# Patient Record
Sex: MALE | Race: WHITE | Employment: OTHER | URBAN - METROPOLITAN AREA
[De-identification: names, ages, dates, MRNs, and addresses within clinical notes are randomized per-mention and may not be internally consistent; named-entity substitution may affect disease eponyms.]

---

## 2017-01-04 ENCOUNTER — TRANSFERRED RECORDS (OUTPATIENT)
Dept: HEALTH INFORMATION MANAGEMENT | Facility: CLINIC | Age: 82
End: 2017-01-04

## 2017-01-19 ENCOUNTER — TELEPHONE (OUTPATIENT)
Dept: INTERNAL MEDICINE | Facility: CLINIC | Age: 82
End: 2017-01-19

## 2017-01-19 NOTE — TELEPHONE ENCOUNTER
[facility] called for verbal orders, pt private pays for SNV  Med recon, vitals, dietary d/t parkinson    Verbal orders given per protocol  Sheila Mancia RN

## 2017-03-02 ENCOUNTER — OFFICE VISIT (OUTPATIENT)
Dept: INTERNAL MEDICINE | Facility: CLINIC | Age: 82
End: 2017-03-02
Payer: MEDICARE

## 2017-03-02 VITALS
BODY MASS INDEX: 25.43 KG/M2 | HEART RATE: 71 BPM | SYSTOLIC BLOOD PRESSURE: 126 MMHG | HEIGHT: 66 IN | OXYGEN SATURATION: 98 % | WEIGHT: 158.2 LBS | DIASTOLIC BLOOD PRESSURE: 76 MMHG | TEMPERATURE: 98.1 F

## 2017-03-02 DIAGNOSIS — L30.9 DERMATITIS: Primary | ICD-10-CM

## 2017-03-02 DIAGNOSIS — I10 ESSENTIAL HYPERTENSION WITH GOAL BLOOD PRESSURE LESS THAN 140/90: ICD-10-CM

## 2017-03-02 PROCEDURE — 99213 OFFICE O/P EST LOW 20 MIN: CPT | Performed by: INTERNAL MEDICINE

## 2017-03-02 RX ORDER — METOPROLOL SUCCINATE 25 MG/1
25 TABLET, EXTENDED RELEASE ORAL EVERY MORNING
Qty: 90 TABLET | Refills: 3 | Status: SHIPPED | OUTPATIENT
Start: 2017-03-02 | End: 2018-01-26

## 2017-03-02 RX ORDER — CLOBETASOL PROPIONATE 0.5 MG/G
OINTMENT TOPICAL
Qty: 60 G | Refills: 3 | Status: SHIPPED | OUTPATIENT
Start: 2017-03-02 | End: 2018-01-26

## 2017-03-02 NOTE — PROGRESS NOTES
"  SUBJECTIVE:                                                    Chilango Echevarria is a 82 year old male who presents to clinic today for the following health issues:    Rash      Duration: x1 month    Description (location/character/radiation): on back    Intensity:  moderate    Accompanying signs and symptoms: None    History (similar episodes/previous evaluation): None    Precipitating or alleviating factors: None    Therapies tried and outcome: Eucerin cream     Rash pruritic- mostly at night  Pt has long standing hx of dermatitis - atopic and at times contact     Problem list and histories reviewed & adjusted, as indicated.  Additional history: as documented    Labs reviewed in EPIC    Reviewed and updated as needed this visit by clinical staff  Tobacco  Allergies  Meds  Problems       Reviewed and updated as needed this visit by Provider  Allergies  Meds  Problems         ROS:  Constitutional, HEENT, cardiovascular, pulmonary, gi and gu systems are negative, except as otherwise noted.    OBJECTIVE:                                                    /76 (BP Location: Right arm, Patient Position: Chair, Cuff Size: Adult Regular)  Pulse 71  Temp 98.1  F (36.7  C) (Oral)  Ht 5' 5.75\" (1.67 m)  Wt 158 lb 3.2 oz (71.8 kg)  SpO2 98%  BMI 25.73 kg/m2  Body mass index is 25.73 kg/(m^2).  GENERAL APPEARANCE: alert and no distress  SKIN: papular rash diffusely spread over upper back, some small vesicular lesions interdispersed along the lesions, some excoriations as well.     Diagnostic test results:  none      ASSESSMENT/PLAN:                                                    1. Dermatitis  Try topical - may be limited by ability to apply to his back  - DERMATOLOGY REFERRAL  - clobetasol (TEMOVATE) 0.05 % ointment; Apply sparingly to affected area twice daily.  Do not apply to face.  Dispense: 60 g; Refill: 3    2. Essential hypertension with goal blood pressure less than 140/90  - metoprolol (TOPROL-XL) " 25 MG 24 hr tablet; Take 1 tablet (25 mg) by mouth every morning  Dispense: 90 tablet; Refill: 3      Follow up with Provider - prn      Enrique Ring MD  [facility]

## 2017-03-02 NOTE — NURSING NOTE
"Chief Complaint   Patient presents with     Derm Problem     rash       Initial /76 (BP Location: Right arm, Patient Position: Chair, Cuff Size: Adult Regular)  Pulse 71  Temp 98.1  F (36.7  C) (Oral)  Ht 5' 5.75\" (1.67 m)  Wt 158 lb 3.2 oz (71.8 kg)  SpO2 98%  BMI 25.73 kg/m2 Estimated body mass index is 25.73 kg/(m^2) as calculated from the following:    Height as of this encounter: 5' 5.75\" (1.67 m).    Weight as of this encounter: 158 lb 3.2 oz (71.8 kg).  Medication Reconciliation: incomplete   Shannon Morris CMA ([facility])      "

## 2017-03-15 ENCOUNTER — TELEPHONE (OUTPATIENT)
Dept: INTERNAL MEDICINE | Facility: CLINIC | Age: 82
End: 2017-03-15

## 2017-03-15 NOTE — TELEPHONE ENCOUNTER
[facility] Nurse is calling requesting Re-cert of home care. Cont with private pay nurse visits as requested by pt for nextt 60 days.  Verbal OK given, per standing orders.  Form will be faxed to PCP to review, sign, and complete.

## 2017-04-04 NOTE — MR AVS SNAPSHOT
After Visit Summary   3/2/2017    Chilango Echevarria    MRN: [id number]           Patient Information     Date Of Birth          10/24/1934        Visit Information        Provider Department      3/2/2017 1:20 PM Enrique Ring MD [facility]        Today's Diagnoses     Dermatitis    -  1    Essential hypertension with goal blood pressure less than 140/90           Follow-ups after your visit        Additional Services     DERMATOLOGY REFERRAL       Your provider has referred you to: [facility]: [facility] [phone]   [email or web address]    Please be aware that coverage of these services is subject to the terms and limitations of your health insurance plan.  Call member services at your health plan with any benefit or coverage questions.      Please bring the following with you to your appointment:    (1) Any X-Rays, CTs or MRIs which have been performed.  Contact the facility where they were done to arrange for  prior to your scheduled appointment.    (2) List of current medications  (3) This referral request   (4) Any documents/labs given to you for this referral                  Who to contact     If you have questions or need follow up information about today's clinic visit or your schedule please contact [facility] directly at [phone].  Normal or non-critical lab and imaging results will be communicated to you by MyChart, letter or phone within 4 business days after the clinic has received the results. If you do not hear from us within 7 days, please contact the clinic through MyChart or phone. If you have a critical or abnormal lab result, we will notify you by phone as soon as possible.  Submit refill requests through [employer] or call your pharmacy and they will forward the refill request to us. Please allow 3 business days for your refill to be completed.           "Additional Information About Your Visit        MyChart Information     "InvierteMe,SL" lets you send messages to your doctor, view your test results, renew your prescriptions, schedule appointments and more. To sign up, go to [email or web address] . Click on \"Log in\" on the left side of the screen, which will take you to the Welcome page. Then click on \"Sign up Now\" on the right side of the page.     You will be asked to enter the access code listed below, as well as some personal information. Please follow the directions to create your username and password.     Your access code is: [email or web address]  Expires: 2017  2:02 PM     Your access code will  in 90 days. If you need help or a new code, please call your [place] clinic or [phone].        Care EveryWhere ID     This is your Care EveryWhere ID. This could be used by other organizations to access your [place] medical records  [id number]        Your Vitals Were     Pulse Temperature Height Pulse Oximetry BMI (Body Mass Index)       71 98.1  F (36.7  C) (Oral) 5' 5.75\" (1.67 m) 98% 25.73 kg/m2        Blood Pressure from Last 3 Encounters:   17 126/76   16 124/82   16 115/80    Weight from Last 3 Encounters:   17 158 lb 3.2 oz (71.8 kg)   16 150 lb 6.4 oz (68.2 kg)   16 150 lb (68 kg)              We Performed the Following     DERMATOLOGY REFERRAL          Today's Medication Changes          These changes are accurate as of: 3/2/17  2:02 PM.  If you have any questions, ask your nurse or doctor.               These medicines have changed or have updated prescriptions.        Dose/Directions    * clobetasol 0.05 % cream   Commonly known as:  TEMOVATE   This may have changed:  Another medication with the same name was added. Make sure you understand how and when to take each.   Used for:  Essential hypertension with goal blood pressure less than 140/90   Changed by:  Enrique Ring MD        CHARBEL TOPICALLY BID PRN "   Refills:  5       * clobetasol 0.05 % cream   Commonly known as:  TEMOVATE   This may have changed:  Another medication with the same name was added. Make sure you understand how and when to take each.   Used for:  Contact dermatitis, unspecified contact dermatitis type, unspecified trigger   Changed by:  Enrique Ring MD        apply two times daily if needed.   Quantity:  30 g   Refills:  5       * clobetasol 0.05 % ointment   Commonly known as:  TEMOVATE   This may have changed:  You were already taking a medication with the same name, and this prescription was added. Make sure you understand how and when to take each.   Used for:  Dermatitis   Changed by:  Enrique Ring MD        Apply sparingly to affected area twice daily.  Do not apply to face.   Quantity:  60 g   Refills:  3       * Notice:  This list has 3 medication(s) that are the same as other medications prescribed for you. Read the directions carefully, and ask your doctor or other care provider to review them with you.      Stop taking these medicines if you haven't already. Please contact your care team if you have questions.     fluticasone 50 MCG/ACT spray   Commonly known as:  FLONASE   Stopped by:  Enrique Ring MD           ketotifen 0.025 % Soln ophthalmic solution   Commonly known as:  ZADITOR   Stopped by:  Enrique Ring MD           loratadine 10 MG tablet   Commonly known as:  CLARITIN   Stopped by:  Enrique Ring MD           psyllium 58.6 % Powd   Commonly known as:  METAMUCIL SMOOTH TEXTURE   Stopped by:  Enrique Ring MD           ranitidine 150 MG tablet   Commonly known as:  ZANTAC   Stopped by:  Enrique Ring MD                Where to get your medicines      These medications were sent to [id number] [address] AT [facility] [address]  [address] [facility] [address], [facility] [address]     Phone:  [phone]     clobetasol 0.05 % ointment    metoprolol  25 MG 24 hr tablet                Primary Care Provider Office Phone # Fax #    Enrique Ring [phone]       [facility] [address] [facility] [address]        Thank you!     Thank you for choosing [facility]  for your care. Our goal is always to provide you with excellent care. Hearing back from our patients is one way we can continue to improve our services. Please take a few minutes to complete the written survey that you may receive in the mail after your visit with us. Thank you!             Your Updated Medication List - Protect others around you: Learn how to safely use, store and throw away your medicines at www.disposemymeds.org.          This list is accurate as of: 3/2/17  2:02 PM.  Always use your most recent med list.                   Brand Name Dispense Instructions for use    allopurinol 100 MG tablet    ZYLOPRIM    90 tablet    Take 1 tablet (100 mg) by mouth daily       aspirin 81 MG tablet      1 tab po QD (Once per day)       bisacodyl 5 MG EC tablet    DULCOLAX     Take 1 tablet (5 mg) by mouth daily as needed for constipation       * clobetasol 0.05 % cream    TEMOVATE     CHARBEL TOPICALLY BID PRN       * clobetasol 0.05 % cream    TEMOVATE    30 g    apply two times daily if needed.       * clobetasol 0.05 % ointment    TEMOVATE    60 g    Apply sparingly to affected area twice daily.  Do not apply to face.       ENSURE PLUS Liqd      Take 240 mLs by mouth daily       metoprolol 25 MG 24 hr tablet    TOPROL-XL    90 tablet    Take 1 tablet (25 mg) by mouth every morning       omeprazole 20 MG CR capsule    priLOSEC    90 capsule    Take  by mouth. TAKE 30'-60' BEFORE 1ST MEAL DAILY       polyethylene glycol powder    MIRALAX    510 g    Take 17 g by mouth daily as needed for constipation (1 capful)       * Notice:  This list has 3 medication(s) that are the same as other medications prescribed for you. Read the directions  carefully, and ask your doctor or other care provider to review them with you.       no

## 2017-05-22 DIAGNOSIS — Z53.9 DIAGNOSIS NOT YET DEFINED: Primary | ICD-10-CM

## 2017-05-22 PROCEDURE — G0180 MD CERTIFICATION HHA PATIENT: HCPCS | Performed by: INTERNAL MEDICINE

## 2017-06-09 DIAGNOSIS — M1A.9XX0 CHRONIC GOUT WITHOUT TOPHUS, UNSPECIFIED CAUSE, UNSPECIFIED SITE: Primary | ICD-10-CM

## 2017-06-09 DIAGNOSIS — I10 ESSENTIAL HYPERTENSION: ICD-10-CM

## 2017-06-09 RX ORDER — ALLOPURINOL 100 MG/1
TABLET ORAL
Qty: 90 TABLET | Refills: 1 | Status: SHIPPED | OUTPATIENT
Start: 2017-06-09 | End: 2017-12-18

## 2017-06-09 NOTE — TELEPHONE ENCOUNTER
Routing refill request to provider for review/approval because:  Labs not current:  Uric acid

## 2017-06-09 NOTE — TELEPHONE ENCOUNTER
allopurinol       Last Written Prescription Date: 11/16/16  Last Fill Quantity: 90, # refills: 1  Last Office Visit with [facility], P or [facility] prescribing provider:  03/02/17        Uric Acid   Date Value Ref Range Status   06/16/2010 6.3 3.5 - 8.5 mg/dL Final   ]  Creatinine   Date Value Ref Range Status   05/26/2015 0.73 mg/dL Final   ]  Lab Results   Component Value Date    WBC 7.4 05/17/2015     Lab Results   Component Value Date    RBC 3.52 05/17/2015     Lab Results   Component Value Date    HGB 10.8 05/26/2015    HGB 10.8 05/26/2015     Lab Results   Component Value Date    HCT 33.6 05/17/2015     No components found for: MCT  Lab Results   Component Value Date    MCV 96 05/17/2015     Lab Results   Component Value Date    MCH 32.1 05/17/2015     Lab Results   Component Value Date    MCHC 33.6 05/17/2015     Lab Results   Component Value Date    RDW 14.1 05/17/2015     Lab Results   Component Value Date     05/20/2015     Lab Results   Component Value Date    AST 20 05/18/2015     Lab Results   Component Value Date    ALT 24 05/18/2015

## 2017-07-13 ENCOUNTER — TELEPHONE (OUTPATIENT)
Dept: NURSING | Facility: CLINIC | Age: 82
End: 2017-07-13

## 2017-07-19 DIAGNOSIS — Z53.9 DIAGNOSIS NOT YET DEFINED: Primary | ICD-10-CM

## 2017-07-19 PROCEDURE — G0179 MD RECERTIFICATION HHA PT: HCPCS | Performed by: INTERNAL MEDICINE

## 2017-10-04 DIAGNOSIS — M10.9 GOUT: ICD-10-CM

## 2017-10-04 DIAGNOSIS — K21.9 GASTROESOPHAGEAL REFLUX DISEASE WITHOUT ESOPHAGITIS: ICD-10-CM

## 2017-10-04 NOTE — TELEPHONE ENCOUNTER
Omeprazole      Last Written Prescription Date: 11/16/16  Last Fill Quantity: 90,  # refills: 2   Last Office Visit with [facility], P or [facility] prescribing provider: 03/02/17; last OV discussing GERD: >1 year ago    Routing refill request to provider for review/approval because:  Last OV discussing GERD >1 year ago    Please advise, thanks.

## 2017-10-17 PROCEDURE — G0179 MD RECERTIFICATION HHA PT: HCPCS | Performed by: INTERNAL MEDICINE

## 2017-12-18 DIAGNOSIS — M1A.9XX0 CHRONIC GOUT WITHOUT TOPHUS, UNSPECIFIED CAUSE, UNSPECIFIED SITE: ICD-10-CM

## 2017-12-18 NOTE — LETTER
[facility]  [address] [id number] [phone]            Chilango Echevarria  [address] [place] [address]  [id number]  [facility] [address]        December 19, 2017    Dear Chilango,    While refilling your prescription today, we noticed that you are due to have labs drawn.  We will refill your prescription for 30 days, but a follow-up appointment must be made before any additional refills can be approved.     Taking care of your health is important to us and we look forward to seeing you in the near future.  Please call us at [phone] or [phone] (or use Quixhop) to schedule an appointment.     Please disregard this notice if you have already made an appointment.    Sincerely,        [facility]

## 2017-12-19 RX ORDER — ALLOPURINOL 100 MG/1
TABLET ORAL
Qty: 30 TABLET | Refills: 0 | Status: SHIPPED | OUTPATIENT
Start: 2017-12-19 | End: 2018-01-26

## 2018-01-10 ENCOUNTER — TELEPHONE (OUTPATIENT)
Dept: NURSING | Facility: CLINIC | Age: 83
End: 2018-01-10

## 2018-01-10 NOTE — TELEPHONE ENCOUNTER
Parveen, nurse from [facility], calling with request for orders.  Patient is requesting to have one private pay nurse visit within the next 60 days.  Verbal consent given per standing nurse orders.  Hawa Srivastava RN

## 2018-01-18 DIAGNOSIS — Z53.9 DIAGNOSIS NOT YET DEFINED: Primary | ICD-10-CM

## 2018-01-18 PROCEDURE — G0179 MD RECERTIFICATION HHA PT: HCPCS | Performed by: INTERNAL MEDICINE

## 2018-01-26 ENCOUNTER — OFFICE VISIT (OUTPATIENT)
Dept: INTERNAL MEDICINE | Facility: CLINIC | Age: 83
End: 2018-01-26
Payer: MEDICARE

## 2018-01-26 VITALS
SYSTOLIC BLOOD PRESSURE: 120 MMHG | RESPIRATION RATE: 16 BRPM | BODY MASS INDEX: 24.46 KG/M2 | TEMPERATURE: 97.7 F | DIASTOLIC BLOOD PRESSURE: 60 MMHG | HEART RATE: 80 BPM | HEIGHT: 66 IN | WEIGHT: 152.2 LBS

## 2018-01-26 DIAGNOSIS — K21.9 GASTROESOPHAGEAL REFLUX DISEASE WITHOUT ESOPHAGITIS: ICD-10-CM

## 2018-01-26 DIAGNOSIS — I10 ESSENTIAL HYPERTENSION: ICD-10-CM

## 2018-01-26 DIAGNOSIS — L30.9 DERMATITIS: ICD-10-CM

## 2018-01-26 DIAGNOSIS — M1A.9XX0 CHRONIC GOUT WITHOUT TOPHUS, UNSPECIFIED CAUSE, UNSPECIFIED SITE: ICD-10-CM

## 2018-01-26 DIAGNOSIS — I10 ESSENTIAL HYPERTENSION WITH GOAL BLOOD PRESSURE LESS THAN 140/90: ICD-10-CM

## 2018-01-26 DIAGNOSIS — G20.A1 PARKINSON'S DISEASE (H): Primary | ICD-10-CM

## 2018-01-26 LAB
ANION GAP SERPL CALCULATED.3IONS-SCNC: 6 MMOL/L (ref 3–14)
BUN SERPL-MCNC: 17 MG/DL (ref 7–30)
CALCIUM SERPL-MCNC: 8.2 MG/DL (ref 8.5–10.1)
CHLORIDE SERPL-SCNC: 106 MMOL/L (ref 94–109)
CO2 SERPL-SCNC: 27 MMOL/L (ref 20–32)
CREAT SERPL-MCNC: 0.85 MG/DL (ref 0.66–1.25)
GFR SERPL CREATININE-BSD FRML MDRD: 86 ML/MIN/1.7M2
GLUCOSE SERPL-MCNC: 86 MG/DL (ref 70–99)
POTASSIUM SERPL-SCNC: 3.9 MMOL/L (ref 3.4–5.3)
SODIUM SERPL-SCNC: 139 MMOL/L (ref 133–144)
URATE SERPL-MCNC: 3.6 MG/DL (ref 3.5–7.2)

## 2018-01-26 PROCEDURE — 99214 OFFICE O/P EST MOD 30 MIN: CPT | Performed by: INTERNAL MEDICINE

## 2018-01-26 PROCEDURE — 36415 COLL VENOUS BLD VENIPUNCTURE: CPT | Performed by: INTERNAL MEDICINE

## 2018-01-26 PROCEDURE — 84550 ASSAY OF BLOOD/URIC ACID: CPT | Performed by: INTERNAL MEDICINE

## 2018-01-26 PROCEDURE — 80048 BASIC METABOLIC PNL TOTAL CA: CPT | Performed by: INTERNAL MEDICINE

## 2018-01-26 RX ORDER — CLOBETASOL PROPIONATE 0.5 MG/G
OINTMENT TOPICAL
Qty: 60 G | Refills: 11 | Status: SHIPPED | OUTPATIENT
Start: 2018-01-26 | End: 2018-02-16

## 2018-01-26 RX ORDER — ALLOPURINOL 100 MG/1
TABLET ORAL
Qty: 90 TABLET | Refills: 3 | Status: SHIPPED | OUTPATIENT
Start: 2018-01-26 | End: 2019-01-30

## 2018-01-26 RX ORDER — METOPROLOL SUCCINATE 25 MG/1
25 TABLET, EXTENDED RELEASE ORAL EVERY MORNING
Qty: 90 TABLET | Refills: 3 | Status: SHIPPED | OUTPATIENT
Start: 2018-01-26 | End: 2018-10-26

## 2018-01-26 NOTE — LETTER
February 1, 2018      Chilango Echevarria  [address] [place] [address]  [id number]  [facility] [address]        Dear Wale,    We are writing to inform you of your test results.    Your test results fall within the expected range(s) or remain unchanged from previous results.  Please continue with current treatment plan.    Resulted Orders   Basic metabolic panel   Result Value Ref Range    Sodium 139 133 - 144 mmol/L    Potassium 3.9 3.4 - 5.3 mmol/L    Chloride 106 94 - 109 mmol/L    Carbon Dioxide 27 20 - 32 mmol/L    Anion Gap 6 3 - 14 mmol/L    Glucose 86 70 - 99 mg/dL    Urea Nitrogen 17 7 - 30 mg/dL    Creatinine 0.85 0.66 - 1.25 mg/dL    GFR Estimate 86 >60 mL/min/1.7m2      Comment:      Non  GFR Calc    GFR Estimate If Black >90 >60 mL/min/1.7m2      Comment:       GFR Calc    Calcium 8.2 (L) 8.5 - 10.1 mg/dL   Uric acid   Result Value Ref Range    Uric Acid 3.6 3.5 - 7.2 mg/dL       If you have any questions or concerns, please call the clinic at the number listed above.       Sincerely,        Enrique Ring MD

## 2018-01-26 NOTE — NURSING NOTE
"Chief Complaint   Patient presents with     Medication Follow-up       Initial /60  Pulse 80  Temp 97.7  F (36.5  C)  Resp 16  Ht 5' 5.75\" (1.67 m)  Wt 152 lb 3.2 oz (69 kg)  BMI 24.75 kg/m2 Estimated body mass index is 24.75 kg/(m^2) as calculated from the following:    Height as of this encounter: 5' 5.75\" (1.67 m).    Weight as of this encounter: 152 lb 3.2 oz (69 kg).  Medication Reconciliation: complete     Dana Holman, CATHI      "

## 2018-01-26 NOTE — MR AVS SNAPSHOT
"              After Visit Summary   1/26/2018    Chilango Echevarria    MRN: [id number]           Patient Information     Date Of Birth          10/24/1934        Visit Information        Provider Department      1/26/2018 1:00 PM Enrique Ring MD [facility]        Today's Diagnoses     Parkinson's disease (H)    -  1    Chronic gout without tophus, unspecified cause, unspecified site        Essential hypertension with goal blood pressure less than 140/90        Gastroesophageal reflux disease without esophagitis        Dermatitis        Essential hypertension           Follow-ups after your visit        Who to contact     If you have questions or need follow up information about today's clinic visit or your schedule please contact [facility] directly at [phone].  Normal or non-critical lab and imaging results will be communicated to you by MyChart, letter or phone within 4 business days after the clinic has received the results. If you do not hear from us within 7 days, please contact the clinic through Any+Timeshart or phone. If you have a critical or abnormal lab result, we will notify you by phone as soon as possible.  Submit refill requests through [employer] or call your pharmacy and they will forward the refill request to us. Please allow 3 business days for your refill to be completed.          Additional Information About Your Visit        Any+Timeshart Information     [employer] lets you send messages to your doctor, view your test results, renew your prescriptions, schedule appointments and more. To sign up, go to [email or web address] . Click on \"Log in\" on the left side of the screen, which will take you to the Welcome page. Then click on \"Sign up Now\" on the right side of the page.     You will be asked to enter the access code listed below, as well as some personal information. Please follow the directions to create your username and password.     Your " "access code is: [email or web address]  Expires: 2018  1:35 PM     Your access code will  in 90 days. If you need help or a new code, please call your [place] clinic or [phone].        Care EveryWhere ID     This is your Care EveryWhere ID. This could be used by other organizations to access your [place] medical records  [id number]        Your Vitals Were     Pulse Temperature Respirations Height BMI (Body Mass Index)       80 97.7  F (36.5  C) 16 5' 5.75\" (1.67 m) 24.75 kg/m2        Blood Pressure from Last 3 Encounters:   18 120/60   17 126/76   16 124/82    Weight from Last 3 Encounters:   18 152 lb 3.2 oz (69 kg)   17 158 lb 3.2 oz (71.8 kg)   16 150 lb 6.4 oz (68.2 kg)              We Performed the Following     Basic metabolic panel     Uric acid          Today's Medication Changes          These changes are accurate as of 18  1:35 PM.  If you have any questions, ask your nurse or doctor.               These medicines have changed or have updated prescriptions.        Dose/Directions    allopurinol 100 MG tablet   Commonly known as:  ZYLOPRIM   This may have changed:  See the new instructions.   Used for:  Chronic gout without tophus, unspecified cause, unspecified site        TAKE 1 TABLET(100 MG) BY MOUTH DAILY   Quantity:  90 tablet   Refills:  3       clobetasol 0.05 % ointment   Commonly known as:  TEMOVATE   This may have changed:  Another medication with the same name was removed. Continue taking this medication, and follow the directions you see here.   Used for:  Dermatitis        Apply sparingly to affected area twice daily.  Do not apply to face.   Quantity:  60 g   Refills:  11       omeprazole 20 MG CR capsule   Commonly known as:  priLOSEC   This may have changed:  See the new instructions.   Used for:  Gastroesophageal reflux disease without esophagitis        TAKE 1 CAPSULE BY MOUTH 30 TO 60 MINUTES BEFORE FIRST MEAL OF THE DAY   Quantity:  90 " capsule   Refills:  3            Where to get your medicines      These medications were sent to [id number] - [place], [place] [facility] [address] [facility] [address] [facility] [address]  [address], [facility] [address]     Phone:  [phone]     allopurinol 100 MG tablet    clobetasol 0.05 % ointment    metoprolol succinate 25 MG 24 hr tablet    omeprazole 20 MG CR capsule                Primary Care Provider Office Phone # Fax #    Enrique Ring [phone]       [address] [facility] [address]        Equal Access to Services     PRIYANKA GLASS : Hadii aad ku hadasho Sobenitezali, waaxda luqadaha, qaybta kaalmada adeegyada, drew lares. So [facility] [phone].    ATENCIÓN: Si habla español, tiene a burch disposición servicios gratuitos de asistencia lingüística. [facility] [phone].    We comply with applicable federal civil rights laws and [place] laws. We do not discriminate on the basis of race, color, national origin, age, disability, sex, sexual orientation, or gender identity.            Thank you!     Thank you for choosing [facility]  for your care. Our goal is always to provide you with excellent care. Hearing back from our patients is one way we can continue to improve our services. Please take a few minutes to complete the written survey that you may receive in the mail after your visit with us. Thank you!             Your Updated Medication List - Protect others around you: Learn how to safely use, store and throw away your medicines at www.disposemymeds.org.          This list is accurate as of 1/26/18  1:35 PM.  Always use your most recent med list.                   Brand Name Dispense Instructions for use Diagnosis    allopurinol 100 MG tablet    ZYLOPRIM    90 tablet    TAKE 1 TABLET(100 MG) BY MOUTH DAILY    Chronic gout without tophus, unspecified cause, unspecified site       aspirin 81 MG tablet      1  tab po QD (Once per day)        bisacodyl 5 MG EC tablet    DULCOLAX     Take 1 tablet (5 mg) by mouth daily as needed for constipation    Slow transit constipation       clobetasol 0.05 % ointment    TEMOVATE    60 g    Apply sparingly to affected area twice daily.  Do not apply to face.    Dermatitis       ENSURE PLUS Liqd      Take 240 mLs by mouth daily        metoprolol succinate 25 MG 24 hr tablet    TOPROL-XL    90 tablet    Take 1 tablet (25 mg) by mouth every morning    Essential hypertension with goal blood pressure less than 140/90       omeprazole 20 MG CR capsule    priLOSEC    90 capsule    TAKE 1 CAPSULE BY MOUTH 30 TO 60 MINUTES BEFORE FIRST MEAL OF THE DAY    Gastroesophageal reflux disease without esophagitis       polyethylene glycol powder    MIRALAX    510 g    Take 17 g by mouth daily as needed for constipation (1 capful)    Constipation

## 2018-01-26 NOTE — PROGRESS NOTES
"  SUBJECTIVE:   Chilango Echevarria is a 83 year old male who presents to clinic today for the following health issues:    Hypertension  BP Readings from Last 3 Encounters:   01/26/18 120/60   03/02/17 126/76   07/21/16 124/82        Gout- reports no episodes in last year    Parkinson's- has deferred rx and denies any falls or significant problems with iADLs    Problem list and histories reviewed & adjusted, as indicated.  Additional history: as documented    Labs reviewed in EPIC    Reviewed and updated as needed this visit by clinical staff  Allergies       Reviewed and updated as needed this visit by Provider         ROS:  Constitutional, HEENT, cardiovascular, pulmonary, gi and gu systems are negative, except as otherwise noted.    OBJECTIVE:                                                    /60  Pulse 80  Temp 97.7  F (36.5  C)  Resp 16  Ht 5' 5.75\" (1.67 m)  Wt 152 lb 3.2 oz (69 kg)  BMI 24.75 kg/m2  Body mass index is 24.75 kg/(m^2).  GENERAL APPEARANCE: alert, no distress  HENT: ear canals and TM's normal and nose and mouth without ulcers or lesions  NECK: no adenopathy, no asymmetry, masses, or scars and thyroid normal to palpation  RESP: lungs clear to auscultation - no rales, rhonchi or wheezes  CV: regular rates and rhythm, normal S1 S2, no S3 or S4 and no murmur, click or rub  MS: extremities normal- no gross deformities noted  SKIN: no suspicious lesions or rashes  Neuro: normal get up and go test. Resting tremor. No significant cogwheel rigidity  Diagnostic test results:  Results for orders placed or performed in visit on 01/26/18 (from the past 24 hour(s))   Basic metabolic panel   Result Value Ref Range    Sodium 139 133 - 144 mmol/L    Potassium 3.9 3.4 - 5.3 mmol/L    Chloride 106 94 - 109 mmol/L    Carbon Dioxide 27 20 - 32 mmol/L    Anion Gap 6 3 - 14 mmol/L    Glucose 86 70 - 99 mg/dL    Urea Nitrogen 17 7 - 30 mg/dL    Creatinine 0.85 0.66 - 1.25 mg/dL    GFR Estimate 86 >60 " mL/min/1.7m2    GFR Estimate If Black >90 >60 mL/min/1.7m2    Calcium 8.2 (L) 8.5 - 10.1 mg/dL   Uric acid   Result Value Ref Range    Uric Acid 3.6 3.5 - 7.2 mg/dL        ASSESSMENT/PLAN:                                                    1. Parkinson's disease (H)  Cont monitor.     2. Chronic gout without tophus, unspecified cause, unspecified site  Well controlled  - allopurinol (ZYLOPRIM) 100 MG tablet; TAKE 1 TABLET(100 MG) BY MOUTH DAILY  Dispense: 90 tablet; Refill: 3  - Basic metabolic panel  - Uric acid    3. Essential hypertension with goal blood pressure less than 140/90  Well controlled  - metoprolol succinate (TOPROL-XL) 25 MG 24 hr tablet; Take 1 tablet (25 mg) by mouth every morning  Dispense: 90 tablet; Refill: 3    4. Gastroesophageal reflux disease without esophagitis  - omeprazole (PRILOSEC) 20 MG CR capsule; TAKE 1 CAPSULE BY MOUTH 30 TO 60 MINUTES BEFORE FIRST MEAL OF THE DAY  Dispense: 90 capsule; Refill: 3    5. Dermatitis  - clobetasol (TEMOVATE) 0.05 % ointment; Apply sparingly to affected area twice daily.  Do not apply to face.  Dispense: 60 g; Refill: 11        Follow up with Provider - 6 mo      Enrique Ring MD  [facility]

## 2018-02-15 ENCOUNTER — TELEPHONE (OUTPATIENT)
Dept: INTERNAL MEDICINE | Facility: CLINIC | Age: 83
End: 2018-02-15

## 2018-02-15 DIAGNOSIS — L30.9 DERMATITIS: ICD-10-CM

## 2018-02-15 NOTE — TELEPHONE ENCOUNTER
Patient called and stated he needed a replacement for his clobetasol (TEMOVATE) 0.05 % ointment. Insurance will cover Triamcinolone. Patient is wanting a hard copy of the rx mailed to his home address on file. Please give pt a call once rx has been approved and mailed.

## 2018-02-16 RX ORDER — TRIAMCINOLONE ACETONIDE 1 MG/G
OINTMENT TOPICAL 2 TIMES DAILY
Qty: 30 G | Refills: 11 | Status: SHIPPED | OUTPATIENT
Start: 2018-02-16 | End: 2019-03-25

## 2018-02-20 DIAGNOSIS — Z53.9 DIAGNOSIS NOT YET DEFINED: Primary | ICD-10-CM

## 2018-03-14 ENCOUNTER — DOCUMENTATION ONLY (OUTPATIENT)
Dept: CARE COORDINATION | Facility: CLINIC | Age: 83
End: 2018-03-14

## 2018-03-14 NOTE — PROGRESS NOTES
[facility] now requests orders and shares plan of care/discharge summaries for some patients through [employer].  Please REPLY TO THIS MESSAGE in order to give authorization for orders when needed.  This is considered a verbal order, you will still receive a faxed copy of orders for signature.  Thank you for your assistance in improving collaboration for our patients.    ORDER//    Hourly RN/LPN visits 0 to 24 hrs/day as requested by pt. May increase or decrease as needed for 60 day recertification of home care services.     Thanks!

## 2018-04-19 DIAGNOSIS — Z53.9 DIAGNOSIS NOT YET DEFINED: Primary | ICD-10-CM

## 2018-04-19 PROCEDURE — G0179 MD RECERTIFICATION HHA PT: HCPCS | Performed by: INTERNAL MEDICINE

## 2018-05-09 ENCOUNTER — DOCUMENTATION ONLY (OUTPATIENT)
Dept: CARE COORDINATION | Facility: CLINIC | Age: 83
End: 2018-05-09

## 2018-05-09 NOTE — PROGRESS NOTES
[facility] now requests orders and shares plan of care/discharge summaries for some patients through [employer].  Please REPLY TO THIS MESSAGE in order to give authorization for orders when needed.  This is considered a verbal order, you will still receive a faxed copy of orders for signature.  Thank you for your assistance in improving collaboration for our patients.    ORDER    60 day recert of private pay home care services.     Pt requesting to continue with hourly RN/LPN 0 to 24 hrs/day as requested, anticipating every other month wellness checks/ nail care.   No changes in plan of care.     Thanks!

## 2018-07-19 ENCOUNTER — DOCUMENTATION ONLY (OUTPATIENT)
Dept: CARE COORDINATION | Facility: CLINIC | Age: 83
End: 2018-07-19

## 2018-07-19 NOTE — PROGRESS NOTES
[facility] now requests orders and shares plan of care/discharge summaries for some patients through [employer].  Please REPLY TO THIS MESSAGE OR ROUTE BACK TO THE AUTHOR in order to give authorization for orders when needed.  This is considered a verbal order, you will still receive a faxed copy of orders for signature.  Thank you for your assistance in improving collaboration for our patients.    ORDER    Hourly nursing 0 to 24 hrs/day as requested, may increase or decrease as needed. Private pay.    Summary to MD//  Vitals//Afebrile, P 66, RR 18, /60, Sp02 98 pct on room air.   Situation// 60 day recertification for continued home care services through [facility].  Background// 83 year old male with diagnosis of htn, parkinsons and aortic stenosis. Pt lives alone, friends in apartment building that are willing to assist him. Has several family members in another state that he communicates with and 1 brother.  Pt is not homebound, drives still. Patient alert and oriented x4, pleasant. No change in appetite. Drinks ensure 2-3x weekly and approx 2 to 3 beers daily, reinforced need to increase water intake. Denies falls. Denies SOB/cough. LSCTA. Skin CDI. Ambulates and transfers independently. Independent with medications. Continent of bowel and bladder.  Denies any new issues with bowel and bladder, miralax and fiber tab used regularly. No significant changes in past cert period. Requesting ongoing nurse visits for nail care to feet every other month.   Analysis// Would benefit from supportive services as requested to promote safety and independence in home setting.   Recommendation// Plan is to continue with private pay hourly nurse visits 0 to 24 hrs/day as requested by patient to assess weight, nail care, nutrition and for general assessment. Estimated need for homecare is ongoing. Thank you.

## 2018-08-06 PROCEDURE — G0179 MD RECERTIFICATION HHA PT: HCPCS | Performed by: INTERNAL MEDICINE

## 2018-09-06 ENCOUNTER — TELEPHONE (OUTPATIENT)
Dept: INTERNAL MEDICINE | Facility: CLINIC | Age: 83
End: 2018-09-06

## 2018-09-13 ENCOUNTER — TRANSFERRED RECORDS (OUTPATIENT)
Dept: HEALTH INFORMATION MANAGEMENT | Facility: CLINIC | Age: 83
End: 2018-09-13

## 2018-10-26 ENCOUNTER — OFFICE VISIT (OUTPATIENT)
Dept: INTERNAL MEDICINE | Facility: CLINIC | Age: 83
End: 2018-10-26
Payer: MEDICARE

## 2018-10-26 VITALS
DIASTOLIC BLOOD PRESSURE: 76 MMHG | SYSTOLIC BLOOD PRESSURE: 138 MMHG | BODY MASS INDEX: 23.58 KG/M2 | TEMPERATURE: 97.7 F | RESPIRATION RATE: 14 BRPM | HEART RATE: 62 BPM | OXYGEN SATURATION: 97 % | WEIGHT: 145 LBS

## 2018-10-26 DIAGNOSIS — K59.01 SLOW TRANSIT CONSTIPATION: ICD-10-CM

## 2018-10-26 DIAGNOSIS — I10 ESSENTIAL HYPERTENSION: ICD-10-CM

## 2018-10-26 DIAGNOSIS — I10 ESSENTIAL HYPERTENSION WITH GOAL BLOOD PRESSURE LESS THAN 140/90: ICD-10-CM

## 2018-10-26 DIAGNOSIS — R42 DIZZINESS: Primary | ICD-10-CM

## 2018-10-26 DIAGNOSIS — G20.A1 PARKINSON'S DISEASE (H): ICD-10-CM

## 2018-10-26 DIAGNOSIS — R29.6 FALLS FREQUENTLY: ICD-10-CM

## 2018-10-26 DIAGNOSIS — M1A.9XX0 CHRONIC GOUT WITHOUT TOPHUS, UNSPECIFIED CAUSE, UNSPECIFIED SITE: ICD-10-CM

## 2018-10-26 LAB
ALBUMIN SERPL-MCNC: 3.5 G/DL (ref 3.4–5)
ALP SERPL-CCNC: 60 U/L (ref 40–150)
ALT SERPL W P-5'-P-CCNC: 10 U/L (ref 0–70)
ANION GAP SERPL CALCULATED.3IONS-SCNC: 7 MMOL/L (ref 3–14)
AST SERPL W P-5'-P-CCNC: 64 U/L (ref 0–45)
BILIRUB SERPL-MCNC: 0.7 MG/DL (ref 0.2–1.3)
BUN SERPL-MCNC: 14 MG/DL (ref 7–30)
CALCIUM SERPL-MCNC: 8.7 MG/DL (ref 8.5–10.1)
CHLORIDE SERPL-SCNC: 102 MMOL/L (ref 94–109)
CO2 SERPL-SCNC: 27 MMOL/L (ref 20–32)
CREAT SERPL-MCNC: 1.08 MG/DL (ref 0.66–1.25)
ERYTHROCYTE [DISTWIDTH] IN BLOOD BY AUTOMATED COUNT: 13.6 % (ref 10–15)
GFR SERPL CREATININE-BSD FRML MDRD: 65 ML/MIN/1.7M2
GLUCOSE SERPL-MCNC: 82 MG/DL (ref 70–99)
HCT VFR BLD AUTO: 40 % (ref 40–53)
HGB BLD-MCNC: 12.9 G/DL (ref 13.3–17.7)
MCH RBC QN AUTO: 31.9 PG (ref 26.5–33)
MCHC RBC AUTO-ENTMCNC: 32.3 G/DL (ref 31.5–36.5)
MCV RBC AUTO: 99 FL (ref 78–100)
PLATELET # BLD AUTO: 179 10E9/L (ref 150–450)
POTASSIUM SERPL-SCNC: 4.3 MMOL/L (ref 3.4–5.3)
PROT SERPL-MCNC: 7.1 G/DL (ref 6.8–8.8)
RBC # BLD AUTO: 4.04 10E12/L (ref 4.4–5.9)
SODIUM SERPL-SCNC: 136 MMOL/L (ref 133–144)
TSH SERPL DL<=0.005 MIU/L-ACNC: 1.14 MU/L (ref 0.4–4)
URATE SERPL-MCNC: 4.3 MG/DL (ref 3.5–7.2)
VIT B12 SERPL-MCNC: 311 PG/ML (ref 193–986)
WBC # BLD AUTO: 4.7 10E9/L (ref 4–11)

## 2018-10-26 PROCEDURE — 85027 COMPLETE CBC AUTOMATED: CPT | Performed by: INTERNAL MEDICINE

## 2018-10-26 PROCEDURE — 84443 ASSAY THYROID STIM HORMONE: CPT | Performed by: INTERNAL MEDICINE

## 2018-10-26 PROCEDURE — 84550 ASSAY OF BLOOD/URIC ACID: CPT | Performed by: INTERNAL MEDICINE

## 2018-10-26 PROCEDURE — 99214 OFFICE O/P EST MOD 30 MIN: CPT | Performed by: INTERNAL MEDICINE

## 2018-10-26 PROCEDURE — 82607 VITAMIN B-12: CPT | Performed by: INTERNAL MEDICINE

## 2018-10-26 PROCEDURE — 80053 COMPREHEN METABOLIC PANEL: CPT | Performed by: INTERNAL MEDICINE

## 2018-10-26 PROCEDURE — 36415 COLL VENOUS BLD VENIPUNCTURE: CPT | Performed by: INTERNAL MEDICINE

## 2018-10-26 RX ORDER — CARBIDOPA AND LEVODOPA 25; 100 MG/1; MG/1
2 TABLET ORAL 3 TIMES DAILY
COMMUNITY
Start: 2018-10-26 | End: 2021-01-01

## 2018-10-26 RX ORDER — METOPROLOL SUCCINATE 25 MG/1
12.5 TABLET, EXTENDED RELEASE ORAL EVERY MORNING
Qty: 90 TABLET | Refills: 0
Start: 2018-10-26 | End: 2020-01-17

## 2018-10-26 RX ORDER — CARBIDOPA AND LEVODOPA 25; 100 MG/1; MG/1
TABLET ORAL
Refills: 3 | COMMUNITY
Start: 2018-09-13 | End: 2018-10-26

## 2018-10-26 RX ORDER — CARBIDOPA AND LEVODOPA 25; 100 MG/1; MG/1
1 TABLET ORAL 3 TIMES DAILY
COMMUNITY
Start: 2018-10-26 | End: 2018-10-26

## 2018-10-26 NOTE — PATIENT INSTRUCTIONS
*  Dizziness upon standing up can be due to more variable blood pressure at times.  This can be made worse by not drinking enough water throughout the day.  This can be made worse with alcohol.      *  Reduce the Metoprolol to 12.5 mg once per day.  Take 1/2 of the 25 mg tablet.     *  Continue all other medications at the same doses.  Contact your usual pharmacy if you need refills.     *  Make sure that you try to eat regular meals, aim for three meals per day to get enough calories.     *  Stop the aspirin due to excessive and easier bruising.     *  Take Miralax more often to get the bowels moving better.    Take one capful three times per day until the BMs become loose, then reduce to once or twice per day depending on your bowelk patterns.     *  Continue Senokot tablet, 2 per day to help provide    *  Consider fiber supplement to help bulk the stool.      *  Home care referral to see about home care nurse    *  Return to see Dr. Ring in approximately 6-8 weeks, sooner if needed.  Please get nonfasting labs done in the [facility] lab 1-2 days before this appointment.  Call [phone] to schedule both appointments.

## 2018-10-26 NOTE — PROGRESS NOTES
SUBJECTIVE:   Chilango Echevarria is a 84 year old male who presents to clinic today for the following health issues:    Pt has fallen 2 times in past month. Last fall was 2 days ago on birthday.   Having lightheadedness when standing up. Goes away after a little bit.    Has not went to ER for fall.     Has known parkinsons disease, follwoed by neurology          Problem list and histories reviewed & adjusted, as indicated.  Additional history: as documented        Reviewed and updated as needed this visit by clinical staff  Tobacco  Allergies  Meds  Med Hx  Surg Hx  Fam Hx  Soc Hx      Reviewed and updated as needed this visit by Provider           Past Medical History:  ---------------------------  Past Medical History:   Diagnosis Date     Aortic stenosis     very mild     Ying esophagus      Contact dermatitis and other eczema, due to unspecified cause      Esophageal reflux      Gout, unspecified      Incarcerated hiatal hernia 5/21/2015     Inguinal hernia without mention of obstruction or gangrene, unilateral or unspecified, (not specified as recurrent)      Polymyalgia rheumatica (H)      Umbilical hernia without mention of obstruction or gangrene      Unspecified essential hypertension      Vasomotor rhinitis        Past Surgical History:  ---------------------------  Past Surgical History:   Procedure Laterality Date     C NONSPECIFIC PROCEDURE      T&A     C NONSPECIFIC PROCEDURE      umbilical herniorraphy     ESOPHAGOSCOPY, GASTROSCOPY, DUODENOSCOPY (EGD), COMBINED N/A 5/11/2015    Procedure: COMBINED ESOPHAGOSCOPY, GASTROSCOPY, DUODENOSCOPY (EGD);  Surgeon: Thad Ferro MD;  Location: [facility] OR     LAPAROSCOPIC ASSISTED INSERTION TUBE JEJUNOSTOMY N/A 5/14/2015    Procedure: LAPAROSCOPIC ASSISTED INSERTION TUBE JEJUNOSTOMY;  Surgeon: Thad Ferro MD;  Location: [facility] OR     LAPAROSCOPIC HERNIORRHAPHY HIATAL N/A 5/14/2015    Procedure: LAPAROSCOPIC HERNIORRHAPHY HIATAL;   Surgeon: Thad Ferro MD;  Location: [facility] OR     LAPAROSCOPIC HERNIORRHAPHY INGUINAL Right 5/14/2015    Procedure: LAPAROSCOPIC HERNIORRHAPHY INGUINAL;  Surgeon: Thad Ferro MD;  Location: [facility] OR     PICC INSERTION Right 5/11/2015    5fr DL Power PICC, 39cm (1cm external) in the R medial brachial vein w/ tip in the low SVC.       Current Medications:  ---------------------------  Current Outpatient Prescriptions   Medication Sig Dispense Refill     allopurinol (ZYLOPRIM) 100 MG tablet TAKE 1 TABLET(100 MG) BY MOUTH DAILY 90 tablet 3     ASPIRIN 81 MG OR TABS 1 tab po QD (Once per day)       bisacodyl (DULCOLAX) 5 MG EC tablet Take 1 tablet (5 mg) by mouth daily as needed for constipation       carbidopa-levodopa (SINEMET)  MG per tablet Unsure dose  3     metoprolol succinate (TOPROL-XL) 25 MG 24 hr tablet Take 1 tablet (25 mg) by mouth every morning 90 tablet 3     Nutritional Supplements (ENSURE PLUS) LIQD Take 240 mLs by mouth daily       omeprazole (PRILOSEC) 20 MG CR capsule TAKE 1 CAPSULE BY MOUTH 30 TO 60 MINUTES BEFORE FIRST MEAL OF THE DAY 90 capsule 3     polyethylene glycol (MIRALAX) powder Take 17 g by mouth daily as needed for constipation (1 capful) 510 g 1     triamcinolone (KENALOG) 0.1 % ointment Apply topically 2 times daily 30 g 11       Allergies:  -------------  Allergies   Allergen Reactions     Lisinopril      rash, exacerbation of eczema       Social History:  -------------------  Social History     Social History     Marital status: Single     Spouse name: N/A     Number of children: 1     Years of education: N/A     Occupational History     retired      Social History Main Topics     Smoking status: Never Smoker     Smokeless tobacco: Never Used     Alcohol use Yes      Comment: beer only, 3 bottles per day     Drug use: No     Sexual activity: Not Currently     Partners: Female     Other Topics Concern     Not on file     Social History Narrative       Family  Medical History:  ------------------------------  Family History   Problem Relation Age of Onset     Breast Cancer Mother      Diabetes Paternal Grandfather      Cancer Son 37     lung         ROS:  REVIEW OF SYSTEMS:    RESP: negative for cough, dyspnea, wheezing, hemoptysis  CV: negative for chest pain, palpitations, PND, [place], orthopnea  GI: negative for dysphagia, N/V, pain, melena, diarrhea and constipation  NEURO: negative for numbness/tingling, paralysis; POS for incoordination due to parkinsons     OBJECTIVE:                                                    /76  Pulse 62  Temp 97.7  F (36.5  C) (Oral)  Resp 14  Wt 145 lb (65.8 kg)  SpO2 97%  BMI 23.58 kg/m2     GENERAL alert and no distress  EYES:  Normal sclera,conjunctiva, EOMI  HENT: oral and posterior pharynx without lesions or erythema, facies symmetric  Mask like fascies  NECK: Neck supple. No LAD, without thyroidmegaly or JVD., Carotids without bruits.  RESP: Clear to ausculation bilaterally without wheezes or crackles. Normal BS in all fields.  CV: RRR normal S1S2 without murmurs, rubs or gallops. PMI normal  LYMPH: no cervical lymph adenopathy appreciated  EXT:  no edema  PSYCH: Alert and oriented times 3; speech- coherent  SKIN:  No obvious significant skin lesions on visible portions of face   NEURO:  Slower shuffling type gait initially, mild sog wheel rigidity       ASSESSMENT/PLAN:                                                      (R42) Dizziness  (primary encounter diagnosis)  Comment: could be mild orthostasis, reduce meds slightly.   OK to liberalize sodium intake  Plan: TSH with free T4 reflex, Vitamin B12, HOME CARE        NURSING REFERRAL, order for DME            (G20) Parkinson's disease (H)  Comment: has been followed by neurology.  Discussed the progressive natuer of this issue  Plan: CBC with platelets, Comprehensive metabolic         panel, Vitamin B12, carbidopa-levodopa         (SINEMET)  MG per tablet, CARE          COORDINATION REFERRAL, HOME CARE NURSING         REFERRAL, order for DME            (K59.01) Slow transit constipation  Comment: mirlax powder, drink more fluids.   Plan: TSH with free T4 reflex            (I10) Essential hypertension  Comment:   Plan:     (M1A.9XX0) Chronic gout without tophus, unspecified cause, unspecified site  Comment:   Plan: CBC with platelets, Comprehensive metabolic         panel, Uric acid            (I10) Essential hypertension with goal blood pressure less than 140/90  Comment:   Plan: metoprolol succinate (TOPROL-XL) 25 MG 24 hr         tablet            (R29.6) Falls frequently  Comment: concerned about safeety at home.  Not wearing his panic button.   Would like OT and PT home safety assessment.   Plan: CARE COORDINATION REFERRAL, HOME CARE NURSING         REFERRAL               See Patient Instructions    *  Dizziness upon standing up can be due to more variable blood pressure at times.  This can be made worse by not drinking enough water throughout the day.  This can be made worse with alcohol.      *  Reduce the Metoprolol to 12.5 mg once per day.  Take 1/2 of the 25 mg tablet.     *  Continue all other medications at the same doses.  Contact your usual pharmacy if you need refills.     *  Make sure that you try to eat regular meals, aim for three meals per day to get enough calories.     *  Stop the aspirin due to excessive and easier bruising.     *  Take Miralax more often to get the bowels moving better.    Take one capful three times per day until the BMs become loose, then reduce to once or twice per day depending on your bowelk patterns.     *  Continue Senokot tablet, 2 per day to help provide    *  Consider fiber supplement to help bulk the stool.      *  Home care referral to see about home care nurse    *  Return to see Dr. Ring in approximately 6-8 weeks, sooner if needed.  Please get nonfasting labs done in the [facility] lab 1-2 days before this appointment.   Call [phone] to schedule both appointments.       TATIANA CLEMENT M.D., MD  [facility]

## 2018-10-26 NOTE — MR AVS SNAPSHOT
After Visit Summary   10/26/2018    Chilango Echevarria    MRN: [id number]           Patient Information     Date Of Birth          10/24/1934        Visit Information        Provider Department      10/26/2018 3:00 PM David De Anda MD [facility]        Today's Diagnoses     Dizziness    -  1    Parkinson's disease (H)        Slow transit constipation        Essential hypertension        Chronic gout without tophus, unspecified cause, unspecified site        Essential hypertension with goal blood pressure less than 140/90        Falls frequently          Care Instructions    *  Dizziness upon standing up can be due to more variable blood pressure at times.  This can be made worse by not drinking enough water throughout the day.  This can be made worse with alcohol.      *  Reduce the Metoprolol to 12.5 mg once per day.  Take 1/2 of the 25 mg tablet.     *  Continue all other medications at the same doses.  Contact your usual pharmacy if you need refills.     *  Make sure that you try to eat regular meals, aim for three meals per day to get enough calories.     *  Stop the aspirin due to excessive and easier bruising.     *  Take Miralax more often to get the bowels moving better.    Take one capful three times per day until the BMs become loose, then reduce to once or twice per day depending on your bowelk patterns.     *  Continue Senokot tablet, 2 per day to help provide    *  Consider fiber supplement to help bulk the stool.      *  Home care referral to see about home care nurse    *  Return to see Dr. Ring in approximately 6-8 weeks, sooner if needed.  Please get nonfasting labs done in the [facility] lab 1-2 days before this appointment.  Call [phone] to schedule both appointments.           Follow-ups after your visit        Additional Services     CARE COORDINATION REFERRAL       Services are provided by a Care Coordinator for people with complex needs such  as: medical, social, or financial troubles.  The Care Coordinator works with the patient and their Primary Care Provider to determine health goals, obtain resources, achieve outcomes, and develop care plans that help coordinate the patient's care.     Reason for Referral: Patient/Caregiver Support: Home Safety evaluation, home care nurse for BP checks, home PT for gait evaluation, safety evalaution    Additional pertinent details:      Clinical Staff have discussed the Care Coordination Referral with the patient and/or caregiver: yes            HOME CARE NURSING REFERRAL       **Order classes of: [facility], [facility] and [facility] will route to the Home Care and Hospice Referral Pool.  Home Care or Hospice will then contact the patient to schedule their appointment.**    If you do not hear from Home Care and Hospice, or you would like to call to schedule, please call the referring place of service that your provider has listed below.  ______________________________________________________________________    Your provider has referred you to: [facility]: [facility] [phone]   [email or web address]    Extended Emergency Contact Information  Primary Emergency Contact: Marissa Echevarria   [facility]  Home Phone: [phone]  Work Phone: none  Mobile Phone: [phone]  Relation: Relative  Secondary Emergency Contact: Bela Torres   [facility]  Home Phone: [phone]  Work Phone: none  Mobile Phone: [phone]  Relation: Friend    Patient Anticipated Discharge Date: 10/26/2018    RN, PT, HHA to begin 24 - 48 hours after discharge.  PLEASE EVALUATE AND TREAT (Evaluation timeline is 24 - 48 hrs. Please call if there is need for a variance to this timeline).    REASON FOR REFERRAL: Assessment & Treatment: PT and RN and Fall Risk Assessment: Member indicated wants to use walker and Member indicates difficulty with chair     ADDITIONAL SERVICES  NEEDED: OT    OTHER PERTINENT INFORMATION: Patient was last seen by provider on 10/26/2018  for follow up recent falls.    Current Outpatient Prescriptions:  allopurinol (ZYLOPRIM) 100 MG tablet, TAKE 1 TABLET(100 MG) BY MOUTH DAILY, Disp: 90 tablet, Rfl: 3  ASPIRIN 81 MG OR TABS, 1 tab po QD (Once per day), Disp: , Rfl:   bisacodyl (DULCOLAX) 5 MG EC tablet, Take 1 tablet (5 mg) by mouth daily as needed for constipation, Disp: , Rfl:   carbidopa-levodopa (SINEMET)  MG per tablet, Take 1 tablet by mouth 3 times daily, Disp: , Rfl:   metoprolol succinate (TOPROL-XL) 25 MG 24 hr tablet, Take 0.5 tablets (12.5 mg) by mouth every morning, Disp: 90 tablet, Rfl: 0  Nutritional Supplements (ENSURE PLUS) LIQD, Take 240 mLs by mouth daily, Disp: , Rfl:   omeprazole (PRILOSEC) 20 MG CR capsule, TAKE 1 CAPSULE BY MOUTH 30 TO 60 MINUTES BEFORE FIRST MEAL OF THE DAY, Disp: 90 capsule, Rfl: 3  polyethylene glycol (MIRALAX) powder, Take 17 g by mouth daily as needed for constipation (1 capful), Disp: 510 g, Rfl: 1  triamcinolone (KENALOG) 0.1 % ointment, Apply topically 2 times daily, Disp: 30 g, Rfl: 11  [DISCONTINUED] carbidopa-levodopa (SINEMET)  MG per tablet, Unsure dose, Disp: , Rfl: 3  [DISCONTINUED] carbidopa-levodopa (SINEMET)  MG per tablet, Take 1 tablet by mouth 3 times daily Unsure dose, Disp: , Rfl:   [DISCONTINUED] metoprolol succinate (TOPROL-XL) 25 MG 24 hr tablet, Take 1 tablet (25 mg) by mouth every morning, Disp: 90 tablet, Rfl: 3      Patient Active Problem List:     Gout     Esophageal reflux     Aortic stenosis     CARDIOVASCULAR SCREENING; LDL GOAL LESS THAN 130     Vasomotor rhinitis     Advanced directives, counseling/discussion     Status post laparoscopic hernia repair-5/14/15     Dermatitis     HTN (hypertension)     Umbilical hernia     Right inguinal hernia     Health Care Home     Parkinson's disease (H)     Slow transit constipation      Documentation of Face to Face and  Certification for Home Health Services    I certify that patient, Chilango Echevarria is under my care and that I, or a Nurse Practitioner or Physician's Assistant working with me, had a face-to-face encounter that meets the physician face-to-face encounter requirements with this patient on: 10/26/2018 .    This encounter with the patient was in whole, or in part, for the following medical condition, which is the primary reason for Home Health Care:   P[arkinson, freqnet falls. .    I certify that, based on my findings, the following services are medically necessary Home Health Services: Nursing, Occupational Therapy and Physical Therapy    My clinical findings support the need for the above services because: Nurse is needed: To assess blood pressure after changes in medications or other medical regimen.., Occupational Therapy Services are needed to assess and treat cognitive ability and address ADL safety due to impairment in walking and frequent falls . and Physical Therapy Services are needed to assess and treat the following functional impairments: parkinsons disease, freqnet falls.    Further, I certify that my clinical findings support that this patient is homebound (i.e. absences from home require considerable and taxing effort and are for medical reasons or [facility] services or infrequently or of short duration when for other reasons) because: Requires assistance of another person or specialized equipment to access medical services because patient: Is unable to walk greater than 50 feet without rest..    Based on the above findings, I certify that this patient is confined to the home and needs intermittent skilled nursing care, physical therapy and/or speech therapy.  The patient is under my care, and I have initiated the establishment of the plan of care.  This patient will be followed by a physician who will periodically review the plan of care.    Physician/Provider to provide follow up care: María Elena  Enrique ANDERSON    Responsible [facility] certified Physician at time of discharge:     Enrique Ring    Please be aware that coverage of these services is subject to the terms and limitations of your health insurance plan.  Call member services at your health plan with any benefit or coverage questions.                  Who to contact     If you have questions or need follow up information about today's clinic visit or your schedule please contact [facility] directly at [phone].  Normal or non-critical lab and imaging results will be communicated to you by MyChart, letter or phone within 4 business days after the clinic has received the results. If you do not hear from us within 7 days, please contact the clinic through [employer] or phone. If you have a critical or abnormal lab result, we will notify you by phone as soon as possible.  Submit refill requests through [employer] or call your pharmacy and they will forward the refill request to us. Please allow 3 business days for your refill to be completed.          Additional Information About Your Visit        Care EveryWhere ID     This is your Care EveryWhere ID. This could be used by other organizations to access your [place] medical records  [id number]        Your Vitals Were     Pulse Temperature Respirations Pulse Oximetry BMI (Body Mass Index)       62 97.7  F (36.5  C) (Oral) 14 97% 23.58 kg/m2        Blood Pressure from Last 3 Encounters:   10/26/18 138/76   01/26/18 120/60   03/02/17 126/76    Weight from Last 3 Encounters:   10/26/18 145 lb (65.8 kg)   01/26/18 152 lb 3.2 oz (69 kg)   03/02/17 158 lb 3.2 oz (71.8 kg)              We Performed the Following     CARE COORDINATION REFERRAL     CBC with platelets     Comprehensive metabolic panel     HOME CARE NURSING REFERRAL     TSH with free T4 reflex     Uric acid     Vitamin B12          Today's Medication Changes          These changes are accurate as of 10/26/18  3:42 PM.  If you  have any questions, ask your nurse or doctor.               Start taking these medicines.        Dose/Directions    carbidopa-levodopa  MG per tablet   Commonly known as:  SINEMET   Used for:  Parkinson's disease (H)   Started by:  David De Anda MD        Dose:  1 tablet   Take 1 tablet by mouth 3 times daily   Refills:  0         These medicines have changed or have updated prescriptions.        Dose/Directions    metoprolol succinate 25 MG 24 hr tablet   Commonly known as:  TOPROL-XL   This may have changed:  how much to take   Used for:  Essential hypertension with goal blood pressure less than 140/90   Changed by:  David De Anda MD        Dose:  12.5 mg   Take 0.5 tablets (12.5 mg) by mouth every morning   Quantity:  90 tablet   Refills:  0         Stop taking these medicines if you haven't already. Please contact your care team if you have questions.     aspirin 81 MG tablet   Stopped by:  David De Anda MD                Where to get your medicines      Some of these will need a paper prescription and others can be bought over the counter.  Ask your nurse if you have questions.     You don't need a prescription for these medications     metoprolol succinate 25 MG 24 hr tablet                Primary Care Provider Office Phone # Fax #    Enrique Ring -[phone]       [address] [place] [address]        Equal Access to Services     PRIYANKA GLASS : Karyna hansono Sojinny, waaxda luqadaha, qaybta kaalmada adeegyada, drew lares. So [facility] [phone].    ATENCIÓN: Si habla español, tiene a burch disposición servicios gratuitos de asistencia lingüística. Llame al [phone].    We comply with applicable federal civil rights laws and [place] laws. We do not discriminate on the basis of race, color, national origin, age, disability, sex, sexual orientation, or gender identity.            Thank you!     Thank  you for choosing [facility]  for your care. Our goal is always to provide you with excellent care. Hearing back from our patients is one way we can continue to improve our services. Please take a few minutes to complete the written survey that you may receive in the mail after your visit with us. Thank you!             Your Updated Medication List - Protect others around you: Learn how to safely use, store and throw away your medicines at www.disposemymeds.org.          This list is accurate as of 10/26/18  3:42 PM.  Always use your most recent med list.                   Brand Name Dispense Instructions for use Diagnosis    allopurinol 100 MG tablet    ZYLOPRIM    90 tablet    TAKE 1 TABLET(100 MG) BY MOUTH DAILY    Chronic gout without tophus, unspecified cause, unspecified site       bisacodyl 5 MG EC tablet    DULCOLAX     Take 1 tablet (5 mg) by mouth daily as needed for constipation    Slow transit constipation       carbidopa-levodopa  MG per tablet    SINEMET     Take 1 tablet by mouth 3 times daily    Parkinson's disease (H)       ENSURE PLUS Liqd      Take 240 mLs by mouth daily        metoprolol succinate 25 MG 24 hr tablet    TOPROL-XL    90 tablet    Take 0.5 tablets (12.5 mg) by mouth every morning    Essential hypertension with goal blood pressure less than 140/90       omeprazole 20 MG CR capsule    priLOSEC    90 capsule    TAKE 1 CAPSULE BY MOUTH 30 TO 60 MINUTES BEFORE FIRST MEAL OF THE DAY    Gastroesophageal reflux disease without esophagitis       polyethylene glycol powder    MIRALAX    510 g    Take 17 g by mouth daily as needed for constipation (1 capful)    Constipation       triamcinolone 0.1 % ointment    KENALOG    30 g    Apply topically 2 times daily    Dermatitis

## 2018-10-27 ENCOUNTER — DOCUMENTATION ONLY (OUTPATIENT)
Dept: CARE COORDINATION | Facility: CLINIC | Age: 83
End: 2018-10-27

## 2018-10-27 NOTE — PROGRESS NOTES
Dear Dr. Ring,   Medicare Home Health regulations requires [facility] to provide an initial assessment visit either within 48 hours of the patient's return home, or on the physician ordered Start of Care date.    There will be a delay in the Initial Assessment for Chilango Echevarria; MRN [id number]  pt has private pay Nurse visit /HN via [facility] , he stated he is scheduled to see the nurse on 11/9/18 , he  does  not want to have  another Nurse or therapy come before that visit . He want to discuss with his PP nurse on 11/9/18.     Sincerely [facility]  Sameer Zambrano  [phone]

## 2018-10-29 ENCOUNTER — DOCUMENTATION ONLY (OUTPATIENT)
Dept: CARE COORDINATION | Facility: CLINIC | Age: 83
End: 2018-10-29

## 2018-10-29 NOTE — TELEPHONE ENCOUNTER
Are you sure he leaves the house unattended? I did not order the recent home care orders myself. I think he saw one of my partners but in my past experience his wife would typically go places with him for safety.   Just double check this.     Enrique Ring

## 2018-10-29 NOTE — PROGRESS NOTES
"Dr. Ring,  I am informing you of an update to the homecare referral for your patient, Chilango Echevarria.  This morning, in speaking with his current homecare nurse Parveen, I have learned that Bill is not homebound.  Under Medicare guidelines, he needs to be homebound, and he does not have another insurance payor that could cover skilled services for homecare.   Patient is not willing to privately pay for skilled homecare services at this time.  According to the nurses seeing him lately, \"Yes, this is correct. I saw him last visit and he stated he does still go to the bar almost every day. It's unfortunate because I think he could benefit from skilled care related to his poor nutritional status but what can you do if he's not willing to stay home.\".   Also, noted that a person cannot choose to be homebound.    If this gentleman has a change in situation where he is not driving, we would be happy to reassess.  Thank you for this referral.    Negar Kilgore RN, BSN  Clinical Coordinator  [facility]  [phone]    "

## 2018-10-30 ENCOUNTER — PATIENT OUTREACH (OUTPATIENT)
Dept: CARE COORDINATION | Facility: CLINIC | Age: 83
End: 2018-10-30

## 2018-10-30 ASSESSMENT — ACTIVITIES OF DAILY LIVING (ADL): DEPENDENT_IADLS:: INDEPENDENT

## 2018-10-30 NOTE — LETTER
Ideal CARE COORDINATION  [address] [facility] [address]    October 30, 2018    Chilango Echevarria  [address]  [place] [address]      Dear Chilango,    I am a clinic care coordinator who works with Enrique Ring MD at [facility]. I wanted to thank you for spending the time to talk with me.  I wanted to introduce myself and provide you with my contact information so that you can call me with questions or concerns about your health care. Below is a description of clinic care coordination and how I can further assist you.     The clinic care coordinator is a registered nurse and/or  who understand the health care system. The goal of clinic care coordination is to help you manage your health and improve access to the [facility] system in the most efficient manner. The registered nurse can assist you in meeting your health care goals by providing education, coordinating services, and strengthening the communication among your providers. The  can assist you with financial, behavioral, psychosocial, chemical dependency, counseling, and/or psychiatric resources.    Please feel free to contact me at [phone], with any questions or concerns. We at [facility] are focused on providing you with the highest-quality healthcare experience possible and that all starts with you.     Sincerely,     Kris Franks DINORAH  Clinic Care Coordinator  [facility]  [phone]  [email or web address]      Enclosed: I have enclosed a copy of the Complex Care Plan. This has helpful information and goals that we have talked about. Please keep this in an easy to access place to use as needed.

## 2018-10-30 NOTE — PROGRESS NOTES
"Clinic Care Coordination Contact    Clinic Care Coordination Contact  OUTREACH    Referral Information:  Referral Source: Care Team    Primary Diagnosis: Psychosocial    Chief Complaint   Patient presents with     Clinic Care Coordination - Initial     Psychosocial Needs- Home safety        Clinical Concerns:  SW outreach requested d/t pt home safety in question, history of falls and Parkinson's Disease.  Pt indicates he lives independently and drives himself short distances adding he only requests assistance with driving if the destination is \"a long ways away.\" Pt denies any concerns with driving at this time, denying accidents, close calls, or concerns.  D/t driving independently, pt is not a candidate for Medicare coverage of Home Care services.  Pt refuses to pay privately.  When SW reviewed ADL/IADLs pt indicates he is able to complete all independently.  Pt then mentioned he doesn't vacuum well.  When SW inquired further if this meant he couldn't, or simply he doesn't often as he should- pt clarified he doesn't do it as often as he should but is fully capable.  Pt washes and maintains hygiene per reports and indicates he cooks and prepares his own meals.  Pt did say he sometimes gets meals from a friend, but has no concerns with food, meals or availability to food. Pt reported 2 falls in the last few months, reporting simply losing his balance \"because of Parkinson's.\" Pt denies any injury with either.  Additionally, pt does have Life Alert pendent and utilized this service a couple months ago following one of the falls he reported. SW inquired of any falls risks such as clutter, rugs or spills to which pt denied.  Pt denied any assistive devices for mobility or ambulation but acknowledged an awareness of the progressive nature of Parkinson's Disease process and indicated he understood this may change in the near future. Pt did not indicate much for psychosocial support, but does have family he keeps in " contact with- see demographics page. Sent pt CC letter and Care Plan and will continue to f/u monthly to assess for changes in care needs.       Medication Management:  Independent. No Pill Box. Follows instructions on bottles.      Functional Status:  Dependent ADLs:: Independent  Dependent IADLs:: Independent  Bed or wheelchair confined:: No  Mobility Status: Independent  Fallen 2 or more times in the past year?: Yes  Any fall with injury in the past year?: No    Living Situation:  Current living arrangement:: I live in a private home    Diet/Exercise/Sleep:  Inadequate nutrition (GOAL):: No  Food Insecurity: No  Tube Feeding: No  Inadequate activity/exercise (GOAL):: No  Significant changes in sleep pattern (GOAL): No    Transportation:  Transportation concerns (GOAL):: No  Transportation means:: Regular car     Psychosocial:  Adventist or spiritual beliefs that impact treatment:: No  Mental health DX:: No  Mental health management concern (GOAL):: No  Informal Support system:: Family, Friends     Financial/Insurance:   Financial/Insurance concerns (GOAL):: No    * Refusal to pay privately for services- specifically home care or [facility] services.  SW discussed services he may benefit from, but d/t income and no MA, Pt would have to pay out of pocket of which pt has historically and continues to refuse.       Resources and Interventions:  Community Resources: Lifeline  Supplies used at home:: None  Equipment Currently Used at Home: none    Advance Care Plan/Directive  Advanced Care Plans/Directives on file:: No  Advanced Care Plan/Directive Status: Not Applicable    Referrals Placed: None     Goals:   Goals        General    Psychosocial (pt-stated)     Notes - Note edited  10/30/2018  2:29 PM by Kris Franks LSW    Goal Statement: Chilango will continue to consider alternate placement and assisted living settings.   Measure of Success: Chilango will accept SW conversation of safety concerns and care  needs.  Supportive Steps to Achieve: SW to offer resources for [facility], SW to communicate with the [facility] as needed for [facility] assessment if requested.   Barriers: Pt has been independent and continues to function at home.  History of falls. Pt does not qualify for home care.   Strengths: Patient has Life Alert. Independent.  Awareness of progressive disease process  Date to Achieve By: 3 months.   Patient expressed understanding of goal: Pt reports understanding and denies any additional questions or concerns at this times. SW CC engaged in AIDET communication during encounter.                Patient/Caregiver understanding: Pt reports understanding and denies any additional questions or concerns at this times. SW CC engaged in AIDET communication during encounter.    Outreach Frequency: monthly    Plan: SW to f/u monthly to inquire of consideration of additional supports, check on progress and assess for needs.      Kris Franks DINORAH  Clinic Care Coordinator  [facility]  [phone]  [email or web address]

## 2018-10-30 NOTE — LETTER
[facility]  Complex Care Plan  About Me  Patient Name:  Chilango Echevarria    YOB: 1934  Age:     84 year old   Romario MRN:   [id number] Telephone Information:    Home Phone [phone]   Mobile none       Address:    [address]  [facility] [address] Email address:  No e-mail address on record      Emergency Contact(s)  Name Relationship Lgl Grd Work Phone Home Phone Mobile Phone   1. PAPO ECHEVARRIA Relative  none [phone]   2. LINDA HOBSON Friend  none [phone]           Primary language:  English     needed? No   [place] [employer]:  [phone] op. 1  Other communication barriers: None  Preferred Method of Communication:  Mail  Current living arrangement: I live in a private home  Mobility Status/ Medical Equipment: Independent    Health Maintenance  Health Maintenance Reviewed: Up to date    My Access Plan  Medical Emergency 911   Primary Clinic Line [facility] - [phone]   24 Hour Appointment Line [phone] or  [phone]) (toll-free)   24 Hour Nurse Line [phone] (toll-free)   Preferred Urgent Care [facility], [phone]   Preferred Hospital [facility]  [phone]   Preferred Pharmacy [facility] [address] [facility] [address] [facility] [address]     Behavioral Health Crisis Line The National Suicide Prevention Lifeline at 1-352.289.6297 or 911     My Care Team Members    Patient Care Team       Relationship Specialty Notifications Start End    Enrique Ring MD PCP - General Internal Medicine  2/27/15     Phone: [phone] Pager: [phone] Fax: [phone]        [address] [facility] [address]    Sienna Bernard APRN CNS   Oncology  5/22/15     Phone: [phone] Fax: [phone]         [address] [place] [address]     Thad Ferro MD MD Thoracic Diseases  6/12/15     Phone: [phone] Fax: [phone]         [address] [facility] [address] [facility] [address]    [facility] ([facility] ([place])  10/26/18     Phone: [phone]         Kris Franks LSW Lead Care Coordinator Primary Care - CC  10/30/18     Phone: [phone]                 My Care Plans  Self Management and Treatment Plan  Goals and (Comments)  Goals        General    Psychosocial (pt-stated)     Notes - Note created  10/30/2018  2:26 PM by Kris Franks LSW    Goal Statement: Chilango will continue to consider alternate placement and assisted living settings.   Measure of Success: Chilango will accept SW conversation of safety concerns and care needs.  Supportive Steps to Achieve: SW to offer resources for [facility], SW to communicate with the county as needed for [place] if requested.   Barriers: Pt has been independent and continues to function at home.  History of falls. Pt does not qualify for home care.   Strengths: Independent.  Awareness of limitations and progressive disease process  Date to Achieve By: 3 months.   Patient expressed understanding of goal: Pt reports understanding and denies any additional questions or concerns at this times. SW CC engaged in AIDET communication during encounter.                      My Medical and Care Information  Problem List   Patient Active Problem List   Diagnosis     Gout     Esophageal reflux     Aortic stenosis     CARDIOVASCULAR SCREENING; LDL GOAL LESS THAN 130     Vasomotor rhinitis     Advanced directives, counseling/discussion     Status post laparoscopic hernia repair-5/14/15     Dermatitis     HTN (hypertension)     Umbilical hernia     Right inguinal hernia     Health Care Home     Parkinson's disease (H)     Slow transit constipation      Current Medications and Allergies:  See printed Medication Report.    Care Coordination Start Date: 10/30/2018   Frequency  of Care Coordination: monthly   Form Last Updated: 10/30/2018

## 2018-11-10 ENCOUNTER — DOCUMENTATION ONLY (OUTPATIENT)
Dept: CARE COORDINATION | Facility: CLINIC | Age: 83
End: 2018-11-10

## 2018-11-10 NOTE — PROGRESS NOTES
[facility] now requests orders and shares plan of care/discharge summaries for some patients through [employer].  Please REPLY TO THIS MESSAGE OR ROUTE BACK TO THE AUTHOR in order to give authorization for orders when needed.  This is considered a verbal order, you will still receive a faxed copy of orders for signature.  Thank you for your assistance in improving collaboration for our patients.    ORDER hourly nurse every 2 months per patient/family request. Payer is private.    SUMMARY TO MD  SITUATION   patient is alert and responsive, albeit somewhat slow to respond. patient lung sounds are diminished, heart tones heard regularly. patient reports having regular bowel movements daily. patient states he had one on 11/8. patient reports no issues with gi/gu. patient states that he doesnt have a strong appetite and that he eats a peanut butter sandwich once a day and that then for his second meal a neighbor lady cooks him meat and potatoes. patient reports going to the bar near his house every day for two beers. patient states he feels weak but doesnt know why. this writer suggested taking 1 ensure daily to boost caloric intake. patient still declines having a private pay home health aide though he said he would continue to consider for assistance with housekeeping and showers.  this writer weighed patient with his scale and his weight was 148.8lb though patient had clothes and shoes on. patients vss...bp 130/80, hr 58, rr 15, t97.7f, spo2 99 percent on ra.     BACKGROUND Gout, Esophageal reflux, Aortic stenosis, cardiovascular screening, Vasomotor rhinitis, Advanced directives, counseling discussion, Status post laparoscopic hernia repair, Dermatitis, HTN, Umbilical hernia, Right inguinal hernia, Health Care Home, Parkinsons disease, Slow transit constipation    ANALYSIS patient is at risk for hospitalization d/t medical frailty related to poor nutrition and risk for falls  RECOMMENDATION HN every two  months.  Estimated Length of Home Care Need ... 60 days

## 2019-01-04 ENCOUNTER — TELEPHONE (OUTPATIENT)
Dept: INTERNAL MEDICINE | Facility: CLINIC | Age: 84
End: 2019-01-04

## 2019-01-04 NOTE — TELEPHONE ENCOUNTER
Javier, nurse from [facility], calling with patient update as well as request for orders.  Javier states that patient had a fall on 12/16 and has a minor abrasion on his right elbow.  No signs of infection and no other injuries were acquired.  Patient also has a rash on his left hip, separate from fall incident, that has caused him some minor itching.  He will try using some hydrocortisone cream on the area.  If symptoms do not improve he will call the clinic back.  Javier requesting skilled nurse visits 1/ every 60 days.  Verbal consent given per standing nurse orders.  Hawa Srivastava RN

## 2019-01-09 ENCOUNTER — TELEPHONE (OUTPATIENT)
Dept: INTERNAL MEDICINE | Facility: CLINIC | Age: 84
End: 2019-01-09

## 2019-01-09 ENCOUNTER — TELEPHONE (OUTPATIENT)
Dept: NURSING | Facility: CLINIC | Age: 84
End: 2019-01-09

## 2019-01-09 DIAGNOSIS — K64.4 EXTERNAL HEMORRHOIDS: Primary | ICD-10-CM

## 2019-01-09 NOTE — TELEPHONE ENCOUNTER
Reason for Call:  Other call back    Detailed comments: pt called to request Proctozone but is not on current med list. Please send a new prescription for pt. Pt wants prescription [facility] to [facility] [address] in [place]. Please call pt when completed.    Phone Number Patient can be reached at: Home number on file [phone] (home)    Best Time: anytime    Can we leave a detailed message on this number? YES    Call taken on 1/9/2019 at 1:37 PM by BREANA MANE

## 2019-01-10 NOTE — TELEPHONE ENCOUNTER
Patient calling to check on status of medication request he called about earlier today (see epic)macular degeneration has not addressed this yet. Advised pt to call clinic in am when open. No triage was needed.

## 2019-01-11 NOTE — TELEPHONE ENCOUNTER
occalion hard stools with rectal bleeding. Pt is taking miralax daily and a stool softener. BM this morning, no blood in stools, no blood this morning. 4 years ago had a hernia. No stomach pain. Has BM every 1-2 days. recommeded metamucil to help soften stools. Pt will p/u cream. And notifed that if symptoms worsen, and increased rectal bleeding, then he needs to schedule appt with PCP. Patient stated understanding, and agreed to plan of care.

## 2019-01-11 NOTE — TELEPHONE ENCOUNTER
Ok  W/o appt unless he is having rectal bleeding.  Then should at least have some sort of triage to insure this isn't something more significant

## 2019-01-11 NOTE — TELEPHONE ENCOUNTER
Pt states no bleeding into toilet . Was having hard stool . No black tarry stool . Stools dark brown . Looks like medication was prescribed.Lori Ngo RN

## 2019-01-30 DIAGNOSIS — M1A.9XX0 CHRONIC GOUT WITHOUT TOPHUS, UNSPECIFIED CAUSE, UNSPECIFIED SITE: ICD-10-CM

## 2019-01-30 RX ORDER — ALLOPURINOL 100 MG/1
TABLET ORAL
Qty: 90 TABLET | Refills: 2 | Status: SHIPPED | OUTPATIENT
Start: 2019-01-30 | End: 2019-09-26

## 2019-01-30 NOTE — TELEPHONE ENCOUNTER
"Requested Prescriptions   Pending Prescriptions Disp Refills     allopurinol (ZYLOPRIM) 100 MG tablet [Pharmacy Med Name: ALLOPURINOL 100MG TABLETS] 90 tablet 0     Sig: TAKE 1 TABLET(100 MG) BY MOUTH DAILY    Gout Agents Protocol Passed - 1/30/2019  3:10 PM       Passed - CBC on file in past 12 months    Recent Labs   Lab Test 10/26/18  1547   WBC 4.7   RBC 4.04*   HGB 12.9*   HCT 40.0                   Passed - ALT on file in past 12 months    Recent Labs   Lab Test 10/26/18  1547   ALT 10            Passed - Has Uric Acid on file in past 12 months and value is less than 6    Recent Labs   Lab Test 10/26/18  1547   URIC 4.3     If level is 6mg/dL or greater, ok to refill one time and refer to provider.          Passed - Recent (12 mo) or future (30 days) visit within the authorizing provider's specialty    Patient had office visit in the last 12 months or has a visit in the next 30 days with authorizing provider or within the authorizing provider's specialty.  See \"Patient Info\" tab in inbasket, or \"Choose Columns\" in Meds & Orders section of the refill encounter.             Passed - Medication is active on med list       Passed - Patient is age 18 or older       Passed - Normal serum creatinine on file in the past 12 months    Recent Labs   Lab Test 10/26/18  1547   CR 1.08               "

## 2019-02-13 ENCOUNTER — PATIENT OUTREACH (OUTPATIENT)
Dept: CARE COORDINATION | Facility: CLINIC | Age: 84
End: 2019-02-13

## 2019-02-13 ASSESSMENT — ACTIVITIES OF DAILY LIVING (ADL): DEPENDENT_IADLS:: INDEPENDENT

## 2019-02-13 NOTE — LETTER
[facility] CARE COORDINATION  [address] [facility] [address]    February 13, 2019    Chilango Echevarria  [address]  [facility] [address]      Dear Chilango,    I am a clinic care coordinator who works with Enrique Ring MD. I wanted to thank you for spending the time to talk with me.  I wanted to provide you with my contact information so that you can call me with questions or concerns about your health care. Below is a description of clinic care coordination and how I can further assist you.     The clinic care coordinator is a registered nurse and/or  who understand the health care system. The goal of clinic care coordination is to help you manage your health and improve access to the [facility] system in the most efficient manner. The registered nurse can assist you in meeting your health care goals by providing education, coordinating services, and strengthening the communication among your providers. The  can assist you with financial, behavioral, psychosocial, chemical dependency, counseling, and/or psychiatric resources.    Please feel free to contact me at [phone], with any questions or concerns. We at [facility] are focused on providing you with the highest-quality healthcare experience possible and that all starts with you.     Sincerely,     Kris Franks [facility]  Clinic Care Coordinator  [facility]  [phone]  [email or web address]

## 2019-02-13 NOTE — PROGRESS NOTES
Clinic Care Coordination Contact    Clinic Care Coordination Contact  OUTREACH    Referral Information:  Referral Source: Care Team    Primary Diagnosis: Psychosocial    Chief Complaint   Patient presents with     Clinic Care Coordination - Follow-up     Follow up on Home safety/status        Universal Utilization:   Clinic Utilization  Difficulty keeping appointments:: No  Compliance Concerns: No  No-Show Concerns: No  No PCP office visit in Past Year: No  Utilization    Last refreshed: 2/7/2019  8:41 AM:  Hospital Admissions 0           Last refreshed: 2/7/2019  8:41 AM:  ED Visits 0           Last refreshed: 2/7/2019  8:41 AM:  No Show Count (past year) 0              Current as of: 2/7/2019  8:41 AM              Clinical Concerns:  SW outreach to pt on this date. Pt states he has been doing well at home and has no concerns. Pt able to complete ADL/IADLs independently. Pt has agreed to pay privately for nursing visits  Every 2 months. Pt had no concerns and no other needs identified or expressed.       Medication Management:  Independent. Nurse visits once every 2 months- private pay    Resources and Interventions:  Current Resources:   List of home care services:: Skilled Nursing;   Community Resources: Lifeline  Supplies used at home:: None  Equipment Currently Used at Home: none    Advance Care Plan/Directive  Advanced Care Plans/Directives on file:: No  Advanced Care Plan/Directive Status: Not Applicable    Referrals Placed: None     Patient/Caregiver understanding: Pt reports understanding and denies any additional questions or concerns at this times. [facility] engaged in AIDET communication during encounter.    Plan: Pt to continue to f/u with recommended apts and allow nursing visits.  No further outreaches will be made at this time unless a new referral is made or a change in the pt's status occurs. Patient was provided with this writer's contact information and encouraged to call with any questions or  concerns.    Kris Franks, [facility]  Clinic Care Coordinator  [facility]  [phone]  [email or web address]

## 2019-03-07 ENCOUNTER — DOCUMENTATION ONLY (OUTPATIENT)
Dept: CARE COORDINATION | Facility: CLINIC | Age: 84
End: 2019-03-07

## 2019-03-07 NOTE — PROGRESS NOTES
[facility] now requests orders and shares plan of care/discharge summaries for some patients through [employer].  Please REPLY TO THIS MESSAGE OR ROUTE BACK TO THE AUTHOR in order to give authorization for orders when needed.  This is considered a verbal order, you will still receive a faxed copy of orders for signature.  Thank you for your assistance in improving collaboration for our patients.    ORDER hourly nurse 0 to 24 hrs/day per patient/family request. Private pay    SUMMARY TO MD  SITUATION patient is alert and oriented x4. patient lung sounds clear, heart tones heard regularly. patient has significant eczema on upper back, which this writer applied triamcinolone to per patient request. patient reports constipation, this writer edu on use of miralax, increased intake of fiber. patient nails trimmed by this writer.   VS...bp 110/80, hr 66, rr 15, t98.9f, pt denies pain, spo2 99% on ra.  WOUND DESCRIPTION AND MEASUREMENTS  bed upper back from itching related to eczema. patient applies triamcinolone weekly. this writer instructed him that he could use it 2-3 times per day per medication dosage instructions.  PHYSICAL PSYCHOSOCIAL IMPAIRMENT OR LIMITATIONS  patient requesting private pay services for assistance with housekeeping and bathing. this writer will reach out to private pay agency that [facility] refers to and facilitate referral.   NUTRITION CONCERNS...patient has poor appetite and intake of food. encouraged and instructed him to consume 1 bottle of boost daily, currently patient takes every other day, eats a small breakfast and eats reheated dinners. pt reports that he drinks 2 beers every other day at the bar near his apartment.  HOME ENVIRONMENT AFFECTING CARE...lives alone.   CAREGIVER SUPPORT...none available.    BACKGROUND parkinsons, right inguinal hernia, vasomotor rhinitis, esophageal reflux, slow transit constipation, gout, aortic stenosis, htn, dermatitis, umbilical hernia,  hch  ANALYSIS patient is at risk for falls r/t parkinsons and unsteady gait, medical frailty r/t poor nutrition, potential alcoholism  RECOMMENDATION hourly nurse per patient/family request. private pay.

## 2019-03-08 ENCOUNTER — TRANSFERRED RECORDS (OUTPATIENT)
Dept: HEALTH INFORMATION MANAGEMENT | Facility: CLINIC | Age: 84
End: 2019-03-08

## 2019-03-22 ENCOUNTER — DOCUMENTATION ONLY (OUTPATIENT)
Dept: CARE COORDINATION | Facility: CLINIC | Age: 84
End: 2019-03-22

## 2019-03-22 NOTE — PROGRESS NOTES
[facility] now requests orders and shares plan of care/discharge summaries for some patients through [employer].  Please REPLY TO THIS MESSAGE OR ROUTE BACK TO THE AUTHOR in order to give authorization for orders when needed.  This is considered a verbal order, you will still receive a faxed copy of orders for signature.  Thank you for your assistance in improving collaboration for our patients.    ORDER Hourly home health aide 0 to 24 hours/day per patient/family request. Supervision per agency protocol. Private pay    Patient has requested ongoing private pay services for home health aide to assist with bathing, personal cares, light housekeeping, meal assistance in addition to the already requested/apprived hourly nurse/private pay nurse services.

## 2019-03-25 DIAGNOSIS — I10 ESSENTIAL HYPERTENSION WITH GOAL BLOOD PRESSURE LESS THAN 140/90: ICD-10-CM

## 2019-03-25 DIAGNOSIS — L30.9 DERMATITIS: ICD-10-CM

## 2019-03-25 DIAGNOSIS — K21.9 GASTROESOPHAGEAL REFLUX DISEASE WITHOUT ESOPHAGITIS: ICD-10-CM

## 2019-03-26 RX ORDER — METOPROLOL SUCCINATE 25 MG/1
TABLET, EXTENDED RELEASE ORAL
Qty: 90 TABLET | Refills: 1 | Status: SHIPPED | OUTPATIENT
Start: 2019-03-26 | End: 2020-01-17

## 2019-03-26 RX ORDER — TRIAMCINOLONE ACETONIDE 1 MG/G
OINTMENT TOPICAL
Qty: 30 G | Refills: 1 | Status: SHIPPED | OUTPATIENT
Start: 2019-03-26 | End: 2019-10-28

## 2019-03-26 NOTE — TELEPHONE ENCOUNTER
"Requested Prescriptions   Pending Prescriptions Disp Refills     triamcinolone (KENALOG) 0.1 % external ointment [Pharmacy Med Name: TRIAMCINOLONE 0.1% OINTMENT 30GM] 30 g 0    Last Written Prescription Date:  2/16/2018  Last Fill Quantity: 30g,  # refills: 11   Last office visit: 10/26/2018 with prescribing provider:  10/26/2018   Future Office Visit:     Sig: APPLY EXTERNALLY TO THE AFFECTED AREA TWICE DAILY    Topical Steroids and Nonsteroidals Protocol Passed - 3/25/2019 12:35 PM       Passed - Patient is age 6 or older       Passed - Authorizing prescriber's most recent note related to this medication read.    If refill request is for ophthalmic use, please forward request to provider for approval.         Passed - High potency steroid not ordered       Passed - Recent (12 mo) or future (30 days) visit within the authorizing provider's specialty    Patient had office visit in the last 12 months or has a visit in the next 30 days with authorizing provider or within the authorizing provider's specialty.  See \"Patient Info\" tab in inbasket, or \"Choose Columns\" in Meds & Orders section of the refill encounter.             Passed - Medication is active on med list        omeprazole (PRILOSEC) 20 MG DR capsule [Pharmacy Med Name: OMEPRAZOLE 20MG CAPSULES] 90 capsule 0    Last Written Prescription Date:  1/26/2018  Last Fill Quantity: 90,  # refills: 3   Last office visit: 10/26/2018 with prescribing provider:  10/26/2018   Future Office Visit:     Sig: TAKE 1 CAPSULE BY MOUTH 30 TO 60 MINUTES BEFORE FIRST MEAL OF THE DAY    PPI Protocol Passed - 3/25/2019 12:35 PM       Passed - Not on Clopidogrel (unless Pantoprazole ordered)       Passed - No diagnosis of osteoporosis on record       Passed - Recent (12 mo) or future (30 days) visit within the authorizing provider's specialty    Patient had office visit in the last 12 months or has a visit in the next 30 days with authorizing provider or within the authorizing " "provider's specialty.  See \"Patient Info\" tab in inbasket, or \"Choose Columns\" in Meds & Orders section of the refill encounter.             Passed - Medication is active on med list       Passed - Patient is age 18 or older        metoprolol succinate ER (TOPROL-XL) 25 MG 24 hr tablet [Pharmacy Med Name: METOPROLOL ER SUCCINATE 25MG TABS] 90 tablet 0    Last Written Prescription Date:  10/26/2018  Last Fill Quantity: 90,  # refills: 0   Last office visit: 10/26/2018 with prescribing provider:  10/26/2018   Future Office Visit:     Sig: TAKE 1 TABLET(25 MG) BY MOUTH EVERY MORNING    Beta-Blockers Protocol Passed - 3/25/2019 12:35 PM       Passed - Blood pressure under 140/90 in past 12 months    BP Readings from Last 3 Encounters:   10/26/18 138/76   01/26/18 120/60   03/02/17 126/76                Passed - Patient is age 6 or older       Passed - Recent (12 mo) or future (30 days) visit within the authorizing provider's specialty    Patient had office visit in the last 12 months or has a visit in the next 30 days with authorizing provider or within the authorizing provider's specialty.  See \"Patient Info\" tab in inbasket, or \"Choose Columns\" in Meds & Orders section of the refill encounter.             Passed - Medication is active on med list          "

## 2019-04-19 ENCOUNTER — TRANSFERRED RECORDS (OUTPATIENT)
Dept: HEALTH INFORMATION MANAGEMENT | Facility: CLINIC | Age: 84
End: 2019-04-19

## 2019-05-06 ENCOUNTER — TELEPHONE (OUTPATIENT)
Dept: CARE COORDINATION | Facility: CLINIC | Age: 84
End: 2019-05-06

## 2019-05-07 NOTE — TELEPHONE ENCOUNTER
[facility] now requests orders and shares plan of care/discharge summaries for some patients through [employer].  Please REPLY TO THIS MESSAGE OR ROUTE BACK TO THE AUTHOR in order to give authorization for orders when needed.  This is considered a verbal order, you will still receive a faxed copy of orders for signature.  Thank you for your assistance in improving collaboration for our patients.    ORDER    60 day recert of home care    Continue private pay HHA and Hourly Nursing as requestted by pt. Private pay.    No change with payer or plan of care    Thank you

## 2019-06-18 DIAGNOSIS — I10 ESSENTIAL HYPERTENSION WITH GOAL BLOOD PRESSURE LESS THAN 140/90: ICD-10-CM

## 2019-06-18 DIAGNOSIS — K21.9 GASTROESOPHAGEAL REFLUX DISEASE WITHOUT ESOPHAGITIS: ICD-10-CM

## 2019-06-18 RX ORDER — METOPROLOL SUCCINATE 25 MG/1
TABLET, EXTENDED RELEASE ORAL
Qty: 90 TABLET | Refills: 0 | Status: SHIPPED | OUTPATIENT
Start: 2019-06-18 | End: 2019-12-10

## 2019-06-18 NOTE — TELEPHONE ENCOUNTER
"Requested Prescriptions   Pending Prescriptions Disp Refills     metoprolol succinate ER (TOPROL-XL) 25 MG 24 hr tablet [Pharmacy Med Name: METOPROLOL ER SUCCINATE 25MG TABS] 90 tablet 0     Sig: TAKE 1 TABLET(25 MG) BY MOUTH EVERY MORNING       Beta-Blockers Protocol Passed - 6/18/2019 10:55 AM        Passed - Blood pressure under 140/90 in past 12 months     BP Readings from Last 3 Encounters:   10/26/18 138/76   01/26/18 120/60   03/02/17 126/76                 Passed - Patient is age 6 or older        Passed - Recent (12 mo) or future (30 days) visit within the authorizing provider's specialty     Patient had office visit in the last 12 months or has a visit in the next 30 days with authorizing provider or within the authorizing provider's specialty.  See \"Patient Info\" tab in [employer], or \"Choose Columns\" in Meds & Orders section of the refill encounter.              Passed - Medication is active on med list        omeprazole (PRILOSEC) 20 MG DR capsule [Pharmacy Med Name: OMEPRAZOLE 20MG CAPSULES] 90 capsule 0     Sig: TAKE 1 CAPSULE BY MOUTH 30 TO 60 MINUTES BEFORE FIRST MEAL OF THE DAY       PPI Protocol Passed - 6/18/2019 10:55 AM        Passed - Not on Clopidogrel (unless Pantoprazole ordered)        Passed - No diagnosis of osteoporosis on record        Passed - Recent (12 mo) or future (30 days) visit within the authorizing provider's specialty     Patient had office visit in the last 12 months or has a visit in the next 30 days with authorizing provider or within the authorizing provider's specialty.  See \"Patient Info\" tab in [employer] [employer], or \"Choose Columns\" in Meds & Orders section of the refill encounter.              Passed - Medication is active on med list        Passed - Patient is age 18 or older          "

## 2019-07-03 ENCOUNTER — TELEPHONE (OUTPATIENT)
Dept: CARE COORDINATION | Facility: CLINIC | Age: 84
End: 2019-07-03

## 2019-07-03 NOTE — TELEPHONE ENCOUNTER
[facility] now requests orders and shares plan of care/discharge summaries for some patients through [employer].  Please REPLY TO THIS MESSAGE OR ROUTE BACK TO THE AUTHOR in order to give authorization for orders when needed.  This is considered a verbal order, you will still receive a faxed copy of orders for signature.  Thank you for your assistance in improving collaboration for our patients.    ORDER    60 day recert to continue private pay services:    Hourly nursing 0 to 24 hours/day as requested  Hourly HHA 0 to 24 hours/day as requested, RN to supervise.     No change in plan or payer.     Thanks!

## 2019-07-05 DIAGNOSIS — Z53.9 DIAGNOSIS NOT YET DEFINED: Primary | ICD-10-CM

## 2019-07-05 PROCEDURE — G0179 MD RECERTIFICATION HHA PT: HCPCS | Performed by: INTERNAL MEDICINE

## 2019-09-05 ENCOUNTER — TELEPHONE (OUTPATIENT)
Dept: CARE COORDINATION | Facility: CLINIC | Age: 84
End: 2019-09-05

## 2019-09-05 NOTE — TELEPHONE ENCOUNTER
[facility] now requests orders and shares plan of care/discharge summaries for some patients through [employer].  Please REPLY TO THIS MESSAGE OR ROUTE BACK TO THE AUTHOR in order to give authorization for orders when needed.  This is considered a verbal order, you will still receive a faxed copy of orders for signature.  Thank you for your assistance in improving collaboration for our patients.    ORDER    Ok for 1 day late recert per pt request due to availability.     Continue hourly nursing and HHA as requested by pt to promote safety in home setting.   No change to plan of care.     Thank you!

## 2019-09-06 ENCOUNTER — TELEPHONE (OUTPATIENT)
Dept: CARE COORDINATION | Facility: CLINIC | Age: 84
End: 2019-09-06

## 2019-09-06 NOTE — TELEPHONE ENCOUNTER
[facility] now requests orders and shares plan of care/discharge summaries for some patients through [employer].  Please REPLY TO THIS MESSAGE OR ROUTE BACK TO THE AUTHOR in order to give authorization for orders when needed.  This is considered a verbal order, you will still receive a faxed copy of orders for signature.  Thank you for your assistance in improving collaboration for our patients.    ORDER    60 day recert:  Continue private pay home care as requested by pt: hourly RN/LPN and HHA as requested able to increase or decrease as desired.     Thank yoU!

## 2019-09-26 DIAGNOSIS — M1A.9XX0 CHRONIC GOUT WITHOUT TOPHUS, UNSPECIFIED CAUSE, UNSPECIFIED SITE: ICD-10-CM

## 2019-09-26 RX ORDER — ALLOPURINOL 100 MG/1
TABLET ORAL
Qty: 90 TABLET | Refills: 0 | Status: SHIPPED | OUTPATIENT
Start: 2019-09-26 | End: 2019-12-10

## 2019-09-26 NOTE — TELEPHONE ENCOUNTER
"Requested Prescriptions   Pending Prescriptions Disp Refills     allopurinol (ZYLOPRIM) 100 MG tablet [Pharmacy Med Name: ALLOPURINOL 100MG TABLETS] 90 tablet 0     Sig: TAKE 1 TABLET(100 MG) BY MOUTH DAILY       Gout Agents Protocol Passed - 9/26/2019  9:51 AM        Passed - CBC on file in past 12 months     Recent Labs   Lab Test 10/26/18  1547   WBC 4.7   RBC 4.04*   HGB 12.9*   HCT 40.0                    Passed - ALT on file in past 12 months     Recent Labs   Lab Test 10/26/18  1547   ALT 10             Passed - Has Uric Acid on file in past 12 months and value is less than 6     Recent Labs   Lab Test 10/26/18  1547   URIC 4.3     If level is 6mg/dL or greater, ok to refill one time and refer to provider.           Passed - Recent (12 mo) or future (30 days) visit within the authorizing provider's specialty     Patient had office visit in the last 12 months or has a visit in the next 30 days with authorizing provider or within the authorizing provider's specialty.  See \"Patient Info\" tab in inbasket, or \"Choose Columns\" in Meds & Orders section of the refill encounter.              Passed - Medication is active on med list        Passed - Patient is age 18 or older        Passed - Normal serum creatinine on file in the past 12 months     Recent Labs   Lab Test 10/26/18  1547   CR 1.08               "

## 2019-10-28 DIAGNOSIS — L30.9 DERMATITIS: ICD-10-CM

## 2019-10-28 RX ORDER — TRIAMCINOLONE ACETONIDE 1 MG/G
OINTMENT TOPICAL
Qty: 30 G | Refills: 0 | Status: SHIPPED | OUTPATIENT
Start: 2019-10-28 | End: 2019-11-08

## 2019-10-28 NOTE — TELEPHONE ENCOUNTER
"Requested Prescriptions   Pending Prescriptions Disp Refills     triamcinolone (KENALOG) 0.1 % external ointment [Pharmacy Med Name: TRIAMCINOLONE 0.1% OINTMENT 30GM] 30 g 0     Sig: APPLY EXTERNALLY TO THE AFFECTED AREA TWICE DAILY       Topical Steroids and Nonsteroidals Protocol Failed - 10/28/2019 12:26 PM        Failed - Recent (12 mo) or future (30 days) visit within the authorizing provider's specialty     Patient has had an office visit with the authorizing provider or a provider within the authorizing providers department within the previous 12 mos or has a future within next 30 days. See \"Patient Info\" tab in inbasket, or \"Choose Columns\" in Meds & Orders section of the refill encounter.              Passed - Patient is age 6 or older        Passed - Authorizing prescriber's most recent note related to this medication read.     If refill request is for ophthalmic use, please forward request to provider for approval.          Passed - High potency steroid not ordered        Passed - Medication is active on med list        Medication is being filled for 1 time refill only due to:  Patient needs to be seen because it has been more than one year since last visit.    "

## 2019-10-28 NOTE — LETTER
[facility]  [address] [id number] [phone]            Chilango Echevarria  [address] [place] [address] [address]  [facility] [address]        October 28, 2019    Dear Chilango,    While refilling your prescription today, we noticed that you are due for an appointment with your provider.  We will refill your prescription for 30 days, but a follow-up appointment must be made before any additional refills can be approved.     Taking care of your health is important to us and we look forward to seeing you in the near future.  Please call us at [phone] or [phone] (or use [phone]) to schedule an appointment.     Please disregard this notice if you have already made an appointment.    Sincerely,        [facility]

## 2019-11-02 ENCOUNTER — TELEPHONE (OUTPATIENT)
Dept: CARE COORDINATION | Facility: CLINIC | Age: 84
End: 2019-11-02

## 2019-11-02 NOTE — TELEPHONE ENCOUNTER
[facility] now requests orders and shares plan of care/discharge summaries for some patients through [employer].  Please REPLY TO THIS MESSAGE OR ROUTE BACK TO THE AUTHOR in order to give authorization for orders when needed.  This is considered a verbal order, you will still receive a faxed copy of orders for signature.  Thank you for your assistance in improving collaboration for our patients.    ORDER    60 day recertification of home care:    Continue Hourly RN/LPN and HHA 0 to 24 hours/day as requested, no change in plan of care.     Thanks!

## 2019-11-08 DIAGNOSIS — L30.9 DERMATITIS: ICD-10-CM

## 2019-11-08 RX ORDER — TRIAMCINOLONE ACETONIDE 1 MG/G
OINTMENT TOPICAL
Qty: 80 G | Refills: 0 | Status: SHIPPED | OUTPATIENT
Start: 2019-11-08 | End: 2019-11-15

## 2019-11-08 RX ORDER — TRIAMCINOLONE ACETONIDE 1 MG/G
OINTMENT TOPICAL
Qty: 30 G | Refills: 0 | Status: CANCELLED | OUTPATIENT
Start: 2019-11-08

## 2019-11-08 NOTE — TELEPHONE ENCOUNTER
"Requested Prescriptions   Pending Prescriptions Disp Refills     triamcinolone (KENALOG) 0.1 % external ointment 30 g 0     Sig: APPLY EXTERNALLY TO THE AFFECTED AREA TWICE DAILY       Topical Steroids and Nonsteroidals Protocol Passed - 11/8/2019  9:30 AM        Passed - Patient is age 6 or older        Passed - Authorizing prescriber's most recent note related to this medication read.     If refill request is for ophthalmic use, please forward request to provider for approval.          Passed - High potency steroid not ordered        Passed - Recent (12 mo) or future (30 days) visit within the authorizing provider's specialty     Patient has had an office visit with the authorizing provider or a provider within the authorizing providers department within the previous 12 mos or has a future within next 30 days. See \"Patient Info\" tab in inbasket, or \"Choose Columns\" in Meds & Orders section of the refill encounter.              Passed - Medication is active on med list          Duplicate refills sent on 10/28/2019    Prescription sent in for bigger tube, per patient request. Upcoming OV scheduled with partner.    Future Office Visit:   Next 5 appointments (look out 90 days)    Nov 15, 2019  2:30 PM CST  SHORT with Terrence Shay MD  [facility] [address] [address] [phone]         Prescription approved per [facility] Refill Protocol.    Symone CONTI RN, BSN, PHN      "

## 2019-11-15 ENCOUNTER — OFFICE VISIT (OUTPATIENT)
Dept: INTERNAL MEDICINE | Facility: CLINIC | Age: 84
End: 2019-11-15
Payer: MEDICARE

## 2019-11-15 VITALS
RESPIRATION RATE: 16 BRPM | TEMPERATURE: 98.4 F | HEART RATE: 61 BPM | WEIGHT: 154.6 LBS | BODY MASS INDEX: 25.14 KG/M2 | DIASTOLIC BLOOD PRESSURE: 60 MMHG | OXYGEN SATURATION: 100 % | SYSTOLIC BLOOD PRESSURE: 100 MMHG

## 2019-11-15 DIAGNOSIS — L30.9 DERMATITIS: ICD-10-CM

## 2019-11-15 PROCEDURE — 99213 OFFICE O/P EST LOW 20 MIN: CPT | Performed by: INTERNAL MEDICINE

## 2019-11-15 RX ORDER — TRIAMCINOLONE ACETONIDE 1 MG/G
OINTMENT TOPICAL
Qty: 160 G | Refills: 3 | Status: ON HOLD | OUTPATIENT
Start: 2019-11-15 | End: 2020-03-17

## 2019-11-15 NOTE — PATIENT INSTRUCTIONS
- Keep using the Triamcinolone cream 1-2 times daily on affected area.  - Start taking over-the-counter plain Claritin (loratidine).  (Available over-the-counter)    - Schedule a follow-up with our Dermatology clinic if this doesn't improve significantly in next week.

## 2019-11-15 NOTE — PROGRESS NOTES
"Subjective     Chilango Echevarria is a 85 year old male who presents to clinic today for the following health issues:    HPI   Derm Problem (Eczema)      Duration: years for the eczema and rashes 1 week    Description (location/character/radiation): arms and some rashes in back of leg and varies spots    Intensity:  No pain    Accompanying signs and symptoms: itchy and redness    History (similar episodes/previous evaluation): None    Precipitating or alleviating factors: None    Therapies tried and outcome: triamcinolone and cortisone cream with little relief         Worsening rash involving his arms and back, since he ran out of his triamcinolone this is been a chronic problem for patient, no recent trip to dermatology.  On questioning, he has fairly recently changed his laundry detergent to \"[employer].\"    No fever or chills reported.      Reviewed and updated as needed this visit by Provider  Tobacco  Allergies  Meds  Problems  Med Hx  Surg Hx  Fam Hx         Review of Systems   ROS COMP: Constitutional, HEENT, cardiovascular, pulmonary, GI, , musculoskeletal, neuro, skin, endocrine and psych systems are negative, except as otherwise noted.      Objective    /60 (BP Location: Left arm, Patient Position: Chair, Cuff Size: Adult Regular)   Pulse 61   Temp 98.4  F (36.9  C) (Oral)   Resp 16   Wt 70.1 kg (154 lb 9.6 oz)   SpO2 100%   BMI 25.14 kg/m    Body mass index is 25.14 kg/m .  Physical Exam   GENERAL: healthy, alert and no distress  RESP: lungs clear to auscultation - no rales, rhonchi or wheezes  CV: regular rate and rhythm, normal S1 S2, no S3 or S4, no murmur, click or rub, no peripheral edema and peripheral pulses strong  ABDOMEN: soft, nontender, no hepatosplenomegaly, no masses and bowel sounds normal  MS: no gross musculoskeletal defects noted, no edema  SKIN: Diffuse, non-confluent eczematous rash involving both arms, and over his mid back.  Some overlying excoriation where itching " is taken place.            Assessment & Plan     1. Dermatitis  Continue triamcinolone, also recommended daily use of antihistamine for several days.  Use the topical steroid on schedule basis twice daily for next 5 days.  If no significant improvement in the next week, needs to visit with our dermatology clinic.  - triamcinolone (KENALOG) 0.1 % external ointment; APPLY EXTERNALLY TO THE AFFECTED AREA TWICE DAILY  Dispense: 160 g; Refill: 3           Return in about 4 weeks (around 12/13/2019) for recheck, if symptoms fail to improve.    Terrence Shay MD  [facility]

## 2019-12-10 DIAGNOSIS — M1A.9XX0 CHRONIC GOUT WITHOUT TOPHUS, UNSPECIFIED CAUSE, UNSPECIFIED SITE: ICD-10-CM

## 2019-12-10 DIAGNOSIS — I10 ESSENTIAL HYPERTENSION WITH GOAL BLOOD PRESSURE LESS THAN 140/90: ICD-10-CM

## 2019-12-10 DIAGNOSIS — K21.9 GASTROESOPHAGEAL REFLUX DISEASE WITHOUT ESOPHAGITIS: ICD-10-CM

## 2019-12-10 NOTE — LETTER
[facility]  [address] [id number] [phone]            Chilango Echevarria  [address] [place] [address] [address]  [facility] [address]        December 11, 2019    Dear Chilango,    While refilling your prescription today, we noticed that you are due for an appointment with your provider.  We will refill your prescription for 30 days, but a follow-up appointment must be made before any additional refills can be approved.     Taking care of your health is important to us and we look forward to seeing you in the near future.  Please call us at [phone] or [phone] (or use [employer]) to schedule an appointment.     Please disregard this notice if you have already made an appointment.    Sincerely,        [facility]

## 2019-12-11 DIAGNOSIS — M1A.9XX0 CHRONIC GOUT WITHOUT TOPHUS, UNSPECIFIED CAUSE, UNSPECIFIED SITE: ICD-10-CM

## 2019-12-11 RX ORDER — ALLOPURINOL 100 MG/1
TABLET ORAL
Qty: 90 TABLET | Refills: 0 | OUTPATIENT
Start: 2019-12-11

## 2019-12-11 RX ORDER — METOPROLOL SUCCINATE 25 MG/1
TABLET, EXTENDED RELEASE ORAL
Qty: 90 TABLET | Refills: 0 | Status: SHIPPED | OUTPATIENT
Start: 2019-12-11 | End: 2020-02-23

## 2019-12-11 RX ORDER — ALLOPURINOL 100 MG/1
TABLET ORAL
Qty: 30 TABLET | Refills: 0 | Status: SHIPPED | OUTPATIENT
Start: 2019-12-11 | End: 2020-02-23

## 2019-12-11 NOTE — TELEPHONE ENCOUNTER
"Requested Prescriptions   Pending Prescriptions Disp Refills     allopurinol (ZYLOPRIM) 100 MG tablet [Pharmacy Med Name: ALLOPURINOL 100MG TABLETS] 90 tablet 0     Sig: TAKE 1 TABLET(100 MG) BY MOUTH DAILY       Gout Agents Protocol Failed - 12/10/2019 10:05 AM        Failed - CBC on file in past 12 months     Recent Labs   Lab Test 10/26/18  1547   WBC 4.7   RBC 4.04*   HGB 12.9*   HCT 40.0                    Failed - ALT on file in past 12 months     Recent Labs   Lab Test 10/26/18  1547   ALT 10             Failed - Has Uric Acid on file in past 12 months and value is less than 6     Recent Labs   Lab Test 10/26/18  1547   URIC 4.3     If level is 6mg/dL or greater, ok to refill one time and refer to provider.           Failed - Normal serum creatinine on file in the past 12 months     Recent Labs   Lab Test 10/26/18  1547   CR 1.08             Passed - Recent (12 mo) or future (30 days) visit within the authorizing provider's specialty     Patient has had an office visit with the authorizing provider or a provider within the authorizing providers department within the previous 12 mos or has a future within next 30 days. See \"Patient Info\" tab in inbasket, or \"Choose Columns\" in Meds & Orders section of the refill encounter.              Passed - Medication is active on med list        Passed - Patient is age 18 or older     Last Written Prescription Date:  9/26/2019  Last Fill Quantity: 90,  # refills: 0   Last office visit:1/26/2018 with prescribing provider:  Dr. Shay   Future Office Visit:      Routing refill request to provider for review/approval because:  Labs not current:  CBC, BMP  Patient needs to be seen because it has been more than 1 year since last office visit with primary.      "

## 2019-12-11 NOTE — TELEPHONE ENCOUNTER
Call pt- 30 d fill approved.  f/u needed before further refills approved.    Not seen by me since 1/18- needs f/u with me. Maybe seeing another provider who would refill this longer?

## 2019-12-11 NOTE — TELEPHONE ENCOUNTER
"     metoprolol succinate ER (TOPROL-XL) 25 MG 24 hr tablet [Pharmacy Med Name: METOPROLOL ER SUCCINATE 25MG TABS] 90 tablet 0     Sig: TAKE 1 TABLET(25 MG) BY MOUTH EVERY MORNING       Beta-Blockers Protocol Passed - 12/10/2019 10:05 AM        Passed - Blood pressure under 140/90 in past 12 months     BP Readings from Last 3 Encounters:   11/15/19 100/60   10/26/18 138/76   01/26/18 120/60                 Passed - Patient is age 6 or older        Passed - Recent (12 mo) or future (30 days) visit within the authorizing provider's specialty     Patient has had an office visit with the authorizing provider or a provider within the authorizing providers department within the previous 12 mos or has a future within next 30 days. See \"Patient Info\" tab in inbasket, or \"Choose Columns\" in Meds & Orders section of the refill encounter.              Passed - Medication is active on med list        omeprazole (PRILOSEC) 20 MG DR capsule [Pharmacy Med Name: OMEPRAZOLE 20MG CAPSULES] 90 capsule 0     Sig: TAKE 1 CAPSULE BY MOUTH 30 TO 60 MINUTES BEFORE FIRST MEAL OF THE DAY       PPI Protocol Passed - 12/10/2019 10:05 AM        Passed - Not on Clopidogrel (unless Pantoprazole ordered)        Passed - No diagnosis of osteoporosis on record        Passed - Recent (12 mo) or future (30 days) visit within the authorizing provider's specialty     Patient has had an office visit with the authorizing provider or a provider within the authorizing providers department within the previous 12 mos or has a future within next 30 days. See \"Patient Info\" tab in inbasket, or \"Choose Columns\" in Meds & Orders section of the refill encounter.              Passed - Medication is active on med list        Passed - Patient is age 18 or older        Medication is being filled for 1 time refill only due to:  Patient needs to be seen because due for routine wellness visit.    Prescription approved per [facility] Refill Protocol.    Symone BOLDEN, RN, " PHN

## 2019-12-11 NOTE — TELEPHONE ENCOUNTER
"Requested Prescriptions   Pending Prescriptions Disp Refills     allopurinol (ZYLOPRIM) 100 MG tablet [Pharmacy Med Name: ALLOPURINOL 100MG TABLETS] 90 tablet 0     Sig: TAKE 1 TABLET(100 MG) BY MOUTH DAILY       Gout Agents Protocol Failed - 12/11/2019  2:15 PM        Failed - CBC on file in past 12 months     Recent Labs   Lab Test 10/26/18  1547   WBC 4.7   RBC 4.04*   HGB 12.9*   HCT 40.0                    Failed - ALT on file in past 12 months     Recent Labs   Lab Test 10/26/18  1547   ALT 10             Failed - Has Uric Acid on file in past 12 months and value is less than 6     Recent Labs   Lab Test 10/26/18  1547   URIC 4.3     If level is 6mg/dL or greater, ok to refill one time and refer to provider.           Failed - Normal serum creatinine on file in the past 12 months     Recent Labs   Lab Test 10/26/18  1547   CR 1.08             Passed - Recent (12 mo) or future (30 days) visit within the authorizing provider's specialty     Patient has had an office visit with the authorizing provider or a provider within the authorizing providers department within the previous 12 mos or has a future within next 30 days. See \"Patient Info\" tab in inbasket, or \"Choose Columns\" in Meds & Orders section of the refill encounter.              Passed - Medication is active on med list        Passed - Patient is age 18 or older        "

## 2019-12-13 NOTE — TELEPHONE ENCOUNTER
Called patient and gave him the message below. Patient will call back to sched apt after he try's to figure out transportation.

## 2020-01-01 ENCOUNTER — HOSPITAL LABORATORY (OUTPATIENT)
Dept: OTHER | Facility: CLINIC | Age: 85
End: 2020-01-01

## 2020-01-01 ENCOUNTER — TELEPHONE (OUTPATIENT)
Dept: GERIATRICS | Facility: CLINIC | Age: 85
End: 2020-01-01

## 2020-01-01 ENCOUNTER — APPOINTMENT (OUTPATIENT)
Dept: GENERAL RADIOLOGY | Facility: CLINIC | Age: 85
DRG: 388 | End: 2020-01-01
Attending: INTERNAL MEDICINE
Payer: MEDICARE

## 2020-01-01 ENCOUNTER — APPOINTMENT (OUTPATIENT)
Dept: SPEECH THERAPY | Facility: CLINIC | Age: 85
DRG: 388 | End: 2020-01-01
Payer: MEDICARE

## 2020-01-01 ENCOUNTER — NURSING HOME VISIT (OUTPATIENT)
Dept: GERIATRICS | Facility: CLINIC | Age: 85
End: 2020-01-01
Payer: MEDICARE

## 2020-01-01 ENCOUNTER — APPOINTMENT (OUTPATIENT)
Dept: CT IMAGING | Facility: CLINIC | Age: 85
DRG: 312 | End: 2020-01-01
Attending: EMERGENCY MEDICINE
Payer: MEDICARE

## 2020-01-01 ENCOUNTER — APPOINTMENT (OUTPATIENT)
Dept: PHYSICAL THERAPY | Facility: CLINIC | Age: 85
DRG: 388 | End: 2020-01-01
Attending: INTERNAL MEDICINE
Payer: MEDICARE

## 2020-01-01 ENCOUNTER — APPOINTMENT (OUTPATIENT)
Dept: PHYSICAL THERAPY | Facility: CLINIC | Age: 85
DRG: 312 | End: 2020-01-01
Attending: PHYSICIAN ASSISTANT
Payer: MEDICARE

## 2020-01-01 ENCOUNTER — APPOINTMENT (OUTPATIENT)
Dept: GENERAL RADIOLOGY | Facility: CLINIC | Age: 85
DRG: 312 | End: 2020-01-01
Attending: EMERGENCY MEDICINE
Payer: MEDICARE

## 2020-01-01 ENCOUNTER — DOCUMENTATION ONLY (OUTPATIENT)
Dept: OTHER | Facility: CLINIC | Age: 85
End: 2020-01-01

## 2020-01-01 ENCOUNTER — PATIENT OUTREACH (OUTPATIENT)
Dept: CARE COORDINATION | Facility: CLINIC | Age: 85
End: 2020-01-01

## 2020-01-01 ENCOUNTER — HOSPITAL ENCOUNTER (INPATIENT)
Facility: CLINIC | Age: 85
LOS: 3 days | Discharge: NURSING, REHAB OR LONG-TERM CARE | DRG: 312 | End: 2020-06-26
Attending: EMERGENCY MEDICINE | Admitting: HOSPITALIST
Payer: MEDICARE

## 2020-01-01 ENCOUNTER — APPOINTMENT (OUTPATIENT)
Dept: CT IMAGING | Facility: CLINIC | Age: 85
DRG: 388 | End: 2020-01-01
Attending: EMERGENCY MEDICINE
Payer: MEDICARE

## 2020-01-01 ENCOUNTER — APPOINTMENT (OUTPATIENT)
Dept: ULTRASOUND IMAGING | Facility: CLINIC | Age: 85
DRG: 388 | End: 2020-01-01
Attending: INTERNAL MEDICINE
Payer: MEDICARE

## 2020-01-01 ENCOUNTER — APPOINTMENT (OUTPATIENT)
Dept: GENERAL RADIOLOGY | Facility: CLINIC | Age: 85
DRG: 388 | End: 2020-01-01
Attending: EMERGENCY MEDICINE
Payer: MEDICARE

## 2020-01-01 ENCOUNTER — DOCUMENTATION ONLY (OUTPATIENT)
Dept: GERIATRICS | Facility: CLINIC | Age: 85
End: 2020-01-01

## 2020-01-01 ENCOUNTER — APPOINTMENT (OUTPATIENT)
Dept: SPEECH THERAPY | Facility: CLINIC | Age: 85
DRG: 388 | End: 2020-01-01
Attending: INTERNAL MEDICINE
Payer: MEDICARE

## 2020-01-01 ENCOUNTER — APPOINTMENT (OUTPATIENT)
Dept: GENERAL RADIOLOGY | Facility: CLINIC | Age: 85
DRG: 312 | End: 2020-01-01
Attending: PHYSICIAN ASSISTANT
Payer: MEDICARE

## 2020-01-01 ENCOUNTER — ANCILLARY PROCEDURE (OUTPATIENT)
Dept: GENERAL RADIOLOGY | Facility: CLINIC | Age: 85
End: 2020-01-01
Attending: NURSE PRACTITIONER
Payer: MEDICARE

## 2020-01-01 ENCOUNTER — APPOINTMENT (OUTPATIENT)
Dept: PHYSICAL THERAPY | Facility: CLINIC | Age: 85
DRG: 388 | End: 2020-01-01
Payer: MEDICARE

## 2020-01-01 ENCOUNTER — APPOINTMENT (OUTPATIENT)
Dept: SPEECH THERAPY | Facility: CLINIC | Age: 85
DRG: 312 | End: 2020-01-01
Attending: PHYSICIAN ASSISTANT
Payer: MEDICARE

## 2020-01-01 ENCOUNTER — OFFICE VISIT (OUTPATIENT)
Dept: URGENT CARE | Facility: URGENT CARE | Age: 85
End: 2020-01-01
Payer: MEDICARE

## 2020-01-01 ENCOUNTER — APPOINTMENT (OUTPATIENT)
Dept: SPEECH THERAPY | Facility: CLINIC | Age: 85
DRG: 312 | End: 2020-01-01
Payer: MEDICARE

## 2020-01-01 ENCOUNTER — APPOINTMENT (OUTPATIENT)
Dept: CARDIOLOGY | Facility: CLINIC | Age: 85
DRG: 388 | End: 2020-01-01
Attending: EMERGENCY MEDICINE
Payer: MEDICARE

## 2020-01-01 ENCOUNTER — TELEPHONE (OUTPATIENT)
Dept: INTERNAL MEDICINE | Facility: CLINIC | Age: 85
End: 2020-01-01

## 2020-01-01 ENCOUNTER — HOSPITAL ENCOUNTER (INPATIENT)
Facility: CLINIC | Age: 85
LOS: 9 days | Discharge: NURSING, REHAB OR LONG-TERM CARE | DRG: 388 | End: 2020-09-16
Attending: EMERGENCY MEDICINE | Admitting: INTERNAL MEDICINE
Payer: MEDICARE

## 2020-01-01 ENCOUNTER — APPOINTMENT (OUTPATIENT)
Dept: GENERAL RADIOLOGY | Facility: CLINIC | Age: 85
DRG: 388 | End: 2020-01-01
Attending: SURGERY
Payer: MEDICARE

## 2020-01-01 ENCOUNTER — APPOINTMENT (OUTPATIENT)
Dept: CT IMAGING | Facility: CLINIC | Age: 85
DRG: 312 | End: 2020-01-01
Attending: PHYSICIAN ASSISTANT
Payer: MEDICARE

## 2020-01-01 VITALS
BODY MASS INDEX: 22.36 KG/M2 | RESPIRATION RATE: 18 BRPM | SYSTOLIC BLOOD PRESSURE: 103 MMHG | TEMPERATURE: 97.2 F | WEIGHT: 134.2 LBS | OXYGEN SATURATION: 98 % | HEART RATE: 60 BPM | DIASTOLIC BLOOD PRESSURE: 63 MMHG | HEIGHT: 65 IN

## 2020-01-01 VITALS
SYSTOLIC BLOOD PRESSURE: 123 MMHG | RESPIRATION RATE: 18 BRPM | DIASTOLIC BLOOD PRESSURE: 67 MMHG | TEMPERATURE: 97 F | WEIGHT: 133.8 LBS | HEART RATE: 68 BPM | OXYGEN SATURATION: 98 % | BODY MASS INDEX: 22.29 KG/M2 | HEIGHT: 65 IN

## 2020-01-01 VITALS
WEIGHT: 123.6 LBS | HEIGHT: 65 IN | OXYGEN SATURATION: 96 % | DIASTOLIC BLOOD PRESSURE: 60 MMHG | SYSTOLIC BLOOD PRESSURE: 88 MMHG | HEART RATE: 62 BPM | BODY MASS INDEX: 20.59 KG/M2 | RESPIRATION RATE: 18 BRPM | TEMPERATURE: 97.3 F

## 2020-01-01 VITALS
HEART RATE: 78 BPM | TEMPERATURE: 97.1 F | HEIGHT: 65 IN | WEIGHT: 123 LBS | RESPIRATION RATE: 18 BRPM | BODY MASS INDEX: 20.49 KG/M2 | OXYGEN SATURATION: 94 % | DIASTOLIC BLOOD PRESSURE: 76 MMHG | SYSTOLIC BLOOD PRESSURE: 114 MMHG

## 2020-01-01 VITALS
DIASTOLIC BLOOD PRESSURE: 56 MMHG | HEART RATE: 62 BPM | SYSTOLIC BLOOD PRESSURE: 126 MMHG | RESPIRATION RATE: 16 BRPM | OXYGEN SATURATION: 96 % | TEMPERATURE: 96.4 F | WEIGHT: 151.6 LBS | BODY MASS INDEX: 25.23 KG/M2

## 2020-01-01 VITALS
TEMPERATURE: 97.3 F | OXYGEN SATURATION: 98 % | HEART RATE: 58 BPM | WEIGHT: 139 LBS | BODY MASS INDEX: 23.16 KG/M2 | HEIGHT: 65 IN | SYSTOLIC BLOOD PRESSURE: 135 MMHG | RESPIRATION RATE: 18 BRPM | DIASTOLIC BLOOD PRESSURE: 76 MMHG

## 2020-01-01 VITALS
HEIGHT: 65 IN | TEMPERATURE: 96.7 F | HEART RATE: 78 BPM | WEIGHT: 130.9 LBS | DIASTOLIC BLOOD PRESSURE: 68 MMHG | OXYGEN SATURATION: 93 % | SYSTOLIC BLOOD PRESSURE: 130 MMHG | RESPIRATION RATE: 18 BRPM | BODY MASS INDEX: 21.81 KG/M2

## 2020-01-01 VITALS
WEIGHT: 131.6 LBS | HEART RATE: 68 BPM | BODY MASS INDEX: 21.92 KG/M2 | HEIGHT: 65 IN | TEMPERATURE: 97.5 F | DIASTOLIC BLOOD PRESSURE: 58 MMHG | SYSTOLIC BLOOD PRESSURE: 102 MMHG | RESPIRATION RATE: 18 BRPM | OXYGEN SATURATION: 92 %

## 2020-01-01 VITALS
DIASTOLIC BLOOD PRESSURE: 66 MMHG | HEIGHT: 65 IN | TEMPERATURE: 97.1 F | BODY MASS INDEX: 23.14 KG/M2 | HEART RATE: 76 BPM | WEIGHT: 138.9 LBS | OXYGEN SATURATION: 97 % | RESPIRATION RATE: 18 BRPM | SYSTOLIC BLOOD PRESSURE: 101 MMHG

## 2020-01-01 VITALS
OXYGEN SATURATION: 96 % | DIASTOLIC BLOOD PRESSURE: 76 MMHG | BODY MASS INDEX: 23.11 KG/M2 | RESPIRATION RATE: 20 BRPM | HEART RATE: 65 BPM | TEMPERATURE: 97.9 F | SYSTOLIC BLOOD PRESSURE: 116 MMHG | WEIGHT: 138.89 LBS

## 2020-01-01 VITALS
HEIGHT: 65 IN | SYSTOLIC BLOOD PRESSURE: 112 MMHG | DIASTOLIC BLOOD PRESSURE: 70 MMHG | TEMPERATURE: 97.5 F | HEART RATE: 68 BPM | RESPIRATION RATE: 18 BRPM | WEIGHT: 139.6 LBS | OXYGEN SATURATION: 97 % | BODY MASS INDEX: 23.26 KG/M2

## 2020-01-01 VITALS
HEART RATE: 68 BPM | SYSTOLIC BLOOD PRESSURE: 134 MMHG | TEMPERATURE: 97.3 F | DIASTOLIC BLOOD PRESSURE: 74 MMHG | BODY MASS INDEX: 22.96 KG/M2 | WEIGHT: 137.8 LBS | RESPIRATION RATE: 18 BRPM | OXYGEN SATURATION: 96 % | HEIGHT: 65 IN

## 2020-01-01 VITALS
WEIGHT: 125.3 LBS | DIASTOLIC BLOOD PRESSURE: 63 MMHG | HEIGHT: 65 IN | OXYGEN SATURATION: 99 % | SYSTOLIC BLOOD PRESSURE: 101 MMHG | HEART RATE: 70 BPM | RESPIRATION RATE: 18 BRPM | BODY MASS INDEX: 20.88 KG/M2 | TEMPERATURE: 96.8 F

## 2020-01-01 VITALS
HEART RATE: 67 BPM | OXYGEN SATURATION: 96 % | WEIGHT: 140 LBS | DIASTOLIC BLOOD PRESSURE: 61 MMHG | HEIGHT: 65 IN | TEMPERATURE: 97.8 F | BODY MASS INDEX: 23.32 KG/M2 | RESPIRATION RATE: 18 BRPM | SYSTOLIC BLOOD PRESSURE: 112 MMHG

## 2020-01-01 VITALS
OXYGEN SATURATION: 96 % | TEMPERATURE: 97.5 F | RESPIRATION RATE: 18 BRPM | BODY MASS INDEX: 24.07 KG/M2 | HEIGHT: 65 IN | SYSTOLIC BLOOD PRESSURE: 114 MMHG | WEIGHT: 144.5 LBS | HEART RATE: 67 BPM | DIASTOLIC BLOOD PRESSURE: 65 MMHG

## 2020-01-01 VITALS
SYSTOLIC BLOOD PRESSURE: 102 MMHG | TEMPERATURE: 98.9 F | OXYGEN SATURATION: 96 % | BODY MASS INDEX: 23.11 KG/M2 | RESPIRATION RATE: 17 BRPM | DIASTOLIC BLOOD PRESSURE: 58 MMHG | WEIGHT: 138.9 LBS | HEART RATE: 75 BPM

## 2020-01-01 VITALS
TEMPERATURE: 98.3 F | BODY MASS INDEX: 24.46 KG/M2 | DIASTOLIC BLOOD PRESSURE: 62 MMHG | SYSTOLIC BLOOD PRESSURE: 98 MMHG | HEART RATE: 72 BPM | WEIGHT: 147 LBS | OXYGEN SATURATION: 98 %

## 2020-01-01 DIAGNOSIS — Z51.5 HOSPICE CARE PATIENT: ICD-10-CM

## 2020-01-01 DIAGNOSIS — I95.1 ORTHOSTATIC HYPOTENSION: ICD-10-CM

## 2020-01-01 DIAGNOSIS — G47.00 INSOMNIA, UNSPECIFIED TYPE: Primary | ICD-10-CM

## 2020-01-01 DIAGNOSIS — K56.609 SMALL BOWEL OBSTRUCTION (H): ICD-10-CM

## 2020-01-01 DIAGNOSIS — S01.01XA LACERATION OF SCALP WITHOUT FOREIGN BODY, INITIAL ENCOUNTER: ICD-10-CM

## 2020-01-01 DIAGNOSIS — M62.81 GENERALIZED MUSCLE WEAKNESS: Primary | ICD-10-CM

## 2020-01-01 DIAGNOSIS — K21.00 GASTROESOPHAGEAL REFLUX DISEASE WITH ESOPHAGITIS WITHOUT HEMORRHAGE: ICD-10-CM

## 2020-01-01 DIAGNOSIS — R41.0 CONFUSION: ICD-10-CM

## 2020-01-01 DIAGNOSIS — G20.A1 PARKINSON'S DISEASE (H): ICD-10-CM

## 2020-01-01 DIAGNOSIS — G20.A1 PARKINSON'S DISEASE (H): Primary | ICD-10-CM
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
## 2020-01-01 DIAGNOSIS — E43 SEVERE MALNUTRITION (H): ICD-10-CM

## 2020-01-01 DIAGNOSIS — Z53.9 ERRONEOUS ENCOUNTER--DISREGARD: Primary | ICD-10-CM

## 2020-01-01 DIAGNOSIS — U07.1 COVID-19 VIRUS INFECTION: Primary | ICD-10-CM

## 2020-01-01 DIAGNOSIS — N17.9 ACUTE KIDNEY INJURY (H): ICD-10-CM

## 2020-01-01 DIAGNOSIS — M25.512 LEFT SHOULDER PAIN, UNSPECIFIED CHRONICITY: ICD-10-CM

## 2020-01-01 DIAGNOSIS — I71.43 ANEURYSM OF INFRARENAL ABDOMINAL AORTA (H): ICD-10-CM

## 2020-01-01 DIAGNOSIS — F22 PARANOIA (H): ICD-10-CM

## 2020-01-01 DIAGNOSIS — L20.9 ATOPIC DERMATITIS, UNSPECIFIED TYPE: ICD-10-CM

## 2020-01-01 DIAGNOSIS — M62.81 GENERALIZED MUSCLE WEAKNESS: ICD-10-CM

## 2020-01-01 DIAGNOSIS — M1A.9XX0 CHRONIC GOUT WITHOUT TOPHUS, UNSPECIFIED CAUSE, UNSPECIFIED SITE: ICD-10-CM

## 2020-01-01 DIAGNOSIS — Z87.898 HISTORY OF SYNCOPE: ICD-10-CM

## 2020-01-01 DIAGNOSIS — Z87.19 HX SBO: ICD-10-CM

## 2020-01-01 DIAGNOSIS — I10 ESSENTIAL HYPERTENSION: ICD-10-CM

## 2020-01-01 DIAGNOSIS — N39.0 URINARY TRACT INFECTION WITHOUT HEMATURIA, SITE UNSPECIFIED: ICD-10-CM

## 2020-01-01 DIAGNOSIS — D64.9 ANEMIA, UNSPECIFIED TYPE: ICD-10-CM

## 2020-01-01 DIAGNOSIS — M25.512 ACUTE PAIN OF LEFT SHOULDER: ICD-10-CM

## 2020-01-01 DIAGNOSIS — R45.1 AGITATION: Primary | ICD-10-CM

## 2020-01-01 DIAGNOSIS — R94.31 ABNORMAL ELECTROCARDIOGRAM: ICD-10-CM

## 2020-01-01 DIAGNOSIS — F03.91 DEMENTIA WITH BEHAVIORAL DISTURBANCE, UNSPECIFIED DEMENTIA TYPE: ICD-10-CM

## 2020-01-01 DIAGNOSIS — I95.1 ORTHOSTATIC HYPOTENSION: Primary | ICD-10-CM

## 2020-01-01 DIAGNOSIS — M75.102 ROTATOR CUFF SYNDROME, LEFT: Primary | ICD-10-CM

## 2020-01-01 DIAGNOSIS — R39.89 URINARY PROBLEM: ICD-10-CM

## 2020-01-01 DIAGNOSIS — H61.23 BILATERAL IMPACTED CERUMEN: ICD-10-CM

## 2020-01-01 DIAGNOSIS — R26.9 GAIT ABNORMALITY: ICD-10-CM

## 2020-01-01 DIAGNOSIS — K59.01 SLOW TRANSIT CONSTIPATION: ICD-10-CM

## 2020-01-01 DIAGNOSIS — R55 SYNCOPE, UNSPECIFIED SYNCOPE TYPE: ICD-10-CM

## 2020-01-01 DIAGNOSIS — Z11.59 SCREENING FOR VIRAL DISEASE: ICD-10-CM

## 2020-01-01 DIAGNOSIS — Z51.5 HOSPICE CARE PATIENT: Primary | ICD-10-CM

## 2020-01-01 DIAGNOSIS — R55 SYNCOPE AND COLLAPSE: ICD-10-CM

## 2020-01-01 DIAGNOSIS — R29.6 FALLS FREQUENTLY: ICD-10-CM

## 2020-01-01 DIAGNOSIS — K21.00 GASTROESOPHAGEAL REFLUX DISEASE WITH ESOPHAGITIS: ICD-10-CM

## 2020-01-01 DIAGNOSIS — F03.90 DEMENTIA WITHOUT BEHAVIORAL DISTURBANCE, UNSPECIFIED DEMENTIA TYPE: ICD-10-CM

## 2020-01-01 DIAGNOSIS — R41.89 COGNITIVE IMPAIRMENT: ICD-10-CM

## 2020-01-01 DIAGNOSIS — I35.0 MILD AORTIC STENOSIS: ICD-10-CM

## 2020-01-01 DIAGNOSIS — S46.001D INJURY OF RIGHT ROTATOR CUFF, SUBSEQUENT ENCOUNTER: ICD-10-CM

## 2020-01-01 LAB
ABO + RH BLD: NORMAL
ALBUMIN SERPL-MCNC: 3 G/DL (ref 3.4–5)
ALBUMIN SERPL-MCNC: 4.4 G/DL (ref 3.4–5)
ALBUMIN UR-MCNC: 10 MG/DL
ALBUMIN UR-MCNC: 20 MG/DL
ALBUMIN UR-MCNC: 30 MG/DL
ALBUMIN UR-MCNC: NEGATIVE MG/DL
ALP SERPL-CCNC: 51 U/L (ref 40–150)
ALP SERPL-CCNC: 78 U/L (ref 40–150)
ALT SERPL W P-5'-P-CCNC: 10 U/L (ref 0–70)
ALT SERPL W P-5'-P-CCNC: 10 U/L (ref 0–70)
ANION GAP SERPL CALCULATED.3IONS-SCNC: 2 MMOL/L (ref 3–14)
ANION GAP SERPL CALCULATED.3IONS-SCNC: 3 MMOL/L (ref 3–14)
ANION GAP SERPL CALCULATED.3IONS-SCNC: 4 MMOL/L (ref 3–14)
ANION GAP SERPL CALCULATED.3IONS-SCNC: 5 MMOL/L (ref 3–14)
ANION GAP SERPL CALCULATED.3IONS-SCNC: 7 MMOL/L (ref 3–14)
APPEARANCE UR: ABNORMAL
APPEARANCE UR: ABNORMAL
APPEARANCE UR: CLEAR
APPEARANCE UR: CLEAR
APTT PPP: 30 SEC (ref 22–37)
AST SERPL W P-5'-P-CCNC: 22 U/L (ref 0–45)
AST SERPL W P-5'-P-CCNC: 23 U/L (ref 0–45)
BACTERIA #/AREA URNS HPF: ABNORMAL /HPF
BACTERIA SPEC CULT: ABNORMAL
BACTERIA SPEC CULT: ABNORMAL
BACTERIA SPEC CULT: NO GROWTH
BACTERIA SPEC CULT: NO GROWTH
BACTERIA SPEC CULT: NORMAL
BACTERIA SPEC CULT: NORMAL
BASOPHILS # BLD AUTO: 0 10E9/L (ref 0–0.2)
BASOPHILS NFR BLD AUTO: 0.1 %
BASOPHILS NFR BLD AUTO: 0.2 %
BASOPHILS NFR BLD AUTO: 0.3 %
BASOPHILS NFR BLD AUTO: 0.3 %
BILIRUB DIRECT SERPL-MCNC: 0.1 MG/DL (ref 0–0.2)
BILIRUB SERPL-MCNC: 0.5 MG/DL (ref 0.2–1.3)
BILIRUB SERPL-MCNC: 0.5 MG/DL (ref 0.2–1.3)
BILIRUB UR QL STRIP: NEGATIVE
BLD GP AB SCN SERPL QL: NORMAL
BLD GP AB SCN SERPL QL: NORMAL
BLD PROD TYP BPU: NORMAL
BLD PROD TYP BPU: NORMAL
BLD UNIT ID BPU: 0
BLOOD BANK CMNT PATIENT-IMP: NORMAL
BLOOD BANK CMNT PATIENT-IMP: NORMAL
BLOOD PRODUCT CODE: NORMAL
BPU ID: NORMAL
BUN SERPL-MCNC: 15 MG/DL (ref 7–30)
BUN SERPL-MCNC: 15 MG/DL (ref 7–30)
BUN SERPL-MCNC: 22 MG/DL (ref 7–30)
BUN SERPL-MCNC: 23 MG/DL (ref 7–30)
BUN SERPL-MCNC: 27 MG/DL (ref 7–30)
BUN SERPL-MCNC: 28 MG/DL (ref 7–30)
BUN SERPL-MCNC: 34 MG/DL (ref 7–30)
BUN SERPL-MCNC: 37 MG/DL (ref 7–30)
BUN SERPL-MCNC: 37 MG/DL (ref 7–30)
BUN SERPL-MCNC: 43 MG/DL (ref 7–30)
BUN SERPL-MCNC: 55 MG/DL (ref 7–30)
BUN SERPL-MCNC: 62 MG/DL (ref 7–30)
CALCIUM SERPL-MCNC: 7.5 MG/DL (ref 8.5–10.1)
CALCIUM SERPL-MCNC: 7.8 MG/DL (ref 8.5–10.1)
CALCIUM SERPL-MCNC: 7.9 MG/DL (ref 8.5–10.1)
CALCIUM SERPL-MCNC: 8 MG/DL (ref 8.5–10.1)
CALCIUM SERPL-MCNC: 8.2 MG/DL (ref 8.5–10.1)
CALCIUM SERPL-MCNC: 8.4 MG/DL (ref 8.5–10.1)
CALCIUM SERPL-MCNC: 8.8 MG/DL (ref 8.5–10.1)
CALCIUM SERPL-MCNC: 9.5 MG/DL (ref 8.5–10.1)
CHLORIDE SERPL-SCNC: 104 MMOL/L (ref 94–109)
CHLORIDE SERPL-SCNC: 106 MMOL/L (ref 94–109)
CHLORIDE SERPL-SCNC: 107 MMOL/L (ref 94–109)
CHLORIDE SERPL-SCNC: 108 MMOL/L (ref 94–109)
CHLORIDE SERPL-SCNC: 108 MMOL/L (ref 94–109)
CHLORIDE SERPL-SCNC: 109 MMOL/L (ref 94–109)
CHLORIDE SERPL-SCNC: 110 MMOL/L (ref 94–109)
CHLORIDE SERPL-SCNC: 110 MMOL/L (ref 94–109)
CHLORIDE SERPL-SCNC: 111 MMOL/L (ref 94–109)
CHLORIDE SERPL-SCNC: 111 MMOL/L (ref 94–109)
CHLORIDE SERPL-SCNC: 112 MMOL/L (ref 94–109)
CHLORIDE SERPL-SCNC: 114 MMOL/L (ref 94–109)
CK SERPL-CCNC: 193 U/L (ref 30–300)
CK SERPL-CCNC: 44 U/L (ref 30–300)
CO2 SERPL-SCNC: 23 MMOL/L (ref 20–32)
CO2 SERPL-SCNC: 24 MMOL/L (ref 20–32)
CO2 SERPL-SCNC: 25 MMOL/L (ref 20–32)
CO2 SERPL-SCNC: 25 MMOL/L (ref 20–32)
CO2 SERPL-SCNC: 26 MMOL/L (ref 20–32)
CO2 SERPL-SCNC: 27 MMOL/L (ref 20–32)
CO2 SERPL-SCNC: 28 MMOL/L (ref 20–32)
CO2 SERPL-SCNC: 32 MMOL/L (ref 20–32)
COLOR UR AUTO: ABNORMAL
COLOR UR AUTO: YELLOW
COVID-19 VIRUS PCR TO MAYO - RESULT: NORMAL
COVID-19 VIRUS PCR TO MAYO - RESULT: NORMAL
CREAT BLD-MCNC: 2.4 MG/DL (ref 0.66–1.25)
CREAT SERPL-MCNC: 0.73 MG/DL (ref 0.66–1.25)
CREAT SERPL-MCNC: 0.76 MG/DL (ref 0.66–1.25)
CREAT SERPL-MCNC: 0.77 MG/DL (ref 0.66–1.25)
CREAT SERPL-MCNC: 0.78 MG/DL (ref 0.66–1.25)
CREAT SERPL-MCNC: 0.79 MG/DL (ref 0.66–1.25)
CREAT SERPL-MCNC: 0.81 MG/DL (ref 0.66–1.25)
CREAT SERPL-MCNC: 0.82 MG/DL (ref 0.66–1.25)
CREAT SERPL-MCNC: 0.86 MG/DL (ref 0.66–1.25)
CREAT SERPL-MCNC: 0.89 MG/DL (ref 0.66–1.25)
CREAT SERPL-MCNC: 1.13 MG/DL (ref 0.66–1.25)
CREAT SERPL-MCNC: 1.9 MG/DL (ref 0.66–1.25)
CREAT SERPL-MCNC: 2.33 MG/DL (ref 0.66–1.25)
DIFFERENTIAL METHOD BLD: ABNORMAL
EOSINOPHIL # BLD AUTO: 0 10E9/L (ref 0–0.7)
EOSINOPHIL # BLD AUTO: 0.1 10E9/L (ref 0–0.7)
EOSINOPHIL # BLD AUTO: 0.1 10E9/L (ref 0–0.7)
EOSINOPHIL NFR BLD AUTO: 0 %
EOSINOPHIL NFR BLD AUTO: 0 %
EOSINOPHIL NFR BLD AUTO: 0.3 %
EOSINOPHIL NFR BLD AUTO: 0.9 %
EOSINOPHIL NFR BLD AUTO: 1.1 %
EOSINOPHIL NFR BLD AUTO: 3.7 %
ERYTHROCYTE [DISTWIDTH] IN BLOOD BY AUTOMATED COUNT: 13.7 % (ref 10–15)
ERYTHROCYTE [DISTWIDTH] IN BLOOD BY AUTOMATED COUNT: 13.8 % (ref 10–15)
ERYTHROCYTE [DISTWIDTH] IN BLOOD BY AUTOMATED COUNT: 14.4 % (ref 10–15)
ERYTHROCYTE [DISTWIDTH] IN BLOOD BY AUTOMATED COUNT: 14.6 % (ref 10–15)
ERYTHROCYTE [DISTWIDTH] IN BLOOD BY AUTOMATED COUNT: 14.7 % (ref 10–15)
ERYTHROCYTE [DISTWIDTH] IN BLOOD BY AUTOMATED COUNT: 15 % (ref 10–15)
ERYTHROCYTE [DISTWIDTH] IN BLOOD BY AUTOMATED COUNT: 15 % (ref 10–15)
ERYTHROCYTE [DISTWIDTH] IN BLOOD BY AUTOMATED COUNT: 15.2 % (ref 10–15)
ERYTHROCYTE [DISTWIDTH] IN BLOOD BY AUTOMATED COUNT: 15.2 % (ref 10–15)
ERYTHROCYTE [DISTWIDTH] IN BLOOD BY AUTOMATED COUNT: 15.3 % (ref 10–15)
ERYTHROCYTE [DISTWIDTH] IN BLOOD BY AUTOMATED COUNT: 15.5 % (ref 10–15)
ERYTHROCYTE [DISTWIDTH] IN BLOOD BY AUTOMATED COUNT: 16.3 % (ref 10–15)
GFR SERPL CREATININE-BSD FRML MDRD: 24 ML/MIN/{1.73_M2}
GFR SERPL CREATININE-BSD FRML MDRD: 26 ML/MIN/{1.73_M2}
GFR SERPL CREATININE-BSD FRML MDRD: 31 ML/MIN/{1.73_M2}
GFR SERPL CREATININE-BSD FRML MDRD: 59 ML/MIN/{1.73_M2}
GFR SERPL CREATININE-BSD FRML MDRD: 78 ML/MIN/{1.73_M2}
GFR SERPL CREATININE-BSD FRML MDRD: 78 ML/MIN/{1.73_M2}
GFR SERPL CREATININE-BSD FRML MDRD: 80 ML/MIN/{1.73_M2}
GFR SERPL CREATININE-BSD FRML MDRD: 81 ML/MIN/{1.73_M2}
GFR SERPL CREATININE-BSD FRML MDRD: 82 ML/MIN/{1.73_M2}
GFR SERPL CREATININE-BSD FRML MDRD: 83 ML/MIN/{1.73_M2}
GFR SERPL CREATININE-BSD FRML MDRD: 84 ML/MIN/{1.73_M2}
GLUCOSE SERPL-MCNC: 121 MG/DL (ref 70–99)
GLUCOSE SERPL-MCNC: 140 MG/DL (ref 70–99)
GLUCOSE SERPL-MCNC: 71 MG/DL (ref 70–99)
GLUCOSE SERPL-MCNC: 74 MG/DL (ref 70–99)
GLUCOSE SERPL-MCNC: 77 MG/DL (ref 70–99)
GLUCOSE SERPL-MCNC: 78 MG/DL (ref 70–99)
GLUCOSE SERPL-MCNC: 81 MG/DL (ref 70–99)
GLUCOSE SERPL-MCNC: 82 MG/DL (ref 70–99)
GLUCOSE SERPL-MCNC: 88 MG/DL (ref 70–99)
GLUCOSE SERPL-MCNC: 90 MG/DL (ref 70–99)
GLUCOSE SERPL-MCNC: 97 MG/DL (ref 70–99)
GLUCOSE SERPL-MCNC: 97 MG/DL (ref 70–99)
GLUCOSE UR STRIP-MCNC: NEGATIVE MG/DL
HCT VFR BLD AUTO: 22.2 % (ref 40–53)
HCT VFR BLD AUTO: 26.2 % (ref 40–53)
HCT VFR BLD AUTO: 27.5 % (ref 40–53)
HCT VFR BLD AUTO: 28.9 % (ref 40–53)
HCT VFR BLD AUTO: 31.7 % (ref 40–53)
HCT VFR BLD AUTO: 32.1 % (ref 40–53)
HCT VFR BLD AUTO: 33.8 % (ref 40–53)
HCT VFR BLD AUTO: 34.9 % (ref 40–53)
HCT VFR BLD AUTO: 37.1 % (ref 40–53)
HCT VFR BLD AUTO: 38 % (ref 40–53)
HCT VFR BLD AUTO: 38.1 % (ref 40–53)
HCT VFR BLD AUTO: 43.2 % (ref 40–53)
HGB BLD-MCNC: 10 G/DL (ref 13.3–17.7)
HGB BLD-MCNC: 10.1 G/DL (ref 13.3–17.7)
HGB BLD-MCNC: 10.5 G/DL (ref 13.3–17.7)
HGB BLD-MCNC: 11.6 G/DL (ref 13.3–17.7)
HGB BLD-MCNC: 11.6 G/DL (ref 13.3–17.7)
HGB BLD-MCNC: 11.8 G/DL (ref 13.3–17.7)
HGB BLD-MCNC: 13.5 G/DL (ref 13.3–17.7)
HGB BLD-MCNC: 6.8 G/DL (ref 13.3–17.7)
HGB BLD-MCNC: 7.1 G/DL (ref 13.3–17.7)
HGB BLD-MCNC: 7.5 G/DL (ref 13.3–17.7)
HGB BLD-MCNC: 8.1 G/DL (ref 13.3–17.7)
HGB BLD-MCNC: 8.3 G/DL (ref 13.3–17.7)
HGB BLD-MCNC: 8.5 G/DL (ref 13.3–17.7)
HGB BLD-MCNC: 9.3 G/DL (ref 13.3–17.7)
HGB BLD-MCNC: 9.8 G/DL (ref 13.3–17.7)
HGB UR QL STRIP: NEGATIVE
HYALINE CASTS #/AREA URNS LPF: 3 /LPF (ref 0–2)
IMM GRANULOCYTES # BLD: 0 10E9/L (ref 0–0.4)
IMM GRANULOCYTES # BLD: 0.1 10E9/L (ref 0–0.4)
IMM GRANULOCYTES NFR BLD: 0.3 %
IMM GRANULOCYTES NFR BLD: 0.3 %
IMM GRANULOCYTES NFR BLD: 0.4 %
IMM GRANULOCYTES NFR BLD: 0.6 %
IMM GRANULOCYTES NFR BLD: 0.6 %
IMM GRANULOCYTES NFR BLD: 0.7 %
INR PPP: 1.05 (ref 0.86–1.14)
INR PPP: 1.29 (ref 0.86–1.14)
INTERPRETATION ECG - MUSE: NORMAL
KETONES UR STRIP-MCNC: 10 MG/DL
KETONES UR STRIP-MCNC: 10 MG/DL
KETONES UR STRIP-MCNC: 20 MG/DL
KETONES UR STRIP-MCNC: NEGATIVE MG/DL
LABORATORY COMMENT REPORT: ABNORMAL
LABORATORY COMMENT REPORT: NORMAL
LABORATORY COMMENT REPORT: NORMAL
LACTATE BLD-SCNC: 0.9 MMOL/L (ref 0.7–2)
LEUKOCYTE ESTERASE UR QL STRIP: ABNORMAL
LEUKOCYTE ESTERASE UR QL STRIP: NEGATIVE
LYMPHOCYTES # BLD AUTO: 0.6 10E9/L (ref 0.8–5.3)
LYMPHOCYTES # BLD AUTO: 0.7 10E9/L (ref 0.8–5.3)
LYMPHOCYTES # BLD AUTO: 0.8 10E9/L (ref 0.8–5.3)
LYMPHOCYTES # BLD AUTO: 0.8 10E9/L (ref 0.8–5.3)
LYMPHOCYTES # BLD AUTO: 0.9 10E9/L (ref 0.8–5.3)
LYMPHOCYTES # BLD AUTO: 1 10E9/L (ref 0.8–5.3)
LYMPHOCYTES NFR BLD AUTO: 13.7 %
LYMPHOCYTES NFR BLD AUTO: 20.1 %
LYMPHOCYTES NFR BLD AUTO: 22.6 %
LYMPHOCYTES NFR BLD AUTO: 23.1 %
LYMPHOCYTES NFR BLD AUTO: 3.9 %
LYMPHOCYTES NFR BLD AUTO: 5.5 %
Lab: ABNORMAL
Lab: NORMAL
Lab: NORMAL
MCH RBC QN AUTO: 28.6 PG (ref 26.5–33)
MCH RBC QN AUTO: 28.7 PG (ref 26.5–33)
MCH RBC QN AUTO: 28.7 PG (ref 26.5–33)
MCH RBC QN AUTO: 29.1 PG (ref 26.5–33)
MCH RBC QN AUTO: 29.2 PG (ref 26.5–33)
MCH RBC QN AUTO: 29.9 PG (ref 26.5–33)
MCH RBC QN AUTO: 30.1 PG (ref 26.5–33)
MCH RBC QN AUTO: 30.2 PG (ref 26.5–33)
MCH RBC QN AUTO: 30.6 PG (ref 26.5–33)
MCH RBC QN AUTO: 30.8 PG (ref 26.5–33)
MCHC RBC AUTO-ENTMCNC: 29.9 G/DL (ref 31.5–36.5)
MCHC RBC AUTO-ENTMCNC: 30.1 G/DL (ref 31.5–36.5)
MCHC RBC AUTO-ENTMCNC: 30.4 G/DL (ref 31.5–36.5)
MCHC RBC AUTO-ENTMCNC: 30.5 G/DL (ref 31.5–36.5)
MCHC RBC AUTO-ENTMCNC: 30.6 G/DL (ref 31.5–36.5)
MCHC RBC AUTO-ENTMCNC: 30.9 G/DL (ref 31.5–36.5)
MCHC RBC AUTO-ENTMCNC: 31.1 G/DL (ref 31.5–36.5)
MCHC RBC AUTO-ENTMCNC: 31.3 G/DL (ref 31.5–36.5)
MCHC RBC AUTO-ENTMCNC: 31.3 G/DL (ref 31.5–36.5)
MCHC RBC AUTO-ENTMCNC: 31.5 G/DL (ref 31.5–36.5)
MCHC RBC AUTO-ENTMCNC: 31.7 G/DL (ref 31.5–36.5)
MCHC RBC AUTO-ENTMCNC: 32.2 G/DL (ref 31.5–36.5)
MCV RBC AUTO: 101 FL (ref 78–100)
MCV RBC AUTO: 93 FL (ref 78–100)
MCV RBC AUTO: 94 FL (ref 78–100)
MCV RBC AUTO: 95 FL (ref 78–100)
MCV RBC AUTO: 95 FL (ref 78–100)
MCV RBC AUTO: 96 FL (ref 78–100)
MCV RBC AUTO: 98 FL (ref 78–100)
MONOCYTES # BLD AUTO: 0.2 10E9/L (ref 0–1.3)
MONOCYTES # BLD AUTO: 0.3 10E9/L (ref 0–1.3)
MONOCYTES # BLD AUTO: 0.3 10E9/L (ref 0–1.3)
MONOCYTES # BLD AUTO: 0.4 10E9/L (ref 0–1.3)
MONOCYTES NFR BLD AUTO: 2.7 %
MONOCYTES NFR BLD AUTO: 3 %
MONOCYTES NFR BLD AUTO: 6.3 %
MONOCYTES NFR BLD AUTO: 6.8 %
MONOCYTES NFR BLD AUTO: 6.8 %
MONOCYTES NFR BLD AUTO: 7.4 %
MUCOUS THREADS #/AREA URNS LPF: PRESENT /LPF
NEUTROPHILS # BLD AUTO: 12.5 10E9/L (ref 1.6–8.3)
NEUTROPHILS # BLD AUTO: 14.9 10E9/L (ref 1.6–8.3)
NEUTROPHILS # BLD AUTO: 2.2 10E9/L (ref 1.6–8.3)
NEUTROPHILS # BLD AUTO: 2.3 10E9/L (ref 1.6–8.3)
NEUTROPHILS # BLD AUTO: 3 10E9/L (ref 1.6–8.3)
NEUTROPHILS # BLD AUTO: 5 10E9/L (ref 1.6–8.3)
NEUTROPHILS NFR BLD AUTO: 68.3 %
NEUTROPHILS NFR BLD AUTO: 68.5 %
NEUTROPHILS NFR BLD AUTO: 68.8 %
NEUTROPHILS NFR BLD AUTO: 78.4 %
NEUTROPHILS NFR BLD AUTO: 91 %
NEUTROPHILS NFR BLD AUTO: 92.9 %
NITRATE UR QL: NEGATIVE
NRBC # BLD AUTO: 0 10*3/UL
NRBC BLD AUTO-RTO: 0 /100
NUM BPU REQUESTED: 1
PH UR STRIP: 5 PH (ref 5–7)
PH UR STRIP: 5 PH (ref 5–7)
PH UR STRIP: 5.5 PH (ref 5–7)
PH UR STRIP: 6 PH (ref 5–7)
PLATELET # BLD AUTO: 121 10E9/L (ref 150–450)
PLATELET # BLD AUTO: 121 10E9/L (ref 150–450)
PLATELET # BLD AUTO: 127 10E9/L (ref 150–450)
PLATELET # BLD AUTO: 137 10E9/L (ref 150–450)
PLATELET # BLD AUTO: 153 10E9/L (ref 150–450)
PLATELET # BLD AUTO: 154 10E9/L (ref 150–450)
PLATELET # BLD AUTO: 156 10E9/L (ref 150–450)
PLATELET # BLD AUTO: 179 10E9/L (ref 150–450)
PLATELET # BLD AUTO: 181 10E9/L (ref 150–450)
PLATELET # BLD AUTO: 192 10E9/L (ref 150–450)
PLATELET # BLD AUTO: 200 10E9/L (ref 150–450)
PLATELET # BLD AUTO: 212 10E9/L (ref 150–450)
PLATELET # BLD AUTO: 222 10E9/L (ref 150–450)
POTASSIUM SERPL-SCNC: 3.6 MMOL/L (ref 3.4–5.3)
POTASSIUM SERPL-SCNC: 3.6 MMOL/L (ref 3.4–5.3)
POTASSIUM SERPL-SCNC: 3.7 MMOL/L (ref 3.4–5.3)
POTASSIUM SERPL-SCNC: 3.8 MMOL/L (ref 3.4–5.3)
POTASSIUM SERPL-SCNC: 4 MMOL/L (ref 3.4–5.3)
POTASSIUM SERPL-SCNC: 4 MMOL/L (ref 3.4–5.3)
POTASSIUM SERPL-SCNC: 4.2 MMOL/L (ref 3.4–5.3)
POTASSIUM SERPL-SCNC: 4.2 MMOL/L (ref 3.4–5.3)
POTASSIUM SERPL-SCNC: 4.3 MMOL/L (ref 3.4–5.3)
POTASSIUM SERPL-SCNC: 4.9 MMOL/L (ref 3.4–5.3)
PROCALCITONIN SERPL-MCNC: 0.07 NG/ML
PROCALCITONIN SERPL-MCNC: <0.05 NG/ML
PROT SERPL-MCNC: 5.9 G/DL (ref 6.8–8.8)
PROT SERPL-MCNC: 8.3 G/DL (ref 6.8–8.8)
RBC # BLD AUTO: 2.21 10E12/L (ref 4.4–5.9)
RBC # BLD AUTO: 2.78 10E12/L (ref 4.4–5.9)
RBC # BLD AUTO: 2.81 10E12/L (ref 4.4–5.9)
RBC # BLD AUTO: 3.04 10E12/L (ref 4.4–5.9)
RBC # BLD AUTO: 3.32 10E12/L (ref 4.4–5.9)
RBC # BLD AUTO: 3.42 10E12/L (ref 4.4–5.9)
RBC # BLD AUTO: 3.52 10E12/L (ref 4.4–5.9)
RBC # BLD AUTO: 3.67 10E12/L (ref 4.4–5.9)
RBC # BLD AUTO: 3.98 10E12/L (ref 4.4–5.9)
RBC # BLD AUTO: 3.99 10E12/L (ref 4.4–5.9)
RBC # BLD AUTO: 4.06 10E12/L (ref 4.4–5.9)
RBC # BLD AUTO: 4.62 10E12/L (ref 4.4–5.9)
RBC #/AREA URNS AUTO: 1 /HPF (ref 0–2)
RBC #/AREA URNS AUTO: 2 /HPF (ref 0–2)
RBC #/AREA URNS AUTO: 3 /HPF (ref 0–2)
RBC #/AREA URNS AUTO: <1 /HPF (ref 0–2)
SARS-COV-2 RNA SPEC QL NAA+PROBE: ABNORMAL
SARS-COV-2 RNA SPEC QL NAA+PROBE: NEGATIVE
SARS-COV-2 RNA SPEC QL NAA+PROBE: NEGATIVE
SARS-COV-2 RNA SPEC QL NAA+PROBE: NORMAL
SARS-COV-2 RNA SPEC QL NAA+PROBE: NOT DETECTED
SARS-COV-2 RNA SPEC QL NAA+PROBE: POSITIVE
SODIUM SERPL-SCNC: 136 MMOL/L (ref 133–144)
SODIUM SERPL-SCNC: 137 MMOL/L (ref 133–144)
SODIUM SERPL-SCNC: 138 MMOL/L (ref 133–144)
SODIUM SERPL-SCNC: 139 MMOL/L (ref 133–144)
SODIUM SERPL-SCNC: 139 MMOL/L (ref 133–144)
SODIUM SERPL-SCNC: 142 MMOL/L (ref 133–144)
SODIUM SERPL-SCNC: 143 MMOL/L (ref 133–144)
SODIUM SERPL-SCNC: 143 MMOL/L (ref 133–144)
SODIUM SERPL-SCNC: 144 MMOL/L (ref 133–144)
SOURCE: ABNORMAL
SP GR UR STRIP: 1 (ref 1–1.03)
SP GR UR STRIP: 1.02 (ref 1–1.03)
SP GR UR STRIP: 1.02 (ref 1–1.03)
SP GR UR STRIP: 1.03 (ref 1–1.03)
SPECIMEN EXP DATE BLD: NORMAL
SPECIMEN EXP DATE BLD: NORMAL
SPECIMEN SOURCE: ABNORMAL
SPECIMEN SOURCE: NORMAL
SQUAMOUS #/AREA URNS AUTO: 1 /HPF (ref 0–1)
SQUAMOUS #/AREA URNS AUTO: 1 /HPF (ref 0–1)
TRANSFUSION STATUS PATIENT QL: NORMAL
TRANSFUSION STATUS PATIENT QL: NORMAL
TROPONIN I BLD-MCNC: 0.01 UG/L (ref 0–0.08)
TROPONIN I SERPL-MCNC: <0.015 UG/L (ref 0–0.04)
TROPONIN I SERPL-MCNC: <0.015 UG/L (ref 0–0.04)
TSH SERPL DL<=0.005 MIU/L-ACNC: 0.99 MU/L (ref 0.4–4)
UROBILINOGEN UR STRIP-MCNC: 2 MG/DL (ref 0–2)
UROBILINOGEN UR STRIP-MCNC: 2 MG/DL (ref 0–2)
UROBILINOGEN UR STRIP-MCNC: 4 MG/DL (ref 0–2)
UROBILINOGEN UR STRIP-MCNC: NORMAL MG/DL (ref 0–2)
VIT B12 SERPL-MCNC: 361 PG/ML (ref 193–986)
WBC # BLD AUTO: 11.5 10E9/L (ref 4–11)
WBC # BLD AUTO: 13.7 10E9/L (ref 4–11)
WBC # BLD AUTO: 16 10E9/L (ref 4–11)
WBC # BLD AUTO: 3.2 10E9/L (ref 4–11)
WBC # BLD AUTO: 3.4 10E9/L (ref 4–11)
WBC # BLD AUTO: 3.8 10E9/L (ref 4–11)
WBC # BLD AUTO: 4.2 10E9/L (ref 4–11)
WBC # BLD AUTO: 4.4 10E9/L (ref 4–11)
WBC # BLD AUTO: 4.5 10E9/L (ref 4–11)
WBC # BLD AUTO: 4.6 10E9/L (ref 4–11)
WBC # BLD AUTO: 6.4 10E9/L (ref 4–11)
WBC # BLD AUTO: 6.7 10E9/L (ref 4–11)
WBC #/AREA URNS AUTO: 1 /HPF (ref 0–5)
WBC #/AREA URNS AUTO: 10 /HPF (ref 0–5)
WBC #/AREA URNS AUTO: 15 /HPF (ref 0–5)
WBC #/AREA URNS AUTO: 6 /HPF (ref 0–5)

## 2020-01-01 PROCEDURE — 96361 HYDRATE IV INFUSION ADD-ON: CPT

## 2020-01-01 PROCEDURE — 12001 RPR S/N/AX/GEN/TRNK 2.5CM/<: CPT

## 2020-01-01 PROCEDURE — 86901 BLOOD TYPING SEROLOGIC RH(D): CPT | Performed by: EMERGENCY MEDICINE

## 2020-01-01 PROCEDURE — 25000128 H RX IP 250 OP 636: Performed by: EMERGENCY MEDICINE

## 2020-01-01 PROCEDURE — 99309 SBSQ NF CARE MODERATE MDM 30: CPT | Performed by: NURSE PRACTITIONER

## 2020-01-01 PROCEDURE — 74019 RADEX ABDOMEN 2 VIEWS: CPT

## 2020-01-01 PROCEDURE — 86900 BLOOD TYPING SEROLOGIC ABO: CPT | Performed by: EMERGENCY MEDICINE

## 2020-01-01 PROCEDURE — 90715 TDAP VACCINE 7 YRS/> IM: CPT | Performed by: EMERGENCY MEDICINE

## 2020-01-01 PROCEDURE — 84443 ASSAY THYROID STIM HORMONE: CPT | Performed by: EMERGENCY MEDICINE

## 2020-01-01 PROCEDURE — 25800030 ZZH RX IP 258 OP 636: Performed by: INTERNAL MEDICINE

## 2020-01-01 PROCEDURE — 25000132 ZZH RX MED GY IP 250 OP 250 PS 637: Mod: GY | Performed by: INTERNAL MEDICINE

## 2020-01-01 PROCEDURE — 92526 ORAL FUNCTION THERAPY: CPT | Mod: GN | Performed by: SPEECH-LANGUAGE PATHOLOGIST

## 2020-01-01 PROCEDURE — 80048 BASIC METABOLIC PNL TOTAL CA: CPT | Performed by: INTERNAL MEDICINE

## 2020-01-01 PROCEDURE — 84484 ASSAY OF TROPONIN QUANT: CPT | Performed by: EMERGENCY MEDICINE

## 2020-01-01 PROCEDURE — 81001 URINALYSIS AUTO W/SCOPE: CPT | Performed by: EMERGENCY MEDICINE

## 2020-01-01 PROCEDURE — 87186 SC STD MICRODIL/AGAR DIL: CPT | Performed by: EMERGENCY MEDICINE

## 2020-01-01 PROCEDURE — 85027 COMPLETE CBC AUTOMATED: CPT | Performed by: INTERNAL MEDICINE

## 2020-01-01 PROCEDURE — 70450 CT HEAD/BRAIN W/O DYE: CPT

## 2020-01-01 PROCEDURE — 73030 X-RAY EXAM OF SHOULDER: CPT | Mod: LT

## 2020-01-01 PROCEDURE — G0378 HOSPITAL OBSERVATION PER HR: HCPCS

## 2020-01-01 PROCEDURE — 99232 SBSQ HOSP IP/OBS MODERATE 35: CPT | Performed by: SURGERY

## 2020-01-01 PROCEDURE — 99310 SBSQ NF CARE HIGH MDM 45: CPT | Mod: GV | Performed by: NURSE PRACTITIONER

## 2020-01-01 PROCEDURE — 85025 COMPLETE CBC W/AUTO DIFF WBC: CPT | Performed by: INTERNAL MEDICINE

## 2020-01-01 PROCEDURE — 25000132 ZZH RX MED GY IP 250 OP 250 PS 637: Mod: GY | Performed by: PHYSICIAN ASSISTANT

## 2020-01-01 PROCEDURE — 96360 HYDRATION IV INFUSION INIT: CPT

## 2020-01-01 PROCEDURE — 93005 ELECTROCARDIOGRAM TRACING: CPT | Mod: 76

## 2020-01-01 PROCEDURE — 99309 SBSQ NF CARE MODERATE MDM 30: CPT | Mod: GW | Performed by: NURSE PRACTITIONER

## 2020-01-01 PROCEDURE — 36415 COLL VENOUS BLD VENIPUNCTURE: CPT | Performed by: INTERNAL MEDICINE

## 2020-01-01 PROCEDURE — 97161 PT EVAL LOW COMPLEX 20 MIN: CPT | Mod: GP | Performed by: PHYSICAL THERAPIST

## 2020-01-01 PROCEDURE — 12000000 ZZH R&B MED SURG/OB

## 2020-01-01 PROCEDURE — 25800030 ZZH RX IP 258 OP 636: Performed by: HOSPITALIST

## 2020-01-01 PROCEDURE — 99285 EMERGENCY DEPT VISIT HI MDM: CPT | Mod: 25

## 2020-01-01 PROCEDURE — 25000128 H RX IP 250 OP 636: Performed by: INTERNAL MEDICINE

## 2020-01-01 PROCEDURE — 93880 EXTRACRANIAL BILAT STUDY: CPT

## 2020-01-01 PROCEDURE — 99233 SBSQ HOSP IP/OBS HIGH 50: CPT | Performed by: HOSPITALIST

## 2020-01-01 PROCEDURE — 25000125 ZZHC RX 250: Performed by: HOSPITALIST

## 2020-01-01 PROCEDURE — C9113 INJ PANTOPRAZOLE SODIUM, VIA: HCPCS | Performed by: INTERNAL MEDICINE

## 2020-01-01 PROCEDURE — 71046 X-RAY EXAM CHEST 2 VIEWS: CPT

## 2020-01-01 PROCEDURE — 36415 COLL VENOUS BLD VENIPUNCTURE: CPT | Performed by: PHYSICIAN ASSISTANT

## 2020-01-01 PROCEDURE — 99233 SBSQ HOSP IP/OBS HIGH 50: CPT | Performed by: CLINICAL NURSE SPECIALIST

## 2020-01-01 PROCEDURE — 80048 BASIC METABOLIC PNL TOTAL CA: CPT | Performed by: PHYSICIAN ASSISTANT

## 2020-01-01 PROCEDURE — 82550 ASSAY OF CK (CPK): CPT | Performed by: EMERGENCY MEDICINE

## 2020-01-01 PROCEDURE — 99232 SBSQ HOSP IP/OBS MODERATE 35: CPT | Performed by: INTERNAL MEDICINE

## 2020-01-01 PROCEDURE — 73502 X-RAY EXAM HIP UNI 2-3 VIEWS: CPT

## 2020-01-01 PROCEDURE — 12000011 ZZH R&B MS OVERFLOW

## 2020-01-01 PROCEDURE — 99310 SBSQ NF CARE HIGH MDM 45: CPT | Performed by: NURSE PRACTITIONER

## 2020-01-01 PROCEDURE — 99220 ZZC INITIAL OBSERVATION CARE,LEVL III: CPT | Performed by: PHYSICIAN ASSISTANT

## 2020-01-01 PROCEDURE — 85049 AUTOMATED PLATELET COUNT: CPT | Performed by: INTERNAL MEDICINE

## 2020-01-01 PROCEDURE — 99231 SBSQ HOSP IP/OBS SF/LOW 25: CPT | Performed by: PHYSICIAN ASSISTANT

## 2020-01-01 PROCEDURE — 99239 HOSP IP/OBS DSCHRG MGMT >30: CPT | Performed by: INTERNAL MEDICINE

## 2020-01-01 PROCEDURE — 99223 1ST HOSP IP/OBS HIGH 75: CPT | Performed by: CLINICAL NURSE SPECIALIST

## 2020-01-01 PROCEDURE — 72125 CT NECK SPINE W/O DYE: CPT

## 2020-01-01 PROCEDURE — 90471 IMMUNIZATION ADMIN: CPT

## 2020-01-01 PROCEDURE — 83605 ASSAY OF LACTIC ACID: CPT | Performed by: PHYSICIAN ASSISTANT

## 2020-01-01 PROCEDURE — 99207 PR CDG-HISTORY COMP: MEETS EXP. PROB FOCUSED- DOWN CODED LACK OF PFSH: CPT | Performed by: INTERNAL MEDICINE

## 2020-01-01 PROCEDURE — 85018 HEMOGLOBIN: CPT | Performed by: PHYSICIAN ASSISTANT

## 2020-01-01 PROCEDURE — 99233 SBSQ HOSP IP/OBS HIGH 50: CPT | Performed by: INTERNAL MEDICINE

## 2020-01-01 PROCEDURE — 80048 BASIC METABOLIC PNL TOTAL CA: CPT | Performed by: HOSPITALIST

## 2020-01-01 PROCEDURE — 71045 X-RAY EXAM CHEST 1 VIEW: CPT

## 2020-01-01 PROCEDURE — 99233 SBSQ HOSP IP/OBS HIGH 50: CPT | Performed by: PHYSICIAN ASSISTANT

## 2020-01-01 PROCEDURE — 86850 RBC ANTIBODY SCREEN: CPT | Performed by: EMERGENCY MEDICINE

## 2020-01-01 PROCEDURE — 82565 ASSAY OF CREATININE: CPT

## 2020-01-01 PROCEDURE — 25800030 ZZH RX IP 258 OP 636: Performed by: PHYSICIAN ASSISTANT

## 2020-01-01 PROCEDURE — C9803 HOPD COVID-19 SPEC COLLECT: HCPCS

## 2020-01-01 PROCEDURE — 25000128 H RX IP 250 OP 636: Performed by: HOSPITALIST

## 2020-01-01 PROCEDURE — 25800030 ZZH RX IP 258 OP 636: Performed by: EMERGENCY MEDICINE

## 2020-01-01 PROCEDURE — 0HQ0XZZ REPAIR SCALP SKIN, EXTERNAL APPROACH: ICD-10-PCS | Performed by: EMERGENCY MEDICINE

## 2020-01-01 PROCEDURE — 80076 HEPATIC FUNCTION PANEL: CPT | Performed by: EMERGENCY MEDICINE

## 2020-01-01 PROCEDURE — P9016 RBC LEUKOCYTES REDUCED: HCPCS | Performed by: EMERGENCY MEDICINE

## 2020-01-01 PROCEDURE — 99304 1ST NF CARE SF/LOW MDM 25: CPT | Mod: GV | Performed by: INTERNAL MEDICINE

## 2020-01-01 PROCEDURE — 93005 ELECTROCARDIOGRAM TRACING: CPT

## 2020-01-01 PROCEDURE — 85025 COMPLETE CBC W/AUTO DIFF WBC: CPT | Performed by: EMERGENCY MEDICINE

## 2020-01-01 PROCEDURE — 85018 HEMOGLOBIN: CPT | Performed by: INTERNAL MEDICINE

## 2020-01-01 PROCEDURE — 84145 PROCALCITONIN (PCT): CPT | Performed by: INTERNAL MEDICINE

## 2020-01-01 PROCEDURE — 80053 COMPREHEN METABOLIC PANEL: CPT | Performed by: EMERGENCY MEDICINE

## 2020-01-01 PROCEDURE — 74177 CT ABD & PELVIS W/CONTRAST: CPT

## 2020-01-01 PROCEDURE — 92610 EVALUATE SWALLOWING FUNCTION: CPT | Mod: GN

## 2020-01-01 PROCEDURE — 92610 EVALUATE SWALLOWING FUNCTION: CPT | Mod: GN | Performed by: SPEECH-LANGUAGE PATHOLOGIST

## 2020-01-01 PROCEDURE — 87040 BLOOD CULTURE FOR BACTERIA: CPT | Performed by: PHYSICIAN ASSISTANT

## 2020-01-01 PROCEDURE — 93306 TTE W/DOPPLER COMPLETE: CPT | Mod: 26 | Performed by: INTERNAL MEDICINE

## 2020-01-01 PROCEDURE — 97530 THERAPEUTIC ACTIVITIES: CPT | Mod: GP | Performed by: PHYSICAL THERAPY ASSISTANT

## 2020-01-01 PROCEDURE — 96365 THER/PROPH/DIAG IV INF INIT: CPT | Mod: 59

## 2020-01-01 PROCEDURE — 40000894 ZZH STATISTIC OT IP EVAL DEFER: Performed by: STUDENT IN AN ORGANIZED HEALTH CARE EDUCATION/TRAINING PROGRAM

## 2020-01-01 PROCEDURE — 99309 SBSQ NF CARE MODERATE MDM 30: CPT | Mod: GV | Performed by: NURSE PRACTITIONER

## 2020-01-01 PROCEDURE — 99214 OFFICE O/P EST MOD 30 MIN: CPT | Mod: 25 | Performed by: NURSE PRACTITIONER

## 2020-01-01 PROCEDURE — 36415 COLL VENOUS BLD VENIPUNCTURE: CPT | Performed by: HOSPITALIST

## 2020-01-01 PROCEDURE — 25500064 ZZH RX 255 OP 636: Performed by: INTERNAL MEDICINE

## 2020-01-01 PROCEDURE — 87086 URINE CULTURE/COLONY COUNT: CPT | Performed by: EMERGENCY MEDICINE

## 2020-01-01 PROCEDURE — 25000125 ZZHC RX 250: Performed by: EMERGENCY MEDICINE

## 2020-01-01 PROCEDURE — 84145 PROCALCITONIN (PCT): CPT | Performed by: PHYSICIAN ASSISTANT

## 2020-01-01 PROCEDURE — 99291 CRITICAL CARE FIRST HOUR: CPT | Performed by: INTERNAL MEDICINE

## 2020-01-01 PROCEDURE — 25000128 H RX IP 250 OP 636

## 2020-01-01 PROCEDURE — 80048 BASIC METABOLIC PNL TOTAL CA: CPT | Performed by: EMERGENCY MEDICINE

## 2020-01-01 PROCEDURE — 81001 URINALYSIS AUTO W/SCOPE: CPT | Performed by: INTERNAL MEDICINE

## 2020-01-01 PROCEDURE — 85610 PROTHROMBIN TIME: CPT | Performed by: EMERGENCY MEDICINE

## 2020-01-01 PROCEDURE — 99239 HOSP IP/OBS DSCHRG MGMT >30: CPT | Performed by: HOSPITALIST

## 2020-01-01 PROCEDURE — 97530 THERAPEUTIC ACTIVITIES: CPT | Mod: GP | Performed by: PHYSICAL THERAPIST

## 2020-01-01 PROCEDURE — 92526 ORAL FUNCTION THERAPY: CPT | Mod: GN

## 2020-01-01 PROCEDURE — 84484 ASSAY OF TROPONIN QUANT: CPT | Performed by: INTERNAL MEDICINE

## 2020-01-01 PROCEDURE — 96375 TX/PRO/DX INJ NEW DRUG ADDON: CPT

## 2020-01-01 PROCEDURE — 85730 THROMBOPLASTIN TIME PARTIAL: CPT | Performed by: EMERGENCY MEDICINE

## 2020-01-01 PROCEDURE — 99308 SBSQ NF CARE LOW MDM 20: CPT | Performed by: NURSE PRACTITIONER

## 2020-01-01 PROCEDURE — 99207 ZZC CDG-MDM COMPONENT: MEETS MODERATE - UP CODED: CPT | Performed by: INTERNAL MEDICINE

## 2020-01-01 PROCEDURE — 86923 COMPATIBILITY TEST ELECTRIC: CPT | Performed by: EMERGENCY MEDICINE

## 2020-01-01 PROCEDURE — 99223 1ST HOSP IP/OBS HIGH 75: CPT | Mod: AI | Performed by: INTERNAL MEDICINE

## 2020-01-01 PROCEDURE — 87088 URINE BACTERIA CULTURE: CPT | Performed by: EMERGENCY MEDICINE

## 2020-01-01 PROCEDURE — 87086 URINE CULTURE/COLONY COUNT: CPT | Performed by: PHYSICIAN ASSISTANT

## 2020-01-01 PROCEDURE — 84484 ASSAY OF TROPONIN QUANT: CPT

## 2020-01-01 PROCEDURE — 99308 SBSQ NF CARE LOW MDM 20: CPT | Performed by: INTERNAL MEDICINE

## 2020-01-01 PROCEDURE — 86901 BLOOD TYPING SEROLOGIC RH(D): CPT | Performed by: INTERNAL MEDICINE

## 2020-01-01 PROCEDURE — 97116 GAIT TRAINING THERAPY: CPT | Mod: GP | Performed by: PHYSICAL THERAPIST

## 2020-01-01 PROCEDURE — 99222 1ST HOSP IP/OBS MODERATE 55: CPT | Mod: 25 | Performed by: INTERNAL MEDICINE

## 2020-01-01 PROCEDURE — 74018 RADEX ABDOMEN 1 VIEW: CPT

## 2020-01-01 PROCEDURE — 99222 1ST HOSP IP/OBS MODERATE 55: CPT | Performed by: SURGERY

## 2020-01-01 PROCEDURE — U0003 INFECTIOUS AGENT DETECTION BY NUCLEIC ACID (DNA OR RNA); SEVERE ACUTE RESPIRATORY SYNDROME CORONAVIRUS 2 (SARS-COV-2) (CORONAVIRUS DISEASE [COVID-19]), AMPLIFIED PROBE TECHNIQUE, MAKING USE OF HIGH THROUGHPUT TECHNOLOGIES AS DESCRIBED BY CMS-2020-01-R: HCPCS | Performed by: EMERGENCY MEDICINE

## 2020-01-01 PROCEDURE — 85025 COMPLETE CBC W/AUTO DIFF WBC: CPT | Performed by: HOSPITALIST

## 2020-01-01 PROCEDURE — 82607 VITAMIN B-12: CPT | Performed by: PHYSICIAN ASSISTANT

## 2020-01-01 PROCEDURE — 69209 REMOVE IMPACTED EAR WAX UNI: CPT | Performed by: NURSE PRACTITIONER

## 2020-01-01 PROCEDURE — 25000132 ZZH RX MED GY IP 250 OP 250 PS 637: Mod: GY | Performed by: EMERGENCY MEDICINE

## 2020-01-01 RX ORDER — ASPIRIN 300 MG/1
300 SUPPOSITORY RECTAL ONCE
Status: DISCONTINUED | OUTPATIENT
Start: 2020-01-01 | End: 2020-01-01

## 2020-01-01 RX ORDER — ONDANSETRON 4 MG/1
4 TABLET, ORALLY DISINTEGRATING ORAL EVERY 6 HOURS PRN
Status: DISCONTINUED | OUTPATIENT
Start: 2020-01-01 | End: 2020-01-01 | Stop reason: HOSPADM

## 2020-01-01 RX ORDER — SODIUM CHLORIDE, SODIUM LACTATE, POTASSIUM CHLORIDE, CALCIUM CHLORIDE 600; 310; 30; 20 MG/100ML; MG/100ML; MG/100ML; MG/100ML
INJECTION, SOLUTION INTRAVENOUS CONTINUOUS
Status: ACTIVE | OUTPATIENT
Start: 2020-01-01 | End: 2020-01-01

## 2020-01-01 RX ORDER — DEXTROSE MONOHYDRATE, SODIUM CHLORIDE, SODIUM LACTATE, POTASSIUM CHLORIDE, CALCIUM CHLORIDE 5; 600; 310; 179; 20 G/100ML; MG/100ML; MG/100ML; MG/100ML; MG/100ML
INJECTION, SOLUTION INTRAVENOUS CONTINUOUS
Status: DISCONTINUED | OUTPATIENT
Start: 2020-01-01 | End: 2020-01-01

## 2020-01-01 RX ORDER — NALOXONE HYDROCHLORIDE 0.4 MG/ML
.1-.4 INJECTION, SOLUTION INTRAMUSCULAR; INTRAVENOUS; SUBCUTANEOUS
Status: DISCONTINUED | OUTPATIENT
Start: 2020-01-01 | End: 2020-01-01 | Stop reason: HOSPADM

## 2020-01-01 RX ORDER — POLYETHYLENE GLYCOL 3350 17 G/17G
1 POWDER, FOR SOLUTION ORAL 2 TIMES DAILY
COMMUNITY
End: 2021-01-01

## 2020-01-01 RX ORDER — BISACODYL 10 MG
SUPPOSITORY, RECTAL RECTAL
Qty: 12 SUPPOSITORY | Refills: 1 | Status: SHIPPED | OUTPATIENT
Start: 2020-01-01

## 2020-01-01 RX ORDER — CEFTRIAXONE 1 G/1
1 INJECTION, POWDER, FOR SOLUTION INTRAMUSCULAR; INTRAVENOUS ONCE
Status: COMPLETED | OUTPATIENT
Start: 2020-01-01 | End: 2020-01-01

## 2020-01-01 RX ORDER — SODIUM CHLORIDE, SODIUM LACTATE, POTASSIUM CHLORIDE, CALCIUM CHLORIDE 600; 310; 30; 20 MG/100ML; MG/100ML; MG/100ML; MG/100ML
INJECTION, SOLUTION INTRAVENOUS CONTINUOUS
Status: DISCONTINUED | OUTPATIENT
Start: 2020-01-01 | End: 2020-01-01

## 2020-01-01 RX ORDER — PROCHLORPERAZINE MALEATE 5 MG
5 TABLET ORAL EVERY 6 HOURS PRN
Status: DISCONTINUED | OUTPATIENT
Start: 2020-01-01 | End: 2020-01-01 | Stop reason: HOSPADM

## 2020-01-01 RX ORDER — ONDANSETRON 2 MG/ML
INJECTION INTRAMUSCULAR; INTRAVENOUS
Status: COMPLETED
Start: 2020-01-01 | End: 2020-01-01

## 2020-01-01 RX ORDER — LORAZEPAM 2 MG/ML
.25-.5 CONCENTRATE ORAL EVERY 4 HOURS PRN
Qty: 30 ML | Refills: 1 | Status: SHIPPED | OUTPATIENT
Start: 2020-01-01 | End: 2020-01-01 | Stop reason: DRUGHIGH

## 2020-01-01 RX ORDER — ATROPINE SULFATE 10 MG/ML
2-4 SOLUTION/ DROPS OPHTHALMIC
Qty: 5 ML | Refills: 1 | Status: SHIPPED | OUTPATIENT
Start: 2020-01-01 | End: 2021-01-01

## 2020-01-01 RX ORDER — PREDNISONE 20 MG/1
20 TABLET ORAL DAILY
COMMUNITY
Start: 2020-01-01 | End: 2020-01-01

## 2020-01-01 RX ORDER — SODIUM CHLORIDE 9 MG/ML
1000 INJECTION, SOLUTION INTRAVENOUS CONTINUOUS
Status: DISCONTINUED | OUTPATIENT
Start: 2020-01-01 | End: 2020-01-01

## 2020-01-01 RX ORDER — TRIAMCINOLONE ACETONIDE 1 MG/G
OINTMENT TOPICAL 2 TIMES DAILY
COMMUNITY
Start: 2020-01-01 | End: 2020-01-01

## 2020-01-01 RX ORDER — ASPIRIN 300 MG/1
300 SUPPOSITORY RECTAL ONCE
Status: COMPLETED | OUTPATIENT
Start: 2020-01-01 | End: 2020-01-01

## 2020-01-01 RX ORDER — CEFTRIAXONE 1 G/1
1 INJECTION, POWDER, FOR SOLUTION INTRAMUSCULAR; INTRAVENOUS EVERY 24 HOURS
Status: DISCONTINUED | OUTPATIENT
Start: 2020-01-01 | End: 2020-01-01

## 2020-01-01 RX ORDER — CARBOXYMETHYLCELLULOSE SODIUM 5 MG/ML
1 SOLUTION/ DROPS OPHTHALMIC 4 TIMES DAILY PRN
Status: DISCONTINUED | OUTPATIENT
Start: 2020-01-01 | End: 2020-01-01 | Stop reason: HOSPADM

## 2020-01-01 RX ORDER — DEXTROSE MONOHYDRATE, SODIUM CHLORIDE, AND POTASSIUM CHLORIDE 50; 1.49; 4.5 G/1000ML; G/1000ML; G/1000ML
INJECTION, SOLUTION INTRAVENOUS CONTINUOUS
Status: DISCONTINUED | OUTPATIENT
Start: 2020-01-01 | End: 2020-01-01

## 2020-01-01 RX ORDER — HALOPERIDOL 2 MG/ML
.5-1 SOLUTION ORAL EVERY 6 HOURS PRN
Qty: 30 ML | Refills: 1 | Status: SHIPPED | OUTPATIENT
Start: 2020-01-01 | End: 2020-01-01

## 2020-01-01 RX ORDER — SODIUM CHLORIDE AND POTASSIUM CHLORIDE 150; 450 MG/100ML; MG/100ML
INJECTION, SOLUTION INTRAVENOUS CONTINUOUS
Status: DISCONTINUED | OUTPATIENT
Start: 2020-01-01 | End: 2020-01-01

## 2020-01-01 RX ORDER — BISACODYL 10 MG
10 SUPPOSITORY, RECTAL RECTAL ONCE
Status: COMPLETED | OUTPATIENT
Start: 2020-01-01 | End: 2020-01-01

## 2020-01-01 RX ORDER — CALCIUM CARBONATE 500 MG/1
500 TABLET, CHEWABLE ORAL DAILY PRN
Status: DISCONTINUED | OUTPATIENT
Start: 2020-01-01 | End: 2020-01-01 | Stop reason: HOSPADM

## 2020-01-01 RX ORDER — MORPHINE SULFATE 100 MG/5ML
2.5 SOLUTION ORAL
Qty: 30 ML | Refills: 0 | Status: SHIPPED | OUTPATIENT
Start: 2020-01-01 | End: 2021-01-01

## 2020-01-01 RX ORDER — PROCHLORPERAZINE 25 MG
12.5 SUPPOSITORY, RECTAL RECTAL EVERY 12 HOURS PRN
Status: DISCONTINUED | OUTPATIENT
Start: 2020-01-01 | End: 2020-01-01 | Stop reason: HOSPADM

## 2020-01-01 RX ORDER — LORAZEPAM 2 MG/ML
0.25 CONCENTRATE ORAL EVERY 4 HOURS PRN
COMMUNITY
End: 2020-01-01

## 2020-01-01 RX ORDER — CYCLOBENZAPRINE HCL 5 MG
5 TABLET ORAL 3 TIMES DAILY PRN
Qty: 30 TABLET | Refills: 0 | Status: ON HOLD | OUTPATIENT
Start: 2020-01-01 | End: 2020-01-01

## 2020-01-01 RX ORDER — PANTOPRAZOLE SODIUM 40 MG/1
40 TABLET, DELAYED RELEASE ORAL
Status: DISCONTINUED | OUTPATIENT
Start: 2020-01-01 | End: 2020-01-01 | Stop reason: HOSPADM

## 2020-01-01 RX ORDER — IOPAMIDOL 755 MG/ML
500 INJECTION, SOLUTION INTRAVASCULAR ONCE
Status: COMPLETED | OUTPATIENT
Start: 2020-01-01 | End: 2020-01-01

## 2020-01-01 RX ORDER — ACETAMINOPHEN 325 MG/1
650 TABLET ORAL 2 TIMES DAILY
COMMUNITY

## 2020-01-01 RX ORDER — NYSTATIN 100000 U/G
CREAM TOPICAL 2 TIMES DAILY
Status: DISCONTINUED | OUTPATIENT
Start: 2020-01-01 | End: 2020-01-01 | Stop reason: HOSPADM

## 2020-01-01 RX ORDER — ALLOPURINOL 100 MG/1
100 TABLET ORAL DAILY
Status: DISCONTINUED | OUTPATIENT
Start: 2020-01-01 | End: 2020-01-01 | Stop reason: HOSPADM

## 2020-01-01 RX ORDER — LANOLIN ALCOHOL/MO/W.PET/CERES
3 CREAM (GRAM) TOPICAL
Status: DISCONTINUED | OUTPATIENT
Start: 2020-01-01 | End: 2020-01-01 | Stop reason: HOSPADM

## 2020-01-01 RX ORDER — AMOXICILLIN 250 MG
2 CAPSULE ORAL 2 TIMES DAILY PRN
Status: DISCONTINUED | OUTPATIENT
Start: 2020-01-01 | End: 2020-01-01 | Stop reason: HOSPADM

## 2020-01-01 RX ORDER — ONDANSETRON 2 MG/ML
4 INJECTION INTRAMUSCULAR; INTRAVENOUS EVERY 6 HOURS PRN
Status: DISCONTINUED | OUTPATIENT
Start: 2020-01-01 | End: 2020-01-01 | Stop reason: HOSPADM

## 2020-01-01 RX ORDER — OLANZAPINE 5 MG/1
5 TABLET, ORALLY DISINTEGRATING ORAL EVERY 6 HOURS PRN
Qty: 10 TABLET | Refills: 1 | Status: SHIPPED | OUTPATIENT
Start: 2020-01-01 | End: 2020-01-01

## 2020-01-01 RX ORDER — ACETAMINOPHEN 325 MG/1
650 TABLET ORAL EVERY 4 HOURS PRN
Status: DISCONTINUED | OUTPATIENT
Start: 2020-01-01 | End: 2020-01-01 | Stop reason: HOSPADM

## 2020-01-01 RX ORDER — ACETAMINOPHEN 650 MG/1
650 SUPPOSITORY RECTAL EVERY 4 HOURS PRN
Qty: 12 SUPPOSITORY | Refills: 1 | Status: SHIPPED | OUTPATIENT
Start: 2020-01-01

## 2020-01-01 RX ORDER — MINERAL OIL/HYDROPHIL PETROLAT
OINTMENT (GRAM) TOPICAL 3 TIMES DAILY
COMMUNITY
Start: 2020-01-01

## 2020-01-01 RX ORDER — METOPROLOL SUCCINATE 25 MG/1
25 TABLET, EXTENDED RELEASE ORAL DAILY
Status: DISCONTINUED | OUTPATIENT
Start: 2020-01-01 | End: 2020-01-01

## 2020-01-01 RX ORDER — ACETAMINOPHEN 650 MG/1
650 SUPPOSITORY RECTAL EVERY 4 HOURS PRN
Status: DISCONTINUED | OUTPATIENT
Start: 2020-01-01 | End: 2020-01-01 | Stop reason: HOSPADM

## 2020-01-01 RX ORDER — LIDOCAINE 40 MG/G
CREAM TOPICAL
Status: DISCONTINUED | OUTPATIENT
Start: 2020-01-01 | End: 2020-01-01 | Stop reason: HOSPADM

## 2020-01-01 RX ORDER — MULTIPLE VITAMINS W/ MINERALS TAB 9MG-400MCG
1 TAB ORAL DAILY
Status: DISCONTINUED | OUTPATIENT
Start: 2020-01-01 | End: 2020-01-01 | Stop reason: HOSPADM

## 2020-01-01 RX ORDER — ONDANSETRON 2 MG/ML
4 INJECTION INTRAMUSCULAR; INTRAVENOUS ONCE
Status: COMPLETED | OUTPATIENT
Start: 2020-01-01 | End: 2020-01-01

## 2020-01-01 RX ORDER — AMOXICILLIN 250 MG
1 CAPSULE ORAL 2 TIMES DAILY PRN
Status: DISCONTINUED | OUTPATIENT
Start: 2020-01-01 | End: 2020-01-01 | Stop reason: HOSPADM

## 2020-01-01 RX ORDER — ASPIRIN 325 MG
325 TABLET ORAL ONCE
Status: DISCONTINUED | OUTPATIENT
Start: 2020-01-01 | End: 2020-01-01

## 2020-01-01 RX ORDER — CARBIDOPA AND LEVODOPA 25; 100 MG/1; MG/1
2 TABLET ORAL 3 TIMES DAILY
Status: DISCONTINUED | OUTPATIENT
Start: 2020-01-01 | End: 2020-01-01 | Stop reason: HOSPADM

## 2020-01-01 RX ORDER — NYSTATIN 100000 [USP'U]/G
POWDER TOPICAL 2 TIMES DAILY
COMMUNITY

## 2020-01-01 RX ORDER — BISACODYL 10 MG
10 SUPPOSITORY, RECTAL RECTAL DAILY PRN
Status: DISCONTINUED | OUTPATIENT
Start: 2020-01-01 | End: 2020-01-01 | Stop reason: HOSPADM

## 2020-01-01 RX ORDER — SENNOSIDES A AND B 8.6 MG/1
2 TABLET, FILM COATED ORAL 2 TIMES DAILY
COMMUNITY

## 2020-01-01 RX ORDER — MIDODRINE HYDROCHLORIDE 5 MG/1
10 TABLET ORAL 2 TIMES DAILY
COMMUNITY
End: 2021-01-01

## 2020-01-01 RX ORDER — HEPARIN SODIUM 5000 [USP'U]/.5ML
5000 INJECTION, SOLUTION INTRAVENOUS; SUBCUTANEOUS EVERY 12 HOURS
Status: DISCONTINUED | OUTPATIENT
Start: 2020-01-01 | End: 2020-01-01 | Stop reason: HOSPADM

## 2020-01-01 RX ORDER — SENNOSIDES 8.6 MG
1-2 TABLET ORAL 2 TIMES DAILY
Qty: 100 TABLET | Refills: 1 | Status: SHIPPED | OUTPATIENT
Start: 2020-01-01 | End: 2020-01-01 | Stop reason: DRUGHIGH

## 2020-01-01 RX ORDER — PANTOPRAZOLE SODIUM 40 MG/1
40 TABLET, DELAYED RELEASE ORAL
Qty: 30 TABLET | Refills: 1 | Status: SHIPPED | OUTPATIENT
Start: 2020-01-01 | End: 2021-01-01

## 2020-01-01 RX ORDER — CALCIUM CARBONATE 500 MG/1
2 TABLET, CHEWABLE ORAL 3 TIMES DAILY PRN
Qty: 30 TABLET | Refills: 1 | Status: SHIPPED | OUTPATIENT
Start: 2020-01-01 | End: 2021-01-01

## 2020-01-01 RX ORDER — LIDOCAINE HYDROCHLORIDE AND EPINEPHRINE 10; 10 MG/ML; UG/ML
INJECTION, SOLUTION INFILTRATION; PERINEURAL
Status: DISCONTINUED
Start: 2020-01-01 | End: 2020-01-01 | Stop reason: HOSPADM

## 2020-01-01 RX ORDER — SODIUM CHLORIDE 450 MG/100ML
INJECTION, SOLUTION INTRAVENOUS CONTINUOUS
Status: DISCONTINUED | OUTPATIENT
Start: 2020-01-01 | End: 2020-01-01

## 2020-01-01 RX ORDER — LIDOCAINE HYDROCHLORIDE 20 MG/ML
JELLY TOPICAL ONCE
Status: COMPLETED | OUTPATIENT
Start: 2020-01-01 | End: 2020-01-01

## 2020-01-01 RX ADMIN — ONDANSETRON 4 MG: 2 INJECTION INTRAMUSCULAR; INTRAVENOUS at 15:42

## 2020-01-01 RX ADMIN — CARBIDOPA AND LEVODOPA 2 TABLET: 25; 100 TABLET ORAL at 12:41

## 2020-01-01 RX ADMIN — NYSTATIN: 100000 CREAM TOPICAL at 21:41

## 2020-01-01 RX ADMIN — CARBIDOPA AND LEVODOPA 2 TABLET: 25; 100 TABLET ORAL at 09:50

## 2020-01-01 RX ADMIN — CARBIDOPA AND LEVODOPA 2 TABLET: 25; 100 TABLET ORAL at 12:57

## 2020-01-01 RX ADMIN — HEPARIN SODIUM 5000 UNITS: 10000 INJECTION, SOLUTION INTRAVENOUS; SUBCUTANEOUS at 09:20

## 2020-01-01 RX ADMIN — Medication: at 09:21

## 2020-01-01 RX ADMIN — NYSTATIN: 100000 CREAM TOPICAL at 21:12

## 2020-01-01 RX ADMIN — CARBIDOPA AND LEVODOPA 2 TABLET: 25; 100 TABLET ORAL at 18:52

## 2020-01-01 RX ADMIN — CALCIUM CARBONATE (ANTACID) CHEW TAB 500 MG 500 MG: 500 CHEW TAB at 03:30

## 2020-01-01 RX ADMIN — SODIUM CHLORIDE 1000 ML: 9 INJECTION, SOLUTION INTRAVENOUS at 21:16

## 2020-01-01 RX ADMIN — POTASSIUM CHLORIDE, DEXTROSE MONOHYDRATE AND SODIUM CHLORIDE: 150; 5; 450 INJECTION, SOLUTION INTRAVENOUS at 13:18

## 2020-01-01 RX ADMIN — CARBIDOPA AND LEVODOPA 2 TABLET: 25; 100 TABLET ORAL at 18:14

## 2020-01-01 RX ADMIN — IOPAMIDOL 80 ML: 755 INJECTION, SOLUTION INTRAVENOUS at 18:49

## 2020-01-01 RX ADMIN — CARBIDOPA AND LEVODOPA 2 TABLET: 25; 100 TABLET ORAL at 13:08

## 2020-01-01 RX ADMIN — CARBIDOPA AND LEVODOPA 2 TABLET: 25; 100 TABLET ORAL at 08:39

## 2020-01-01 RX ADMIN — ACETAMINOPHEN 650 MG: 325 TABLET, FILM COATED ORAL at 17:26

## 2020-01-01 RX ADMIN — CARBIDOPA AND LEVODOPA 2 TABLET: 25; 100 TABLET ORAL at 12:26

## 2020-01-01 RX ADMIN — CARBIDOPA AND LEVODOPA 2 TABLET: 25; 100 TABLET ORAL at 13:31

## 2020-01-01 RX ADMIN — POTASSIUM CHLORIDE, DEXTROSE MONOHYDRATE AND SODIUM CHLORIDE: 150; 5; 450 INJECTION, SOLUTION INTRAVENOUS at 06:55

## 2020-01-01 RX ADMIN — CARBIDOPA AND LEVODOPA 2 TABLET: 25; 100 TABLET ORAL at 08:28

## 2020-01-01 RX ADMIN — SODIUM CHLORIDE 1000 ML: 9 INJECTION, SOLUTION INTRAVENOUS at 02:47

## 2020-01-01 RX ADMIN — Medication: at 10:11

## 2020-01-01 RX ADMIN — SODIUM CHLORIDE 1000 ML: 9 INJECTION, SOLUTION INTRAVENOUS at 09:31

## 2020-01-01 RX ADMIN — ALLOPURINOL 100 MG: 100 TABLET ORAL at 09:20

## 2020-01-01 RX ADMIN — PANTOPRAZOLE SODIUM 40 MG: 40 TABLET, DELAYED RELEASE ORAL at 06:36

## 2020-01-01 RX ADMIN — CARBIDOPA AND LEVODOPA 2 TABLET: 25; 100 TABLET ORAL at 13:13

## 2020-01-01 RX ADMIN — PANTOPRAZOLE SODIUM 40 MG: 40 INJECTION, POWDER, FOR SOLUTION INTRAVENOUS at 09:53

## 2020-01-01 RX ADMIN — CARBIDOPA AND LEVODOPA 2 TABLET: 25; 100 TABLET ORAL at 17:56

## 2020-01-01 RX ADMIN — HEPARIN SODIUM 5000 UNITS: 10000 INJECTION, SOLUTION INTRAVENOUS; SUBCUTANEOUS at 08:26

## 2020-01-01 RX ADMIN — HEPARIN SODIUM 5000 UNITS: 10000 INJECTION, SOLUTION INTRAVENOUS; SUBCUTANEOUS at 09:22

## 2020-01-01 RX ADMIN — Medication 1 MG: at 00:25

## 2020-01-01 RX ADMIN — CARBIDOPA AND LEVODOPA 2 TABLET: 25; 100 TABLET ORAL at 17:57

## 2020-01-01 RX ADMIN — HEPARIN SODIUM 5000 UNITS: 10000 INJECTION, SOLUTION INTRAVENOUS; SUBCUTANEOUS at 09:53

## 2020-01-01 RX ADMIN — SODIUM CHLORIDE, POTASSIUM CHLORIDE, SODIUM LACTATE AND CALCIUM CHLORIDE 500 ML: 600; 310; 30; 20 INJECTION, SOLUTION INTRAVENOUS at 15:40

## 2020-01-01 RX ADMIN — HEPARIN SODIUM 5000 UNITS: 10000 INJECTION, SOLUTION INTRAVENOUS; SUBCUTANEOUS at 09:05

## 2020-01-01 RX ADMIN — HEPARIN SODIUM 5000 UNITS: 10000 INJECTION, SOLUTION INTRAVENOUS; SUBCUTANEOUS at 20:51

## 2020-01-01 RX ADMIN — ONDANSETRON 4 MG: 2 INJECTION INTRAMUSCULAR; INTRAVENOUS at 04:29

## 2020-01-01 RX ADMIN — MICONAZOLE NITRATE: 20 POWDER TOPICAL at 22:31

## 2020-01-01 RX ADMIN — ONDANSETRON 4 MG: 4 TABLET, ORALLY DISINTEGRATING ORAL at 06:31

## 2020-01-01 RX ADMIN — CLOSTRIDIUM TETANI TOXOID ANTIGEN (FORMALDEHYDE INACTIVATED), CORYNEBACTERIUM DIPHTHERIAE TOXOID ANTIGEN (FORMALDEHYDE INACTIVATED), BORDETELLA PERTUSSIS TOXOID ANTIGEN (GLUTARALDEHYDE INACTIVATED), BORDETELLA PERTUSSIS FILAMENTOUS HEMAGGLUTININ ANTIGEN (FORMALDEHYDE INACTIVATED), BORDETELLA PERTUSSIS PERTACTIN ANTIGEN, AND BORDETELLA PERTUSSIS FIMBRIAE 2/3 ANTIGEN 0.5 ML: 5; 2; 2.5; 5; 3; 5 INJECTION, SUSPENSION INTRAMUSCULAR at 09:31

## 2020-01-01 RX ADMIN — CARBIDOPA AND LEVODOPA 2 TABLET: 25; 100 TABLET ORAL at 08:49

## 2020-01-01 RX ADMIN — SODIUM CHLORIDE 1000 ML: 9 INJECTION, SOLUTION INTRAVENOUS at 01:23

## 2020-01-01 RX ADMIN — POTASSIUM CHLORIDE, DEXTROSE MONOHYDRATE AND SODIUM CHLORIDE: 150; 5; 450 INJECTION, SOLUTION INTRAVENOUS at 05:34

## 2020-01-01 RX ADMIN — CARBIDOPA AND LEVODOPA 2 TABLET: 25; 100 TABLET ORAL at 09:04

## 2020-01-01 RX ADMIN — CARBIDOPA AND LEVODOPA 2 TABLET: 25; 100 TABLET ORAL at 12:07

## 2020-01-01 RX ADMIN — NYSTATIN: 100000 CREAM TOPICAL at 10:55

## 2020-01-01 RX ADMIN — SODIUM CHLORIDE, POTASSIUM CHLORIDE, SODIUM LACTATE AND CALCIUM CHLORIDE 500 ML: 600; 310; 30; 20 INJECTION, SOLUTION INTRAVENOUS at 14:39

## 2020-01-01 RX ADMIN — Medication: at 20:59

## 2020-01-01 RX ADMIN — CEFTRIAXONE SODIUM 1 G: 1 INJECTION, POWDER, FOR SOLUTION INTRAMUSCULAR; INTRAVENOUS at 20:43

## 2020-01-01 RX ADMIN — NYSTATIN: 100000 CREAM TOPICAL at 14:49

## 2020-01-01 RX ADMIN — CARBIDOPA AND LEVODOPA 2 TABLET: 25; 100 TABLET ORAL at 09:43

## 2020-01-01 RX ADMIN — SODIUM CHLORIDE, POTASSIUM CHLORIDE, SODIUM LACTATE AND CALCIUM CHLORIDE 1000 ML: 600; 310; 30; 20 INJECTION, SOLUTION INTRAVENOUS at 12:04

## 2020-01-01 RX ADMIN — CARBIDOPA AND LEVODOPA 2 TABLET: 25; 100 TABLET ORAL at 18:40

## 2020-01-01 RX ADMIN — PANTOPRAZOLE SODIUM 40 MG: 40 INJECTION, POWDER, FOR SOLUTION INTRAVENOUS at 08:43

## 2020-01-01 RX ADMIN — CEFTRIAXONE SODIUM 1 G: 1 INJECTION, POWDER, FOR SOLUTION INTRAMUSCULAR; INTRAVENOUS at 19:55

## 2020-01-01 RX ADMIN — ACETAMINOPHEN 650 MG: 325 TABLET, FILM COATED ORAL at 09:06

## 2020-01-01 RX ADMIN — ACETAMINOPHEN 650 MG: 325 TABLET, FILM COATED ORAL at 09:39

## 2020-01-01 RX ADMIN — PANTOPRAZOLE SODIUM 40 MG: 40 INJECTION, POWDER, FOR SOLUTION INTRAVENOUS at 13:19

## 2020-01-01 RX ADMIN — PANTOPRAZOLE SODIUM 40 MG: 40 INJECTION, POWDER, FOR SOLUTION INTRAVENOUS at 09:04

## 2020-01-01 RX ADMIN — HEPARIN SODIUM 5000 UNITS: 10000 INJECTION, SOLUTION INTRAVENOUS; SUBCUTANEOUS at 20:13

## 2020-01-01 RX ADMIN — CEFTRIAXONE SODIUM 1 G: 1 INJECTION, POWDER, FOR SOLUTION INTRAMUSCULAR; INTRAVENOUS at 19:38

## 2020-01-01 RX ADMIN — BISACODYL 10 MG: 10 SUPPOSITORY RECTAL at 11:32

## 2020-01-01 RX ADMIN — ALLOPURINOL 100 MG: 100 TABLET ORAL at 08:36

## 2020-01-01 RX ADMIN — HEPARIN SODIUM 5000 UNITS: 10000 INJECTION, SOLUTION INTRAVENOUS; SUBCUTANEOUS at 21:22

## 2020-01-01 RX ADMIN — MICONAZOLE NITRATE: 20 POWDER TOPICAL at 06:14

## 2020-01-01 RX ADMIN — Medication: at 10:55

## 2020-01-01 RX ADMIN — HEPARIN SODIUM 5000 UNITS: 10000 INJECTION, SOLUTION INTRAVENOUS; SUBCUTANEOUS at 19:55

## 2020-01-01 RX ADMIN — POTASSIUM CHLORIDE: 149 INJECTION, SOLUTION, CONCENTRATE INTRAVENOUS at 18:52

## 2020-01-01 RX ADMIN — POTASSIUM CHLORIDE, DEXTROSE MONOHYDRATE AND SODIUM CHLORIDE: 150; 5; 450 INJECTION, SOLUTION INTRAVENOUS at 06:58

## 2020-01-01 RX ADMIN — CARBIDOPA AND LEVODOPA 2 TABLET: 25; 100 TABLET ORAL at 13:12

## 2020-01-01 RX ADMIN — SODIUM CHLORIDE 1000 ML: 9 INJECTION, SOLUTION INTRAVENOUS at 17:26

## 2020-01-01 RX ADMIN — Medication: at 21:40

## 2020-01-01 RX ADMIN — HEPARIN SODIUM 5000 UNITS: 10000 INJECTION, SOLUTION INTRAVENOUS; SUBCUTANEOUS at 20:04

## 2020-01-01 RX ADMIN — CARBIDOPA AND LEVODOPA 2 TABLET: 25; 100 TABLET ORAL at 08:26

## 2020-01-01 RX ADMIN — POTASSIUM CHLORIDE, DEXTROSE MONOHYDRATE AND SODIUM CHLORIDE: 150; 5; 450 INJECTION, SOLUTION INTRAVENOUS at 15:42

## 2020-01-01 RX ADMIN — ONDANSETRON 4 MG: 2 INJECTION INTRAMUSCULAR; INTRAVENOUS at 03:18

## 2020-01-01 RX ADMIN — CARBIDOPA AND LEVODOPA 2 TABLET: 25; 100 TABLET ORAL at 17:55

## 2020-01-01 RX ADMIN — CALCIUM CARBONATE (ANTACID) CHEW TAB 500 MG 500 MG: 500 CHEW TAB at 17:56

## 2020-01-01 RX ADMIN — OMEPRAZOLE 40 MG: 20 CAPSULE, DELAYED RELEASE ORAL at 08:28

## 2020-01-01 RX ADMIN — PANTOPRAZOLE SODIUM 40 MG: 40 INJECTION, POWDER, FOR SOLUTION INTRAVENOUS at 09:43

## 2020-01-01 RX ADMIN — CARBIDOPA AND LEVODOPA 2 TABLET: 25; 100 TABLET ORAL at 08:43

## 2020-01-01 RX ADMIN — SODIUM CHLORIDE 1000 ML: 9 INJECTION, SOLUTION INTRAVENOUS at 22:37

## 2020-01-01 RX ADMIN — OMEPRAZOLE 40 MG: 20 CAPSULE, DELAYED RELEASE ORAL at 08:36

## 2020-01-01 RX ADMIN — Medication: at 12:31

## 2020-01-01 RX ADMIN — NYSTATIN: 100000 CREAM TOPICAL at 10:11

## 2020-01-01 RX ADMIN — MELATONIN TAB 3 MG 3 MG: 3 TAB at 02:14

## 2020-01-01 RX ADMIN — CARBIDOPA AND LEVODOPA 2 TABLET: 25; 100 TABLET ORAL at 14:51

## 2020-01-01 RX ADMIN — PANTOPRAZOLE SODIUM 40 MG: 40 TABLET, DELAYED RELEASE ORAL at 06:31

## 2020-01-01 RX ADMIN — SODIUM CHLORIDE 500 ML: 9 INJECTION, SOLUTION INTRAVENOUS at 20:42

## 2020-01-01 RX ADMIN — ONDANSETRON 4 MG: 2 INJECTION INTRAMUSCULAR; INTRAVENOUS at 20:01

## 2020-01-01 RX ADMIN — ASPIRIN 300 MG: 300 SUPPOSITORY RECTAL at 20:10

## 2020-01-01 RX ADMIN — MULTIPLE VITAMINS W/ MINERALS TAB 1 TABLET: TAB at 08:25

## 2020-01-01 RX ADMIN — ONDANSETRON 4 MG: 2 INJECTION INTRAMUSCULAR; INTRAVENOUS at 21:40

## 2020-01-01 RX ADMIN — Medication: at 21:11

## 2020-01-01 RX ADMIN — SODIUM CHLORIDE, POTASSIUM CHLORIDE, SODIUM LACTATE AND CALCIUM CHLORIDE: 600; 310; 30; 20 INJECTION, SOLUTION INTRAVENOUS at 07:37

## 2020-01-01 RX ADMIN — HUMAN ALBUMIN MICROSPHERES AND PERFLUTREN 2 ML: 10; .22 INJECTION, SOLUTION INTRAVENOUS at 08:12

## 2020-01-01 RX ADMIN — METOPROLOL SUCCINATE 25 MG: 25 TABLET, FILM COATED, EXTENDED RELEASE ORAL at 14:51

## 2020-01-01 RX ADMIN — SODIUM CHLORIDE 60 ML: 9 INJECTION, SOLUTION INTRAVENOUS at 18:51

## 2020-01-01 RX ADMIN — POTASSIUM CHLORIDE, DEXTROSE MONOHYDRATE AND SODIUM CHLORIDE: 150; 5; 450 INJECTION, SOLUTION INTRAVENOUS at 05:07

## 2020-01-01 RX ADMIN — ALLOPURINOL 100 MG: 100 TABLET ORAL at 08:28

## 2020-01-01 RX ADMIN — ENOXAPARIN SODIUM 40 MG: 40 INJECTION SUBCUTANEOUS at 13:08

## 2020-01-01 RX ADMIN — PROCHLORPERAZINE EDISYLATE 5 MG: 5 INJECTION INTRAMUSCULAR; INTRAVENOUS at 16:35

## 2020-01-01 RX ADMIN — CARBIDOPA AND LEVODOPA 2 TABLET: 25; 100 TABLET ORAL at 12:00

## 2020-01-01 RX ADMIN — POTASSIUM CHLORIDE, DEXTROSE MONOHYDRATE AND SODIUM CHLORIDE: 150; 5; 450 INJECTION, SOLUTION INTRAVENOUS at 19:25

## 2020-01-01 RX ADMIN — ALLOPURINOL 100 MG: 100 TABLET ORAL at 09:35

## 2020-01-01 RX ADMIN — CARBIDOPA AND LEVODOPA 2 TABLET: 25; 100 TABLET ORAL at 08:36

## 2020-01-01 RX ADMIN — HEPARIN SODIUM 5000 UNITS: 10000 INJECTION, SOLUTION INTRAVENOUS; SUBCUTANEOUS at 20:43

## 2020-01-01 RX ADMIN — CARBIDOPA AND LEVODOPA 2 TABLET: 25; 100 TABLET ORAL at 12:04

## 2020-01-01 RX ADMIN — NYSTATIN: 100000 CREAM TOPICAL at 09:21

## 2020-01-01 RX ADMIN — CARBIDOPA AND LEVODOPA 2 TABLET: 25; 100 TABLET ORAL at 17:47

## 2020-01-01 RX ADMIN — CARBIDOPA AND LEVODOPA 2 TABLET: 25; 100 TABLET ORAL at 12:13

## 2020-01-01 RX ADMIN — POTASSIUM CHLORIDE, DEXTROSE MONOHYDRATE AND SODIUM CHLORIDE: 150; 5; 450 INJECTION, SOLUTION INTRAVENOUS at 18:17

## 2020-01-01 RX ADMIN — CARBIDOPA AND LEVODOPA 2 TABLET: 25; 100 TABLET ORAL at 09:24

## 2020-01-01 RX ADMIN — HEPARIN SODIUM 5000 UNITS: 10000 INJECTION, SOLUTION INTRAVENOUS; SUBCUTANEOUS at 08:39

## 2020-01-01 RX ADMIN — ACETAMINOPHEN 650 MG: 650 SUPPOSITORY RECTAL at 22:30

## 2020-01-01 RX ADMIN — HEPARIN SODIUM 5000 UNITS: 10000 INJECTION, SOLUTION INTRAVENOUS; SUBCUTANEOUS at 07:39

## 2020-01-01 RX ADMIN — OMEPRAZOLE 40 MG: 20 CAPSULE, DELAYED RELEASE ORAL at 06:40

## 2020-01-01 RX ADMIN — CEFTRIAXONE SODIUM 1 G: 1 INJECTION, POWDER, FOR SOLUTION INTRAMUSCULAR; INTRAVENOUS at 20:03

## 2020-01-01 RX ADMIN — MULTIPLE VITAMINS W/ MINERALS TAB 1 TABLET: TAB at 08:39

## 2020-01-01 RX ADMIN — POTASSIUM CHLORIDE: 149 INJECTION, SOLUTION, CONCENTRATE INTRAVENOUS at 04:53

## 2020-01-01 RX ADMIN — OLANZAPINE 2.5 MG: 5 TABLET, ORALLY DISINTEGRATING ORAL at 18:14

## 2020-01-01 RX ADMIN — HEPARIN SODIUM 5000 UNITS: 10000 INJECTION, SOLUTION INTRAVENOUS; SUBCUTANEOUS at 08:50

## 2020-01-01 RX ADMIN — ONDANSETRON 4 MG: 2 INJECTION INTRAMUSCULAR; INTRAVENOUS at 14:09

## 2020-01-01 RX ADMIN — CEFTRIAXONE 1 G: 1 INJECTION, POWDER, FOR SOLUTION INTRAMUSCULAR; INTRAVENOUS at 22:39

## 2020-01-01 RX ADMIN — CARBIDOPA AND LEVODOPA 2 TABLET: 25; 100 TABLET ORAL at 18:33

## 2020-01-01 RX ADMIN — CARBIDOPA AND LEVODOPA 2 TABLET: 25; 100 TABLET ORAL at 09:21

## 2020-01-01 RX ADMIN — POTASSIUM CHLORIDE, DEXTROSE MONOHYDRATE AND SODIUM CHLORIDE: 150; 5; 450 INJECTION, SOLUTION INTRAVENOUS at 18:47

## 2020-01-01 RX ADMIN — CARBIDOPA AND LEVODOPA 2 TABLET: 25; 100 TABLET ORAL at 09:39

## 2020-01-01 RX ADMIN — CARBIDOPA AND LEVODOPA 2 TABLET: 25; 100 TABLET ORAL at 11:53

## 2020-01-01 RX ADMIN — CARBIDOPA AND LEVODOPA 2 TABLET: 25; 100 TABLET ORAL at 17:44

## 2020-01-01 RX ADMIN — NYSTATIN: 100000 CREAM TOPICAL at 09:00

## 2020-01-01 RX ADMIN — CARBIDOPA AND LEVODOPA 2 TABLET: 25; 100 TABLET ORAL at 17:26

## 2020-01-01 RX ADMIN — HEPARIN SODIUM 5000 UNITS: 10000 INJECTION, SOLUTION INTRAVENOUS; SUBCUTANEOUS at 08:43

## 2020-01-01 RX ADMIN — PANTOPRAZOLE SODIUM 40 MG: 40 INJECTION, POWDER, FOR SOLUTION INTRAVENOUS at 09:28

## 2020-01-01 RX ADMIN — OMEPRAZOLE 40 MG: 20 CAPSULE, DELAYED RELEASE ORAL at 09:21

## 2020-01-01 RX ADMIN — HEPARIN SODIUM 5000 UNITS: 10000 INJECTION, SOLUTION INTRAVENOUS; SUBCUTANEOUS at 19:34

## 2020-01-01 RX ADMIN — SODIUM CHLORIDE 750 ML: 9 INJECTION, SOLUTION INTRAVENOUS at 14:49

## 2020-01-01 RX ADMIN — POTASSIUM CHLORIDE, DEXTROSE MONOHYDRATE AND SODIUM CHLORIDE: 150; 5; 450 INJECTION, SOLUTION INTRAVENOUS at 08:00

## 2020-01-01 RX ADMIN — CARBIDOPA AND LEVODOPA 2 TABLET: 25; 100 TABLET ORAL at 18:13

## 2020-01-01 RX ADMIN — ONDANSETRON 4 MG: 4 TABLET, ORALLY DISINTEGRATING ORAL at 12:06

## 2020-01-01 RX ADMIN — CARBIDOPA AND LEVODOPA 2 TABLET: 25; 100 TABLET ORAL at 18:17

## 2020-01-01 RX ADMIN — LIDOCAINE HYDROCHLORIDE: 20 JELLY TOPICAL at 14:54

## 2020-01-01 RX ADMIN — ACETAMINOPHEN 650 MG: 325 TABLET, FILM COATED ORAL at 03:30

## 2020-01-01 RX ADMIN — NYSTATIN: 100000 CREAM TOPICAL at 20:59

## 2020-01-01 RX ADMIN — POTASSIUM CHLORIDE, DEXTROSE MONOHYDRATE AND SODIUM CHLORIDE: 150; 5; 450 INJECTION, SOLUTION INTRAVENOUS at 02:09

## 2020-01-01 ASSESSMENT — ACTIVITIES OF DAILY LIVING (ADL)
ADLS_ACUITY_SCORE: 29
ADLS_ACUITY_SCORE: 27
WHICH_OF_THE_ABOVE_FUNCTIONAL_RISKS_HAD_A_RECENT_ONSET_OR_CHANGE?: FALL HISTORY
TRANSFERRING: 1-->ASSISTIVE EQUIPMENT
ADLS_ACUITY_SCORE: 31
ADLS_ACUITY_SCORE: 27
COGNITION: 0 - NO COGNITION ISSUES REPORTED
ADLS_ACUITY_SCORE: 27
ADLS_ACUITY_SCORE: 30
ADLS_ACUITY_SCORE: 27
TOILETING: 1-->ASSISTIVE EQUIPMENT
ADLS_ACUITY_SCORE: 27
ADLS_ACUITY_SCORE: 31
ADLS_ACUITY_SCORE: 26
ADLS_ACUITY_SCORE: 30
ADLS_ACUITY_SCORE: 31
BATHING: 1-->ASSISTIVE EQUIPMENT
ADLS_ACUITY_SCORE: 27
ADLS_ACUITY_SCORE: 31
ADLS_ACUITY_SCORE: 30
ADLS_ACUITY_SCORE: 31
ADLS_ACUITY_SCORE: 31
ADLS_ACUITY_SCORE: 26
ADLS_ACUITY_SCORE: 31
FALL_HISTORY_WITHIN_LAST_SIX_MONTHS: YES
ADLS_ACUITY_SCORE: 27
ADLS_ACUITY_SCORE: 26
ADLS_ACUITY_SCORE: 27
ADLS_ACUITY_SCORE: 31
ADLS_ACUITY_SCORE: 31
ADLS_ACUITY_SCORE: 26
ADLS_ACUITY_SCORE: 26
ADLS_ACUITY_SCORE: 31
ADLS_ACUITY_SCORE: 31
RETIRED_COMMUNICATION: 0-->UNDERSTANDS/COMMUNICATES WITHOUT DIFFICULTY
ADLS_ACUITY_SCORE: 26
ADLS_ACUITY_SCORE: 31
ADLS_ACUITY_SCORE: 27
ADLS_ACUITY_SCORE: 29
ADLS_ACUITY_SCORE: 26
ADLS_ACUITY_SCORE: 27
ADLS_ACUITY_SCORE: 31
ADLS_ACUITY_SCORE: 31
SWALLOWING: 0-->SWALLOWS FOODS/LIQUIDS WITHOUT DIFFICULTY
DRESS: 1-->ASSISTIVE EQUIPMENT
ADLS_ACUITY_SCORE: 31
ADLS_ACUITY_SCORE: 31
ADLS_ACUITY_SCORE: 27
ADLS_ACUITY_SCORE: 33
ADLS_ACUITY_SCORE: 26
RETIRED_EATING: 2-->ASSISTIVE PERSON
AMBULATION: 1-->ASSISTIVE EQUIPMENT
ADLS_ACUITY_SCORE: 31
ADLS_ACUITY_SCORE: 27

## 2020-01-01 ASSESSMENT — MIFFLIN-ST. JEOR
SCORE: 1218.79
SCORE: 1169.8
SCORE: 1172.53
SCORE: 1208.81
SCORE: 1215.61
SCORE: 1246.92
SCORE: 1175.24
SCORE: 1242.38
SCORE: 1236.94
SCORE: 1241.93
SCORE: 1245.1
SCORE: 1205.64
SCORE: 1267.33

## 2020-01-01 ASSESSMENT — ENCOUNTER SYMPTOMS
ABDOMINAL PAIN: 0
ARTHRALGIAS: 0
LIGHT-HEADEDNESS: 0
FEVER: 0
SHORTNESS OF BREATH: 0
ARTHRALGIAS: 1
HEADACHES: 0
DIZZINESS: 0
FEVER: 0
WOUND: 1
COUGH: 0
RHINORRHEA: 0
HEADACHES: 0
VOMITING: 0
VOMITING: 0
ABDOMINAL PAIN: 0
COUGH: 0
NAUSEA: 0
NECK PAIN: 0
SHORTNESS OF BREATH: 0

## 2020-01-02 ENCOUNTER — TELEPHONE (OUTPATIENT)
Dept: CARE COORDINATION | Facility: CLINIC | Age: 85
End: 2020-01-02

## 2020-01-02 NOTE — TELEPHONE ENCOUNTER
I will not approve orders as pt last seen by me 2 yrs ago in jan 2018. Maybe there is another physician he now sees on a more regular basis who can sign?    Enrique Ring MD

## 2020-01-02 NOTE — TELEPHONE ENCOUNTER
[facility] now requests orders and shares plan of care/discharge summaries for some patients through [employer].  Please REPLY TO THIS MESSAGE OR ROUTE BACK TO THE AUTHOR in order to give authorization for orders when needed.  This is considered a verbal order, you will still receive a faxed copy of orders for signature.  Thank you for your assistance in improving collaboration for our patients.    ORDER    Continue private pay home care services, HHA 0 to 24 hour /day as requested, RN to supervise. May increase or decrease as requested.     Thank you!

## 2020-01-03 NOTE — TELEPHONE ENCOUNTER
Cher-  I did review a recent neuro note from Dr. Hurley at [facility] neuro who seems to be following him more often.  Maybe they would be willing to sign the home orders until the pt is seen by myself again.     Enrique Ring MD

## 2020-01-07 NOTE — TELEPHONE ENCOUNTER
Victorino Aguayo,     Dr. Hurley at the [facility] refused to give home care orders for this patient referring me once again back to his PCP - regardless of the reason.     I confirmed with the pt that he has an appt set up with you on 1/17 and am wondering if you would be willing to give a one time order for us to see him as he requested (private pay patient) x1 before this appt and then we could revisit ongoing orders needed to get through the remaining cert period of the next 60 days.     Please let me know as soon as you decide.     Respectfully,     Cher Hurley RN    [facility]

## 2020-01-08 ENCOUNTER — TELEPHONE (OUTPATIENT)
Dept: INTERNAL MEDICINE | Facility: CLINIC | Age: 85
End: 2020-01-08

## 2020-01-08 NOTE — TELEPHONE ENCOUNTER
FYI see notes below    per previous request, looks like home care orders Ok'd by MD until appt  on 01/17/2020.          Will forward family concerns to home care nurse    Sarah Cedeno CMA

## 2020-01-08 NOTE — TELEPHONE ENCOUNTER
Reason for Call:  Other call back    Detailed comments: Patient's daughter in law is calling to discuss getting home care for her father in law. Please call to discuss. Marissa is very concerned that patient is not taking care of himself and needs help. Marissa states that she doesn't think he is taking his medication properly. Marissa is having surgery next Tuesday and wants to get help as soon as possible for patient.    Phone Number Patient can be reached at: Other phone number:  [phone]    Best Time: any    Can we leave a detailed message on this number? YES    Call taken on 1/8/2020 at 11:06 AM by MANJU MOISE

## 2020-01-17 ENCOUNTER — OFFICE VISIT (OUTPATIENT)
Dept: INTERNAL MEDICINE | Facility: CLINIC | Age: 85
End: 2020-01-17
Payer: MEDICARE

## 2020-01-17 ENCOUNTER — TELEPHONE (OUTPATIENT)
Dept: CARE COORDINATION | Facility: CLINIC | Age: 85
End: 2020-01-17

## 2020-01-17 ENCOUNTER — PATIENT OUTREACH (OUTPATIENT)
Dept: CARE COORDINATION | Facility: CLINIC | Age: 85
End: 2020-01-17

## 2020-01-17 VITALS
OXYGEN SATURATION: 99 % | WEIGHT: 149.7 LBS | HEIGHT: 66 IN | BODY MASS INDEX: 24.06 KG/M2 | HEART RATE: 66 BPM | DIASTOLIC BLOOD PRESSURE: 62 MMHG | SYSTOLIC BLOOD PRESSURE: 124 MMHG | TEMPERATURE: 97.4 F

## 2020-01-17 DIAGNOSIS — Z00.00 ENCOUNTER FOR MEDICARE ANNUAL WELLNESS EXAM: Primary | ICD-10-CM

## 2020-01-17 DIAGNOSIS — I10 ESSENTIAL HYPERTENSION WITH GOAL BLOOD PRESSURE LESS THAN 140/90: ICD-10-CM

## 2020-01-17 DIAGNOSIS — M1A.9XX0 CHRONIC GOUT WITHOUT TOPHUS, UNSPECIFIED CAUSE, UNSPECIFIED SITE: ICD-10-CM

## 2020-01-17 DIAGNOSIS — G20.A1 PARKINSON'S DISEASE (H): ICD-10-CM

## 2020-01-17 LAB
ANION GAP SERPL CALCULATED.3IONS-SCNC: 7 MMOL/L (ref 3–14)
BUN SERPL-MCNC: 27 MG/DL (ref 7–30)
CALCIUM SERPL-MCNC: 9.3 MG/DL (ref 8.5–10.1)
CHLORIDE SERPL-SCNC: 107 MMOL/L (ref 94–109)
CO2 SERPL-SCNC: 27 MMOL/L (ref 20–32)
CREAT SERPL-MCNC: 1 MG/DL (ref 0.66–1.25)
GFR SERPL CREATININE-BSD FRML MDRD: 68 ML/MIN/{1.73_M2}
GLUCOSE SERPL-MCNC: 77 MG/DL (ref 70–99)
POTASSIUM SERPL-SCNC: 4.3 MMOL/L (ref 3.4–5.3)
SODIUM SERPL-SCNC: 141 MMOL/L (ref 133–144)
URATE SERPL-MCNC: 4.9 MG/DL (ref 3.5–7.2)

## 2020-01-17 PROCEDURE — 80048 BASIC METABOLIC PNL TOTAL CA: CPT | Performed by: INTERNAL MEDICINE

## 2020-01-17 PROCEDURE — G0439 PPPS, SUBSEQ VISIT: HCPCS | Performed by: INTERNAL MEDICINE

## 2020-01-17 PROCEDURE — 99213 OFFICE O/P EST LOW 20 MIN: CPT | Mod: 25 | Performed by: INTERNAL MEDICINE

## 2020-01-17 PROCEDURE — 84550 ASSAY OF BLOOD/URIC ACID: CPT | Performed by: INTERNAL MEDICINE

## 2020-01-17 PROCEDURE — 36415 COLL VENOUS BLD VENIPUNCTURE: CPT | Performed by: INTERNAL MEDICINE

## 2020-01-17 ASSESSMENT — MIFFLIN-ST. JEOR: SCORE: 1302.81

## 2020-01-17 ASSESSMENT — ACTIVITIES OF DAILY LIVING (ADL): CURRENT_FUNCTION: NO ASSISTANCE NEEDED

## 2020-01-17 NOTE — LETTER
January 21, 2020      Chilango Echevarria  [address] [place] [address] [address]  [facility] [address]        Dear ,    We are writing to inform you of your test results.    Your lab results are normal.      Resulted Orders   Basic metabolic panel   Result Value Ref Range    Sodium 141 133 - 144 mmol/L    Potassium 4.3 3.4 - 5.3 mmol/L    Chloride 107 94 - 109 mmol/L    Carbon Dioxide 27 20 - 32 mmol/L    Anion Gap 7 3 - 14 mmol/L    Glucose 77 70 - 99 mg/dL    Urea Nitrogen 27 7 - 30 mg/dL    Creatinine 1.00 0.66 - 1.25 mg/dL    GFR Estimate 68 >60 mL/min/[1.73_m2]      Comment:      Non  GFR Calc  Starting 12/18/2018, serum creatinine based estimated GFR (eGFR) will be   calculated using the Chronic Kidney Disease Epidemiology Collaboration   (CKD-EPI) equation.      GFR Estimate If Black 79 >60 mL/min/[1.73_m2]      Comment:       GFR Calc  Starting 12/18/2018, serum creatinine based estimated GFR (eGFR) will be   calculated using the Chronic Kidney Disease Epidemiology Collaboration   (CKD-EPI) equation.      Calcium 9.3 8.5 - 10.1 mg/dL   Uric acid   Result Value Ref Range    Uric Acid 4.9 3.5 - 7.2 mg/dL       If you have any questions or concerns, please call the clinic at the number listed above.       Sincerely,        Enrique Ring MD

## 2020-01-17 NOTE — PATIENT INSTRUCTIONS
Patient Education   Personalized Prevention Plan  You are due for the preventive services outlined below.  Your care team is available to assist you in scheduling these services.  If you have already completed any of these items, please share that information with your care team to update in your medical record.  Health Maintenance Due   Topic Date Due     Annual Wellness Visit  04/15/2010     Zoster (Shingles) Vaccine (2 of 3) 05/27/2013     FALL RISK ASSESSMENT  10/30/2019       Exercise for a Healthier Heart     Exercise with a friend. When activity is fun, you're more likely to stick with it.   You may wonder how you can improve the health of your heart. If you re thinking about exercise, you re on the right track. You don t need to become an athlete, but you do need a certain amount of brisk exercise to help strengthen your heart. If you have been diagnosed with a heart condition, your doctor may recommend exercise to help stabilize your condition. To help make exercise a habit, choose safe, fun activities.  Be sure to check with your healthcare provider before starting an exercise program.  Why exercise?  Exercising regularly offers many healthy rewards. It can help you do all of the following:    Improve your blood cholesterol level to help prevent further heart trouble    Lower your blood pressure to help prevent a stroke or heart attack    Control diabetes, or reduce your risk of getting this disease    Improve your heart and lung function    Reach and maintain a healthy weight    Make your muscles stronger and more limber so you can stay active    Prevent falls and fractures by slowing the loss of bone mass (osteoporosis)    Manage stress better    Reduce your blood pressure    Improve your sense of self and your body image  Exercise tips  Ease into your routine. Set small goals. Then build on them.  Exercise on most days. Aim for a total of 150 or more minutes of moderate to  vigorous intensity activity  each week. Consider 40 minutes, 3 to 4 times a week. For best results, activity should last for 40 minutes on average. It is OK to work up to the 40 minute period over time. Examples of moderate-intensity activity is walking 1 mile in 15 minutes or 30 to 45 minutes of yard work.  Step up your daily activity level. Along with your exercise program, try being more active throughout the day. Walk instead of drive. Do more household tasks or yard work.  Choose one or more activities you enjoy. Walking is one of the easiest things you can do. You can also try swimming, riding a bike, dancing, or taking an exercise class.  Stop exercising and call your doctor if you:    Have chest pain or feel dizzy or lightheaded    Feel burning, tightness, pressure, or heaviness in your chest, neck, shoulders, back, or arms    Have unusual shortness of breath    Have increased joint or muscle pain    Have palpitations or an irregular heartbeat  Date Last Reviewed: 5/1/2016 2000-2019 [employer]. [address] [place] [address]. All rights reserved. This information is not intended as a substitute for professional medical care. Always follow your healthcare professional's instructions.          Understanding USDA MyPlate  The USDA (U.S. Department of Agriculture) has guidelines to help you make healthy food choices. These are called MyPlate. MyPlate shows the food groups that make up healthy meals using the image of a place setting. Before you eat, think about the healthiest choices for what to put onto your plate or into your cup or bowl. To learn more about building a healthy plate, visit www.choosemyplate.gov.    The food groups    Fruits. Any fruit or 100% fruit juice counts as part of the Fruit Group. Fruits may be fresh, canned, frozen, or dried, and may be whole, cut-up, or pureed. Make half your plate fruits and vegetables.    Vegetables. Any vegetable or 100% vegetable juice counts as a member of the  Vegetable Group. Vegetables may be fresh, frozen, canned, or dried. They can be served raw or cooked and may be whole, cut-up, or mashed. Make half your plate fruits and vegetables.    Grains. All foods made from grains are part of the Grains Group. These include wheat, rice, oats, cornmeal, and barley such as bread, pasta, oatmeal, cereal, tortillas, and grits. Grains should be no more than a quarter of your plate. At least half of your grains should be whole grains.    Protein. This group includes meat, poultry, seafood, beans and peas, eggs, processed soy products (like tofu), nuts (including nut butters), and seeds. Make protein choices no more than a quarter of your plate. Meat and poultry choices should be lean or low fat.    Dairy. All fluid milk products and foods made from milk that contain calcium, like yogurt and cheese, are part of the Dairy Group. (Foods that have little calcium, such as cream, butter, and cream cheese, are not part of the group.) Most dairy choices should be low-fat or fat-free.    Oils. These are fats that are liquid at room temperature. They include canola, corn, olive, soybean, and sunflower oil. Foods that are mainly oil include mayonnaise, certain salad dressings, and soft margarines. You should have only 5 to 7 teaspoons of oils a day. You probably already get this much from the food you eat.  Date Last Reviewed: 8/1/2017 2000-2019 [employer]. [address] [place] [address], [place] [address]. All rights reserved. This information is not intended as a substitute for professional medical care. Always follow your healthcare professional's instructions.    Try using Aquaphor,  Eucerin or CeraVe cream on your skin after you shower.

## 2020-01-17 NOTE — NURSING NOTE
"Chief Complaint   Patient presents with     Medicare Visit     /62   Pulse 66   Temp 97.4  F (36.3  C) (Temporal)   Ht 1.67 m (5' 5.75\")   Wt 67.9 kg (149 lb 11.2 oz)   SpO2 99%   BMI 24.35 kg/m   Estimated body mass index is 24.35 kg/m  as calculated from the following:    Height as of this encounter: 1.67 m (5' 5.75\").    Weight as of this encounter: 67.9 kg (149 lb 11.2 oz).        Health Maintenance due pending provider review:  NONE    n/a    Sarah Cedeno, CATHI  "

## 2020-01-17 NOTE — PROGRESS NOTES
"SUBJECTIVE:   Chilango Echevarria is a 85 year old male who presents for Preventive Visit.    Are you in the first 12 months of your Medicare coverage?  No    Healthy Habits:    In general, how would you rate your overall health?  Good    Frequency of exercise:  None    Do you usually eat at least 4 servings of fruit and vegetables a day, include whole grains    & fiber and avoid regularly eating high fat or \"junk\" foods?  No    Taking medications regularly:  Yes    Barriers to taking medications:  None    Medication side effects:  None    Ability to successfully perform activities of daily living:  No assistance needed (\"some assistance needed\")    Home Safety:  No safety concerns identified    Hearing Impairment:  No hearing concerns    In the past 6 months, have you been bothered by leaking of urine?  No    In general, how would you rate your overall mental or emotional health?  Good      PHQ-2 Total Score:    Do you feel safe in your environment? YES    Have you ever done Advance Care Planning? (For example, a Health Directive, POLST, or a discussion with a medical provider or your loved ones about your wishes): Yes, advance care planning is on file.      Fall risk  Fallen 2 or more times in the past year?: No  Any fall with injury in the past year?: No    Cognitive Screening  Correct clock  Remembers 1 of 3 words       Do you have sleep apnea, excessive snoring or daytime drowsiness?: no    Reviewed and updated as needed this visit by clinical staff  Tobacco  Allergies  Meds  Med Hx  Surg Hx  Fam Hx  Soc Hx        Reviewed and updated as needed this visit by Provider        Social History     Tobacco Use     Smoking status: Never Smoker     Smokeless tobacco: Never Used   Substance Use Topics     Alcohol use: Yes     Comment: beer only, 3 bottles per day     If you drink alcohol do you typically have >3 drinks per day or >7 drinks per week? No    Alcohol Use 1/17/2020   Prescreen: >3 drinks/day or >7 " "drinks/week? No           Hypertension Follow-up      Do you check your blood pressure regularly outside of the clinic? No     Are you following a low salt diet? No    Are your blood pressures ever more than 140 on the top number (systolic) OR more   than 90 on the bottom number (diastolic), for example 140/90? No      Current providers sharing in care for this patient include:   Patient Care Team:  Enrique Ring MD as PCP - General (Internal Medicine)  Sienna Bernard APRN CNS (Oncology)  Thad Ferro MD as MD (Thoracic Diseases)  [facility] ([place]  Terrence Shay MD as Assigned PCP    The following health maintenance items are reviewed in Epic and correct as of today:  Health Maintenance   Topic Date Due     ZOSTER IMMUNIZATION (2 of 3) 05/27/2013     MEDICARE ANNUAL WELLNESS VISIT  01/17/2021     FALL RISK ASSESSMENT  01/17/2021     DTAP/TDAP/TD IMMUNIZATION (3 - Td) 07/09/2023     COLONOSCOPY  10/03/2023     ADVANCE CARE PLANNING  01/17/2025     PHQ-2  Completed     INFLUENZA VACCINE  Completed     PNEUMOCOCCAL IMMUNIZATION 65+ LOW/MEDIUM RISK  Completed     IPV IMMUNIZATION  Aged Out     MENINGITIS IMMUNIZATION  Aged Out            Review of Systems  Constitutional, HEENT, cardiovascular, pulmonary, gi and gu systems are negative, except as otherwise noted.    OBJECTIVE:   /62   Pulse 66   Temp 97.4  F (36.3  C) (Temporal)   Ht 1.67 m (5' 5.75\")   Wt 67.9 kg (149 lb 11.2 oz)   SpO2 99%   BMI 24.35 kg/m   Estimated body mass index is 24.35 kg/m  as calculated from the following:    Height as of this encounter: 1.67 m (5' 5.75\").    Weight as of this encounter: 67.9 kg (149 lb 11.2 oz).  Physical Exam  GENERAL: alert, no distress and elderly  EYES: Eyes grossly normal to inspection, PERRL and conjunctivae and sclerae normal  HENT: ear canals and TM's normal, nose and mouth without ulcers or lesions  NECK: no adenopathy, no " "asymmetry, masses, or scars and thyroid normal to palpation  RESP: lungs clear to auscultation - no rales, rhonchi or wheezes  CV: regular rate and rhythm, normal S1 S2, no S3 or S4, no murmur, click or rub, no peripheral edema and peripheral pulses strong  ABDOMEN: soft, nontender, no hepatosplenomegaly, no masses and bowel sounds normal  MS: no gross musculoskeletal defects noted, no edema  SKIN: no suspicious lesions or rashes  NEURO: Normal strength and tone, sensory exam grossly normal, mentation intact and resting tremor  PSYCH: mentation appears normal, affect normal/bright    Results for orders placed or performed in visit on 01/17/20   Basic metabolic panel     Status: None   Result Value Ref Range    Sodium 141 133 - 144 mmol/L    Potassium 4.3 3.4 - 5.3 mmol/L    Chloride 107 94 - 109 mmol/L    Carbon Dioxide 27 20 - 32 mmol/L    Anion Gap 7 3 - 14 mmol/L    Glucose 77 70 - 99 mg/dL    Urea Nitrogen 27 7 - 30 mg/dL    Creatinine 1.00 0.66 - 1.25 mg/dL    GFR Estimate 68 >60 mL/min/[1.73_m2]    GFR Estimate If Black 79 >60 mL/min/[1.73_m2]    Calcium 9.3 8.5 - 10.1 mg/dL   Uric acid     Status: None   Result Value Ref Range    Uric Acid 4.9 3.5 - 7.2 mg/dL       ASSESSMENT / PLAN:   1. Encounter for Medicare annual wellness exam  See pt instructions    2. Parkinson's disease (H)  - CARE COORDINATION REFERRAL    3. Essential hypertension with goal blood pressure less than 140/90  Looks good-cont meds  - Basic metabolic panel    4. Chronic gout without tophus, unspecified cause, unspecified site  - Uric acid    COUNSELING:  Reviewed preventive health counseling, as reflected in patient instructions    Estimated body mass index is 24.35 kg/m  as calculated from the following:    Height as of this encounter: 1.67 m (5' 5.75\").    Weight as of this encounter: 67.9 kg (149 lb 11.2 oz).         reports that he has never smoked. He has never used smokeless tobacco.      Appropriate preventive services were " discussed with this patient, including applicable screening as appropriate for cardiovascular disease, diabetes, osteopenia/osteoporosis, and glaucoma.  As appropriate for age/gender, discussed screening for colorectal cancer, prostate cancer, breast cancer, and cervical cancer. Checklist reviewing preventive services available has been given to the patient.    Reviewed patients plan of care and provided an AVS. The Intermediate Care Plan ( asthma action plan, low back pain action plan, and migraine action plan) for Chilango meets the Care Plan requirement. This Care Plan has been established and reviewed with the Patient.    Counseling Resources:  ATP IV Guidelines  Pooled Cohorts Equation Calculator  Breast Cancer Risk Calculator  FRAX Risk Assessment  ICSI Preventive Guidelines  Dietary Guidelines for Americans, 2010  USDA's MyPlate  ASA Prophylaxis  Lung CA Screening    Enrique Ring MD  [facility]    Identified Health Risks:

## 2020-01-17 NOTE — PROGRESS NOTES
"Clinic Care Coordination Contact  Care Team Conversations    CHESTER CC received order from PCP after visit today to look into patient's current supports at home and what other options he may need/be open to to further support him. Patient is diagnosed with Parkinson's.     CHESTER BOND reviewed visit notes from home care.     CHESTER CC secure e-mailed [facility] RN case manager Cher Hurley to inquire about home care and her thoughts on patient's current needs.     CHESTER BOND received an email back form Cher [facility] RN case manager:  \"I'm glad Dr. Aguayo approached this - we have been trying without much success to have more HHA hours for Bill but he hasn't been willing to do anything more than every other week for 2 hours, mostly it seems, because it's out of pocket. His hygiene has become a problem as well as just issues with living alone. Family has been very supportive of playing \"hard ball\" a little more, encouraging him to also allow more than what he is allowing the HHA to do.     I did request required approval for a non billable  visit through [facility] to meet Bill at home as he is private pay and it is something he would not agree to pay for - so waiting on that response from my supervisor and we will move forward from there.\"    CHESTER BOND will follow up in two weeks and will wait for confirmation if [facility] CHESTER is able to see patient.     Deja Devi, [facility]  Clinic Care Coordinator   [facility]  [phone]  [email or web address]      "

## 2020-01-18 NOTE — TELEPHONE ENCOUNTER
[facility] now requests orders and shares plan of care/discharge summaries for some patients through [facility].  Please REPLY TO THIS MESSAGE OR ROUTE BACK TO THE AUTHOR in order to give authorization for orders when needed.  This is considered a verbal order, you will still receive a faxed copy of orders for signature.  Thank you for your assistance in improving collaboration for our patients.    ORDER    [facility] RN/LPN 0 - 24 hours/day as requested, HHA 0 to 24 hours/day as requested.     Non billable  to help navigate county resources potentially available, supportive services, long term care planning.     Thank you!

## 2020-01-20 ASSESSMENT — ACTIVITIES OF DAILY LIVING (ADL): DEPENDENT_IADLS:: CLEANING;LAUNDRY

## 2020-01-29 ENCOUNTER — TELEPHONE (OUTPATIENT)
Dept: DERMATOLOGY | Facility: CLINIC | Age: 85
End: 2020-01-29

## 2020-01-29 ENCOUNTER — OFFICE VISIT (OUTPATIENT)
Dept: DERMATOLOGY | Facility: CLINIC | Age: 85
End: 2020-01-29
Payer: MEDICARE

## 2020-01-29 VITALS — OXYGEN SATURATION: 96 % | HEART RATE: 60 BPM | SYSTOLIC BLOOD PRESSURE: 119 MMHG | DIASTOLIC BLOOD PRESSURE: 75 MMHG

## 2020-01-29 DIAGNOSIS — L20.81 ATOPIC NEURODERMATITIS: Primary | ICD-10-CM

## 2020-01-29 DIAGNOSIS — R58 ECCHYMOSIS: ICD-10-CM

## 2020-01-29 DIAGNOSIS — T14.90XA TRAUMA: ICD-10-CM

## 2020-01-29 DIAGNOSIS — L29.9 LOCALIZED PRURITUS: ICD-10-CM

## 2020-01-29 DIAGNOSIS — L85.3 XEROSIS OF SKIN: ICD-10-CM

## 2020-01-29 PROCEDURE — 99203 OFFICE O/P NEW LOW 30 MIN: CPT | Performed by: PHYSICIAN ASSISTANT

## 2020-01-29 RX ORDER — CETIRIZINE HYDROCHLORIDE 10 MG/1
TABLET ORAL
Qty: 60 TABLET | Refills: 1 | Status: ON HOLD | OUTPATIENT
Start: 2020-01-29 | End: 2020-03-16

## 2020-01-29 RX ORDER — LORATADINE 10 MG/1
TABLET ORAL
Qty: 60 TABLET | Refills: 1 | Status: ON HOLD | OUTPATIENT
Start: 2020-01-29 | End: 2020-03-16

## 2020-01-29 RX ORDER — FLUOCINONIDE 0.5 MG/G
CREAM TOPICAL
Qty: 60 G | Refills: 1 | Status: SHIPPED | OUTPATIENT
Start: 2020-01-29 | End: 2020-02-18

## 2020-01-29 NOTE — LETTER
1/29/2020         RE: Chilango Echevarria  [address] [place] [address] [address]  [facility] [id number]        Dear Colleague,    Thank you for referring your patient, Chilango Echevarria, to the [facility]. Please see a copy of my visit note below.    HPI:  I was asked to see pt by Dr. Ring. Chilango Echevarria is a 85 year old male patient here today for rash on back, UE and LE .  Patient states this has been present for a whil.  Patient reports the following symptoms: itch .  Patient reports the following previous treatments: TAC 1% to back prn with some improvement.  Patient reports the following modifying factors: none.  Associated symptoms: none.  Patient has no other skin complaints today.  Remainder of the HPI, Meds, PMH, Allergies, FH, and SH was reviewed in chart.      Past Medical History:   Diagnosis Date     Aortic stenosis     very mild     Ying esophagus      Contact dermatitis and other eczema, due to unspecified cause      Esophageal reflux      Gout, unspecified      Incarcerated hiatal hernia 5/21/2015     Inguinal hernia without mention of obstruction or gangrene, unilateral or unspecified, (not specified as recurrent)      Polymyalgia rheumatica (H)      Umbilical hernia without mention of obstruction or gangrene      Unspecified essential hypertension      Vasomotor rhinitis        Past Surgical History:   Procedure Laterality Date     C NONSPECIFIC PROCEDURE      T&A     C NONSPECIFIC PROCEDURE      umbilical herniorraphy     ESOPHAGOSCOPY, GASTROSCOPY, DUODENOSCOPY (EGD), COMBINED N/A 5/11/2015    Procedure: COMBINED ESOPHAGOSCOPY, GASTROSCOPY, DUODENOSCOPY (EGD);  Surgeon: Thad Ferro MD;  Location: [facility]     LAPAROSCOPIC ASSISTED INSERTION TUBE JEJUNOSTOMY N/A 5/14/2015    Procedure: LAPAROSCOPIC ASSISTED INSERTION TUBE JEJUNOSTOMY;  Surgeon: Thad Ferro MD;  [facility]     LAPAROSCOPIC HERNIORRHAPHY HIATAL N/A 5/14/2015    Procedure:  LAPAROSCOPIC HERNIORRHAPHY HIATAL;  Surgeon: Thad Ferro MD;  [facility]     LAPAROSCOPIC HERNIORRHAPHY INGUINAL Right 5/14/2015    Procedure: LAPAROSCOPIC HERNIORRHAPHY INGUINAL;  Surgeon: Thad Ferro MD;  Location: [facility] OR     PICC INSERTION Right 5/11/2015    5fr DL Power PICC, 39cm (1cm external) in the R medial brachial vein w/ tip in the low SVC.        Family History   Problem Relation Age of Onset     Breast Cancer Mother      Diabetes Paternal Grandfather      Cancer Son 37        lung       Social History     Socioeconomic History     Marital status: Single     Spouse name: Not on file     Number of children: 1     Years of education: Not on file     Highest education level: Not on file   Occupational History     Occupation: retired   Social Needs     Financial resource strain: Not on file     Food insecurity:     Worry: Not on file     Inability: Not on file     Transportation needs:     Medical: Not on file     Non-medical: Not on file   Tobacco Use     Smoking status: Never Smoker     Smokeless tobacco: Never Used   Substance and Sexual Activity     Alcohol use: Yes     Comment: beer only, 3 bottles per day     Drug use: No     Sexual activity: Not Currently     Partners: Female   Lifestyle     Physical activity:     Days per week: Not on file     Minutes per session: Not on file     Stress: Not on file   Relationships     Social connections:     Talks on phone: Not on file     Gets together: Not on file     Attends Mu-ism service: Not on file     Active member of club or organization: Not on file     Attends meetings of clubs or organizations: Not on file     Relationship status: Not on file     Intimate partner violence:     Fear of current or ex partner: Not on file     Emotionally abused: Not on file     Physically abused: Not on file     Forced sexual activity: Not on file   Other Topics Concern     Parent/sibling w/ CABG, MI or angioplasty before 65F 55M? Not  Asked   Social History Narrative     Not on file       Outpatient Encounter Medications as of 1/29/2020   Medication Sig Dispense Refill     allopurinol (ZYLOPRIM) 100 MG tablet TAKE 1 TABLET(100 MG) BY MOUTH DAILY 30 tablet 0     bisacodyl (DULCOLAX) 5 MG EC tablet Take 1 tablet (5 mg) by mouth daily as needed for constipation       carbidopa-levodopa (SINEMET)  MG per tablet Take 1 tablet by mouth 3 times daily       metoprolol succinate ER (TOPROL-XL) 25 MG 24 hr tablet TAKE 1 TABLET(25 MG) BY MOUTH EVERY MORNING 90 tablet 0     Nutritional Supplements (ENSURE PLUS) LIQD Take 240 mLs by mouth daily       omeprazole (PRILOSEC) 20 MG DR capsule TAKE 1 CAPSULE BY MOUTH 30 TO 60 MINUTES BEFORE FIRST MEAL OF THE DAY 90 capsule 0     order for DME Equipment being ordered: walker 1 each 0     polyethylene glycol (MIRALAX) powder Take 17 g by mouth daily as needed for constipation (1 capful) 510 g 1     triamcinolone (KENALOG) 0.1 % external ointment APPLY EXTERNALLY TO THE AFFECTED AREA TWICE DAILY 160 g 3     No facility-administered encounter medications on file as of 1/29/2020.        Review Of Systems:  Skin: rash on back, UE, and LE  Eyes: negative  Ears/Nose/Throat: negative  Respiratory: No shortness of breath, dyspnea on exertion, cough, or hemoptysis  Cardiovascular: negative  Gastrointestinal: negative  Genitourinary: negative  Musculoskeletal: negative  Neurologic: negative  Psychiatric: negative  Hematologic/Lymphatic/Immunologic: negative  Endocrine: negative      Objective:     /75   Pulse 60   SpO2 96%   Eyes: Conjunctivae/lids: Normal   ENT: Lips:  Normal  MSK: Normal  Cardiovascular: Peripheral edema none  Pulm: Breathing Normal  Neuro/Psych: Orientation: A/O x 3. Normal; Mood/Affect: Normal, NAD, WDWN  Pt accompanied by: friend Abena  Following areas examined: back, UE, face, legs  Carlson skin type:i   Findings:  Blue/purple/red patches on dorsal forearms  hemecrusted plaque on left  dorsal forearm  Pink thickened excoriated plaques with accentuated skin lines on posterior arms and medial forearms  Red scaly patches and papules on back  Red scaly macules and patches on legs    Assessment and Plan:     1) ecchymosis and trauma  Watch crusted area-present x 1 week due to trauma. Should resolve within 3-4 weeks. If not may need to biopsy.    2) atopic neurodermatitis, localized pruritis, xerosis of skin  Disc topicals, prednisone, NBUVB, oral antihistimines, and methotrexate  Apply a thin layer of  topical steroid lidex to affected area on arms, back, and legs two times a day for 2 weeks, tapering with improvement.  Use a gentle cleanser in the shower.  Moisturized 2x/day with gentle moisturizing cream for one week. Then once a day going forward.   Keep skin cool. Avoid hot water use on body and thick occlusive clothing, both can irritate skin  Begin 2 pills of Loratadine (Claritin) in the morning   Begin 2 pills of Cetirizine (Zyrtec) in the evening   Can purchase Sarna SENSITIVE lotion- I usually recommend creams but this lotion has anti itch properties that do not have the risk of thinning out the skin. Please use this in addition to your daily moisturizing cream if needed.  If you are unable to reach your back consider purchasing a lotion applicator.     Side effects of topical steroids including but not limited to atrophy (skin thinning), striae (stretch marks) telangiectasias, steroid acne, and others. Do not apply to normal skin. Do not apply to discolored skin that does not have rash present. Educated patient on post inflammatory hyperpigmentation.       Proper skin care from [facility]:    -Eliminate harsh soaps as they strip the natural oils from the skin, often resulting in dry itchy skin ( i.e. Dial, Zest, Vietnamese Spring, Ivory)  -Use mild soaps or soap alternatitives such as Cetaphil or Dove Sensitive Skin in the shower. You do not need to use soap on arms, legs, and trunk  every time you shower unless visibly soiled.   -Avoid hot or cold showers.  -After showering, lightly dry off and apply moisturizing within 2-3 minutes. This will help trap moisture in the skin. If you were prescribed a topical medication apply that first to rash and then apply body moisturize to entire body including rash.   -Aggressive use of a moisturizer at least 2 times a day to the entire body (including Vanicream, Cetaphil, Eucerin, CeraVe, Aquaphor or Vaseline ) and moisturize hands after every washing.  -We recommend using moisturizers that come in a tub that needs to be scooped out, not a pump. This has more of an oil base. It will hold moisture in your skin much better than a water base moisturizer. The above recommended are non-pore clogging.      Follow up in 2-3 weeks to recheck rash and spot on left forearm             Again, thank you for allowing me to participate in the care of your patient.        Sincerely,        Heather Darden PA-C

## 2020-01-29 NOTE — TELEPHONE ENCOUNTER
----- Message from Heather Darden PA-C sent at 1/29/2020  3:24 PM CST -----  Dear Sony Pettit would like a home health aid to come to his house 3-4x a week to help with topical application of medicated creams.      Can you help me with this?    Than you,    Heather

## 2020-01-29 NOTE — PROGRESS NOTES
HPI:  I was asked to see pt by Dr. Ring. Chilango Echevarria is a 85 year old male patient here today for rash on back, UE and LE .  Patient states this has been present for a whil.  Patient reports the following symptoms: itch .  Patient reports the following previous treatments: TAC 1% to back prn with some improvement.  Patient reports the following modifying factors: none.  Associated symptoms: none.  Patient has no other skin complaints today.  Remainder of the HPI, Meds, PMH, Allergies, FH, and SH was reviewed in chart.      Past Medical History:   Diagnosis Date     Aortic stenosis     very mild     Ying esophagus      Contact dermatitis and other eczema, due to unspecified cause      Esophageal reflux      Gout, unspecified      Incarcerated hiatal hernia 5/21/2015     Inguinal hernia without mention of obstruction or gangrene, unilateral or unspecified, (not specified as recurrent)      Polymyalgia rheumatica (H)      Umbilical hernia without mention of obstruction or gangrene      Unspecified essential hypertension      Vasomotor rhinitis        Past Surgical History:   Procedure Laterality Date     C NONSPECIFIC PROCEDURE      T&A     C NONSPECIFIC PROCEDURE      umbilical herniorraphy     ESOPHAGOSCOPY, GASTROSCOPY, DUODENOSCOPY (EGD), COMBINED N/A 5/11/2015    Procedure: COMBINED ESOPHAGOSCOPY, GASTROSCOPY, DUODENOSCOPY (EGD);  Surgeon: Thad Ferro MD;  [facility]     LAPAROSCOPIC ASSISTED INSERTION TUBE JEJUNOSTOMY N/A 5/14/2015    Procedure: LAPAROSCOPIC ASSISTED INSERTION TUBE JEJUNOSTOMY;  Surgeon: Thad Ferro MD;  [facility]     LAPAROSCOPIC HERNIORRHAPHY HIATAL N/A 5/14/2015    Procedure: LAPAROSCOPIC HERNIORRHAPHY HIATAL;  Surgeon: Thad Ferro MD;  [facility]     LAPAROSCOPIC HERNIORRHAPHY INGUINAL Right 5/14/2015    Procedure: LAPAROSCOPIC HERNIORRHAPHY INGUINAL;  Surgeon: Thad Ferro MD;  Location: [facility]     PICC  INSERTION Right 5/11/2015    5fr DL Power PICC, 39cm (1cm external) in the R medial brachial vein w/ tip in the low SVC.        Family History   Problem Relation Age of Onset     Breast Cancer Mother      Diabetes Paternal Grandfather      Cancer Son 37        lung       Social History     Socioeconomic History     Marital status: Single     Spouse name: Not on file     Number of children: 1     Years of education: Not on file     Highest education level: Not on file   Occupational History     Occupation: retired   Social Needs     Financial resource strain: Not on file     Food insecurity:     Worry: Not on file     Inability: Not on file     Transportation needs:     Medical: Not on file     Non-medical: Not on file   Tobacco Use     Smoking status: Never Smoker     Smokeless tobacco: Never Used   Substance and Sexual Activity     Alcohol use: Yes     Comment: beer only, 3 bottles per day     Drug use: No     Sexual activity: Not Currently     Partners: Female   Lifestyle     Physical activity:     Days per week: Not on file     Minutes per session: Not on file     Stress: Not on file   Relationships     Social connections:     Talks on phone: Not on file     Gets together: Not on file     Attends Sikh service: Not on file     Active member of club or organization: Not on file     Attends meetings of clubs or organizations: Not on file     Relationship status: Not on file     Intimate partner violence:     Fear of current or ex partner: Not on file     Emotionally abused: Not on file     Physically abused: Not on file     Forced sexual activity: Not on file   Other Topics Concern     Parent/sibling w/ CABG, MI or angioplasty before 65F 55M? Not Asked   Social History Narrative     Not on file       Outpatient Encounter Medications as of 1/29/2020   Medication Sig Dispense Refill     allopurinol (ZYLOPRIM) 100 MG tablet TAKE 1 TABLET(100 MG) BY MOUTH DAILY 30 tablet 0     bisacodyl (DULCOLAX) 5 MG EC tablet  Take 1 tablet (5 mg) by mouth daily as needed for constipation       carbidopa-levodopa (SINEMET)  MG per tablet Take 1 tablet by mouth 3 times daily       metoprolol succinate ER (TOPROL-XL) 25 MG 24 hr tablet TAKE 1 TABLET(25 MG) BY MOUTH EVERY MORNING 90 tablet 0     Nutritional Supplements (ENSURE PLUS) LIQD Take 240 mLs by mouth daily       omeprazole (PRILOSEC) 20 MG DR capsule TAKE 1 CAPSULE BY MOUTH 30 TO 60 MINUTES BEFORE FIRST MEAL OF THE DAY 90 capsule 0     order for DME Equipment being ordered: walker 1 each 0     polyethylene glycol (MIRALAX) powder Take 17 g by mouth daily as needed for constipation (1 capful) 510 g 1     triamcinolone (KENALOG) 0.1 % external ointment APPLY EXTERNALLY TO THE AFFECTED AREA TWICE DAILY 160 g 3     No facility-administered encounter medications on file as of 1/29/2020.        Review Of Systems:  Skin: rash on back, UE, and LE  Eyes: negative  Ears/Nose/Throat: negative  Respiratory: No shortness of breath, dyspnea on exertion, cough, or hemoptysis  Cardiovascular: negative  Gastrointestinal: negative  Genitourinary: negative  Musculoskeletal: negative  Neurologic: negative  Psychiatric: negative  Hematologic/Lymphatic/Immunologic: negative  Endocrine: negative      Objective:     /75   Pulse 60   SpO2 96%   Eyes: Conjunctivae/lids: Normal   ENT: Lips:  Normal  MSK: Normal  Cardiovascular: Peripheral edema none  Pulm: Breathing Normal  Neuro/Psych: Orientation: A/O x 3. Normal; Mood/Affect: Normal, NAD, WDWN  Pt accompanied by: friend Abena  Following areas examined: back, UE, face, legs  Carlson skin type:i   Findings:  Blue/purple/red patches on dorsal forearms  hemecrusted plaque on left dorsal forearm  Pink thickened excoriated plaques with accentuated skin lines on posterior arms and medial forearms  Red scaly patches and papules on back  Red scaly macules and patches on legs    Assessment and Plan:     1) ecchymosis and trauma  Watch crusted  area-present x 1 week due to trauma. Should resolve within 3-4 weeks. If not may need to biopsy.    2) atopic neurodermatitis, localized pruritis, xerosis of skin  Disc topicals, prednisone, NBUVB, oral antihistimines, and methotrexate  Apply a thin layer of  topical steroid lidex to affected area on arms, back, and legs two times a day for 2 weeks, tapering with improvement.  Use a gentle cleanser in the shower.  Moisturized 2x/day with gentle moisturizing cream for one week. Then once a day going forward.   Keep skin cool. Avoid hot water use on body and thick occlusive clothing, both can irritate skin  Begin 2 pills of Loratadine (Claritin) in the morning   Begin 2 pills of Cetirizine (Zyrtec) in the evening   Can purchase Sarna SENSITIVE lotion- I usually recommend creams but this lotion has anti itch properties that do not have the risk of thinning out the skin. Please use this in addition to your daily moisturizing cream if needed.  If you are unable to reach your back consider purchasing a lotion applicator.     Side effects of topical steroids including but not limited to atrophy (skin thinning), striae (stretch marks) telangiectasias, steroid acne, and others. Do not apply to normal skin. Do not apply to discolored skin that does not have rash present. Educated patient on post inflammatory hyperpigmentation.       Proper skin care from [facility]:    -Eliminate harsh soaps as they strip the natural oils from the skin, often resulting in dry itchy skin ( i.e. Dial, Zest, Lithuanian Spring, Ivory)  -Use mild soaps or soap alternatitives such as Cetaphil or Dove Sensitive Skin in the shower. You do not need to use soap on arms, legs, and trunk every time you shower unless visibly soiled.   -Avoid hot or cold showers.  -After showering, lightly dry off and apply moisturizing within 2-3 minutes. This will help trap moisture in the skin. If you were prescribed a topical medication apply that first to rash  and then apply body moisturize to entire body including rash.   -Aggressive use of a moisturizer at least 2 times a day to the entire body (including Vanicream, Cetaphil, Eucerin, CeraVe, Aquaphor or Vaseline ) and moisturize hands after every washing.  -We recommend using moisturizers that come in a tub that needs to be scooped out, not a pump. This has more of an oil base. It will hold moisture in your skin much better than a water base moisturizer. The above recommended are non-pore clogging.      Follow up in 2-3 weeks to recheck rash and spot on left forearm

## 2020-01-29 NOTE — PATIENT INSTRUCTIONS
Apply a thin layer of  topical steroid lidex to affected area on arms, back, and legs two times a day for 2 weeks, tapering with improvement.  Use a gentle cleanser in the shower.  Moisturized 2x/day with gentle moisturizing cream for one week. Then once a day going forward.   Keep skin cool. Avoid hot water use on body and thick occlusive clothing, both can irritate skin  Begin 2 pills of Loratadine (Claritin) in the morning   Begin 2 pills of Cetirizine (Zyrtec) in the evening   Can purchase Sarna SENSITIVE lotion- I usually recommend creams but this lotion has anti itch properties that do not have the risk of thinning out the skin. Please use this in addition to your daily moisturizing cream if needed.  If you are unable to reach your back consider purchasing a lotion applicator.     Side effects of topical steroids including but not limited to atrophy (skin thinning), striae (stretch marks) telangiectasias, steroid acne, and others. Do not apply to normal skin. Do not apply to discolored skin that does not have rash present. Educated patient on post inflammatory hyperpigmentation.       Proper skin care from [facility]:    -Eliminate harsh soaps as they strip the natural oils from the skin, often resulting in dry itchy skin ( i.e. Dial, Zest, Chinese Spring, Ivory)  -Use mild soaps or soap alternatitives such as Cetaphil or Dove Sensitive Skin in the shower. You do not need to use soap on arms, legs, and trunk every time you shower unless visibly soiled.   -Avoid hot or cold showers.  -After showering, lightly dry off and apply moisturizing within 2-3 minutes. This will help trap moisture in the skin. If you were prescribed a topical medication apply that first to rash and then apply body moisturize to entire body including rash.   -Aggressive use of a moisturizer at least 2 times a day to the entire body (including Vanicream, Cetaphil, Eucerin, CeraVe, Aquaphor or Vaseline ) and moisturize hands after  every washing.  -We recommend using moisturizers that come in a tub that needs to be scooped out, not a pump. This has more of an oil base. It will hold moisture in your skin much better than a water base moisturizer. The above recommended are non-pore clogging.      Follow up in 2-3 weeks

## 2020-01-30 ENCOUNTER — PATIENT OUTREACH (OUTPATIENT)
Dept: CARE COORDINATION | Facility: CLINIC | Age: 85
End: 2020-01-30

## 2020-01-30 NOTE — PROGRESS NOTES
Clinic Care Coordination Contact  Care Team Conversations    CHESTER BOND received a new order for care coordination for [facility] to come to patient's home 3-4x/week to help with medicated cream applications. CHESTER BOND is already on patient's care team and coordinating with home care staff.     CHESTER BOND reviewed home care advisor. Non billable SW visit scheduled for 1/31/20. Future [facility] visits are scheduled but limited.     CHESTER BOND e-mailed Cher, RN case manager with [facility] to see if she can discuss increasing HHA with patient.     CHESTER BOND will follow up in two weeks.     Deja Devi, [facility]  Clinic Care Coordinator   [facility], and [facility]  [phone]  [email or web address]

## 2020-01-31 ENCOUNTER — TELEPHONE (OUTPATIENT)
Dept: DERMATOLOGY | Facility: CLINIC | Age: 85
End: 2020-01-31

## 2020-01-31 NOTE — TELEPHONE ENCOUNTER
Please see documentation by Deja Social Work Care Coordinator, from yesterday.  It appears SW CC is in the process of communicating with patient's home care team regarding patient's needs.    Kris Sigala, RN  Clinic Care Coordinator  [facility]  Ph: [phone]

## 2020-01-31 NOTE — TELEPHONE ENCOUNTER
----- Message from Heather Darden PA-C sent at 1/31/2020  1:33 PM CST -----  Victorino Pettit,    Any news on home health aid for patient?    Thanks,    Heather  ----- Message -----  From: Heather Darden PA-C  Sent: 1/29/2020   3:26 PM CST  To: Heather Darden PA-C    Home health aid

## 2020-02-04 ENCOUNTER — HOSPITAL ENCOUNTER (OUTPATIENT)
Dept: PHYSICAL THERAPY | Facility: CLINIC | Age: 85
Setting detail: THERAPIES SERIES
End: 2020-02-04
Attending: PSYCHIATRY & NEUROLOGY
Payer: MEDICARE

## 2020-02-04 PROCEDURE — 97110 THERAPEUTIC EXERCISES: CPT | Mod: GP

## 2020-02-04 PROCEDURE — 97535 SELF CARE MNGMENT TRAINING: CPT | Mod: GP

## 2020-02-04 PROCEDURE — 97161 PT EVAL LOW COMPLEX 20 MIN: CPT | Mod: GP

## 2020-02-05 NOTE — PROGRESS NOTES
02/04/20 1500   Quick Adds   Quick Adds Certification   Type of Visit Initial OP PT Evaluation   General Information   Start of Care Date 02/04/20   Referring Physician Kd Hurley MD   Orders Evaluate and Treat as Indicated   Order Date 12/31/19   Medical Diagnosis Parkinson's Disease   Onset of illness/injury or Date of Surgery 02/04/18   Surgical/Medical history reviewed Yes   Pertinent history of current problem (include personal factors and/or comorbidities that impact the POC) PD dx 2yrs, gout, HTN on meds, dizzy when stands up too fast.    Prior level of function comment Lives alone, no AD use (has 2WW), progressive falls (ambulance called 2x for floor transfer assist, last time pt got up alone). Still drives, friend hoping to have it addressed and get him signed up for [employer], pt reluctantly agreed to no driving in the winter but friend hesitant in general   Previous/Current Treatment Medication(s)   Current Community Support   (Friend, Abena, drove him here. )   Patient role/Employment history Retired   Patient/Family Goals Statement Pt: To improve balance. Friend: To decrease falls, improve mobility & safety.    Fall Risk Screen   Fall screen completed by PT   Have you fallen 2 or more times in the past year? Yes   Have you fallen and had an injury in the past year? No   Timed Up and Go score (seconds) 24.4   Is patient a fall risk? Yes   Fall screen comments High falls risk. Pt & friend educated on findings and indication for RW (or Ax1).    Abuse Screen (yes response referral indicated)   Feels Unsafe at Home or Work/School no   Feels Threatened by Someone no   Does Anyone Try to Keep You From Having Contact with Others or Doing Things Outside Your Home? no   Physical Signs of Abuse Present no   Functional Scales   Functional Scales and Outcomes Jean & Yahr Scale: 3-4. Gait speed: 6m/15.5sec = 0.39m/sec (household ambulator). DGI 8/24 (< 20 indicates falls risk). 30sec Sit-Stand Test: 0 s  UEs, 12reps c UEs c posterior lean and insufficient ant weight-shfit throughout. TUG 24.4sec (< 12sec high falls risk), 24.4 manual c water cup 29sec (< 13.2sec falls risk), TUG cognitive c serial 3s 27.8sec (< 14.7sec high falls risk).    Pain   Pain comments Back stiffness.    Cognitive Status Examination   Orientation orientation to person, place and time   Level of Consciousness alert   Follows Commands and Answers Questions 75% of the time  (Hearing affecting this. )   Personal Safety and Judgment at risk behaviors demonstrated   Memory intact   Cognitive Comment Multiple falls, pt remains reluctant to AD use / PT / falls prevention measures.    Posture   Posture Comments Flexed grossly: shoudlers, spine, downward gaze.    Strength   Strength Comments LE MMT: hip flx 4-R 3+L, seated hip abd 4-B & add 4B, knee flx/ext 5B, DF 5B.    Transfer Skills   Transfer Comments Unable to stand s UEs, c UEs still keeps posterior weight-shift.    Gait   Gait Comments GAIT: Slow, short strides, flexed, rigid trunk, no arm movement, downward gaze, 5+ steps to turns, slows c manual & cognitive dual-tasking, no freezing nor festinating today but mini shuffles on turns and obstacles noted.  STAIRS: 2 feet to a step intermittently, 2 rails.    Sensory Examination   Sensory Perception Comments Ceased kinesthesia & proprioception as pt not relaxing for accuracy.    Muscle Tone   Muscle Tone Comments Kinetic L hand tremor c gait noted.   Planned Therapy Interventions   Planned Therapy Interventions balance training;bed mobility training;fine motor coordination training;gait training;motor coordination training;neuromuscular re-education;strengthening;transfer training;other (see comments)  (Self-care/home management)   Planned Therapy Interventions Comment Pt declining LSVT BIG protocol, interested in simply PT 1x/wk and having aide at home 3x/wk help c HEP carryover.    Clinical Impression   Criteria for Skilled Therapeutic  Interventions Met yes, treatment indicated   PT Diagnosis Impaired mobility d/t Parkinson's Disease.   Influenced by the following impairments Impaired amplitude calibration, hip strength, balance, gait speed, postural reactions, posture, activity tolerance.    Functional limitations due to impairments Impaired transfers, gait, stairs, mobility, driving, activty tolerance.    Clinical Presentation Evolving/Changing   Clinical Presentation Rationale Increasing fall frequency, progressive neurodegenerative condition.    Clinical Decision Making (Complexity) Moderate complexity   Therapy Frequency 2 times/Week   Predicted Duration of Therapy Intervention (days/wks) 90   Risk & Benefits of therapy have been explained Yes   Patient, Family & other staff in agreement with plan of care Yes   Education Assessment   Barriers to Learning Cognitive;Emotional  (Buy-in)   GOALS   PT Eval Goals 1;2;3;4   Goal 1   Goal Identifier Walking balance   Goal Description Pt will score >= 15 on the DGI to improve falls risk (initially 8/24).    Target Date 05/03/20   Goal 2   Goal Identifier Transfer ease   Goal Description Pt will perform >= 13 stands in 30sec c <= 1 UE assist to improve transfer ease (13 c B UEs, 0 s UE)   Target Date 05/03/20   Goal 3   Goal Identifier Safety with dual-tasking   Goal Description Pt will perform TUG manual and/or cognitive 5sec faster to improve safety and ease when dual-tasking (initially 29sec manual & 27.8sec cognitive)   Target Date 05/03/20   Goal 4   Goal Identifier Self-management   Goal Description Pt will be SBA in HEP with handout & aide's help for safe HEP carryover outside of PT.    Target Date 05/03/20   Total Evaluation Time   PT Eval, Low Complexity Minutes (02779) 35   Therapy Certification   Certification date from 02/04/20   Certification date to 05/03/20   Medical Diagnosis Parkison's Disease   Certification I certify the need for these services furnished under this plan of treatment  and while under my care.  (Physician co-signature of this document indicates review and certification of the therapy plan).

## 2020-02-05 NOTE — PROGRESS NOTES
[facility]        OUTPATIENT PHYSICAL THERAPY FUNCTIONAL EVALUATION  PLAN OF TREATMENT FOR OUTPATIENT REHABILITATION  (COMPLETE FOR INITIAL CLAIMS ONLY)  Patient's Last Name, First Name, M.I.  YOB: 1934  Chilango Echevarria     Provider's Name   [facility]   Medical Record No.  [id number]     Start of Care Date:  02/04/20   Onset Date:  02/04/18   Type:     _X__PT   ____OT  ____SLP Medical Diagnosis:  Parkison's Disease     PT Diagnosis:  Impaired mobility d/t Parkinson's Disease. Visits from SOC:  1                              __________________________________________________________________________________  Plan of Treatment/Functional Goals:  balance training, bed mobility training, fine motor coordination training, gait training, motor coordination training, neuromuscular re-education, strengthening, transfer training, other (see comments)(Self-care/home management)  Pt declining LSVT BIG protocol, interested in simply PT 1x/wk and having aide at home 3x/wk help c HEP carryover.         GOALS  Walking balance  Pt will score >= 15 on the DGI to improve falls risk (initially 8/24).   05/03/20    Transfer ease  Pt will perform >= 13 stands in 30sec c <= 1 UE assist to improve transfer ease (13 c B UEs, 0 s UE)  05/03/20    Safety with dual-tasking  Pt will perform TUG manual and/or cognitive 5sec faster to improve safety and ease when dual-tasking (initially 29sec manual & 27.8sec cognitive)  05/03/20    Self-management  Pt will be SBA in HEP with handout & aide's help for safe HEP carryover outside of PT.   05/03/20                                                Therapy Frequency:  2 times/Week   Predicted Duration of Therapy Intervention:  90    Brittany L. Dressler, PT                                    I CERTIFY THE NEED FOR THESE SERVICES FURNISHED UNDER         THIS PLAN OF TREATMENT AND WHILE UNDER MY CARE .             Physician Signature               Date    X_____________________________________________________                      Certification Date From:  02/04/20   Certification Date To:  05/03/20    Referring Provider:  Kd Hurley MD    Initial Assessment  See Epic Evaluation- Start of Care Date: 02/04/20

## 2020-02-13 NOTE — PROGRESS NOTES
Clinic Care Coordination Contact    Situation: Patient chart reviewed by care coordinator.    Background: Patient requested an increase in HHA to 3-4x/week. [place] emailed home care RN case manager to follow up on request but did not hear back. [place] reviewed home care advisor for an update. Patient is currently participating in the [facility] evaluation 2/4/20-present.     Assessment: Patient is accessing HHA every few days per home care advisor.     Plan/Recommendations: No further outreaches will be made at this time unless a new referral is made or a change in the pt's status occurs.     KAMINI Cool   Social Work Clinic Care Coordinator   [facility]   PH: [phone]  [email or web address]

## 2020-02-17 ENCOUNTER — HOSPITAL ENCOUNTER (OUTPATIENT)
Dept: PHYSICAL THERAPY | Facility: CLINIC | Age: 85
Setting detail: THERAPIES SERIES
End: 2020-02-17
Attending: PSYCHIATRY & NEUROLOGY
Payer: MEDICARE

## 2020-02-17 PROCEDURE — 97112 NEUROMUSCULAR REEDUCATION: CPT | Mod: GP

## 2020-02-17 PROCEDURE — 97110 THERAPEUTIC EXERCISES: CPT | Mod: GP

## 2020-02-18 ENCOUNTER — OFFICE VISIT (OUTPATIENT)
Dept: DERMATOLOGY | Facility: CLINIC | Age: 85
End: 2020-02-18
Payer: MEDICARE

## 2020-02-18 VITALS — HEART RATE: 68 BPM | DIASTOLIC BLOOD PRESSURE: 60 MMHG | SYSTOLIC BLOOD PRESSURE: 97 MMHG | OXYGEN SATURATION: 98 %

## 2020-02-18 DIAGNOSIS — L20.81 ATOPIC NEURODERMATITIS: Primary | ICD-10-CM

## 2020-02-18 PROCEDURE — 99213 OFFICE O/P EST LOW 20 MIN: CPT | Performed by: PHYSICIAN ASSISTANT

## 2020-02-18 RX ORDER — FLUOCINONIDE 0.5 MG/G
CREAM TOPICAL
Qty: 60 G | Refills: 3 | Status: ON HOLD | OUTPATIENT
Start: 2020-02-18 | End: 2020-01-01

## 2020-02-18 NOTE — LETTER
2/18/2020         RE: Chilango Echevarria  [address]  [place] [address]        Dear Colleague,    Thank you for referring your patient, Chilango Echevarria, to the [facility]. Please see a copy of my visit note below.    HPI:   Chief complaints: Chilango Echevarria (Bill) is a 85 year old male who presents for recheck of eczema. Applying moisturizer and topical steroids every other day and dong well.   Condition present for:  Awhile.   Previous treatments include: TAC cream which did not help; lidex which is helping. Taking antihistamines which is helpful as well.     Review Of Systems  Eyes: negative  Ears/Nose/Throat: negative  Respiratory: No shortness of breath, dyspnea on exertion, cough, or hemoptysis  Cardiovascular: negative  Gastrointestinal: negative  Genitourinary: negative  Musculoskeletal: negative  Neurologic: negative  Psychiatric: negative  Skin: positive for rash and itching    This document serves as a record of the services and decisions personally performed and made by Abigail Correia, MS, PA-C. It was created on her behalf by Maricruz Amato, a trained medical scribe. The creation of this document is based on the provider's statements to the medical scribe.  Maricruz Amato 10:04 AM February 18, 2020    PHYSICAL EXAM:    BP 97/60   Pulse 68   SpO2 98%   Skin exam performed as follows: Type 2 skin. Mood appropriate  Alert and Oriented X 3. Well developed, well nourished in no distress.  General appearance: Normal  Head including face: Normal  Eyes: conjunctiva and lids: Normal  Mouth: Lips, teeth, gums: Normal  Neck: Normal  Chest-breast/axillae: Normal  Back: Normal  Spleen and liver: Normal  Cardiovascular: Exam of peripheral vascular system by observation for swelling, varicosities, edema: Normal  Genitalia: groin, buttocks: Normal  Extremities: digits/nails (clubbing): Normal  Eccrine and Apocrine glands: Normal  Right upper  extremity: Normal  Left upper extremity: Normal  Right lower extremity: Normal  Left lower extremity: Normal  Skin: Scalp and body hair: See below    1. PIH on the back and legs    ASSESSMENT/PLAN:     1. Atopic dermatitis - doing well with current regimen and will continue this. He is much more comfortable and less itchy.  Advised. Discussed diagnosis and chronic condition at length.   --Continue daily moisturizer  --Continue fluocinonide cream BID PRN  --Continue loratadine daily    --Continue cetrizine daily        Follow-up: yearly/PRN sooner  CC:   Scribed By: Maricruz Amato, Medical Scribe    The information in this document, created by the medical scribe for me, accurately reflects the services I personally performed and the decisions made by me. I have reviewed and approved this document for accuracy prior to leaving the patient care area.  February 18, 2020 10:12 AM    Abigail Correia MS, PAIRIS        Again, thank you for allowing me to participate in the care of your patient.        Sincerely,        Abigail Correia PA-C

## 2020-02-18 NOTE — PROGRESS NOTES
HPI:   Chief complaints: Chilango Echevarria (Bill) is a 85 year old male who presents for recheck of eczema. Applying moisturizer and topical steroids every other day and dong well.   Condition present for:  Awhile.   Previous treatments include: TAC cream which did not help; lidex which is helping. Taking antihistamines which is helpful as well.     Review Of Systems  Eyes: negative  Ears/Nose/Throat: negative  Respiratory: No shortness of breath, dyspnea on exertion, cough, or hemoptysis  Cardiovascular: negative  Gastrointestinal: negative  Genitourinary: negative  Musculoskeletal: negative  Neurologic: negative  Psychiatric: negative  Skin: positive for rash and itching    This document serves as a record of the services and decisions personally performed and made by Abigail Correia, MS, PA-C. It was created on her behalf by Maricruz Amato, a trained medical scribe. The creation of this document is based on the provider's statements to the medical scribe.  Maricruz Amato 10:04 AM February 18, 2020    PHYSICAL EXAM:    BP 97/60   Pulse 68   SpO2 98%   Skin exam performed as follows: Type 2 skin. Mood appropriate  Alert and Oriented X 3. Well developed, well nourished in no distress.  General appearance: Normal  Head including face: Normal  Eyes: conjunctiva and lids: Normal  Mouth: Lips, teeth, gums: Normal  Neck: Normal  Chest-breast/axillae: Normal  Back: Normal  Spleen and liver: Normal  Cardiovascular: Exam of peripheral vascular system by observation for swelling, varicosities, edema: Normal  Genitalia: groin, buttocks: Normal  Extremities: digits/nails (clubbing): Normal  Eccrine and Apocrine glands: Normal  Right upper extremity: Normal  Left upper extremity: Normal  Right lower extremity: Normal  Left lower extremity: Normal  Skin: Scalp and body hair: See below    1. PIH on the back and legs    ASSESSMENT/PLAN:     1. Atopic dermatitis - doing well with current regimen and will  continue this. He is much more comfortable and less itchy.  Advised. Discussed diagnosis and chronic condition at length.   --Continue daily moisturizer  --Continue fluocinonide cream BID PRN  --Continue loratadine daily    --Continue cetrizine daily        Follow-up: yearly/PRN sooner  CC:   Scribed By: Maricruz Amato, Medical Scribe    The information in this document, created by the medical scribe for me, accurately reflects the services I personally performed and the decisions made by me. I have reviewed and approved this document for accuracy prior to leaving the patient care area.  February 18, 2020 10:12 AM    Abigail Correia MS, PA-C

## 2020-02-23 RX ORDER — ALLOPURINOL 100 MG/1
100 TABLET ORAL DAILY
Qty: 90 TABLET | Refills: 3 | Status: SHIPPED | OUTPATIENT
Start: 2020-02-23 | End: 2021-01-01

## 2020-02-23 RX ORDER — METOPROLOL SUCCINATE 25 MG/1
TABLET, EXTENDED RELEASE ORAL
Qty: 90 TABLET | Refills: 3 | Status: ON HOLD | OUTPATIENT
Start: 2020-02-23 | End: 2020-01-01

## 2020-03-03 ENCOUNTER — TELEPHONE (OUTPATIENT)
Dept: CARE COORDINATION | Facility: CLINIC | Age: 85
End: 2020-03-03

## 2020-03-03 NOTE — TELEPHONE ENCOUNTER
[facility] now requests orders and shares plan of care/discharge summaries for some patients through [employer].  Please REPLY TO THIS MESSAGE OR ROUTE BACK TO THE AUTHOR in order to give authorization for orders when needed.  This is considered a verbal order, you will still receive a faxed copy of orders for signature.  Thank you for your assistance in improving collaboration for our patients.    ORDER    Requesting ok for late recertification due to patient availability. Anticipate to be done this week.     Thank you!

## 2020-03-09 NOTE — PROGRESS NOTES
Outpatient Physical Therapy Discharge Note     Patient: Chilango Echevarria  : 10/24/1934    Beginning/End Dates of Reporting Period:  2020 to 3/9/2020 (last seen 2020)    Referring Provider: Kd Hurley MD    Therapy Diagnosis: Impaired mobility d/t Parkinson's Disease.     Client Self Report:  N/A - pt missed 4 of 6wks of therapy.     Objective Measurements:  Jean & Yahr Scale: 3-4.   Gait speed: 6m/15.5sec = 0.39m/sec (household ambulator).   DGI  (< 20 indicates falls risk).   30sec Sit-Stand Test: 0 s upper extremity assist, 12reps c upper extremities c posterior lean and insufficient ant weight-shfit throughout.   TUG 24.4sec (< 12sec high falls risk), 24.4   TUG manual c water cup 29sec (< 13.2sec falls risk),   TUG cognitive c serial 3s 27.8sec (< 14.7sec high falls risk).   LE MMT: hip flx 4-R 3+L, seated hip abd 4-B & add 4B, knee flx/ext 5B, DF 5B.   GAIT: Slow, short strides, flexed, rigid trunk, no arm movement, downward gaze, 5+ steps to turns, slows c manual & cognitive dual-tasking, no freezing nor festinating today but mini shuffles on turns and obstacles noted.   Kinetic L hand tremor c gait noted.   STAIRS: 2 feet to a step intermittently, 2 rails.     Goals:  Goal Identifier Walking balance   Goal Description Pt will score >= 15 on the DGI to improve falls risk (initially ).    Target Date 20   Date Met      Progress:     Goal Identifier Transfer ease   Goal Description Pt will perform >= 13 stands in 30sec c <= 1 UE assist to improve transfer ease (13 c B UEs, 0 s UE)   Target Date 20   Date Met      Progress:     Goal Identifier Safety with dual-tasking   Goal Description Pt will perform TUG manual and/or cognitive 5sec faster to improve safety and ease when dual-tasking (initially 29sec manual & 27.8sec cognitive)   Target Date 20   Date Met      Progress:     Goal Identifier Self-management   Goal Description Pt will be SBA in HEP with handout & aide's  help for safe HEP carryover outside of PT.    Target Date 05/03/20   Date Met      Progress:     Progress Toward Goals:   Progress limited due to inattendance.    Plan:  Discharge from therapy.    Discharge:    Reason for Discharge:   Patient chooses to discontinue therapy.  Inattendance    Equipment Issued: HEP    Discharge Plan: Patient to continue home program. Resume OP PT for Parkinson's rehab when pt is able to attend regularly, re-consider LSVT BIG program.

## 2020-03-15 ENCOUNTER — APPOINTMENT (OUTPATIENT)
Dept: CT IMAGING | Facility: CLINIC | Age: 85
End: 2020-03-15
Attending: INTERNAL MEDICINE
Payer: MEDICARE

## 2020-03-15 ENCOUNTER — APPOINTMENT (OUTPATIENT)
Dept: GENERAL RADIOLOGY | Facility: CLINIC | Age: 85
End: 2020-03-15
Attending: INTERNAL MEDICINE
Payer: MEDICARE

## 2020-03-15 ENCOUNTER — HOSPITAL ENCOUNTER (OUTPATIENT)
Facility: CLINIC | Age: 85
Setting detail: OBSERVATION
Discharge: NURSING, REHAB OR LONG-TERM CARE | End: 2020-03-17
Attending: INTERNAL MEDICINE | Admitting: INTERNAL MEDICINE
Payer: MEDICARE

## 2020-03-15 DIAGNOSIS — S09.90XA INJURY OF HEAD, INITIAL ENCOUNTER: ICD-10-CM
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
## 2020-03-15 DIAGNOSIS — G20.A1 PARKINSON DISEASE (H): ICD-10-CM

## 2020-03-15 DIAGNOSIS — R29.898 WEAKNESS OF BOTH LOWER EXTREMITIES: ICD-10-CM

## 2020-03-15 LAB
ALBUMIN SERPL-MCNC: 3.7 G/DL (ref 3.4–5)
ALBUMIN UR-MCNC: NEGATIVE MG/DL
ALP SERPL-CCNC: 63 U/L (ref 40–150)
ALT SERPL W P-5'-P-CCNC: <6 U/L (ref 0–70)
ANION GAP SERPL CALCULATED.3IONS-SCNC: 3 MMOL/L (ref 3–14)
APPEARANCE UR: ABNORMAL
AST SERPL W P-5'-P-CCNC: 21 U/L (ref 0–45)
BASOPHILS # BLD AUTO: 0 10E9/L (ref 0–0.2)
BASOPHILS NFR BLD AUTO: 0.3 %
BILIRUB SERPL-MCNC: 0.5 MG/DL (ref 0.2–1.3)
BILIRUB UR QL STRIP: NEGATIVE
BUN SERPL-MCNC: 26 MG/DL (ref 7–30)
CALCIUM SERPL-MCNC: 8.5 MG/DL (ref 8.5–10.1)
CHLORIDE SERPL-SCNC: 108 MMOL/L (ref 94–109)
CK SERPL-CCNC: 96 U/L (ref 30–300)
CO2 SERPL-SCNC: 26 MMOL/L (ref 20–32)
COLOR UR AUTO: ABNORMAL
CREAT SERPL-MCNC: 0.97 MG/DL (ref 0.66–1.25)
DIFFERENTIAL METHOD BLD: ABNORMAL
EOSINOPHIL # BLD AUTO: 0.1 10E9/L (ref 0–0.7)
EOSINOPHIL NFR BLD AUTO: 1.7 %
ERYTHROCYTE [DISTWIDTH] IN BLOOD BY AUTOMATED COUNT: 13.5 % (ref 10–15)
ETHANOL SERPL-MCNC: <0.01 G/DL
GFR SERPL CREATININE-BSD FRML MDRD: 70 ML/MIN/{1.73_M2}
GLUCOSE SERPL-MCNC: 71 MG/DL (ref 70–99)
GLUCOSE UR STRIP-MCNC: NEGATIVE MG/DL
HCT VFR BLD AUTO: 37.4 % (ref 40–53)
HGB BLD-MCNC: 11.9 G/DL (ref 13.3–17.7)
HGB UR QL STRIP: ABNORMAL
IMM GRANULOCYTES # BLD: 0 10E9/L (ref 0–0.4)
IMM GRANULOCYTES NFR BLD: 0.3 %
KETONES UR STRIP-MCNC: NEGATIVE MG/DL
LEUKOCYTE ESTERASE UR QL STRIP: NEGATIVE
LYMPHOCYTES # BLD AUTO: 0.8 10E9/L (ref 0.8–5.3)
LYMPHOCYTES NFR BLD AUTO: 12.9 %
MCH RBC QN AUTO: 31 PG (ref 26.5–33)
MCHC RBC AUTO-ENTMCNC: 31.8 G/DL (ref 31.5–36.5)
MCV RBC AUTO: 97 FL (ref 78–100)
MONOCYTES # BLD AUTO: 0.3 10E9/L (ref 0–1.3)
MONOCYTES NFR BLD AUTO: 5.1 %
MUCOUS THREADS #/AREA URNS LPF: PRESENT /LPF
NEUTROPHILS # BLD AUTO: 5 10E9/L (ref 1.6–8.3)
NEUTROPHILS NFR BLD AUTO: 79.7 %
NITRATE UR QL: NEGATIVE
NRBC # BLD AUTO: 0 10*3/UL
NRBC BLD AUTO-RTO: 0 /100
PH UR STRIP: 6 PH (ref 5–7)
PLATELET # BLD AUTO: 157 10E9/L (ref 150–450)
POTASSIUM SERPL-SCNC: 4.3 MMOL/L (ref 3.4–5.3)
PROT SERPL-MCNC: 7.2 G/DL (ref 6.8–8.8)
RBC # BLD AUTO: 3.84 10E12/L (ref 4.4–5.9)
RBC #/AREA URNS AUTO: 7 /HPF (ref 0–2)
SODIUM SERPL-SCNC: 137 MMOL/L (ref 133–144)
SOURCE: ABNORMAL
SP GR UR STRIP: 1.01 (ref 1–1.03)
SQUAMOUS #/AREA URNS AUTO: <1 /HPF (ref 0–1)
TROPONIN I SERPL-MCNC: <0.015 UG/L (ref 0–0.04)
UROBILINOGEN UR STRIP-MCNC: NORMAL MG/DL (ref 0–2)
WBC # BLD AUTO: 6.3 10E9/L (ref 4–11)
WBC #/AREA URNS AUTO: 1 /HPF (ref 0–5)

## 2020-03-15 PROCEDURE — 70450 CT HEAD/BRAIN W/O DYE: CPT

## 2020-03-15 PROCEDURE — 99285 EMERGENCY DEPT VISIT HI MDM: CPT | Mod: 25

## 2020-03-15 PROCEDURE — 81001 URINALYSIS AUTO W/SCOPE: CPT | Performed by: INTERNAL MEDICINE

## 2020-03-15 PROCEDURE — 80320 DRUG SCREEN QUANTALCOHOLS: CPT | Performed by: INTERNAL MEDICINE

## 2020-03-15 PROCEDURE — 99220 ZZC INITIAL OBSERVATION CARE,LEVL III: CPT | Performed by: INTERNAL MEDICINE

## 2020-03-15 PROCEDURE — 73502 X-RAY EXAM HIP UNI 2-3 VIEWS: CPT

## 2020-03-15 PROCEDURE — 84484 ASSAY OF TROPONIN QUANT: CPT | Performed by: INTERNAL MEDICINE

## 2020-03-15 PROCEDURE — 25000132 ZZH RX MED GY IP 250 OP 250 PS 637: Mod: GY

## 2020-03-15 PROCEDURE — G0378 HOSPITAL OBSERVATION PER HR: HCPCS

## 2020-03-15 PROCEDURE — 93005 ELECTROCARDIOGRAM TRACING: CPT

## 2020-03-15 PROCEDURE — 85025 COMPLETE CBC W/AUTO DIFF WBC: CPT | Performed by: INTERNAL MEDICINE

## 2020-03-15 PROCEDURE — 25000132 ZZH RX MED GY IP 250 OP 250 PS 637: Mod: GY | Performed by: INTERNAL MEDICINE

## 2020-03-15 PROCEDURE — 82550 ASSAY OF CK (CPK): CPT | Performed by: INTERNAL MEDICINE

## 2020-03-15 PROCEDURE — 80053 COMPREHEN METABOLIC PANEL: CPT | Performed by: INTERNAL MEDICINE

## 2020-03-15 RX ORDER — ACETAMINOPHEN 325 MG/1
650 TABLET ORAL ONCE
Status: COMPLETED | OUTPATIENT
Start: 2020-03-15 | End: 2020-03-15

## 2020-03-15 RX ORDER — ACETAMINOPHEN 325 MG/1
TABLET ORAL
Status: COMPLETED
Start: 2020-03-15 | End: 2020-03-15

## 2020-03-15 RX ORDER — IBUPROFEN 200 MG
400 TABLET ORAL ONCE
Status: COMPLETED | OUTPATIENT
Start: 2020-03-15 | End: 2020-03-15

## 2020-03-15 RX ADMIN — IBUPROFEN 400 MG: 200 TABLET, FILM COATED ORAL at 22:51

## 2020-03-15 RX ADMIN — ACETAMINOPHEN 650 MG: 325 TABLET ORAL at 21:43

## 2020-03-15 RX ADMIN — ACETAMINOPHEN 650 MG: 325 TABLET, FILM COATED ORAL at 21:43

## 2020-03-16 ENCOUNTER — APPOINTMENT (OUTPATIENT)
Dept: PHYSICAL THERAPY | Facility: CLINIC | Age: 85
End: 2020-03-16
Attending: INTERNAL MEDICINE
Payer: MEDICARE
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
## 2020-03-16 LAB — INTERPRETATION ECG - MUSE: NORMAL

## 2020-03-16 PROCEDURE — 97116 GAIT TRAINING THERAPY: CPT | Mod: GP | Performed by: PHYSICAL THERAPIST

## 2020-03-16 PROCEDURE — 25000132 ZZH RX MED GY IP 250 OP 250 PS 637: Mod: GY | Performed by: INTERNAL MEDICINE

## 2020-03-16 PROCEDURE — G0378 HOSPITAL OBSERVATION PER HR: HCPCS

## 2020-03-16 PROCEDURE — 99207 ZZC CDG-CODE CATEGORY CHANGED: CPT | Performed by: INTERNAL MEDICINE

## 2020-03-16 PROCEDURE — 96360 HYDRATION IV INFUSION INIT: CPT

## 2020-03-16 PROCEDURE — 97530 THERAPEUTIC ACTIVITIES: CPT | Mod: GP | Performed by: PHYSICAL THERAPIST

## 2020-03-16 PROCEDURE — 97161 PT EVAL LOW COMPLEX 20 MIN: CPT | Mod: GP | Performed by: PHYSICAL THERAPIST

## 2020-03-16 PROCEDURE — 96361 HYDRATE IV INFUSION ADD-ON: CPT

## 2020-03-16 PROCEDURE — 25800030 ZZH RX IP 258 OP 636: Performed by: INTERNAL MEDICINE

## 2020-03-16 PROCEDURE — 99225 ZZC SUBSEQUENT OBSERVATION CARE,LEVEL II: CPT | Performed by: INTERNAL MEDICINE

## 2020-03-16 RX ORDER — ACETAMINOPHEN 325 MG/1
650 TABLET ORAL EVERY 4 HOURS PRN
Status: DISCONTINUED | OUTPATIENT
Start: 2020-03-16 | End: 2020-03-17 | Stop reason: HOSPADM

## 2020-03-16 RX ORDER — NALOXONE HYDROCHLORIDE 0.4 MG/ML
.1-.4 INJECTION, SOLUTION INTRAMUSCULAR; INTRAVENOUS; SUBCUTANEOUS
Status: DISCONTINUED | OUTPATIENT
Start: 2020-03-16 | End: 2020-03-17 | Stop reason: HOSPADM

## 2020-03-16 RX ORDER — MICONAZOLE NITRATE 20 MG/G
CREAM TOPICAL
Status: DISCONTINUED | OUTPATIENT
Start: 2020-03-16 | End: 2020-03-17 | Stop reason: HOSPADM

## 2020-03-16 RX ORDER — ONDANSETRON 2 MG/ML
4 INJECTION INTRAMUSCULAR; INTRAVENOUS EVERY 6 HOURS PRN
Status: DISCONTINUED | OUTPATIENT
Start: 2020-03-16 | End: 2020-03-17 | Stop reason: HOSPADM

## 2020-03-16 RX ORDER — LORAZEPAM 1 MG/1
1-2 TABLET ORAL EVERY 30 MIN PRN
Status: DISCONTINUED | OUTPATIENT
Start: 2020-03-16 | End: 2020-03-17 | Stop reason: HOSPADM

## 2020-03-16 RX ORDER — MULTIPLE VITAMINS W/ MINERALS TAB 9MG-400MCG
1 TAB ORAL DAILY
Status: DISCONTINUED | OUTPATIENT
Start: 2020-03-16 | End: 2020-03-17 | Stop reason: HOSPADM

## 2020-03-16 RX ORDER — POLYETHYLENE GLYCOL 3350 17 G/17G
17 POWDER, FOR SOLUTION ORAL DAILY PRN
Status: DISCONTINUED | OUTPATIENT
Start: 2020-03-16 | End: 2020-03-17 | Stop reason: HOSPADM

## 2020-03-16 RX ORDER — ALLOPURINOL 100 MG/1
100 TABLET ORAL DAILY
Status: DISCONTINUED | OUTPATIENT
Start: 2020-03-16 | End: 2020-03-17 | Stop reason: HOSPADM

## 2020-03-16 RX ORDER — FOLIC ACID 1 MG/1
1 TABLET ORAL DAILY
Status: DISCONTINUED | OUTPATIENT
Start: 2020-03-16 | End: 2020-03-17 | Stop reason: HOSPADM

## 2020-03-16 RX ORDER — POLYETHYLENE GLYCOL 3350 17 G/17G
17 POWDER, FOR SOLUTION ORAL DAILY PRN
Status: DISCONTINUED | OUTPATIENT
Start: 2020-03-16 | End: 2020-03-16

## 2020-03-16 RX ORDER — LORAZEPAM 2 MG/ML
1-2 INJECTION INTRAMUSCULAR EVERY 30 MIN PRN
Status: DISCONTINUED | OUTPATIENT
Start: 2020-03-16 | End: 2020-03-17 | Stop reason: HOSPADM

## 2020-03-16 RX ORDER — LIDOCAINE 40 MG/G
CREAM TOPICAL
Status: DISCONTINUED | OUTPATIENT
Start: 2020-03-16 | End: 2020-03-17 | Stop reason: HOSPADM

## 2020-03-16 RX ORDER — BISACODYL 10 MG
10 SUPPOSITORY, RECTAL RECTAL DAILY PRN
Status: DISCONTINUED | OUTPATIENT
Start: 2020-03-16 | End: 2020-03-17 | Stop reason: HOSPADM

## 2020-03-16 RX ORDER — AMOXICILLIN 250 MG
1 CAPSULE ORAL 2 TIMES DAILY PRN
Status: DISCONTINUED | OUTPATIENT
Start: 2020-03-16 | End: 2020-03-17 | Stop reason: HOSPADM

## 2020-03-16 RX ORDER — AMOXICILLIN 250 MG
2 CAPSULE ORAL 2 TIMES DAILY PRN
Status: DISCONTINUED | OUTPATIENT
Start: 2020-03-16 | End: 2020-03-17 | Stop reason: HOSPADM

## 2020-03-16 RX ORDER — BISACODYL 5 MG
5 TABLET, DELAYED RELEASE (ENTERIC COATED) ORAL DAILY PRN
Status: DISCONTINUED | OUTPATIENT
Start: 2020-03-16 | End: 2020-03-17 | Stop reason: HOSPADM

## 2020-03-16 RX ORDER — FLUOCINONIDE 0.5 MG/G
CREAM TOPICAL 2 TIMES DAILY
Status: DISCONTINUED | OUTPATIENT
Start: 2020-03-16 | End: 2020-03-16

## 2020-03-16 RX ORDER — ONDANSETRON 4 MG/1
4 TABLET, ORALLY DISINTEGRATING ORAL EVERY 6 HOURS PRN
Status: DISCONTINUED | OUTPATIENT
Start: 2020-03-16 | End: 2020-03-17 | Stop reason: HOSPADM

## 2020-03-16 RX ORDER — LANOLIN ALCOHOL/MO/W.PET/CERES
100 CREAM (GRAM) TOPICAL DAILY
Status: DISCONTINUED | OUTPATIENT
Start: 2020-03-16 | End: 2020-03-17 | Stop reason: HOSPADM

## 2020-03-16 RX ORDER — FLUOCINONIDE 0.5 MG/G
OINTMENT TOPICAL 2 TIMES DAILY
Status: DISCONTINUED | OUTPATIENT
Start: 2020-03-16 | End: 2020-03-17 | Stop reason: HOSPADM

## 2020-03-16 RX ORDER — ACETAMINOPHEN 650 MG/1
650 SUPPOSITORY RECTAL EVERY 4 HOURS PRN
Status: DISCONTINUED | OUTPATIENT
Start: 2020-03-16 | End: 2020-03-17 | Stop reason: HOSPADM

## 2020-03-16 RX ORDER — SODIUM CHLORIDE 9 MG/ML
INJECTION, SOLUTION INTRAVENOUS CONTINUOUS
Status: DISCONTINUED | OUTPATIENT
Start: 2020-03-16 | End: 2020-03-17

## 2020-03-16 RX ORDER — CARBIDOPA AND LEVODOPA 25; 100 MG/1; MG/1
2 TABLET ORAL 3 TIMES DAILY
Status: DISCONTINUED | OUTPATIENT
Start: 2020-03-16 | End: 2020-03-17 | Stop reason: HOSPADM

## 2020-03-16 RX ORDER — METOPROLOL SUCCINATE 25 MG/1
25 TABLET, EXTENDED RELEASE ORAL DAILY
Status: DISCONTINUED | OUTPATIENT
Start: 2020-03-16 | End: 2020-03-17 | Stop reason: HOSPADM

## 2020-03-16 RX ORDER — NITROGLYCERIN 0.4 MG/1
0.4 TABLET SUBLINGUAL EVERY 5 MIN PRN
Status: DISCONTINUED | OUTPATIENT
Start: 2020-03-16 | End: 2020-03-17 | Stop reason: HOSPADM

## 2020-03-16 RX ADMIN — ALLOPURINOL 100 MG: 100 TABLET ORAL at 16:40

## 2020-03-16 RX ADMIN — MICONAZOLE NITRATE: 20 CREAM TOPICAL at 01:45

## 2020-03-16 RX ADMIN — MULTIPLE VITAMINS W/ MINERALS TAB 1 TABLET: TAB at 08:06

## 2020-03-16 RX ADMIN — METOPROLOL SUCCINATE 25 MG: 25 TABLET, EXTENDED RELEASE ORAL at 16:40

## 2020-03-16 RX ADMIN — THIAMINE HCL TAB 100 MG 100 MG: 100 TAB at 08:06

## 2020-03-16 RX ADMIN — SODIUM CHLORIDE: 9 INJECTION, SOLUTION INTRAVENOUS at 00:59

## 2020-03-16 RX ADMIN — ACETAMINOPHEN 650 MG: 325 TABLET, FILM COATED ORAL at 01:26

## 2020-03-16 RX ADMIN — ACETAMINOPHEN 650 MG: 325 TABLET, FILM COATED ORAL at 13:10

## 2020-03-16 RX ADMIN — CARBIDOPA AND LEVODOPA 2 TABLET: 25; 100 TABLET ORAL at 16:40

## 2020-03-16 RX ADMIN — CARBIDOPA AND LEVODOPA 2 TABLET: 25; 100 TABLET ORAL at 20:40

## 2020-03-16 RX ADMIN — SODIUM CHLORIDE: 9 INJECTION, SOLUTION INTRAVENOUS at 13:43

## 2020-03-16 RX ADMIN — FOLIC ACID 1 MG: 1 TABLET ORAL at 08:06

## 2020-03-16 RX ADMIN — FLUOCINONIDE: 0.5 OINTMENT TOPICAL at 20:40

## 2020-03-16 NOTE — PROGRESS NOTES
[facility]      OUTPATIENT PHYSICAL THERAPY EVALUATION  PLAN OF TREATMENT FOR OUTPATIENT REHABILITATION  (COMPLETE FOR INITIAL CLAIMS ONLY)  Patient's Last Name, First Name, M.I.  YOB: 1934  Chilango Echevarria                        Provider's Name  [facility] Medical Record No.  [id number]                               Onset Date:  03/15/20   Start of Care Date:  03/16/20      Type:     _X_PT   ___OT   ___SLP Medical Diagnosis:  Generalized weakness                        PT Diagnosis:  Impaired functional mobility   Visits from SOC:  1   _________________________________________________________________________________  Plan of Treatment/Functional Goals    Planned Interventions:  ,    bed mobility training, gait training, ROM, strengthening, stretching, transfer training, home program guidelines, progressive activity/exercise,       Goals: See Physical Therapy Goals on Care Plan in [employer] electronic health record.    Therapy Frequency: 3x/week  Predicted Duration of Therapy Intervention: 5 days  _________________________________________________________________________________    I CERTIFY THE NEED FOR THESE SERVICES FURNISHED UNDER        THIS PLAN OF TREATMENT AND WHILE UNDER MY CARE     (Physician co-signature of this document indicates review and certification of the therapy plan).                Certification date from: 03/16/20, Certification date to: 03/21/20    Referring Physician:  Tyler Robertson,               Initial Assessment        See Physical Therapy evaluation dated 03/16/20 in Epic electronic health record.

## 2020-03-16 NOTE — ED PROVIDER NOTES
History     Chief Complaint:    Fall and Head Injury       HPI     Chilango Echevarria is a 85 year old male who presents accompanied by a neighbor after a fall with head injury.  Patient has Parkinson's disease but notes that for the last several months he has been have more trouble walking.  He feels like his legs are weak.  This afternoon he suddenly felt much more weak and took a fall.  He lay on the floor for about 20 minutes before he was able to contact a neighbor who had been trying to call him.  They contacted friends who helped the patient get up and brought him in.  He did not have any loss of consciousness.  He denies any chest pain, difficulty breathing, abdominal pain.  He says he has been eating and drinking well.  He did have 2 beers this afternoon at happy hour as is typical for him.    Patient was able to ambulate to the car with assistance and using his walker.  However, he seemed very weak and unsteady.    Allergies:  Lisinopril     Medications:    allopurinol (ZYLOPRIM) 100 MG tablet  bisacodyl (DULCOLAX) 5 MG EC tablet  carbidopa-levodopa (SINEMET)  MG per tablet  cetirizine (ZYRTEC ALLERGY) 10 MG tablet  fluocinonide (LIDEX) 0.05 % external cream  loratadine (CLARITIN) 10 MG tablet  metoprolol succinate ER (TOPROL-XL) 25 MG 24 hr tablet  Nutritional Supplements (ENSURE PLUS) LIQD  omeprazole (PRILOSEC) 20 MG DR capsule  order for DME  polyethylene glycol (MIRALAX) powder  triamcinolone (KENALOG) 0.1 % external ointment        Past Medical History:    Past Medical History:   Diagnosis Date     Aortic stenosis      Ying esophagus      Contact dermatitis and other eczema, due to unspecified cause      Esophageal reflux      Gout, unspecified      Incarcerated hiatal hernia 5/21/2015     Inguinal hernia without mention of obstruction or gangrene, unilateral or unspecified, (not specified as recurrent)      Polymyalgia rheumatica (H)      Umbilical hernia without mention of obstruction or  gangrene      Unspecified essential hypertension      Vasomotor rhinitis        Patient Active Problem List    Diagnosis Date Noted     Parkinson's disease (H) 10/22/2015     Priority: Medium     Slow transit constipation 10/22/2015     Priority: Medium     Health Care Home 06/02/2015     Priority: Medium     Status:  Declined    See Letters for Emergency  Care Plan  Date:  July 1, 2015           Status post laparoscopic hernia repair-5/14/15 05/21/2015     Priority: Medium     Dermatitis 05/21/2015     Priority: Medium     HTN (hypertension) 05/21/2015     Priority: Medium     Umbilical hernia 05/21/2015     Priority: Medium     Right inguinal hernia 05/21/2015     Priority: Medium     Advanced directives, counseling/discussion 03/02/2015     Priority: Medium     Vasomotor rhinitis      Priority: Medium     CARDIOVASCULAR SCREENING; LDL GOAL LESS THAN 130 10/31/2010     Priority: Medium     Aortic stenosis      Priority: Medium     Gout 03/15/2005     Priority: Medium     Problem list name updated by automated process. Provider to review       Esophageal reflux 03/15/2005     Priority: Medium        Past Surgical History:    Past Surgical History:   Procedure Laterality Date     C NONSPECIFIC PROCEDURE      T&A     C NONSPECIFIC PROCEDURE      umbilical herniorraphy     ESOPHAGOSCOPY, GASTROSCOPY, DUODENOSCOPY (EGD), COMBINED N/A 5/11/2015    Procedure: COMBINED ESOPHAGOSCOPY, GASTROSCOPY, DUODENOSCOPY (EGD);  Surgeon: Thad Ferro MD;  Location: [facility] OR     LAPAROSCOPIC ASSISTED INSERTION TUBE JEJUNOSTOMY N/A 5/14/2015    Procedure: LAPAROSCOPIC ASSISTED INSERTION TUBE JEJUNOSTOMY;  Surgeon: Thda Ferro MD;  Location: [facility] OR     LAPAROSCOPIC HERNIORRHAPHY HIATAL N/A 5/14/2015    Procedure: LAPAROSCOPIC HERNIORRHAPHY HIATAL;  Surgeon: Thad Ferro MD;  Location: [facility] OR     LAPAROSCOPIC HERNIORRHAPHY INGUINAL Right 5/14/2015    Procedure: LAPAROSCOPIC HERNIORRHAPHY INGUINAL;   Surgeon: Thad Ferro MD;  Location: [facility] OR     PICC INSERTION Right 5/11/2015    5fr DL Power PICC, 39cm (1cm external) in the R medial brachial vein w/ tip in the low SVC.        Family History:    family history includes Breast Cancer in his mother; Cancer (age of onset: 37) in his son; Diabetes in his paternal grandfather.    Social History:   reports that he has never smoked. He has never used smokeless tobacco. He reports current alcohol use. He reports that he does not use drugs.    PCP: Enrique Ring     Review of Systems   All other systems reviewed and are negative.        Physical Exam     Patient Vitals for the past 24 hrs:   BP Temp Temp src Pulse Resp SpO2   03/15/20 2025 (!) 143/66 -- -- 67 18 99 %   03/15/20 2023 -- 98  F (36.7  C) Oral -- -- --        Physical Exam  Constitutional:       Comments: Pleasant and cooperative   HENT:      Head:        Right Ear: Tympanic membrane normal.      Left Ear: Tympanic membrane normal.      Mouth/Throat:      Pharynx: No posterior oropharyngeal erythema.   Eyes:      Conjunctiva/sclera: Conjunctivae normal.   Neck:      Musculoskeletal: Neck supple.   Cardiovascular:      Rate and Rhythm: Normal rate and regular rhythm.      Heart sounds: Normal heart sounds.   Pulmonary:      Effort: Pulmonary effort is normal.      Breath sounds: Normal breath sounds.   Abdominal:      General: Bowel sounds are normal. There is no distension.      Palpations: Abdomen is soft.      Tenderness: There is no abdominal tenderness. There is no guarding or rebound.   Musculoskeletal: Normal range of motion.   Skin:     General: Skin is warm and dry.   Neurological:      Mental Status: He is alert.      Motor: Tremor and abnormal muscle tone present.         Emergency Department Course     ECG normal sinus rhythm, junctional ST depression, normal T waves.  Compared to previous EKG of May 14, 2015, premature beats are no longer present.  T wave abnormality has  resolved.   Interpreted by Kalli Sheth MD.     Imaging:    XR Pelvis w Hip Right 1 View   Final Result   IMPRESSION: No fracture or dislocation. Vascular calcification.      CT Head w/o Contrast   Final Result   IMPRESSION:   1.  No intracranial hemorrhage or skull fractures.   2.  There is diffuse parenchymal volume loss. White matter changes are present in the cerebral hemispheres that are consistent with small vessel ischemic disease in this age patient.           Laboratory:    Labs Ordered and Resulted from Time of ED Arrival Up to the Time of Departure from the ED   CBC WITH PLATELETS DIFFERENTIAL - Abnormal; Notable for the following components:       Result Value    RBC Count 3.84 (*)     Hemoglobin 11.9 (*)     Hematocrit 37.4 (*)     All other components within normal limits   COMPREHENSIVE METABOLIC PANEL   TROPONIN I   ALCOHOL ETHYL   ROUTINE [facility] WITH MICROSCOPIC REFLEX TO CULTURE   ORTHOSTATIC BLOOD PRESSURE AND PULSE          Interventions:    Medications   acetaminophen (TYLENOL) tablet 650 mg (650 mg Oral Given 3/15/20 2143)        Emergency Department Course:  Past medical records, nursing notes, and vitals reviewed.  I performed an exam of the patient and obtained history, as documented above.    Patient scalp laceration was cleansed.  Orthostatic vital signs were obtained.  Patient could barely stand at the bedside long enough to obtain standing vital signs even with assist of 2.  He was unable to step to the side to get himself in a better position on the ER cart.    I rechecked the patient. Findings and plan explained to the Patient and friend.     Impression & Plan       Medical Decision Making:    Chilango Echevarria is a 85 year old male who presents to the emergency department after a fall at home.  He has a tiny laceration on his head which does not require repair.  CT shows no evidence of underlying intracranial bleed.  He began to complain of some pain in the right hip.  He is able  to bear weight and x-ray shows no evidence of fracture.  He fell due to generalized weakness, particularly of the legs.  As noted today here he was so weak he could barely stay standing long enough to do orthostatic vital signs.  He also has marked rigidity.  I am unsure if the weakness may be a manifestation of his Parkinson's.  He clearly cannot go home safely.  I discussed the case with Dr. Robertson of the hospitalist service.  We will admit the patient to the observation area for further evaluation and management.      Diagnosis:    ICD-10-CM    1. Injury of head, initial encounter  S09.90XA Alcohol ethyl     Alcohol ethyl     CANCELED: Alcohol ethyl   2. Weakness of both lower extremities  R29.898    3. Parkinson disease (H)  G20         Discharge Medications:  New Prescriptions    No medications on file        3/15/2020   Kalli Sheth MD [place]elt, Susan Gail, MD  03/15/20 2215

## 2020-03-16 NOTE — H&P
[facility]    History and Physical - Hospitalist Service       Date of Admission:  3/15/2020    Assessment & Plan   Chilango Echevarria is a 85 year old male admitted on 3/15/2020. He has a past medical history significant for hypertension, gout, Parkinson's, and GERD.  He was brought to the emergency room by friends after a fall.  He states that he fell early in the afternoon.  Was unable to get up off the floor.  Friends checked on him about 20 minutes after his fall.  He denies losing consciousness.  Currently having mild right hip pain.  No other complaints.    1.  Weakness and fall.  Physical therapy consult.  Social work consult.  Suspect that his fall was due to his Parkinson's.    2.  Parkinson's disease.  Restart carbidopa/levodopaonce verified by pharmacy.    3.  Hypertension.  Restart metoprolol once verified by pharmacy.    4.  GERD.  Restart omeprazole.    5.  Alcohol dependence.  If he has been honest with the amount of alcohol uses, less likely to develop alcohol withdrawals.  Start alcohol withdrawal protocol to monitor.  Oral vitamins.     Diet: Regular diet  DVT Prophylaxis: Pneumatic Compression Devices  Garza Catheter: not present  Code Status: Full code    Disposition Plan   Expected discharge: Tomorrow, recommended to prior living arrangement     Tyler Robertson, DO  [facility]    ______________________________________________________________________    Chief Complaint   Right hip pain after fall.    History is obtained from the patient    History of Present Illness   Chilango Echevarria is a 85 year old male who has a past medical history significant for hypertension, gout, Parkinson's, and GERD.  He was brought to the emergency room by friends after a fall.  He does report that he does have occasional falls that his neurologist has told him are likely related to his Parkinson's disease.  He does state that he drinks 2 alcoholic beverages per day.  He had 2  alcoholic beverages earlier today as usual.  A little after this, he lost his balance and fell to the floor.  He denies loss of consciousness.  States he landed on his right side.  Currently having some right hip pain.  Feels that pain medications given in the emergency room have done a fairly good job taking care of pain.  Denies fevers, chills, nausea, vomiting, dysuria, or other complaints at this time.    Review of Systems    The 10 point Review of Systems is negative other than noted in the HPI     Past Medical History    I have reviewed this patient's medical history and updated it with pertinent information if needed.   Past Medical History:   Diagnosis Date     Aortic stenosis     very mild     Ying esophagus      Contact dermatitis and other eczema, due to unspecified cause      Esophageal reflux      Gout, unspecified      Incarcerated hiatal hernia 5/21/2015     Inguinal hernia without mention of obstruction or gangrene, unilateral or unspecified, (not specified as recurrent)      Polymyalgia rheumatica (H)      Umbilical hernia without mention of obstruction or gangrene      Unspecified essential hypertension      Vasomotor rhinitis        Past Surgical History   I have reviewed this patient's surgical history and updated it with pertinent information if needed.  Past Surgical History:   Procedure Laterality Date     C NONSPECIFIC PROCEDURE      T&A     C NONSPECIFIC PROCEDURE      umbilical herniorraphy     ESOPHAGOSCOPY, GASTROSCOPY, DUODENOSCOPY (EGD), COMBINED N/A 5/11/2015    Procedure: COMBINED ESOPHAGOSCOPY, GASTROSCOPY, DUODENOSCOPY (EGD);  Surgeon: Thad Ferro MD;  Location: [facility] OR     LAPAROSCOPIC ASSISTED INSERTION TUBE JEJUNOSTOMY N/A 5/14/2015    Procedure: LAPAROSCOPIC ASSISTED INSERTION TUBE JEJUNOSTOMY;  Surgeon: Thad Ferro MD;  Location: [facility]     LAPAROSCOPIC HERNIORRHAPHY HIATAL N/A 5/14/2015    Procedure: LAPAROSCOPIC HERNIORRHAPHY HIATAL;  Surgeon:  Thad Ferro MD;  Location: [facility]     LAPAROSCOPIC HERNIORRHAPHY INGUINAL Right 5/14/2015    Procedure: LAPAROSCOPIC HERNIORRHAPHY INGUINAL;  Surgeon: Thad Ferro MD;  Location: [facility] OR     PICC INSERTION Right 5/11/2015    5fr DL Power PICC, 39cm (1cm external) in the R medial brachial vein w/ tip in the low SVC.       Social History   I have reviewed this patient's social history and updated it with pertinent information if needed.  Social History     Tobacco Use     Smoking status: Never Smoker     Smokeless tobacco: Never Used   Substance Use Topics     Alcohol use: Yes     Comment: beer only, 3 bottles per day     Drug use: No       Family History   I have reviewed this patient's family history and updated it with pertinent information if needed.   Family History   Problem Relation Age of Onset     Breast Cancer Mother      Diabetes Paternal Grandfather      Cancer Son 37        lung       Prior to Admission Medications   Prior to Admission Medications   Prescriptions Last Dose Informant Patient Reported? Taking?   Nutritional Supplements (ENSURE PLUS) LIQD   Yes No   Sig: Take 240 mLs by mouth daily   allopurinol (ZYLOPRIM) 100 MG tablet   No No   Sig: Take 1 tablet (100 mg) by mouth daily   bisacodyl (DULCOLAX) 5 MG EC tablet   No No   Sig: Take 1 tablet (5 mg) by mouth daily as needed for constipation   carbidopa-levodopa (SINEMET)  MG per tablet   Yes No   Sig: Take 1 tablet by mouth 3 times daily   cetirizine (ZYRTEC ALLERGY) 10 MG tablet   No No   Sig: Take 2 tabs PO in the pm   fluocinonide (LIDEX) 0.05 % external cream   No No   Sig: Apply a thin layer to affected area on arms, legs,  and back BID x 2 weeks, tapering with improvement. Do not apply to face or body folds.   loratadine (CLARITIN) 10 MG tablet   No No   Sig: Take 2 tabs PO qam   metoprolol succinate ER (TOPROL-XL) 25 MG 24 hr tablet   No No   Sig: TAKE 1 TABLET(25 MG) BY MOUTH EVERY MORNING   omeprazole  (PRILOSEC) 20 MG DR capsule   No No   Sig: TAKE 1 CAPSULE BY MOUTH 30 TO 60 MINUTES BEFORE FIRST MEAL OF THE DAY   order for DME   No No   Sig: Equipment being ordered: walker   polyethylene glycol (MIRALAX) powder   No No   Sig: Take 17 g by mouth daily as needed for constipation (1 capful)   triamcinolone (KENALOG) 0.1 % external ointment   No No   Sig: APPLY EXTERNALLY TO THE AFFECTED AREA TWICE DAILY      Facility-Administered Medications: None     Allergies   Allergies   Allergen Reactions     Lisinopril      rash, exacerbation of eczema       Physical Exam   Vital Signs: Temp: 98  F (36.7  C) Temp src: Oral BP: (!) 167/81 Pulse: 71   Resp: 18 SpO2: 98 % O2 Device: None (Room air)    Weight: 0 lbs 0 oz    Gen:  NAD, A&Ox3.  Eyes:  PERRL, sclera anicteric.  OP:  MMM, no lesions.  Neck:  Supple.  CV:  Regular, no murmurs.  Lung:  CTA b/l, normal effort.  Ab:  +BS, soft.  Skin:  Warm, dry to touch.  No rash.  Ext:  No pitting edema LE b/l.      Data   Data reviewed today: I reviewed all medications, new labs and imaging results over the last 24 hours. I personally reviewed the EKG tracing showing Sinus rhythm, no obvious acute ischemia.    Recent Labs   Lab 03/15/20  2055   WBC 6.3   HGB 11.9*   MCV 97         POTASSIUM 4.3   CHLORIDE 108   CO2 26   BUN 26   CR 0.97   ANIONGAP 3   MESERET 8.5   GLC 71   ALBUMIN 3.7   PROTTOTAL 7.2   BILITOTAL 0.5   ALKPHOS 63   ALT <6   AST 21   TROPI <0.015

## 2020-03-16 NOTE — PLAN OF CARE
"A&O x4 with some forgetfulness. LS CTA all fields. BS active x4. Abrasion to scalp, no sting barrier applied. Reddened groin, miconazole cream applied and external catheter placed to avoid moisture to area. Patient reports incontinence at times and arrived to the floor with wet boxers. Generalized discolorization throughout body. CIWA score of 1 for shakiness but patient does have parkinson's. Reports pain to R hip, tylenol given PRN. Regular diet. Up with A2 and walker, unsteady. SW consult placed.    PRIMARY DIAGNOSIS: \"GENERIC\" NURSING  OUTPATIENT/OBSERVATION GOALS TO BE MET BEFORE DISCHARGE:  ADLs back to baseline: No    Activity and level of assistance: A2 with walker    Pain status: Improved-controlled with oral pain medications.    Return to near baseline physical activity: No     Discharge Planner Nurse   Safe discharge environment identified: No  Barriers to discharge: Yes       Entered by: Amy Miner 03/16/2020 4:53 AM     Please review provider order for any additional goals.   Nurse to notify provider when observation goals have been met and patient is ready for discharge.  "

## 2020-03-16 NOTE — PROGRESS NOTES
03/16/20 1050   Quick Adds   Quick Adds Certification   Type of Visit Initial PT Evaluation   Living Environment   Lives With alone   Living Arrangements condominium   Home Accessibility no concerns   Transportation Anticipated family or friend will provide   Self-Care   Usual Activity Tolerance moderate   Current Activity Tolerance fair   Regular Exercise No   Equipment Currently Used at Home walker, rolling   Functional Level Prior   Ambulation 1-->assistive equipment   Transferring 0-->independent   Toileting 0-->independent   Bathing 2-->assistive person   Fall history within last six months yes   Number of times patient has fallen within last six months 2   Which of the above functional risks had a recent onset or change? ambulation;transferring;toileting;bathing;dressing;fall history   Prior Functional Level Comment Pt has a HHA that comes 2-3x/week that is supposed to assist with bathing, per family friend pt does not let HHA assist   General Information   Onset of Illness/Injury or Date of Surgery - Date 03/15/20   Referring Physician  Tyler Robertson, DO     Patient/Family Goals Statement Discharge home   Pertinent History of Current Problem (include personal factors and/or comorbidities that impact the POC) Chilango Echevarria is a 85 year old male admitted on 3/15/2020. He has a past medical history significant for hypertension, gout, Parkinson's, and GERD.  He was brought to the emergency room by friends after a fall.  He states that he fell early in the afternoon.  Was unable to get up off the floor.  Friends checked on him about 20 minutes after his fall.  He denies losing consciousness.  Currently having mild right hip pain.  No other complaints.   Precautions/Limitations fall precautions   Weight-Bearing Status - LLE full weight-bearing   Weight-Bearing Status - RLE full weight-bearing   General Observations Pt supine upon initiation, agreeable to session.    Cognitive Status Examination    Orientation orientation to person, place and time   Level of Consciousness alert   Follows Commands and Answers Questions 100% of the time   Personal Safety and Judgment at risk behaviors demonstrated   Memory intact   Pain Assessment   Patient Currently in Pain No   Integumentary/Edema   Integumentary/Edema Comments Multiple small abrasions noted, pt notes dry skin at baseline   Posture    Posture Forward head position;Protracted shoulders;Kyphosis   Range of Motion (ROM)   ROM Comment Lacking terminal knee extension B LE   Strength   Strength Comments Able to SLR B LE, but effortful. Decreased functional strength noted by pt's need for assist with mobility   Bed Mobility   Bed Mobility Comments sit<>supine SBA; maxA to reposition   Transfer Skills   Transfer Comments sit<>stand CGA   Gait   Gait Comments Giselle with FWW   Balance   Balance Comments Requires B UE support for all standing/dynamic balance   Sensory Examination   Sensory Perception no deficits were identified   Muscle Tone   Muscle Tone Comments Increased tone noted to all 4 extremities-pt notes baseline   General Therapy Interventions   Planned Therapy Interventions bed mobility training;gait training;ROM;strengthening;stretching;transfer training;home program guidelines;progressive activity/exercise   Clinical Impression   Criteria for Skilled Therapeutic Intervention yes, treatment indicated   PT Diagnosis Impaired functional mobility   Influenced by the following impairments Weakness, deconditioning, impaired balance   Functional limitations due to impairments Difficulty with bed mobiltiy, transfers, ambulation   Clinical Presentation Stable/Uncomplicated   Clinical Presentation Rationale medically stable, clear POC   Clinical Decision Making (Complexity) Low complexity   Therapy Frequency 3x/week   Predicted Duration of Therapy Intervention (days/wks) 5 days   Anticipated Discharge Disposition Transitional Care Facility   Risk & Benefits of  "therapy have been explained Yes   Patient, Family & other staff in agreement with plan of care Yes   Therapy Certification   Start of care date 03/16/20   Certification date from 03/16/20   Certification date to 03/21/20   Medical Diagnosis Generalized weakness   [facility] TM \"6 Clicks\"   2016, Trustees of [facility], under license to [employer].  All rights reserved.   6 Clicks Short Forms Basic Mobility Inpatient Short Form   [facility] AM-PAC  \"6 Clicks\" V.2 Basic Mobility Inpatient Short Form   1. Turning from your back to your side while in a flat bed without using bedrails? 3 - A Little   2. Moving from lying on your back to sitting on the side of a flat bed without using bedrails? 3 - A Little   3. Moving to and from a bed to a chair (including a wheelchair)? 3 - A Little   4. Standing up from a chair using your arms (e.g., wheelchair, or bedside chair)? 3 - A Little   5. To walk in hospital room? 2 - A Lot   6. Climbing 3-5 steps with a railing? 1 - Total   Basic Mobility Raw Score (Score out of 24.Lower scores equate to lower levels of function) 15   Total Evaluation Time   Total Evaluation Time (Minutes) 8     "

## 2020-03-16 NOTE — PLAN OF CARE
PT: Orders received. PT evaluation completed and treatment initiated. Pt admitted after a fall at home the result of not using his walker as prescribed. Pt lives alone in a condo, and reports 2 falls in the past 6 months. Pt's family friend assists with meal prep, and pt microwaves meals. Pt has a HHA that comes to assist with bathing 2-3x/week, however pt's family friend shares concerns that the pt does not allow the HHA to assist. Pt has a walk in shower with no seat/grab bars. Pt ambulates with a FWW at baseline, able to ambulate short community distances.     Discharge Planner PT   Patient plan for discharge: Home  Current status: Pt supine upon initiation, family friend present. Pt and friend educated in PT role and POC, agreeable to session. Pt completes sit<>supine with SBA, heavy use of UEs, and increased time/effort. Pt completes sit<>stand with CGA and cues for safety. Pt initially unsteady in standing, but is able to find his balance. Pt ambulates 20' with use of FWW and min-modA with cues for walker management. Pt with kyphotic posture, narrow base of support, and heavy reliance on UEs throughout. During gait, pt's briefs begin to fall. Pt unable to safely maintain standing balance with walker in order for this writer to safely adjust them. Pt returns to supine at end of mobility, and requires maxAx2 to reposition in supine. Time spent discussing discharge recommendations to pt and friend. Pt with concerns regarding recommendation with preference for discharge home. After further education/encouragement pt agreeable to short rehab stay. Discussed with pt that the evaluating therapists at the TCU would help determine LOS. Pt without further questions at end of session.   Barriers to return to prior living situation: Requires Ax1 for all basic mobility skills, unable to safely ambulate functional distances, high fall risk, lives alone  Recommendations for discharge: TCU  Rationale for recommendations: Pt  is not currently at baseline for mobility, and is unsafe to discharge home. With continued PT, both IP and after discharge, pt is likely to obtain mobility goals.        Entered by: Tammy Clifford 03/16/2020 10:58 AM

## 2020-03-16 NOTE — PHARMACY-ADMISSION MEDICATION HISTORY
Admission medication history interview status for this patient is complete. See [facility] admission navigator for allergy information, prior to admission medications and immunization status.     Medication history interview source(s):Patient  Medication history resources (including written lists, pill bottles, clinic record): phone interview    Changes made to PTA medication list:  Added: none  Deleted: Zyrtec, Claritin  Changed: Sinemet to 2 tablets TID from 1 TID    Actions taken by pharmacist (provider contacted, etc):None     Additional medication history information:None    Medication reconciliation/reorder completed by provider prior to medication history?  N   (Y/N)     For patients on insulin therapy: N  (Y/N)    Prior to Admission medications    Medication Sig Last Dose Taking? Auth Provider   allopurinol (ZYLOPRIM) 100 MG tablet Take 1 tablet (100 mg) by mouth daily 3/14/2020 at Unknown time Yes Enrique Ring MD   bisacodyl (DULCOLAX) 5 MG EC tablet Take 1 tablet (5 mg) by mouth daily as needed for constipation  Yes Enrique Ring MD   carbidopa-levodopa (SINEMET)  MG per tablet Take 2 tablets by mouth 3 times daily  3/14/2020 at Unknown time Yes David De Anda MD   fluocinonide (LIDEX) 0.05 % external cream Apply a thin layer to affected area on arms, legs,  and back BID x 2 weeks, tapering with improvement. Do not apply to face or body folds. 3/14/2020 at Unknown time Yes Abigail Correia PA-C   metoprolol succinate ER (TOPROL-XL) 25 MG 24 hr tablet TAKE 1 TABLET(25 MG) BY MOUTH EVERY MORNING 3/14/2020 at Unknown time Yes Enrique Ring MD   omeprazole (PRILOSEC) 20 MG DR capsule TAKE 1 CAPSULE BY MOUTH 30 TO 60 MINUTES BEFORE FIRST MEAL OF THE DAY 3/14/2020 at Unknown time Yes Enrique Ring MD   polyethylene glycol (MIRALAX) powder Take 17 g by mouth daily as needed for constipation (1 capful)  Yes Enrique Ring MD   order for DME Equipment being ordered: walker    David De Anda MD   triamcinolone (KENALOG) 0.1 % external ointment APPLY EXTERNALLY TO THE AFFECTED AREA TWICE DAILY   Terrence Shay MD

## 2020-03-16 NOTE — PROGRESS NOTES
[facility]  Hospitalist Progress Note  Thom Rios MD 03/16/2020    Reason for Stay (Diagnosis): weakness, fall         Assessment and Plan:      Summary of Stay: Chilango Echevarria is a 85 year old male admitted on 3/15/2020. He has a past medical history significant for hypertension, gout, Parkinson's, and GERD.  He was brought to the emergency room by friends after a fall.  He states that he fell early in the afternoon.  Was unable to get up off the floor.  Friends checked on him about 20 minutes after his fall.  He denies losing consciousness.  Currently having mild right hip pain.  No other complaints.  Xray neg for fx     1.  Weakness and fall.  Physical therapy consult.  Social work consult.  May require placement.  Suspect that his fall was mechanical and related to his Parkinson's.     2.  Parkinson's disease.  Restart carbidopa/levodopa      3.  Hypertension.  Restart metoprolol      4.  GERD.  Restart omeprazole.     5.  Alcohol dependence.  If he has been honest with the amount of alcohol uses, less likely to develop alcohol withdrawals.  Start alcohol withdrawal protocol to monitor.  Oral vitamins.     Diet: Regular diet  DVT Prophylaxis: Pneumatic Compression Devices  Garza Catheter: not present  Code Status: Full code          Interval History (Subjective):      Minimal pain.  No LOC or syncope.  Met with friend at bedside with patient today.  D/w him that TCU may be recommended                  Physical Exam:      Last Vital Signs:  BP (!) 158/70 (BP Location: Left arm)   Pulse 65   Temp 99.1  F (37.3  C) (Temporal)   Resp 16   SpO2 98%       Intake/Output Summary (Last 24 hours) at 3/16/2020 1739  Last data filed at 3/16/2020 0509  Gross per 24 hour   Intake 311 ml   Output --   Net 311 ml       Constitutional: Awake, alert, cooperative, no apparent distress.  Friend present while I was in room with patient   Respiratory: Clear to auscultation bilaterally, no crackles  or wheezing   Cardiovascular: Regular rate and rhythm, normal S1 and S2, and no murmur noted   Abdomen: Normal bowel sounds, soft, non-distended, non-tender   Skin: No rashes, no cyanosis, dry to touch   Neuro: Alert and oriented x3, no weakness, numbness, memory loss   Extremities: No edema, normal range of motion   Other(s):        All other systems: Negative          Medications:      All current medications were reviewed with changes reflected in problem list.         Data:      All new lab and imaging data was reviewed.   Labs:  Recent Labs   Lab 03/15/20  2055   WBC 6.3   HGB 11.9*   HCT 37.4*   MCV 97         Imaging:   Recent Results (from the past 24 hour(s))   CT Head w/o Contrast    Narrative    EXAM: CT HEAD W/O CONTRAST  LOCATION: [facility]  DATE/TIME: 3/15/2020 9:19 PM    INDICATION: Head injury, head pain.  COMPARISON: None.  TECHNIQUE: Routine without IV contrast. Multiplanar reformats. Dose reduction techniques were used.    FINDINGS:  INTRACRANIAL CONTENTS: There is soft tissue swelling over the left parietal region. There is no evidence for any acute hemorrhage. No skull fractures are identified. There is diffuse parenchymal volume loss. White matter changes are present in the   cerebral hemispheres that are consistent with small vessel ischemic disease in this age patient. No CT evidence of acute infarct.    VISUALIZED ORBITS/SINUSES/MASTOIDS: No intraorbital abnormality. No paranasal sinus mucosal disease. No middle ear or mastoid effusion.    BONES/SOFT TISSUES: No acute abnormality.      Impression    IMPRESSION:  1.  No intracranial hemorrhage or skull fractures.  2.  There is diffuse parenchymal volume loss. White matter changes are present in the cerebral hemispheres that are consistent with small vessel ischemic disease in this age patient.   XR Pelvis w Hip Right 1 View    Narrative    EXAM: XR PELVIS AND HIP RIGHT 1 VIEW  LOCATION: [facility]  DATE/TIME: 3/15/2020 9:26 PM    INDICATION: Injury with pain.  COMPARISON: None.      Impression    IMPRESSION: No fracture or dislocation. Vascular calcification.

## 2020-03-16 NOTE — ED NOTES
[facility]  ED Nurse Handoff Report    Chilango Echevarria is a 85 year old male   ED Chief complaint: Fall and Head Injury  . ED Diagnosis:   Final diagnoses:   Injury of head, initial encounter   Weakness of both lower extremities   Parkinson disease (H)     Allergies:   Allergies   Allergen Reactions     Lisinopril      rash, exacerbation of eczema       Code Status: Full Code  Activity level - Baseline/Home:  Independent. Activity Level - Current:   Assist X 2. Lift room needed: No. Bariatric: No   Needed: No   Isolation: No. Infection: Not Applicable.     Vital Signs:   Vitals:    03/15/20 2045 03/15/20 2100 03/15/20 2115 03/15/20 2145   BP: (!) 161/85 (!) 167/82  (!) 167/81   Pulse: 69 72  71   Resp:       Temp:       TempSrc:       SpO2: 100% 99% 100% 98%       Cardiac Rhythm:  ,      Pain level:    Patient confused: Yes. Patient Falls Risk: Yes.   Elimination Status: Has not voided    Patient Report - Initial Complaint: Fall, Weakness. Focused Assessment:Chilango Echevarrai is a 85 year old male who presents accompanied by a neighbor after a fall with head injury.  Patient has Parkinson's disease but notes that for the last several months he has been have more trouble walking.  He feels like his legs are weak.  This afternoon he suddenly felt much more weak and took a fall.  He lay on the floor for about 20 minutes before he was able to contact a neighbor who had been trying to call him.  They contacted friends who helped the patient get up and brought him in.  He did not have any loss of consciousness.  He denies any chest pain, difficulty breathing, abdominal pain.  He says he has been eating and drinking well.  He did have 2 beers this afternoon at happy hour as is typical for him.     Patient was able to ambulate to the car with assistance and using his walker.  However, he seemed very weak and unsteady.       20:43 HEENT HEENT - HEENT WDL: -WDL except; head  Head Symptoms:  (Patient  has wound to left side of head which resuted from fall. Bleeding is controlled. ) AD     20:41 Musculoskeletal Musculoskeletal - General Mobility:  (Patient complaints of bilateral leg weakness which has been ongoing for the past few months. Patient states that today was worse than normal ) CMS Intact: Yes  Musculoskeletal Comment: Patient complains of right hip pain.   AD     20:41 Neurological Cognitive - Cognitive/Neuro/Behavioral WDL: WDL   [place] Coma Scale - Best Eye Response: 4-->(E4) spontaneous  Best Motor Response: 6-->(M6) obeys commands  Best Verbal Response: 5-->(V5) oriented  [place] Coma Scale Score: 15          Tests Performed: Xray, CT, Blood Work. Abnormal Results:   Labs Ordered and Resulted from Time of ED Arrival Up to the Time of Departure from the ED   CBC WITH PLATELETS DIFFERENTIAL - Abnormal; Notable for the following components:       Result Value    RBC Count 3.84 (*)     Hemoglobin 11.9 (*)     Hematocrit 37.4 (*)     All other components within normal limits   COMPREHENSIVE METABOLIC PANEL   TROPONIN I   ALCOHOL ETHYL   ROUTINE UA WITH MICROSCOPIC REFLEX TO CULTURE   ALCOHOL ETHYL   ORTHOSTATIC BLOOD PRESSURE AND PULSE     XR Pelvis w Hip Right 1 View   Final Result   IMPRESSION: No fracture or dislocation. Vascular calcification.      CT Head w/o Contrast   Final Result   IMPRESSION:   1.  No intracranial hemorrhage or skull fractures.   2.  There is diffuse parenchymal volume loss. White matter changes are present in the cerebral hemispheres that are consistent with small vessel ischemic disease in this age patient.          Treatments provided: Tylenol  Family Comments: Friend in room  OBS brochure/video discussed/provided to patient:  TBD  ED Medications:   Medications   acetaminophen (TYLENOL) tablet 650 mg (650 mg Oral Given 3/15/20 2143)   ibuprofen (ADVIL/MOTRIN) tablet 400 mg (400 mg Oral Given 3/15/20 2251)     Drips infusing:  No  For the majority of the shift, the patient's  behavior [place]. Interventions performed were NA.    Sepsis treatment initiated: No       ED Nurse Name/Phone Number: Antonia Mendez RN,   10:05 PM  RECEIVING UNIT ED HANDOFF REVIEW    Above ED Nurse Handoff Report was reviewed: Yes  Reviewed by: Amy Miner RN on March 16, 2020 at 12:18 AM

## 2020-03-16 NOTE — PLAN OF CARE
PRIMARY DIAGNOSIS: GENERALIZED WEAKNESS    OUTPATIENT/OBSERVATION GOALS TO BE MET BEFORE DISCHARGE  1. Orthostatic performed: Yes:          Lying Orthostatic BP: 167/82         Sitting Orthostatic BP: 161/91         Standing Orthostatic BP: 134/74     2. Tolerating PO medications: Yes    3. Return to near baseline physical activity: No    4. Cleared for discharge by consultants (if involved): No    Discharge Planner Nurse   Safe discharge environment identified: No  Barriers to discharge: Yes       Entered by: Ching Barr 03/16/2020 6:44 PM     Please review provider order for any additional goals.   Nurse to notify provider when observation goals have been met and patient is ready for discharge.    A/O x 4, forgetful.  Denies pain.  Up with A1 and WW ambulating in room.  Tolerating regular diet.  Voiding, incontinent at times.  Abrasions, bruises throughout skin.  Reddened groin, miconazole cream applied.  Wound to head open to air, dried blood.  CIWA scoring 1 for mild tremors. SW following for TCU placement.  Will continue to monitor.

## 2020-03-16 NOTE — ED TRIAGE NOTES
Pt stumbled in apartment and fell, hit L side of head on floor. Was there 20 min before found. No LOC. L hand hurts as well. Denies blood thinning medications. Small lac to L head.

## 2020-03-16 NOTE — PROGRESS NOTES
"PRIMARY DIAGNOSIS: \"GENERIC\" NURSING  OUTPATIENT/OBSERVATION GOALS TO BE MET BEFORE DISCHARGE:  1. ADLs back to baseline: No    2. Activity and level of assistance: Ax2 w/ WW/GB    3. Pain status: Improved-controlled with oral pain medications.    4. Return to near baseline physical activity: No     Discharge Planner Nurse   Safe discharge environment identified: No  Barriers to discharge: Yes       Entered by: Risa Worrell 03/16/2020 2:38 PM     Please review provider order for any additional goals.   Nurse to notify provider when observation goals have been met and patient is ready for discharge.    A&Ox4, with some forgetfulness. +BS/gas, voiding. Abrasion to scalp, cleansed with shampoo cap, no sting barrier applied. Redness in groin, miconazole cream applied. Abrasions throughout extremities. Old healing blister on left heal, cleansed with micro-clense and no sting barrier applied. PT eval today, recommended Ax2 w/ WW/GB and TCU on discharge. SW consult placed. Will continue to monitor.     "

## 2020-03-17 VITALS
RESPIRATION RATE: 18 BRPM | TEMPERATURE: 97.9 F | SYSTOLIC BLOOD PRESSURE: 174 MMHG | DIASTOLIC BLOOD PRESSURE: 82 MMHG | HEART RATE: 57 BPM | OXYGEN SATURATION: 100 %

## 2020-03-17 PROCEDURE — 25000132 ZZH RX MED GY IP 250 OP 250 PS 637: Mod: GY | Performed by: INTERNAL MEDICINE

## 2020-03-17 PROCEDURE — 96361 HYDRATE IV INFUSION ADD-ON: CPT

## 2020-03-17 PROCEDURE — 99217 ZZC OBSERVATION CARE DISCHARGE: CPT | Performed by: INTERNAL MEDICINE

## 2020-03-17 PROCEDURE — 25800030 ZZH RX IP 258 OP 636: Performed by: INTERNAL MEDICINE

## 2020-03-17 PROCEDURE — G0378 HOSPITAL OBSERVATION PER HR: HCPCS

## 2020-03-17 RX ORDER — ACETAMINOPHEN 325 MG/1
650 TABLET ORAL EVERY 4 HOURS PRN
DISCHARGE
Start: 2020-03-17 | End: 2020-01-01

## 2020-03-17 RX ADMIN — MULTIPLE VITAMINS W/ MINERALS TAB 1 TABLET: TAB at 08:11

## 2020-03-17 RX ADMIN — THIAMINE HCL TAB 100 MG 100 MG: 100 TAB at 08:11

## 2020-03-17 RX ADMIN — ACETAMINOPHEN 650 MG: 325 TABLET, FILM COATED ORAL at 08:11

## 2020-03-17 RX ADMIN — FOLIC ACID 1 MG: 1 TABLET ORAL at 08:11

## 2020-03-17 RX ADMIN — METOPROLOL SUCCINATE 25 MG: 25 TABLET, EXTENDED RELEASE ORAL at 08:11

## 2020-03-17 RX ADMIN — FLUOCINONIDE: 0.5 OINTMENT TOPICAL at 08:15

## 2020-03-17 RX ADMIN — ALLOPURINOL 100 MG: 100 TABLET ORAL at 08:11

## 2020-03-17 RX ADMIN — CARBIDOPA AND LEVODOPA 2 TABLET: 25; 100 TABLET ORAL at 14:08

## 2020-03-17 RX ADMIN — SODIUM CHLORIDE: 9 INJECTION, SOLUTION INTRAVENOUS at 01:45

## 2020-03-17 RX ADMIN — OMEPRAZOLE 20 MG: 20 CAPSULE, DELAYED RELEASE ORAL at 06:46

## 2020-03-17 RX ADMIN — CARBIDOPA AND LEVODOPA 2 TABLET: 25; 100 TABLET ORAL at 08:11

## 2020-03-17 NOTE — PROGRESS NOTES
PRIMARY DIAGNOSIS: GENERALIZED WEAKNESS    OUTPATIENT/OBSERVATION GOALS TO BE MET BEFORE DISCHARGE  1. Orthostatic performed: No    2. Tolerating PO medications: Yes    3. Return to near baseline physical activity: No    4. Cleared for discharge by consultants (if involved): No    Discharge Planner Nurse   Safe discharge environment identified: No  Barriers to discharge: Yes       Entered by: Isamar Milner 03/17/2020 2:49 AM     Please review provider order for any additional goals.   Nurse to notify provider when observation goals have been met and patient is ready for discharge.

## 2020-03-17 NOTE — PROGRESS NOTES
[facility]  Hospitalist Progress Note  Thom Rios MD 03/17/2020    Reason for Stay (Diagnosis): fall, weakness         Assessment and Plan:      Summary of Stay: Chilango Echevarria is a 85 year old male admitted on 3/15/2020. He has a past medical history significant for hypertension, gout, Parkinson's, and GERD.  He was brought to the emergency room by friends after a fall.  He states that he fell early in the afternoon.  Was unable to get up off the floor.  Friends checked on him about 20 minutes after his fall.  He denies losing consciousness.  Currently having mild right hip pain.  No other complaints.  Xray neg for fx.  Pt agreeable to TCU    Medically stable to discharge to TCU when bed found.       1.  Weakness and fall.  Physical therapy consult.  Social work consult.  pt agreeable to TCU.  Suspect that his fall was mechanical and related to his Parkinson's.     2.  Parkinson's disease.  Restart carbidopa/levodopa      3.  Hypertension.  Restart metoprolol      4.  GERD.  Restart omeprazole.     5.  Alcohol dependence.  no e/o withdrawal.  Oral vitamins.     Diet: Regular diet  DVT Prophylaxis: Pneumatic Compression Devices  Garza Catheter: not present  Code Status: Full code           Interval History (Subjective):      No complaints.  Agreeable to discharge to TCU                  Physical Exam:      Last Vital Signs:  BP (!) 174/82 (BP Location: Left arm)   Pulse 57   Temp 97.9  F (36.6  C) (Temporal)   Resp 18   SpO2 100%       Intake/Output Summary (Last 24 hours) at 3/17/2020 1032  Last data filed at 3/17/2020 0959  Gross per 24 hour   Intake 1671 ml   Output 1525 ml   Net 146 ml       Constitutional: Awake, alert, cooperative, no apparent distress   Respiratory: Clear to auscultation bilaterally, no crackles or wheezing   Cardiovascular: Regular rate and rhythm, normal S1 and S2, and no murmur noted   Abdomen: Normal bowel sounds, soft, non-distended, non-tender   Skin:  No rashes, no cyanosis, dry to touch   Neuro: Alert and oriented x3, no weakness, numbness, memory loss   Extremities: No edema, normal range of motion   Other(s):        All other systems: Negative          Medications:      All current medications were reviewed with changes reflected in problem list.         Data:      All new lab and imaging data was reviewed.   Labs:  Recent Labs   Lab 03/15/20  2055   WBC 6.3   HGB 11.9*   HCT 37.4*   MCV 97         Imaging:   No results found for this or any previous visit (from the past 24 hour(s)).

## 2020-03-17 NOTE — PROGRESS NOTES
Your information has been submitted on March 17th, 2020 at 01:54:17 PM CDT. The confirmation number is [id number]    Latoya Loera  Care Management Coordinator  [facility]  [phone]

## 2020-03-17 NOTE — CONSULTS
.Care Transition Initial Assessment -   Discharge planning: Per request to see regarding transfer to rehab facility, per discussion with MD pt would be ready for discharge today. Noted pt is admitted under observation care status.   Met with:  Patient, and friend, Abena. Daughter-in-law, April on speaker phone.   Active Problems:    Fall       DATA  Lives With: alone   Living Arrangements: condominium  Quality of Family Relationships: involved, supportive  Description of Support System: Involved, Supportive  Who is your support system?: Children(Friend, Abena)  Support Assessment: Adequate family and caregiver support.   Resources List: Skilled Nursing Facility     Quality of Family Relationships: involved, supportive  Transportation Anticipated: family or friend will provide (3/17 @ 1430)    INTERVENTION   Met with pt, very close friend, Abena also present in room. Pt is in agreement with planning for his transfer to rehab facility, SNF listing was provided. Pt notes that he had been at [facility] in the past so this facility would be the one of preference, [facility] would also be consideration, private room requested. It was discussed with pt, bAena and daughter-in-law April who was on speaker phone that pt is admitted under observation care status and therefore he would not meet criteria for Medicare/SNF coverage. [facility] has spoken to MD to see if there would be a determination of pt meeting criteria under the National Emergency declaration which in some cases would provide that the Medicare 3 day qualifying stay would be waived. Pt does not meet either of the three criteria per [facility] discussion with MD, this was discussed with pt, Abena and April. Therefore, pt will be private pay with upfront cost of $5000 which he acknowledges and accepts. Pt has LTC insurance which [facility] has encouraged that he/family look into to make claim and determine eligibility for insurance benefits.     Abena providing transport at  [phone], additional questions were addressed regarding anticipated length of stay for pt at [facility], determining factors for return home.     .Pt/family was given the Medicare Compare list for SNF, with associated star ratings to assist with choice for referrals/discharge planning Yes    Education was given to pt/family that star ratings are updated/maintained by Medicare and can be reviewed by visiting www.medicare.gov Yes        ASSESSMENT  Cognitive Status:  alert and oriented  Good support of friend and daughter-in-law.     PLAN    Patient given options and choices for discharge: Yes  Patient/family is agreeable to the plan?  Yes  Pt discharging to [facility], rehab stay with plan to return home.     .    Juliet Diallo, KAMINI   Care Transitions Services   [facility]     [phone]

## 2020-03-17 NOTE — PROGRESS NOTES
[facility]  Patient is currently open to home care services with [facility].  The patient is currently receiving private pay A services.  [facility]  and team have been notified of patient admission.  [facility] liaison will continue to follow patient during stay.

## 2020-03-17 NOTE — PROGRESS NOTES
"PRIMARY DIAGNOSIS: \"GENERIC\" NURSING  OUTPATIENT/OBSERVATION GOALS TO BE MET BEFORE DISCHARGE:  1. ADLs back to baseline: No    2. Activity and level of assistance: Up with maximum assistance. Consider SW and/or PT evaluation.     Pain status: denies    3. Return to near baseline physical activity: NO     Discharge Planner Nurse   Safe discharge environment identified: No  Barriers to discharge: Yes       Entered by: Isamar Milner 03/17/2020 5:59 AM      Pt A/O but forgetful afebrile. Up with assist of 1 with walker. Tolerating regular diet. Voiding in adequate amounts. CIWA scoring 1 and 2. Will continue to monitor.      Please review provider order for any additional goals.   Nurse to notify provider when observation goals have been met and patient is ready for discharge.  "

## 2020-03-17 NOTE — PLAN OF CARE
Physical Therapy Discharge Summary    Reason for therapy discharge:    Discharged to transitional care facility.    Progress towards therapy goal(s). See goals on Care Plan in [facility] electronic health record for goal details.  Goals not met.  Barriers to achieving goals:   discharge from facility.    Therapy recommendation(s):    Continued therapy is recommended.  Rationale/Recommendations:  PT as indicated at TCU.     Note: Pt not seen by documenting PT on this date. Information obtained from chart review.

## 2020-03-17 NOTE — PLAN OF CARE
PRIMARY DIAGNOSIS: GENERALIZED WEAKNESS    OUTPATIENT/OBSERVATION GOALS TO BE MET BEFORE DISCHARGE  1. Orthostatic performed: No    2. Tolerating PO medications: Yes    3. Return to near baseline physical activity: Yes    4. Cleared for discharge by consultants (if involved): Yes    Discharge Planner Nurse   Safe discharge environment identified: Yes  Barriers to discharge: No       Entered by: Ching Barr 03/17/2020 3:09 PM     Please review provider order for any additional goals.   Nurse to notify provider when observation goals have been met and patient is ready for discharge.    A/O x 4, forgetful.  VSS, afebrile.  Denies pain.  Up with A1 and WW ambulating in room and joya.  Voiding.  Tolerating regular diet.  Pt sent to TCU with copy of discharge instructions.  Picked up by friend Abena.

## 2020-03-18 NOTE — DISCHARGE SUMMARY
Admit Date:     03/15/2020   Discharge Date:     03/17/2020      PRINCIPAL FINAL DIAGNOSES:   1.  Generalized weakness and deconditioning.   2.  Fall episode.   3.  Underlying Parkinson's disease.   4.  Hypertension.   5.  Reflux disease.   6.  History of alcohol dependence.  No evidence of alcohol withdrawal this admission.   7.  Gout.      PRINCIPAL PROCEDURES THIS ADMISSION:   1.  Physical therapy and occupational therapy consultation.   2.  Social work consult to assist with disposition.  The patient was discharged to [facility] TCU.      REASON FOR ADMISSION:  Please see dictated history and physical.  In brief, Mr. Echevarria is a pleasant 85-year-old male with a history of Parkinson's disease.  He lives alone in a [facility].  The patient fell on the day of admission.  He was unable to get up.  He was able to notify his friends by phoning them after about 20 minutes on the floor.  There was no loss of consciousness.   Head CT scan and pelvic x-ray were unremarkable in the ER without fracture.      HOSPITAL COURSE:  Fall, generalized weakness and deconditioning:  The patient was seen by Physical Therapy and Occupational Therapy while hospitalized.  Recommendation was for transitional care unit.  Social work assisted with disposition.  [facility]      The patient suffered no significant issues with his fall and there were no fractures on imaging.      DISCHARGE MEDICATIONS:   1.  Acetaminophen as needed for pain.   2.  Allopurinol 100 mg daily.   3.  Dulcolax suppository as needed for constipation.   4.  Sinemet 2 tablets 3 times a day.   5.  Lidex cream as needed for eczema rash.   6.  Metoprolol extended release 2.5 mg daily.   7.  Omeprazole 20 mg daily.   8.  MiraLax 17 grams daily as needed for constipation.        DISCHARGE INSTRUCTIONS:  Follow up with primary MD in 1-2 weeks after discharge from rehabilitation center.      I examined the patient on day  of discharge.         BRITTANY REVELES MD             D: 2020   T: 2020   MT: FAYE      Name:     JENNIFER DYSON   MRN:      [id number]        Account:        [id number]   :      10/24/1934           Admit Date:     03/15/2020                                  Discharge Date: 2020      Document: [id number]

## 2020-03-19 ENCOUNTER — NURSING HOME VISIT (OUTPATIENT)
Dept: GERIATRICS | Facility: CLINIC | Age: 85
End: 2020-03-19
Payer: MEDICARE

## 2020-03-19 VITALS
RESPIRATION RATE: 16 BRPM | OXYGEN SATURATION: 99 % | SYSTOLIC BLOOD PRESSURE: 136 MMHG | HEART RATE: 65 BPM | BODY MASS INDEX: 25.02 KG/M2 | HEIGHT: 65 IN | WEIGHT: 150.2 LBS | TEMPERATURE: 98.2 F | DIASTOLIC BLOOD PRESSURE: 80 MMHG
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
## 2020-03-19 DIAGNOSIS — M25.551 HIP PAIN, RIGHT: ICD-10-CM

## 2020-03-19 DIAGNOSIS — F10.21 HISTORY OF ALCOHOL DEPENDENCE (H): ICD-10-CM

## 2020-03-19 DIAGNOSIS — R53.81 PHYSICAL DECONDITIONING: ICD-10-CM

## 2020-03-19 DIAGNOSIS — K21.9 GASTROESOPHAGEAL REFLUX DISEASE WITHOUT ESOPHAGITIS: ICD-10-CM

## 2020-03-19 DIAGNOSIS — M10.9 GOUT, UNSPECIFIED CAUSE, UNSPECIFIED CHRONICITY, UNSPECIFIED SITE: ICD-10-CM

## 2020-03-19 DIAGNOSIS — R53.1 GENERALIZED WEAKNESS: Primary | ICD-10-CM

## 2020-03-19 DIAGNOSIS — L30.9 ECZEMA, UNSPECIFIED TYPE: ICD-10-CM

## 2020-03-19 DIAGNOSIS — I10 ESSENTIAL HYPERTENSION: ICD-10-CM

## 2020-03-19 DIAGNOSIS — G20.A1 PARKINSON'S DISEASE (H): ICD-10-CM

## 2020-03-19 PROCEDURE — 99309 SBSQ NF CARE MODERATE MDM 30: CPT | Performed by: NURSE PRACTITIONER

## 2020-03-19 ASSESSMENT — MIFFLIN-ST. JEOR: SCORE: 1293.18

## 2020-03-19 NOTE — LETTER
3/19/2020        RE: Chilango Echevarria  [address]  [address]  [facility] [address]        [facility]  PRIMARY CARE PROVIDER AND CLINIC:  Enrique Ring MD, [address] / [facility] [address]  Chief Complaint   Patient presents with     Hospital F/U     [facility] Medical Record Number:  [id number]  Place of Service where encounter took place:  [facility]  (S) [[id number]]    Chilango Echevarria  is a 85 year old  (10/24/1934), admitted to the above facility from  [facility]. Hospital stay 03/15/2020 through 03/17/2020..  Admitted to this facility for  rehab, medical management and nursing care.    HPI:    HPI information obtained from: facility chart records, facility staff, patient report and [facility] chart review.   Brief Summary of Hospital Course:     85 y.o male with PMH HTN, gout, Parkinson's and GERD admitted to hospital as above after a fall and resultant right hip pain. Xrays negative for fracture. Admitted to TCU for acute rehab.      Updates on Status Since Skilled nursing Admission:     Today patient is found in room. Alert, calm, NAD. Reports mild pain to right hip. Reports did work with therapy and went well. Denies SOB, chest pain or dizziness. Reports appetite is okay. Reports is voiding and moving bowels without issue. Reports slept only fair, and relates this to new room/bed. VS reviewed and are stable.         CODE STATUS/ADVANCE DIRECTIVES DISCUSSION:   CPR/Full code   Patient's living condition: lives alone  ALLERGIES: Lisinopril  PAST MEDICAL HISTORY:  has a past medical history of Aortic stenosis, Ying esophagus, Contact dermatitis and other eczema, due to unspecified cause, Esophageal reflux, Gout, unspecified, Incarcerated hiatal hernia (5/21/2015), Inguinal hernia without mention of obstruction or gangrene, unilateral or unspecified, (not specified as recurrent), Polymyalgia rheumatica (H), Umbilical hernia  without mention of obstruction or gangrene, Unspecified essential hypertension, and Vasomotor rhinitis. He also has no past medical history of Basal cell carcinoma, Malignant melanoma (H), or Squamous cell carcinoma of skin, unspecified.  PAST SURGICAL HISTORY:   has a past surgical history that includes NONSPECIFIC PROCEDURE; NONSPECIFIC PROCEDURE; picc insertion (Right, 5/11/2015); Laparoscopic herniorrhaphy hiatal (N/A, 5/14/2015); Laparoscopic assisted insertion tube jejunostomy (N/A, 5/14/2015); Laparoscopic herniorrhaphy inguinal (Right, 5/14/2015); and Esophagoscopy, gastroscopy, duodenoscopy (EGD), combined (N/A, 5/11/2015).  FAMILY HISTORY: family history includes Breast Cancer in his mother; Cancer (age of onset: 37) in his son; Diabetes in his paternal grandfather.  SOCIAL HISTORY:   reports that he has never smoked. He has never used smokeless tobacco. He reports current alcohol use. He reports that he does not use drugs.    Post Discharge Medication Reconciliation Status: discharge medications reconciled and changed, per note/orders (see AVS)    Current Outpatient Medications   Medication Sig Dispense Refill     acetaminophen (TYLENOL) 325 MG tablet Take 2 tablets (650 mg) by mouth every 4 hours as needed for mild pain       allopurinol (ZYLOPRIM) 100 MG tablet Take 1 tablet (100 mg) by mouth daily 90 tablet 3     bisacodyl (DULCOLAX) 5 MG EC tablet Take 1 tablet (5 mg) by mouth daily as needed for constipation       carbidopa-levodopa (SINEMET)  MG per tablet Take 2 tablets by mouth 3 times daily        fluocinonide (LIDEX) 0.05 % external cream Apply a thin layer to affected area on arms, legs,  and back BID x 2 weeks, tapering with improvement. Do not apply to face or body folds. 60 g 3     metoprolol succinate ER (TOPROL-XL) 25 MG 24 hr tablet TAKE 1 TABLET(25 MG) BY MOUTH EVERY MORNING 90 tablet 3     omeprazole (PRILOSEC) 20 MG DR capsule TAKE 1 CAPSULE BY MOUTH 30 TO 60 MINUTES BEFORE FIRST  "MEAL OF THE DAY 90 capsule 0     order for DME Equipment being ordered: walker 1 each 0     polyethylene glycol (MIRALAX) powder Take 17 g by mouth daily as needed for constipation (1 capful) 510 g 1         *ROS:  10 point ROS of systems including Constitutional, Eyes, Respiratory, Cardiovascular, Gastroenterology, Genitourinary, Integumentary, Musculoskeletal, Psychiatric were all negative except for pertinent positives noted in my HPI.    Vitals:  /80   Pulse 65   Temp 98.2  F (36.8  C)   Resp 16   Ht 1.651 m (5' 5\")   Wt 68.1 kg (150 lb 3.2 oz)   SpO2 99%   BMI 24.99 kg/m    Exam:  GENERAL APPEARANCE: Alert, in no distress   ENT: Mouth and posterior oropharynx normal, moist mucous membranes   EYES: EOM, conjunctivae, lids, pupils and irises normal   NECK: No adenopathy,masses or thyromegaly   RESP: respiratory effort and palpation of chest normal, Lungs clear to auscultation  CV: Palpation and auscultation of heart done , regular rate and rhythm, no murmur, rub, or gallop, peripheral edema + bilateral LE  ABDOMEN: normal bowel sounds, soft, nontender, no hepatosplenomegaly or other masses   : palpation of bladder WNL   M/S: Gait and station normal   Digits and nails normal - BRUCE  SKIN: Inspection of skin and subcutaneous tissue baseline, Palpation of skin and subcutaneous tissue baseline, scattered bruises to upper extremities  NEURO: Cranial nerves 2-12 are normal tested and grossly at patient's baseline, Examination of sensation by touch normal   PSYCH: oriented X self and general situation, affect and mood normal           Lab/Diagnostic data:  Recent labs in [facility] reviewed by me today.     ASSESSMENT/PLAN:  Generalized weakness  Physical deconditioning  Fall, initial encounter  Right hip pain  - imaging negative for evidence of fracture  - patient lives alone in a Condo and would like to return there, gets home care 3 x a week  - continue on acetaminophen for pain/ice prn  - supportive cares " and treatments  - ongoing discharge planning, SW follow and care conferences per unit protocol      Parkinson's disease (H)  - chronic, deconditioned as above  - continue on carbidopa/levodopa  - acute rehab and supportive cares/tx    Essential hypertension  - controlled  - continue on metoprolol with hold parameters    Gastroesophageal reflux disease without esophagitis  - by history, no active s/s  - continue on omeprazole    History of alcohol dependence (H)  - noted, continue on vitamins    Gout, unspecified cause, unspecified chronicity, unspecified site- by history  - no active flares noted     Eczema, unspecified type  - per patient get on arms at times  - lidex prn    Orders written by provider at facility        Electronically signed by:  ROSALINA Soriano CNP                           Sincerely,        ROSALINA Soriano CNP

## 2020-03-19 NOTE — PROGRESS NOTES
[facility]  PRIMARY CARE PROVIDER AND CLINIC:  Enrique Ring MD, [address] / [facility] [address]  Chief Complaint   Patient presents with     Hospital F/U     [facility] Record Number:  [id number]  Place of Service where encounter took place:  [facility]  ([facility]) [[id number]]    Chilango Echevarria  is a 85 year old  (10/24/1934), admitted to the above facility from  [facility]. Hospital stay 03/15/2020 through 03/17/2020..  Admitted to this facility for  rehab, medical management and nursing care.    HPI:    HPI information obtained from: facility chart records, facility staff, patient report and [facility] chart review.   Brief Summary of Hospital Course:     85 y.o male with PMH HTN, gout, Parkinson's and GERD admitted to hospital as above after a fall and resultant right hip pain. Xrays negative for fracture. Admitted to TCU for acute rehab.      Updates on Status Since Skilled nursing Admission:     Today patient is found in room. Alert, calm, NAD. Reports mild pain to right hip. Reports did work with therapy and went well. Denies SOB, chest pain or dizziness. Reports appetite is okay. Reports is voiding and moving bowels without issue. Reports slept only fair, and relates this to new room/bed. VS reviewed and are stable.         CODE STATUS/ADVANCE DIRECTIVES DISCUSSION:   CPR/Full code   Patient's living condition: lives alone  ALLERGIES: Lisinopril  PAST MEDICAL HISTORY:  has a past medical history of Aortic stenosis, Ying esophagus, Contact dermatitis and other eczema, due to unspecified cause, Esophageal reflux, Gout, unspecified, Incarcerated hiatal hernia (5/21/2015), Inguinal hernia without mention of obstruction or gangrene, unilateral or unspecified, (not specified as recurrent), Polymyalgia rheumatica (H), Umbilical hernia without mention of obstruction or gangrene, Unspecified essential hypertension, and Vasomotor  rhinitis. He also has no past medical history of Basal cell carcinoma, Malignant melanoma (H), or Squamous cell carcinoma of skin, unspecified.  PAST SURGICAL HISTORY:   has a past surgical history that includes NONSPECIFIC PROCEDURE; NONSPECIFIC PROCEDURE; picc insertion (Right, 5/11/2015); Laparoscopic herniorrhaphy hiatal (N/A, 5/14/2015); Laparoscopic assisted insertion tube jejunostomy (N/A, 5/14/2015); Laparoscopic herniorrhaphy inguinal (Right, 5/14/2015); and Esophagoscopy, gastroscopy, duodenoscopy (EGD), combined (N/A, 5/11/2015).  FAMILY HISTORY: family history includes Breast Cancer in his mother; Cancer (age of onset: 37) in his son; Diabetes in his paternal grandfather.  SOCIAL HISTORY:   reports that he has never smoked. He has never used smokeless tobacco. He reports current alcohol use. He reports that he does not use drugs.    Post Discharge Medication Reconciliation Status: discharge medications reconciled and changed, per note/orders (see AVS)    Current Outpatient Medications   Medication Sig Dispense Refill     acetaminophen (TYLENOL) 325 MG tablet Take 2 tablets (650 mg) by mouth every 4 hours as needed for mild pain       allopurinol (ZYLOPRIM) 100 MG tablet Take 1 tablet (100 mg) by mouth daily 90 tablet 3     bisacodyl (DULCOLAX) 5 MG EC tablet Take 1 tablet (5 mg) by mouth daily as needed for constipation       carbidopa-levodopa (SINEMET)  MG per tablet Take 2 tablets by mouth 3 times daily        fluocinonide (LIDEX) 0.05 % external cream Apply a thin layer to affected area on arms, legs,  and back BID x 2 weeks, tapering with improvement. Do not apply to face or body folds. 60 g 3     metoprolol succinate ER (TOPROL-XL) 25 MG 24 hr tablet TAKE 1 TABLET(25 MG) BY MOUTH EVERY MORNING 90 tablet 3     omeprazole (PRILOSEC) 20 MG DR capsule TAKE 1 CAPSULE BY MOUTH 30 TO 60 MINUTES BEFORE FIRST MEAL OF THE DAY 90 capsule 0     order for DME Equipment being ordered: walker 1 each 0      "polyethylene glycol (MIRALAX) powder Take 17 g by mouth daily as needed for constipation (1 capful) 510 g 1         *ROS:  10 point ROS of systems including Constitutional, Eyes, Respiratory, Cardiovascular, Gastroenterology, Genitourinary, Integumentary, Musculoskeletal, Psychiatric were all negative except for pertinent positives noted in my HPI.    Vitals:  /80   Pulse 65   Temp 98.2  F (36.8  C)   Resp 16   Ht 1.651 m (5' 5\")   Wt 68.1 kg (150 lb 3.2 oz)   SpO2 99%   BMI 24.99 kg/m    Exam:  GENERAL APPEARANCE: Alert, in no distress   ENT: Mouth and posterior oropharynx normal, moist mucous membranes   EYES: EOM, conjunctivae, lids, pupils and irises normal   NECK: No adenopathy,masses or thyromegaly   RESP: respiratory effort and palpation of chest normal, Lungs clear to auscultation  CV: Palpation and auscultation of heart done , regular rate and rhythm, no murmur, rub, or gallop, peripheral edema + bilateral LE  ABDOMEN: normal bowel sounds, soft, nontender, no hepatosplenomegaly or other masses   : palpation of bladder WNL   M/S: Gait and station normal   Digits and nails normal - BRUCE  SKIN: Inspection of skin and subcutaneous tissue baseline, Palpation of skin and subcutaneous tissue baseline, scattered bruises to upper extremities  NEURO: Cranial nerves 2-12 are normal tested and grossly at patient's baseline, Examination of sensation by touch normal   PSYCH: oriented X self and general situation, affect and mood normal           Lab/Diagnostic data:  Recent labs in [facility] reviewed by me today.     ASSESSMENT/PLAN:  Generalized weakness  Physical deconditioning  Fall, initial encounter  Right hip pain  - imaging negative for evidence of fracture  - patient lives alone in a Condo and would like to return there, gets home care 3 x a week  - continue on acetaminophen for pain/ice prn  - supportive cares and treatments  - ongoing discharge planning, SW follow and care conferences per unit " protocol      Parkinson's disease (H)  - chronic, deconditioned as above  - continue on carbidopa/levodopa  - acute rehab and supportive cares/tx    Essential hypertension  - controlled  - continue on metoprolol with hold parameters    Gastroesophageal reflux disease without esophagitis  - by history, no active s/s  - continue on omeprazole    History of alcohol dependence (H)  - noted, continue on vitamins    Gout, unspecified cause, unspecified chronicity, unspecified site- by history  - no active flares noted     Eczema, unspecified type  - per patient get on arms at times  - lidex prn    Orders written by provider at facility        Electronically signed by:  ROSALINA Soriano CNP

## 2020-03-26 ENCOUNTER — NURSING HOME VISIT (OUTPATIENT)
Dept: GERIATRICS | Facility: CLINIC | Age: 85
End: 2020-03-26
Payer: MEDICARE

## 2020-03-26 ENCOUNTER — HOSPITAL LABORATORY (OUTPATIENT)
Dept: OTHER | Facility: CLINIC | Age: 85
End: 2020-03-26

## 2020-03-26 VITALS
WEIGHT: 151.2 LBS | RESPIRATION RATE: 16 BRPM | SYSTOLIC BLOOD PRESSURE: 112 MMHG | TEMPERATURE: 97.8 F | BODY MASS INDEX: 25.19 KG/M2 | HEIGHT: 65 IN | HEART RATE: 77 BPM | DIASTOLIC BLOOD PRESSURE: 69 MMHG | OXYGEN SATURATION: 98 %

## 2020-03-26 DIAGNOSIS — D64.9 ANEMIA, UNSPECIFIED TYPE: ICD-10-CM

## 2020-03-26 DIAGNOSIS — I10 ESSENTIAL HYPERTENSION: Primary | ICD-10-CM

## 2020-03-26 LAB
ANION GAP SERPL CALCULATED.3IONS-SCNC: 2 MMOL/L (ref 3–14)
BASOPHILS # BLD AUTO: 0 10E9/L (ref 0–0.2)
BASOPHILS NFR BLD AUTO: 0.2 %
BUN SERPL-MCNC: 38 MG/DL (ref 7–30)
CALCIUM SERPL-MCNC: 8.5 MG/DL (ref 8.5–10.1)
CHLORIDE SERPL-SCNC: 109 MMOL/L (ref 94–109)
CO2 SERPL-SCNC: 29 MMOL/L (ref 20–32)
CREAT SERPL-MCNC: 0.9 MG/DL (ref 0.66–1.25)
DIFFERENTIAL METHOD BLD: ABNORMAL
EOSINOPHIL # BLD AUTO: 0.2 10E9/L (ref 0–0.7)
EOSINOPHIL NFR BLD AUTO: 3.8 %
ERYTHROCYTE [DISTWIDTH] IN BLOOD BY AUTOMATED COUNT: 14 % (ref 10–15)
GFR SERPL CREATININE-BSD FRML MDRD: 77 ML/MIN/{1.73_M2}
GLUCOSE SERPL-MCNC: 83 MG/DL (ref 70–99)
HCT VFR BLD AUTO: 36.2 % (ref 40–53)
HGB BLD-MCNC: 11.3 G/DL (ref 13.3–17.7)
IMM GRANULOCYTES # BLD: 0 10E9/L (ref 0–0.4)
IMM GRANULOCYTES NFR BLD: 0.4 %
LYMPHOCYTES # BLD AUTO: 1.4 10E9/L (ref 0.8–5.3)
LYMPHOCYTES NFR BLD AUTO: 29 %
MCH RBC QN AUTO: 30.4 PG (ref 26.5–33)
MCHC RBC AUTO-ENTMCNC: 31.2 G/DL (ref 31.5–36.5)
MCV RBC AUTO: 97 FL (ref 78–100)
MONOCYTES # BLD AUTO: 0.2 10E9/L (ref 0–1.3)
MONOCYTES NFR BLD AUTO: 4.4 %
NEUTROPHILS # BLD AUTO: 3 10E9/L (ref 1.6–8.3)
NEUTROPHILS NFR BLD AUTO: 62.2 %
NRBC # BLD AUTO: 0 10*3/UL
NRBC BLD AUTO-RTO: 0 /100
PLATELET # BLD AUTO: 180 10E9/L (ref 150–450)
POTASSIUM SERPL-SCNC: 3.6 MMOL/L (ref 3.4–5.3)
RBC # BLD AUTO: 3.72 10E12/L (ref 4.4–5.9)
SODIUM SERPL-SCNC: 140 MMOL/L (ref 133–144)
WBC # BLD AUTO: 4.8 10E9/L (ref 4–11)

## 2020-03-26 PROCEDURE — 99309 SBSQ NF CARE MODERATE MDM 30: CPT | Performed by: NURSE PRACTITIONER

## 2020-03-26 ASSESSMENT — MIFFLIN-ST. JEOR: SCORE: 1297.72

## 2020-03-26 NOTE — PROGRESS NOTES
[facility]  [facility] Record Number:  [id number]  Place of Service where encounter took place:  [facility]  ([facility]) [[id number]]  Chief Complaint   Patient presents with     Nursing Home Acute       HPI:    Chilango Echevarria  is a 85 year old (10/24/1934), who is being seen today for an episodic care visit.  HPI information obtained from: facility chart records, facility staff, patient report and [facility] chart review. Today's concern is:     Essential hypertension  Anemia, unspecified type     Patient seen today for acute visit in TCU to follow-up on adjustments made to BP medications and today's lab results.    CBC RESULTS:   Recent Labs   Lab Test 03/26/20  0744   WBC 4.8   RBC 3.72*   HGB 11.3*   HCT 36.2*   MCV 97   MCH 30.4   MCHC 31.2*   RDW 14.0          Last Basic Metabolic Panel:  Recent Labs   Lab Test 03/26/20  0744      POTASSIUM 3.6   CHLORIDE 109   MESERET 8.5   CO2 29   BUN 38*   CR 0.90   GLC 83     TSH   Date Value Ref Range Status   10/26/2018 1.14 0.40 - 4.00 mU/L Final   04/08/2015 1.25 0.40 - 4.00 mU/L Final     Comment:     Effective 7/30/2014, the reference range for this assay has changed to reflect   new instrumentation/methodology.     ]    No results found for: A1C          Past Medical and Surgical History reviewed in Epic today.    MEDICATIONS:    Current Outpatient Medications   Medication Sig Dispense Refill     acetaminophen (TYLENOL) 325 MG tablet Take 2 tablets (650 mg) by mouth every 4 hours as needed for mild pain       allopurinol (ZYLOPRIM) 100 MG tablet Take 1 tablet (100 mg) by mouth daily 90 tablet 3     bisacodyl (DULCOLAX) 5 MG EC tablet Take 1 tablet (5 mg) by mouth daily as needed for constipation       carbidopa-levodopa (SINEMET)  MG per tablet Take 2 tablets by mouth 3 times daily        fluocinonide (LIDEX) 0.05 % external cream Apply a thin layer to affected area on arms, legs,  and back BID x 2 weeks, tapering  "with improvement. Do not apply to face or body folds. 60 g 3     metoprolol succinate ER (TOPROL-XL) 25 MG 24 hr tablet TAKE 1 TABLET(25 MG) BY MOUTH EVERY MORNING 90 tablet 3     omeprazole (PRILOSEC) 20 MG DR capsule TAKE 1 CAPSULE BY MOUTH 30 TO 60 MINUTES BEFORE FIRST MEAL OF THE DAY 90 capsule 0     order for DME Equipment being ordered: walker 1 each 0     polyethylene glycol (MIRALAX) powder Take 17 g by mouth daily as needed for constipation (1 capful) 510 g 1     REVIEW OF SYSTEMS:  10 point ROS of systems including Constitutional, Eyes, Respiratory, Cardiovascular, Gastroenterology, Genitourinary, Integumentary, Musculoskeletal, Psychiatric were all negative except for pertinent positives noted in my HPI.    Objective:  /69   Pulse 77   Temp 97.8  F (36.6  C)   Resp 16   Ht 1.651 m (5' 5\")   Wt 68.6 kg (151 lb 3.2 oz)   SpO2 98%   BMI 25.16 kg/m    Exam:  GENERAL APPEARANCE:  Alert, in no distress, appears healthy, oriented, cooperative  ENT:  Mouth and posterior oropharynx normal, moist mucous membranes, [place]  EYES:  EOM, conjunctivae, lids, pupils and irises normal  RESP:  no respiratory distress  CV:  no edema  M/S:   Gait and station abnormal - BECCA 2/2 patient being in bed  Digits and nails abnormal - arthritic changes present  SKIN:  Inspection of skin and subcutaneous tissueskin thin and dry  PSYCH:  oriented X 3, normal insight, judgement and memory, affect and mood normal     Labs:   Recent labs in [facility] reviewed by me today.     ASSESSMENT/PLAN:  (I10) Essential hypertension  (primary encounter diagnosis)  Comment: Metoprolol decreased recently to 12.5mg  Last BPs: 112/69, 101/65, 93/69  Plan: BP improving nicely with decrease. Continue to monitor. VS per facility protocol.    (D64.9) Anemia, unspecified type  Comment: Hgb today stable   Ref. Range 10/26/2018 15:47 3/15/2020 20:55 3/26/2020 07:44   Hemoglobin Latest Ref Range: 13.3 - 17.7 g/dL 12.9 (L) 11.9 (L) 11.3 (L)   Plan: Stable " CBC as needed.    Orders written by provider at facility  NNO    Electronically signed by:  Dr. Amie Dye, APRN, DNP, A/GNP-[facility]  [facility] [address] [address]     Cell: [phone]  Fax: [phone]  Email: [email or web address]

## 2020-04-01 ENCOUNTER — VIRTUAL VISIT (OUTPATIENT)
Dept: GERIATRICS | Facility: CLINIC | Age: 85
End: 2020-04-01
Payer: MEDICARE

## 2020-04-01 VITALS
WEIGHT: 141.4 LBS | SYSTOLIC BLOOD PRESSURE: 111 MMHG | HEIGHT: 65 IN | HEART RATE: 75 BPM | TEMPERATURE: 98.9 F | DIASTOLIC BLOOD PRESSURE: 59 MMHG | BODY MASS INDEX: 23.56 KG/M2 | RESPIRATION RATE: 16 BRPM | OXYGEN SATURATION: 100 %

## 2020-04-01 DIAGNOSIS — I10 ESSENTIAL HYPERTENSION: ICD-10-CM

## 2020-04-01 DIAGNOSIS — R29.6 FALLS FREQUENTLY: ICD-10-CM

## 2020-04-01 DIAGNOSIS — M10.9 GOUT, UNSPECIFIED CAUSE, UNSPECIFIED CHRONICITY, UNSPECIFIED SITE: ICD-10-CM

## 2020-04-01 DIAGNOSIS — F10.21 HISTORY OF ALCOHOL DEPENDENCE (H): ICD-10-CM

## 2020-04-01 DIAGNOSIS — M25.551 HIP PAIN, RIGHT: ICD-10-CM

## 2020-04-01 DIAGNOSIS — G20.A1 PARKINSON'S DISEASE (H): ICD-10-CM

## 2020-04-01 DIAGNOSIS — M62.81 GENERALIZED MUSCLE WEAKNESS: ICD-10-CM

## 2020-04-01 DIAGNOSIS — R05.9 COUGH: Primary | ICD-10-CM

## 2020-04-01 DIAGNOSIS — K21.9 GASTROESOPHAGEAL REFLUX DISEASE WITHOUT ESOPHAGITIS: ICD-10-CM

## 2020-04-01 DIAGNOSIS — R53.81 PHYSICAL DECONDITIONING: ICD-10-CM

## 2020-04-01 DIAGNOSIS — L30.9 ECZEMA, UNSPECIFIED TYPE: ICD-10-CM

## 2020-04-01 PROCEDURE — 99309 SBSQ NF CARE MODERATE MDM 30: CPT | Mod: GT | Performed by: NURSE PRACTITIONER

## 2020-04-01 ASSESSMENT — MIFFLIN-ST. JEOR: SCORE: 1253.27

## 2020-04-01 NOTE — LETTER
"    4/1/2020        RE: Chilango Echevarria  [address]  [facility] [id number]        [place] [facility] E-VISIT   Chilango Echevarria is being evaluated via a billable video visit due to the restrictions of the Covid-19 pandemic.   The patient has been notified of following:  \"This video visit will be conducted via a call between you and your provider. We have found that certain health care needs can be provided without the need for an in-person physical exam.  This service lets us provide the care you need with a video conversation. If during the course of the call the provider feels a video visit is not appropriate, you will not be charged for this service.\"   The provider has received verbal consent for a Video Visit from the patient or first contact? Yes  Patient  or facility staff would like the video invitation sent by: Send to e-mail at: [email or web address]  Video Start Time: 2;23 pm  [place] Medical Record Number:  [id number]  Place of Location at the time of visit: [facility]  Chief Complaint   Patient presents with     Video Visit     Nursing Home Acute     HPI:  Chilango Echevarria  is a 85 year old (10/24/1934), who is being seen today for an E-visit.  HPI information obtained from: facility chart records, facility staff, patient report and [facility] chart review.     Seen today for follow up, cough and hypotension  Addendum- also noted plans for discharge 4/6    85 y.o male with PMH HTN, gout, Parkinson's and GERD admitted to hospital as above after a fall and resultant right hip pain. Xrays negative for fracture. Admitted to TCU for acute rehab.    Nursing reports patient developed new and worsening non-productive cough this week, also isolated hypotension when working with therapy this week, some dizziness noted resolved when sat down and rested. VS have otherwise been stable. O2 sats are > 90% . Per DON patient put in droplet precautions d/t COVID 19. " "  Addendum 4/2: Per SW patient is to discharge to A/L of Monday 4/6.    Patient denies SOB, chest pain or dizziness. Reports cough as above.           Past Medical and Surgical History reviewed in Epic today.  MEDICATIONS:      Current Outpatient Medications   Medication Sig Dispense Refill     acetaminophen (TYLENOL) 325 MG tablet Take 2 tablets (650 mg) by mouth every 4 hours as needed for mild pain       allopurinol (ZYLOPRIM) 100 MG tablet Take 1 tablet (100 mg) by mouth daily 90 tablet 3     bisacodyl (DULCOLAX) 5 MG EC tablet Take 1 tablet (5 mg) by mouth daily as needed for constipation       carbidopa-levodopa (SINEMET)  MG per tablet Take 2 tablets by mouth 3 times daily        fluocinonide (LIDEX) 0.05 % external cream Apply a thin layer to affected area on arms, legs,  and back BID x 2 weeks, tapering with improvement. Do not apply to face or body folds. 60 g 3     metoprolol succinate ER (TOPROL-XL) 25 MG 24 hr tablet TAKE 1 TABLET(25 MG) BY MOUTH EVERY MORNING 90 tablet 3     omeprazole (PRILOSEC) 20 MG DR capsule TAKE 1 CAPSULE BY MOUTH 30 TO 60 MINUTES BEFORE FIRST MEAL OF THE DAY 90 capsule 0     order for DME Equipment being ordered: walker 1 each 0     polyethylene glycol (MIRALAX) powder Take 17 g by mouth daily as needed for constipation (1 capful) 510 g 1     REVIEW OF SYSTEMS: 4 point ROS including Respiratory, CV, GI and , other than that noted in the HPI,  is negative  Objective: /59   Pulse 75   Temp 98.9  F (37.2  C)   Resp 16   Ht 1.651 m (5' 5\")   Wt 64.1 kg (141 lb 6.4 oz)   SpO2 100%   BMI 23.53 kg/m     E-visit exam done given COVID-19 precautions.     Resp: Effort WNL (per nursing LS are clear to auscultation)  CV: VS as above, (per nursing no edema)  Abd- soft, nontender, BS +  Musc- BRUCE  Psych- alert, calm, pleasant    Labs:   Recent labs in [facility] reviewed by me today.     ASSESSMENT/PLAN:    Cough  - new, non productive, afebrile at this time. O2 sats stable and " no respiratory distress noted.   - orders to monitor VS q shift and report any fever, tachycardia, hypotension or worsening cough/respiratory status  - maintain droplet precautions  Addendum- 4/2 given new plans to discharge to new A/L 4/6 A/L informed of patient symptoms and have decided cannot take until either symptoms deana or COVID 19 test performed and negative.   - Orders given to maintain droplet precautions until s/s/ resolved x 3 days. Orders given for COVID 19 test, but ability/supplies yet forthcoming - perhaps Friday or weekend    Generalized weakness  Physical deconditioning  Fall, initial encounter  Right hip pain  - imaging negative for evidence of fracture  - patient was living alone in [facility] but now plans in place to admit to A/L   - continue on acetaminophen for pain which has been lessening  - supportive cares and treatments  - home with home care as outlined        Parkinson's disease (H)  - chronic, deconditioned - needs more supportive living arrangement, hence plans for A/L  - continue on carbidopa/levodopa     Essential hypertension  - controlled- isolated hypotension ? Autonomic, monitoring orthostatic readings. 100-130/60-70s  - continue on metoprolol with hold parameters     Gastroesophageal reflux disease without esophagitis  - by history, no active s/s  - continue on omeprazole     History of alcohol dependence (H)  - noted, continue on vitamins     Gout, unspecified cause, unspecified chronicity, unspecified site- by history  - no active flares noted      Eczema, unspecified type  - per patient get on arms at times- none currently  - lidex prn    Orders as above    Total E visit provider time 38 minutes including 20 minutes face to face visit and time spent reviewing pertinent medical and social history, labs, VS, and order compeltion.    Electronically signed by:  ROSALINA Soriano CNP       Video-Visit Details  Type of service:  Video Visit  Video End Time (time video  stopped): 3:13 pm   Distant Location (provider location):  [place] [facility]             Sincerely,        ROSALINA Soriano CNP

## 2020-04-01 NOTE — PROGRESS NOTES
"[place] [facility] E-VISIT   Chilango Echevarria is being evaluated via a billable video visit due to the restrictions of the Covid-19 pandemic.   The patient has been notified of following:  \"This video visit will be conducted via a call between you and your provider. We have found that certain health care needs can be provided without the need for an in-person physical exam.  This service lets us provide the care you need with a video conversation. If during the course of the call the provider feels a video visit is not appropriate, you will not be charged for this service.\"   The provider has received verbal consent for a Video Visit from the patient or first contact? Yes  Patient  or facility staff would like the video invitation sent by: Send to e-mail at: [email or web address]  Video Start Time: 2;23 pm  [place] Medical Record Number:  [id number]  Place of Location at the time of visit: [facility]  Chief Complaint   Patient presents with     Video Visit     Nursing Home Acute     HPI:  Chilango Echevarria  is a 85 year old (10/24/1934), who is being seen today for an E-visit.  HPI information obtained from: facility chart records, facility staff, patient report and [place] Epic chart review.     Seen today for follow up, cough and hypotension  Addendum- also noted plans for discharge 4/6    85 y.o male with PMH HTN, gout, Parkinson's and GERD admitted to hospital as above after a fall and resultant right hip pain. Xrays negative for fracture. Admitted to TCU for acute rehab.    Nursing reports patient developed new and worsening non-productive cough this week, also isolated hypotension when working with therapy this week, some dizziness noted resolved when sat down and rested. VS have otherwise been stable. O2 sats are > 90% . Per DON patient put in droplet precautions d/t COVID 19.   Addendum 4/2: Per SW patient is to discharge to A/L of Monday 4/6.    Patient denies SOB, chest pain " "or dizziness. Reports cough as above.           Past Medical and Surgical History reviewed in Epic today.  MEDICATIONS:      Current Outpatient Medications   Medication Sig Dispense Refill     acetaminophen (TYLENOL) 325 MG tablet Take 2 tablets (650 mg) by mouth every 4 hours as needed for mild pain       allopurinol (ZYLOPRIM) 100 MG tablet Take 1 tablet (100 mg) by mouth daily 90 tablet 3     bisacodyl (DULCOLAX) 5 MG EC tablet Take 1 tablet (5 mg) by mouth daily as needed for constipation       carbidopa-levodopa (SINEMET)  MG per tablet Take 2 tablets by mouth 3 times daily        fluocinonide (LIDEX) 0.05 % external cream Apply a thin layer to affected area on arms, legs,  and back BID x 2 weeks, tapering with improvement. Do not apply to face or body folds. 60 g 3     metoprolol succinate ER (TOPROL-XL) 25 MG 24 hr tablet TAKE 1 TABLET(25 MG) BY MOUTH EVERY MORNING 90 tablet 3     omeprazole (PRILOSEC) 20 MG DR capsule TAKE 1 CAPSULE BY MOUTH 30 TO 60 MINUTES BEFORE FIRST MEAL OF THE DAY 90 capsule 0     order for DME Equipment being ordered: walker 1 each 0     polyethylene glycol (MIRALAX) powder Take 17 g by mouth daily as needed for constipation (1 capful) 510 g 1     REVIEW OF SYSTEMS: 4 point ROS including Respiratory, CV, GI and , other than that noted in the HPI,  is negative  Objective: /59   Pulse 75   Temp 98.9  F (37.2  C)   Resp 16   Ht 1.651 m (5' 5\")   Wt 64.1 kg (141 lb 6.4 oz)   SpO2 100%   BMI 23.53 kg/m     E-visit exam done given COVID-19 precautions.     Resp: Effort WNL (per nursing LS are clear to auscultation)  CV: VS as above, (per nursing no edema)  Abd- soft, nontender, BS +  Musc- BRUCE  Psych- alert, calm, pleasant    Labs:   Recent labs in [facility] reviewed by me today.     ASSESSMENT/PLAN:    Cough  - new, non productive, afebrile at this time. O2 sats stable and no respiratory distress noted.   - orders to monitor VS q shift and report any fever, tachycardia, " hypotension or worsening cough/respiratory status  - maintain droplet precautions  Addendum- 4/2 given new plans to discharge to new A/L 4/6 A/L informed of patient symptoms and have decided cannot take until either symptoms deana or COVID 19 test performed and negative.   - Orders given to maintain droplet precautions until s/s/ resolved x 3 days. Orders given for COVID 19 test, but ability/supplies yet forthcoming - perhaps Friday or weekend    Generalized weakness  Physical deconditioning  Fall, initial encounter  Right hip pain  - imaging negative for evidence of fracture  - patient was living alone in [facility] but now plans in place to admit to A/L   - continue on acetaminophen for pain which has been lessening  - supportive cares and treatments  - home with home care as outlined        Parkinson's disease (H)  - chronic, deconditioned - needs more supportive living arrangement, hence plans for A/L  - continue on carbidopa/levodopa     Essential hypertension  - controlled- isolated hypotension ? Autonomic, monitoring orthostatic readings. 100-130/60-70s  - continue on metoprolol with hold parameters     Gastroesophageal reflux disease without esophagitis  - by history, no active s/s  - continue on omeprazole     History of alcohol dependence (H)  - noted, continue on vitamins     Gout, unspecified cause, unspecified chronicity, unspecified site- by history  - no active flares noted      Eczema, unspecified type  - per patient get on arms at times- none currently  - lidex prn    Orders as above    Total E visit provider time 38 minutes including 20 minutes face to face visit and time spent reviewing pertinent medical and social history, labs, VS, and order compeltion.    Electronically signed by:  ROSALINA Soriano CNP       Video-Visit Details  Type of service:  Video Visit  Video End Time (time video stopped): 3:13 pm   Distant Location (provider location):  [place] [facility]

## 2020-04-02 ENCOUNTER — HOSPITAL LABORATORY (OUTPATIENT)
Dept: OTHER | Facility: CLINIC | Age: 85
End: 2020-04-02

## 2020-04-02 LAB
ANION GAP SERPL CALCULATED.3IONS-SCNC: 6 MMOL/L (ref 3–14)
BUN SERPL-MCNC: 42 MG/DL (ref 7–30)
CALCIUM SERPL-MCNC: 8.7 MG/DL (ref 8.5–10.1)
CHLORIDE SERPL-SCNC: 108 MMOL/L (ref 94–109)
CO2 SERPL-SCNC: 27 MMOL/L (ref 20–32)
CREAT SERPL-MCNC: 0.82 MG/DL (ref 0.66–1.25)
ERYTHROCYTE [DISTWIDTH] IN BLOOD BY AUTOMATED COUNT: 14.1 % (ref 10–15)
GFR SERPL CREATININE-BSD FRML MDRD: 80 ML/MIN/{1.73_M2}
GLUCOSE SERPL-MCNC: 87 MG/DL (ref 70–99)
HCT VFR BLD AUTO: 36.3 % (ref 40–53)
HGB BLD-MCNC: 11.5 G/DL (ref 13.3–17.7)
MCH RBC QN AUTO: 30.8 PG (ref 26.5–33)
MCHC RBC AUTO-ENTMCNC: 31.7 G/DL (ref 31.5–36.5)
MCV RBC AUTO: 97 FL (ref 78–100)
PLATELET # BLD AUTO: 219 10E9/L (ref 150–450)
POTASSIUM SERPL-SCNC: 4 MMOL/L (ref 3.4–5.3)
RBC # BLD AUTO: 3.73 10E12/L (ref 4.4–5.9)
SODIUM SERPL-SCNC: 141 MMOL/L (ref 133–144)
WBC # BLD AUTO: 6.6 10E9/L (ref 4–11)

## 2020-04-06 ENCOUNTER — HOSPITAL LABORATORY (OUTPATIENT)
Dept: OTHER | Facility: CLINIC | Age: 85
End: 2020-04-06

## 2020-04-06 LAB
C PNEUM DNA SPEC QL NAA+PROBE: NORMAL
FLUAV H1 2009 PAND RNA SPEC QL NAA+PROBE: NORMAL
FLUAV H1 RNA SPEC QL NAA+PROBE: NORMAL
FLUAV H3 RNA SPEC QL NAA+PROBE: NORMAL
FLUAV RNA SPEC QL NAA+PROBE: NORMAL
FLUBV RNA SPEC QL NAA+PROBE: NORMAL
HADV DNA SPEC QL NAA+PROBE: NORMAL
HCOV PNL SPEC NAA+PROBE: NORMAL
HMPV RNA SPEC QL NAA+PROBE: NORMAL
HPIV1 RNA SPEC QL NAA+PROBE: NORMAL
HPIV2 RNA SPEC QL NAA+PROBE: NORMAL
HPIV3 RNA SPEC QL NAA+PROBE: NORMAL
HPIV4 RNA SPEC QL NAA+PROBE: NORMAL
M PNEUMO DNA SPEC QL NAA+PROBE: NORMAL
MICROBIOLOGIST REVIEW: NORMAL
RSV RNA SPEC QL NAA+PROBE: NORMAL
RSV RNA SPEC QL NAA+PROBE: NORMAL
RV+EV RNA SPEC QL NAA+PROBE: NORMAL

## 2020-04-06 NOTE — PATIENT INSTRUCTIONS
Chilango Echevarria  YOB: 1934                                 SSN: [id number]  [facility] MRN#:[id number]  Medical Center Admission Date: 3/17/20 Discharge Date: 4/6/2020   PHYSICIAN's DISCHARGE SUMMARY / ORDER SHEET  1. Discharge to: [facility]  2. Medications:      Current Outpatient Medications   Medication Sig     acetaminophen (TYLENOL) 325 MG tablet Take 2 tablets (650 mg) by mouth every 4 hours as needed for mild pain     allopurinol (ZYLOPRIM) 100 MG tablet Take 1 tablet (100 mg) by mouth daily     bisacodyl (DULCOLAX) 5 MG EC tablet Take 1 tablet (5 mg) by mouth daily as needed for constipation     carbidopa-levodopa (SINEMET)  MG per tablet Take 2 tablets by mouth 3 times daily      fluocinonide (LIDEX) 0.05 % external cream Apply a thin layer to affected area on arms, legs,  and back BID x 2 weeks, tapering with improvement. Do not apply to face or body folds.     metoprolol succinate ER (TOPROL-XL) 25 MG 24 hr tablet TAKE 1 TABLET(25 MG) BY MOUTH EVERY MORNING     omeprazole (PRILOSEC) 20 MG DR capsule TAKE 1 CAPSULE BY MOUTH 30 TO 60 MINUTES BEFORE FIRST MEAL OF THE DAY     order for DME Equipment being ordered: walker     polyethylene glycol (MIRALAX) powder Take 17 g by mouth daily as needed for constipation (1 capful)       --see Home Medication List/Physician Order  3. Diet: regular  4. Condition on Discharge: Good  5. Level of Activity: up as tolerated and up with assistance  6. Appointment:  Follow up with primary MD in next 7 days following discharge  7. Referrals: [facility], PT, OT, HHA, RN  8. Other orders/comments:     Discharge to home with current medications and treatments  Discharge to home care as outlined above  Nursing call in 30 days of needed medications to pharmacy of choice upon discharge          ______________________________  ROSALINA Soriano [facility]   Department of [place] [facility]                   4/6/2020

## 2020-04-07 LAB
SARS-COV-2 PCR COMMENT: NORMAL
SARS-COV-2 RNA SPEC QL NAA+PROBE: NORMAL
SARS-COV-2 RNA SPEC QL NAA+PROBE: NORMAL
SPECIMEN SOURCE: NORMAL
SPECIMEN SOURCE: NORMAL

## 2020-04-08 DIAGNOSIS — K21.9 GASTROESOPHAGEAL REFLUX DISEASE WITHOUT ESOPHAGITIS: ICD-10-CM

## 2020-04-08 NOTE — TELEPHONE ENCOUNTER
"Requested Prescriptions   Pending Prescriptions Disp Refills     omeprazole (PRILOSEC) 20 MG DR capsule [Pharmacy Med Name: OMEPRAZOLE 20MG CAPSULES] 90 capsule 0     Sig: TAKE 1 CAPSULE BY MOUTH 30 TO 60 MINUTES BEFORE FIRST MEAL OF THE DAY       PPI Protocol Passed - 4/8/2020  2:40 PM        Passed - Not on Clopidogrel (unless Pantoprazole ordered)        Passed - No diagnosis of osteoporosis on record        Passed - Recent (12 mo) or future (30 days) visit within the authorizing provider's specialty     Patient has had an office visit with the authorizing provider or a provider within the authorizing providers department within the previous 12 mos or has a future within next 30 days. See \"Patient Info\" tab in inbasket, or \"Choose Columns\" in Meds & Orders section of the refill encounter.              Passed - Medication is active on med list        Passed - Patient is age 18 or older           "

## 2020-04-13 ENCOUNTER — TELEPHONE (OUTPATIENT)
Dept: INTERNAL MEDICINE | Facility: CLINIC | Age: 85
End: 2020-04-13

## 2020-04-13 NOTE — TELEPHONE ENCOUNTER
[facility] now requests orders and shares plan of care/discharge summaries for some patients through [employer].  Please REPLY TO THIS MESSAGE OR ROUTE BACK TO THE AUTHOR in order to give authorization for orders when needed.  This is considered a verbal order, you will still receive a faxed copy of orders for signature.  Thank you for your assistance in improving collaboration for our patients.    ORDER   PT 1W1, 2W2, 1W1 for therex, gait/balance training, trial 4WW and assist in getting one if indicated, education.     MD SUMMARY/PLAN OF CARE  Pt. presents today with some LE weakness, poor balance, and overall decreased mobility.  Pt. is not well kept, with dirty clothes and body odor.  He will be having HHA for bathing soon.  Pt. also has slight gait antalgia, and would benefit from using the walker, possibly getting a 4WW.  Will trial with pt.  During eval, PT and pt. were informed that he must stay in his own apartment for 14 days from move in date for quarrantine.  Pt. will benefit from PT 1W1, 2W2, 1W1 for therex, gait/balance training, trial 4WW and assist in getting one if indicated, education.       Kindra Zeng, PT  [facility]  [phone]  [email or web address]

## 2020-04-17 ENCOUNTER — TELEPHONE (OUTPATIENT)
Dept: INTERNAL MEDICINE | Facility: CLINIC | Age: 85
End: 2020-04-17

## 2020-04-17 NOTE — TELEPHONE ENCOUNTER
[facility] PT calling.    The facility pt is in, [facility], is not allowing therapy personal into the building.  Unable to do PT at this time.

## 2020-04-28 ENCOUNTER — TELEPHONE (OUTPATIENT)
Dept: INTERNAL MEDICINE | Facility: CLINIC | Age: 85
End: 2020-04-28

## 2020-04-29 NOTE — TELEPHONE ENCOUNTER
[facility] now requests orders and shares plan of care/discharge summaries for some patients through [employer].  Please REPLY TO THIS MESSAGE OR ROUTE BACK TO THE AUTHOR in order to give authorization for orders when needed.  This is considered a verbal order, you will still receive a faxed copy of orders for signature.  Thank you for your assistance in improving collaboration for our patients.    HHA ORDER CHANGE TO 1 week.      DAVID Baeza.  I am sending you a new order/request for Bill as he was refusing HHA visits initially, which he did really need for hygiene and skin integrity concerns.  I was able to convince him to do weekly visits, decrease from 2x/week initially requested.  Our HHA also tried to convince him about doing 2x/wk but he stuck with what he and I agreed to.  He even called me after their visit and said it went well and he liked the HHA.    SN visits continue 2x/week.  I will be reassessing next week for additional visits needs after that visit.     Thank you.

## 2020-04-30 ENCOUNTER — PATIENT OUTREACH (OUTPATIENT)
Dept: CARE COORDINATION | Facility: CLINIC | Age: 85
End: 2020-04-30

## 2020-04-30 DIAGNOSIS — Z71.89 COUNSELING AND COORDINATION OF CARE: Primary | ICD-10-CM

## 2020-04-30 NOTE — PROGRESS NOTES
Clinic Care Coordination Contact    Clinic Care Coordination Contact  OUTREACH    Referral Information:  Referral Source: Home Care    Primary Diagnosis: Psychosocial    Chief Complaint   Patient presents with     Clinic Care Coordination - Homecare/TCU        Clinical Concerns:  SW CC outreach to patient for home care discharge follow up. Patient was brief and reported he has no questions/concerns at this time. SW CC encouraged patient to reach out to SW CC or clinic if things change, agreeable to plan.     SW CC unsure who the e-mail on file is for, name is not listed on most current consent to communicate.     Patient tested negative for COVID-19. Placed GetWell loop order.     Referrals Placed: None     Patient/Caregiver understanding: Pt reports understanding and denies any additional questions or concerns at this times. SW CC engaged in AIDET communication during encounter.    Plan: No further outreaches will be made at this time unless a new referral is made or a change in the pt's status occurs. Patient was provided with this writer's contact information and encouraged to call with any questions or concerns.  CC will mail care coordination introduction letter and 24 hr access plan to patient for future use.    KAMINI Cool   Social Work Clinic Care Coordinator   [facility]   PH: [phone]  [email or web address]

## 2020-04-30 NOTE — LETTER
Health Care Home - Access Care Plan    About Me:    Patient Name:  Chilango Echevarria    YOB: 1934  Age:                      85 year old   [facility] MRN:     [id number] Telephone Information:   Home Phone [phone]   Mobile [phone]       Address:  [address]  [facility] [address] Email address:  [email or web address]      Emergency Contact(s)   Name Relationship Lgl Grd Work Phone Home Phone Mobile Phone   1. KALPANA ECHEVARRIA Relative  none [phone]   2. SCOTT DEY Friend    [phone]   3. HENRI DEY Friend    [phone]             Health Maintenance: Routine Health maintenance Reviewed: Due/Overdue   Health Maintenance Due   Topic Date Due     ZOSTER IMMUNIZATION (2 of 3) 05/27/2013     My Access Plan  Medical Emergency 911   Questions or concerns during clinic hours Primary Clinic Line, I will call the clinic directly: [facility] - [phone]   24 Hour Appointment Line [phone] or  [phone]) (toll free)   24 Hour Nurse Line [phone] (toll free)   Questions or concerns outside clinic hours 24 Hour Appointment Line, I will call the after-hours on-call line:   [facility] [phone] or [phone] ([phone]) (toll-free)   Preferred Urgent Care [facility], [phone]   Preferred Hospital [facility]  [phone]   Preferred Pharmacy [id number] - [facility] [address] AT [facility] [address]     Behavioral Health Crisis Line The National Suicide Prevention Lifeline at 1-230.373.6317 or 618     My Care Team Members  Patient Care Team       Relationship Specialty Notifications Start End    Enrique Ring MD PCP - General Internal Medicine  2/27/15     Phone: [phone] Pager: [phone] Fax: [phone] [address] [place] [address]    Sienna Bernard,  APRN CNS  Oncology  5/22/15     Phone: [phone] Fax: [phone] [address] [facility] [address] [facility] [address]    Thad Ferro MD MD Thoracic Diseases  6/12/15     Phone: [phone] Fax: [phone]         [address] [facility] [address] [facility] [address]    Enrique Ring MD Assigned PCP   4/19/20     Phone: [phone] Pager: [phone] Fax: [phone] [address] [place] [address] [address] [facility] [address]           My Medical and Care Information  Problem List   Patient Active Problem List   Diagnosis     Gout     Esophageal reflux     Aortic stenosis     CARDIOVASCULAR SCREENING; LDL GOAL LESS THAN 130     Vasomotor rhinitis     Advanced directives, counseling/discussion     Status post laparoscopic hernia repair-5/14/15     Dermatitis     HTN (hypertension)     Umbilical hernia     Right inguinal hernia     Health Care Home     Parkinson's disease (H)     Slow transit constipation     Fall      Current Medications and Allergies:    Current Outpatient Medications   Medication     acetaminophen (TYLENOL) 325 MG tablet     allopurinol (ZYLOPRIM) 100 MG tablet     bisacodyl (DULCOLAX) 5 MG EC tablet     carbidopa-levodopa (SINEMET)  MG per tablet     fluocinonide (LIDEX) 0.05 % external cream     metoprolol succinate ER (TOPROL-XL) 25 MG 24 hr tablet     omeprazole (PRILOSEC) 20 MG DR capsule     order for DME     polyethylene glycol (MIRALAX) powder     No current facility-administered medications for this visit.

## 2020-04-30 NOTE — LETTER
[place] CARE COORDINATION  [facility]  [address] [address]  Tel. [phone]  Fax [phone]    April 30, 2020    Chilango Echevarria  [address] [place] [address] DR CORTEZ [address] [place] [id number]      Dear Chilango,    I am a clinic care coordinator who works with Enrique Ring MD at [facility]. I wanted to introduce myself and provide you with my contact information for you to be able to call me with any questions or concerns. Below is a description of clinic care coordination and how I can further assist you.      The clinic care coordination team is made up of a registered nurse,  and community health worker who understand the health care system. The goal of clinic care coordination is to help you manage your health and improve access to the health care system in the most efficient manner. The team can assist you in meeting your health care goals by providing education, coordinating services, strengthening the communication among your providers and supporting you with any resource needs.    Please feel free to contact the community health worker to schedule an appointment at [phone] with any questions or concerns. We are focused on providing you with the highest-quality healthcare experience possible and that all starts with you.     Sincerely,     KAMINI Cool   Social Work Clinic Care Coordinator   [facility], and [facility]   PH: [phone]  [email or web address]   Enclosed: I have enclosed a copy of a 24 Hour Access Plan. This has helpful phone numbers for you to call when needed. Please keep this in an easy to access place to use as needed.

## 2020-05-04 ENCOUNTER — TELEPHONE (OUTPATIENT)
Dept: INTERNAL MEDICINE | Facility: CLINIC | Age: 85
End: 2020-05-04

## 2020-05-04 NOTE — TELEPHONE ENCOUNTER
Hold metoprolol until BP consistently above 110/x.   If ANY symptoms needs eval immediately  Needs to be rechecked ASAP- like tomorrow even if asymptomatic.  Curbside? Home care check?

## 2020-05-04 NOTE — TELEPHONE ENCOUNTER
Cynthia calling from home care  [phone]    At pt's home and has low bp readings today.  68/46, 64/44    No sx.  Previously had been half tablet of Metoprolol, but about a year ago was increased to full.    She has been checking a couple times a week for last few weeks.  usually 100-120/60-70    Please advise.      Left ear full of wax.  Ok to start pt on debrox?    Additional skilled nursing approved.

## 2020-05-05 ENCOUNTER — TELEPHONE (OUTPATIENT)
Dept: INTERNAL MEDICINE | Facility: CLINIC | Age: 85
End: 2020-05-05

## 2020-05-05 NOTE — TELEPHONE ENCOUNTER
RN called back to HC RN.  HC RN states she will be seeing pt today for a recheck.  Understands provider message.    Will call back with update.

## 2020-05-05 NOTE — TELEPHONE ENCOUNTER
FYI-Per homecare nurse Cynthia,    Today (Tues) pt's BP was 143/74. HR in 80's.  Gave metoprolol.  Homecare will be out to see pt again tomorrow (Wed) and Friday to check BP readings.

## 2020-05-05 NOTE — TELEPHONE ENCOUNTER
Reason for Call:  Other call back    Detailed comments: Cynthia from Home care would like a call back to report the pt's BP.    Phone Number Patient can be reached at: Other phone number:  [phone]    Best Time: anytime    Can we leave a detailed message on this number? YES    Call taken on 5/5/2020 at 3:43 PM by RED PENG

## 2020-05-06 NOTE — TELEPHONE ENCOUNTER
Agree. If we see further hypotension then we wildl reduce the metoprolol dose by 50%. For now- no change.

## 2020-05-06 NOTE — TELEPHONE ENCOUNTER
KATT Cardoso  [phone]     Seen patient today. BP was 130/85. No symptoms. No swelling. No dizziness. Patient reported he did not take Metoprolol yet this morning and waiting for advisement if he can proceed with taking. Triage advised , ok for patient to take meds as usual. Will update PCP if there are further recommendations.    Symone BRANHAMN, RN, PHN

## 2020-05-11 ENCOUNTER — TELEPHONE (OUTPATIENT)
Dept: INTERNAL MEDICINE | Facility: CLINIC | Age: 85
End: 2020-05-11

## 2020-05-11 NOTE — TELEPHONE ENCOUNTER
Called back homecare nurse. Patient has little bumps all over his arms. He is scratching his arms. He is also trying to scratch ears and is sticking pens, spoons, swabs, etc inside his ears even though she has told him not to. Thinks inside of ears itch. She checked his bed because bumps on his arms look like little bug bites, but she did not find anything. He does have a cream for a rash -fluocinonide- but he is not using it as far as she knows. It is a used tube but she is not sure if he has used it since this outbreak happened, looks like it was prescribed by derm in February. Patient is unable to tell homecare nurse if he has been using it. Caregiver reported Saturday night that patient scratched himself and it was bleeding. They dressed right arm and she said there are scratches all over his arms from shoulders to wrist. Inner arms have more of the little rashes. No trouble breathing or swelling. No symptoms on his face. Temp is good. Ears look good but he is scratching them. They do not manage his meds so is not sure if he is on any new ones. She is not sure if he could do a video visit. Would need to contact emergency contact April to arrange potential visit.    Please advise if PCP/partner would like us to try to set up video visit if possible and if not should he be seen in clinic? Or should derm be addressing?

## 2020-05-11 NOTE — TELEPHONE ENCOUNTER
Reason for Call:  Other call back     Detailed comments: Liliya from Home care would like a call back regarding symptoms . Pt is scratching  arms. He has little bumps all over arms. Pt is also trying to scratching  ears with any items he can find (spoons, pens,ect) because they are itching as well.      Phone Number Patient can be reached at: Other phone number: [phone]- please ask for a nurse      Best Time: anytime     Can we leave a detailed message on this number? YES

## 2020-05-13 NOTE — TELEPHONE ENCOUNTER
RN called back to home care.  Home care will set up way to do virtual video visit for pt.  Home care requested [email or web address] link sent to facility.  RN printed and faxed per this request.    Pt scheduled  No further action needed at this time.

## 2020-05-13 NOTE — TELEPHONE ENCOUNTER
Would recommend virtual  Photos are great for rashes- but would need mychart to submit. Next option is video. Could even do when HC RN out there w/their smartphone if needed.

## 2020-06-19 NOTE — TELEPHONE ENCOUNTER
Central Prior Authorization Team   Phone: [phone]      [place] Initiation    Medication: cyclobenzaprine (FLEXERIL) 5 MG tablet-Initiated  Insurance Company: Other (see comments)Comment:  [employer] [phone]  Pharmacy Filling the Rx: [id number] [place], [place] - [address] [place] [address] [address] [facility] [address]  Filling Pharmacy Phone: [phone]  Filling Pharmacy Fax:    Start Date: 6/19/2020

## 2020-06-19 NOTE — PATIENT INSTRUCTIONS
Tylenol and ibuprofen to help with pain  Can do ice to the shoulder   Cyclobenzaprine three times a day as needed for pain  Debrox as needed.     Patient Education     Exercises for Shoulder Flexibility: External Rotation    This stretch can help restore shoulder flexibility and relieve pain over time. When stretching, be sure to breathe deeply. Follow any special instructions from your healthcare provider or physical therapist:  1.  a doorway. Grasp the doorjamb with the hand on the frozen side. Your arm should be bent.  2. With the other hand, hold the elbow on the side with the involved frozen (stiff) shoulder firmly against your body.  3. Standing in the same spot, rotate your body away from the doorjamb. Stop when you feel the stretch in the shoulder. At first, try to hold the stretch for 5 seconds.  4. Work up to doing 3 sets of this stretch, 3 times a day. Work up to holding the stretch for 30 to 60 seconds.  Note: Keep your arms as still as you can. Over time, rotate your body a little more to enhance the stretch. But be careful not to twist your back.  Frozen shoulder is another name for adhesive capsulitis, which causes restricted movement in the shoulder. If you have frozen shoulder, this stretch may cause discomfort, especially when you first get started. A few months may pass before you achieve the results you want. But once your shoulder heals, it rarely becomes frozen again. So stick to your stretching program. If you have any questions, be sure to ask your healthcare provider.  Date Last Reviewed: 3/1/2018    3071-9478 [employer]. [address] [place] [address], [place] [address]. All rights reserved. This information is not intended as a substitute for professional medical care. Always follow your healthcare professional's instructions.           Patient Education     Exercises for Shoulder Flexibility: Internal Rotation    This stretch can help restore shoulder flexibility and  relieve pain over time. When stretching, be sure to breathe deeply. Follow any special instructions from your healthcare provider or physical therapist.  1. While seated, move the arm on the side you want to stretch toward the middle of your back. The palm of your hand should face out.  2. Cup your other hand under the hand that s behind your back. Gently push your cupped hand upward until you feel the stretch in the shoulder. Try to hold the stretch for 5 seconds.  3. Work up to doing 3 sets of this stretch, 3 times a day. Work up to holding the stretch for 30 to 60 seconds.  Note: Keep your back straight. It s OK if your hand can t reach the middle of your back. Instead, start the stretch with your hand as close as you can get it to the middle of your back.  Frozen shoulder  Frozen shoulder is another name for adhesive capsulitis. This causes restricted movement in the shoulder. If you have frozen shoulder, this stretch may cause discomfort, especially when you first get started. A few months may pass before you achieve the results you want. But once your shoulder heals, it rarely becomes frozen again. So stick to your stretching program. If you have any questions, be sure to ask your healthcare provider.   Date Last Reviewed: 12/1/2017 2000-2019 [employer]. [address] [place] [address], [place] [address]. All rights reserved. This information is not intended as a substitute for professional medical care. Always follow your healthcare professional's instructions.           Patient Education     Exercises for Shoulder Flexibility: Adduction (Reaching Across)    This stretch can help restore shoulder flexibility and relieve pain over time. When stretching, be sure to breathe deeply. And follow any special instructions from your doctor or physical therapist:  1. Put the hand from the side you want to stretch on your opposite shoulder. Your elbow should point away from your body. Try to raise your elbow as  close to shoulder height as you can.  2. With your other hand, push the raised elbow toward the opposite shoulder. Avoid turning your head. Stop when you feel the stretch. Try to hold the stretch for 5 seconds.  3. Work up to doing 3 sets of this stretch, 3 times a day. Work up to holding the stretch for 30 to 60 seconds.  Note: Be sure to push your elbow across your chest, not up toward your chin. Over time, try to push your elbow farther across your chest to enhance the stretch.  Frozen shoulder is another name for adhesive capsulitis, which causes restricted movement in the shoulder. If you have frozen shoulder, this stretch may cause discomfort, especially when you first get started. A few months may pass before you achieve the results you want. Once your shoulder heals, it rarely becomes frozen again. So stick to your stretching program. If you have any questions, be sure to ask your doctor.  Date Last Reviewed: 3/1/2018    4273-7583 [employer]. [address] [place] [address]. All rights reserved. This information is not intended as a substitute for professional medical care. Always follow your healthcare professional's instructions.           Patient Education     Exercises for Shoulder Flexibility: Back Scratch    Improving your flexibility can reduce pain. Stretching exercises also can help increase your range of pain-free motion. Breathe normally when you exercise. Try to use smooth, fluid movements. Never force a stretch.  Note: Follow any special instructions you are given. If you feel pain, stop the exercise. If the pain continues after stopping, call your healthcare provider.    Stand straight, placing the back of your hand on the side you want to stretch flat against your lower back.    Throw one end of a towel over your shoulder. Grab it behind your back with your other hand.    Pull down gently on the towel with your front arm. Let your back arm slide up as high as is  comfortable. You ll feel a stretch in your shoulder. Hold the stretch for a few seconds.    Repeat 3 to 5 times. Build up to holding each stretch for 30 to 60 seconds.  For your safety, check with your healthcare provider before starting an exercise program.  Date Last Reviewed: 3/1/2018    6798-4168 [employer]. [address] [place] [address]. All rights reserved. This information is not intended as a substitute for professional medical care. Always follow your healthcare professional's instructions.           Patient Education     Shoulder Exercises: Biceps Curl    This exercise stretches and strengthens your shoulders and arms. Before starting, read through all the instructions. During the exercise, breathe normally and use smooth movements. Stop if you feel any pain. If pain persists, call your healthcare provider.  Here are the steps for the biceps curl:     Hold a ____ pound weight in each hand, with your palms facing your body. Tuck your arms close to your sides. Ask your physical therapist how much weight to use.     Bend your left elbow and raise the weight to your left shoulder. As you lower that weight, bend your right elbow and raise the weight to your right shoulder. Continue to alternate arms.    Repeat ____ times. Do ____ sets ____ times a day.     CAUTION: Keep your arms close to your body throughout the exercise. Keep your wrists straight.   Date Last Reviewed: 11/1/2017 2000-2019 [employer]. [address] [place] [address]. All rights reserved. This information is not intended as a substitute for professional medical care. Always follow your healthcare professional's instructions.           Patient Education     Shoulder Exercises: Side Raise    This exercise stretches and strengthens your shoulders. Before starting, read through all the instructions. During the exercise, breathe normally and use smooth movements. Stop if you feel any pain. If  pain persists, call your healthcare provider.    Stand straight, holding a ____ pound weight in each hand, arms at sides, feet shoulder-width apart. Ask your physical therapist or healthcare provider how much weight to use.     Slowly extend your arms up and out until weights are at shoulder level. Slowly return to starting position.    Repeat ____ times. Do ____ sets ____ times a day.     CAUTION: Don t swing the weights or raise weights above shoulder level.   Date Last Reviewed: 11/1/2017 2000-2019 [employer]. [address] [place] [address]. All rights reserved. This information is not intended as a substitute for professional medical care. Always follow your healthcare professional's instructions.           Patient Education     Shoulder Exercises: Shoulder Press    This exercise stretches and strengthens your shoulders. Before starting, read through all the instructions. During the exercise, breathe normally and use smooth movements. Stop if you feel any pain. If pain persists, call your healthcare provider.    Hold a ____ pound weight in each hand, elbows at shoulder level, palms facing forward. Arms to the side and slightly forward. Ask your physical therapist how much weight to use.     Raise one arm up until it s almost straight. Hold for a second. Lower the weight, extending the other arm up.    Repeat ____ times with each arm. Do ____ sets ____ times a day.  CAUTION: If you have shoulder problems, consult your healthcare provider before doing this exercise. Keep your head and body still during the exercise. Only your arms should move.   Date Last Reviewed: 11/1/2017 2000-2019 [employer]. [address] [place] [address]. All rights reserved. This information is not intended as a substitute for professional medical care. Always follow your healthcare professional's instructions.

## 2020-06-19 NOTE — TELEPHONE ENCOUNTER
Prior Authorization Retail Medication Request    Medication/Dose:cyclobenzaprine (FLEXERIL) 5 MG tablet   ICD code (if different than what is on RX):  M75.102,M25.512  Previously Tried and Failed:    Rationale:      Insurance Name:  Medicare  Insurance ID:  [id number]      Pharmacy Information (if different than what is on RX)  Name:  Gamal[facility] Tre[facility]  Phone:  [phone]

## 2020-06-19 NOTE — PROGRESS NOTES
SUBJECTIVE:   Chilango Echevarria is a 85 year old male presenting with a chief complaint of   Chief Complaint   Patient presents with     Musculoskeletal Problem     Left shoulder pain for 3 weeks after turning fast and it hurts to move. ALso right ear feels clogged       He is an established patient of [facility].    MS Injury/Pain    Onset of symptoms was 3 weeks ago  Location: left shoulder  Context: states happened when he had turned fast.   Course of symptoms is worsening.    Severity moderate  Current and Associated symptoms: Pain, Decreased range of motion and Stiffness  Denies  Swelling, Bruising, Warmth and Redness  Aggravating Factors: movement  Therapies to improve symptoms include: none  Patient does have history of parkinson's  Patient does use a walker for ambulation.   Patient lives in an assisted living.    Also, he has additional complaints of right ear feels plugged. Has tried debrox which has helped mildly. Denies any discharge. Denies any URI symptoms.     Review of Systems   Constitutional: Negative for fever.   HENT: Positive for ear pain. Negative for congestion, ear discharge and rhinorrhea.    Respiratory: Negative for cough and shortness of breath.    Cardiovascular: Negative for chest pain.   Gastrointestinal: Negative for nausea and vomiting.   Musculoskeletal: Positive for arthralgias.   Skin: Negative for rash.   Neurological: Negative for dizziness, light-headedness and headaches.       Past Medical History:   Diagnosis Date     Aortic stenosis     very mild     Ying esophagus      Contact dermatitis and other eczema, due to unspecified cause      Esophageal reflux      Gout, unspecified      Incarcerated hiatal hernia 5/21/2015     Inguinal hernia without mention of obstruction or gangrene, unilateral or unspecified, (not specified as recurrent)      Polymyalgia rheumatica (H)      Umbilical hernia without mention of obstruction or gangrene      Unspecified essential hypertension       Vasomotor rhinitis      Family History   Problem Relation Age of Onset     Breast Cancer Mother      Diabetes Paternal Grandfather      Cancer Son 37        lung     Current Outpatient Medications   Medication Sig Dispense Refill     acetaminophen (TYLENOL) 325 MG tablet Take 2 tablets (650 mg) by mouth every 4 hours as needed for mild pain       allopurinol (ZYLOPRIM) 100 MG tablet Take 1 tablet (100 mg) by mouth daily 90 tablet 3     bisacodyl (DULCOLAX) 5 MG EC tablet Take 1 tablet (5 mg) by mouth daily as needed for constipation       carbidopa-levodopa (SINEMET)  MG per tablet Take 2 tablets by mouth 3 times daily        cyclobenzaprine (FLEXERIL) 5 MG tablet Take 1 tablet (5 mg) by mouth 3 times daily as needed for muscle spasms 30 tablet 0     fluocinonide (LIDEX) 0.05 % external cream Apply a thin layer to affected area on arms, legs,  and back BID x 2 weeks, tapering with improvement. Do not apply to face or body folds. 60 g 3     metoprolol succinate ER (TOPROL-XL) 25 MG 24 hr tablet TAKE 1 TABLET(25 MG) BY MOUTH EVERY MORNING 90 tablet 3     omeprazole (PRILOSEC) 20 MG DR capsule TAKE 1 CAPSULE BY MOUTH 30 TO 60 MINUTES BEFORE FIRST MEAL OF THE DAY 90 capsule 0     order for DME Equipment being ordered: walker 1 each 0     polyethylene glycol (MIRALAX) powder Take 17 g by mouth daily as needed for constipation (1 capful) 510 g 1     Social History     Tobacco Use     Smoking status: Never Smoker     Smokeless tobacco: Never Used   Substance Use Topics     Alcohol use: Yes     Comment: beer only, 3 bottles per day       OBJECTIVE  BP 98/62   Pulse 72   Temp 98.3  F (36.8  C) (Tympanic)   Wt 66.7 kg (147 lb)   SpO2 98%   BMI 24.46 kg/m      Physical Exam  Vitals signs and nursing note reviewed.   Constitutional:       Comments: Frail appearing.    HENT:      Head: Normocephalic and atraumatic.      Right Ear: External ear normal. There is impacted cerumen.      Left Ear: External ear normal.  There is impacted cerumen.      Ears:      Comments: Post cerumen impaction removal. Bilateral ear canals and TMs normal.     Nose: Nose normal.   Eyes:      Extraocular Movements: Extraocular movements intact.      Conjunctiva/sclera: Conjunctivae normal.   Cardiovascular:      Rate and Rhythm: Normal rate and regular rhythm.      Heart sounds: Normal heart sounds.   Pulmonary:      Effort: Pulmonary effort is normal.      Breath sounds: Normal breath sounds and air entry.   Musculoskeletal:      Left shoulder: He exhibits decreased range of motion and tenderness. He exhibits no swelling.   Skin:     General: Skin is warm and dry.   Neurological:      Mental Status: He is alert and oriented to person, place, and time.      GCS: GCS eye subscore is 4. GCS verbal subscore is 5. GCS motor subscore is 6.   Psychiatric:         Behavior: Behavior is cooperative.         X-Ray was done, my findings are: NO acute fracture.     ASSESSMENT:      ICD-10-CM    1. Rotator cuff syndrome, left  M75.102 cyclobenzaprine (FLEXERIL) 5 MG tablet   2. Acute pain of left shoulder  M25.512 XR Shoulder Left 2 Views     cyclobenzaprine (FLEXERIL) 5 MG tablet   3. Bilateral impacted cerumen  H61.23 REMOVE IMPACTED CERUMEN        Medical Decision Making:    Differential Diagnosis:  Ear:  Acute right otitis media, Otitis externa and cerumen impaction.  MS Injury Pain: sprain, fracture, tendonitis, muscle strain, contusion, dislocation and arthritis.     Serious Comorbid Conditions:  Adult:  HTN, Parkinson's    PLAN:  Discussed with patient and caregiver that will do an xray of the shoulder. NO acute fractures noted, but does have findings consistent with rotator cuff. Can do tylenol and ibuprofen for pain. Will do cyclobenzaprine as patient feels this has helped in the past. Can do ice to help with pain. Did discuss doing conservative management, options to do referral to physical therapy as well as follow up with orthopedics. At this time  patient declines both. Did add exercises to AVS that patient can try to complete. For the ear fullness did have medical assistant irrigate both ears. Did have clearing of the cerumen impaction, patient states fullness has resolved and is able to hear better. Education was added to AVS. Patient was agreeable to plan and verbalized understanding.     Followup:    If not improving in 1 week or if condition worsens, follow up with your Primary Care Provider    Patient Instructions     Tylenol and ibuprofen to help with pain  Can do ice to the shoulder   Cyclobenzaprine three times a day as needed for pain  Debrox as needed.     Patient Education     Exercises for Shoulder Flexibility: External Rotation    This stretch can help restore shoulder flexibility and relieve pain over time. When stretching, be sure to breathe deeply. Follow any special instructions from your healthcare provider or physical therapist:  1.  a doorway. Grasp the doorjamb with the hand on the frozen side. Your arm should be bent.  2. With the other hand, hold the elbow on the side with the involved frozen (stiff) shoulder firmly against your body.  3. Standing in the same spot, rotate your body away from the doorjamb. Stop when you feel the stretch in the shoulder. At first, try to hold the stretch for 5 seconds.  4. Work up to doing 3 sets of this stretch, 3 times a day. Work up to holding the stretch for 30 to 60 seconds.  Note: Keep your arms as still as you can. Over time, rotate your body a little more to enhance the stretch. But be careful not to twist your back.  Frozen shoulder is another name for adhesive capsulitis, which causes restricted movement in the shoulder. If you have frozen shoulder, this stretch may cause discomfort, especially when you first get started. A few months may pass before you achieve the results you want. But once your shoulder heals, it rarely becomes frozen again. So stick to your stretching program. If  you have any questions, be sure to ask your healthcare provider.  Date Last Reviewed: 3/1/2018    4095-8295 [employer]. [address] [place] [address]. All rights reserved. This information is not intended as a substitute for professional medical care. Always follow your healthcare professional's instructions.           Patient Education     Exercises for Shoulder Flexibility: Internal Rotation    This stretch can help restore shoulder flexibility and relieve pain over time. When stretching, be sure to breathe deeply. Follow any special instructions from your healthcare provider or physical therapist.  1. While seated, move the arm on the side you want to stretch toward the middle of your back. The palm of your hand should face out.  2. Cup your other hand under the hand that s behind your back. Gently push your cupped hand upward until you feel the stretch in the shoulder. Try to hold the stretch for 5 seconds.  3. Work up to doing 3 sets of this stretch, 3 times a day. Work up to holding the stretch for 30 to 60 seconds.  Note: Keep your back straight. It s OK if your hand can t reach the middle of your back. Instead, start the stretch with your hand as close as you can get it to the middle of your back.  Frozen shoulder  Frozen shoulder is another name for adhesive capsulitis. This causes restricted movement in the shoulder. If you have frozen shoulder, this stretch may cause discomfort, especially when you first get started. A few months may pass before you achieve the results you want. But once your shoulder heals, it rarely becomes frozen again. So stick to your stretching program. If you have any questions, be sure to ask your healthcare provider.   Date Last Reviewed: 12/1/2017 2000-2019 [employer]. [address] [place] [address]. All rights reserved. This information is not intended as a substitute for professional medical care. Always follow your  healthcare professional's instructions.           Patient Education     Exercises for Shoulder Flexibility: Adduction (Reaching Across)    This stretch can help restore shoulder flexibility and relieve pain over time. When stretching, be sure to breathe deeply. And follow any special instructions from your doctor or physical therapist:  1. Put the hand from the side you want to stretch on your opposite shoulder. Your elbow should point away from your body. Try to raise your elbow as close to shoulder height as you can.  2. With your other hand, push the raised elbow toward the opposite shoulder. Avoid turning your head. Stop when you feel the stretch. Try to hold the stretch for 5 seconds.  3. Work up to doing 3 sets of this stretch, 3 times a day. Work up to holding the stretch for 30 to 60 seconds.  Note: Be sure to push your elbow across your chest, not up toward your chin. Over time, try to push your elbow farther across your chest to enhance the stretch.  Frozen shoulder is another name for adhesive capsulitis, which causes restricted movement in the shoulder. If you have frozen shoulder, this stretch may cause discomfort, especially when you first get started. A few months may pass before you achieve the results you want. Once your shoulder heals, it rarely becomes frozen again. So stick to your stretching program. If you have any questions, be sure to ask your doctor.  Date Last Reviewed: 3/1/2018    9222-8629 [employer]. [address] [place] [address], [place] [address]. All rights reserved. This information is not intended as a substitute for professional medical care. Always follow your healthcare professional's instructions.           Patient Education     Exercises for Shoulder Flexibility: Back Scratch    Improving your flexibility can reduce pain. Stretching exercises also can help increase your range of pain-free motion. Breathe normally when you exercise. Try to use smooth, fluid movements.  Never force a stretch.  Note: Follow any special instructions you are given. If you feel pain, stop the exercise. If the pain continues after stopping, call your healthcare provider.    Stand straight, placing the back of your hand on the side you want to stretch flat against your lower back.    Throw one end of a towel over your shoulder. Grab it behind your back with your other hand.    Pull down gently on the towel with your front arm. Let your back arm slide up as high as is comfortable. You ll feel a stretch in your shoulder. Hold the stretch for a few seconds.    Repeat 3 to 5 times. Build up to holding each stretch for 30 to 60 seconds.  For your safety, check with your healthcare provider before starting an exercise program.  Date Last Reviewed: 3/1/2018    3269-1730 [employer]. [address] [place] [address]. All rights reserved. This information is not intended as a substitute for professional medical care. Always follow your healthcare professional's instructions.           Patient Education     Shoulder Exercises: Biceps Curl    This exercise stretches and strengthens your shoulders and arms. Before starting, read through all the instructions. During the exercise, breathe normally and use smooth movements. Stop if you feel any pain. If pain persists, call your healthcare provider.  Here are the steps for the biceps curl:     Hold a ____ pound weight in each hand, with your palms facing your body. Tuck your arms close to your sides. Ask your physical therapist how much weight to use.     Bend your left elbow and raise the weight to your left shoulder. As you lower that weight, bend your right elbow and raise the weight to your right shoulder. Continue to alternate arms.    Repeat ____ times. Do ____ sets ____ times a day.     CAUTION: Keep your arms close to your body throughout the exercise. Keep your wrists straight.   Date Last Reviewed: 11/1/2017 2000-2019 [employer]  [employer]. [address] [place] [address]. All rights reserved. This information is not intended as a substitute for professional medical care. Always follow your healthcare professional's instructions.           Patient Education     Shoulder Exercises: Side Raise    This exercise stretches and strengthens your shoulders. Before starting, read through all the instructions. During the exercise, breathe normally and use smooth movements. Stop if you feel any pain. If pain persists, call your healthcare provider.    Stand straight, holding a ____ pound weight in each hand, arms at sides, feet shoulder-width apart. Ask your physical therapist or healthcare provider how much weight to use.     Slowly extend your arms up and out until weights are at shoulder level. Slowly return to starting position.    Repeat ____ times. Do ____ sets ____ times a day.     CAUTION: Don t swing the weights or raise weights above shoulder level.   Date Last Reviewed: 11/1/2017 2000-2019 [employer]. [address] [place] [address]. All rights reserved. This information is not intended as a substitute for professional medical care. Always follow your healthcare professional's instructions.           Patient Education     Shoulder Exercises: Shoulder Press    This exercise stretches and strengthens your shoulders. Before starting, read through all the instructions. During the exercise, breathe normally and use smooth movements. Stop if you feel any pain. If pain persists, call your healthcare provider.    Hold a ____ pound weight in each hand, elbows at shoulder level, palms facing forward. Arms to the side and slightly forward. Ask your physical therapist how much weight to use.     Raise one arm up until it s almost straight. Hold for a second. Lower the weight, extending the other arm up.    Repeat ____ times with each arm. Do ____ sets ____ times a day.  CAUTION: If you have shoulder problems,  consult your healthcare provider before doing this exercise. Keep your head and body still during the exercise. Only your arms should move.   Date Last Reviewed: 11/1/2017 2000-2019 [employer]. [address] [place] [address], [place] [address]. All rights reserved. This information is not intended as a substitute for professional medical care. Always follow your healthcare professional's instructions.

## 2020-06-22 PROBLEM — R29.6 FALLS: Status: ACTIVE | Noted: 2020-01-01

## 2020-06-22 NOTE — H&P
"[facility]  History and Physical  Hospitalist       Date of Admission:  6/22/2020    Assessment & Plan   Chilango Echevarria is a 85 year old male with Parkinsons disease, hypertension, PMR, gout, GERD with Barretts esophagus, and eczema who presented to [facility] ED on 6/22/2020 after being found down following fall and resulting scalp laceration.  Admitted for Observation and placement.    Patient lives in assisted living ([facility]) and reports that early this AM, he was walking with his walker when it \"got ahead of him\" causing him to fall.  Fall was unwitnessed.  No LOC but he hit his head.  He was found down by staff about 2 hours later in a pool of blood.  VSS.  BMP notable for chloride 110, BUN 43, and Cr 1.13.  CBC noatble for Hgb 8.5 (down from 11.5 in April). . INR 1.29.  C-Spine cleared by CT.  CT Head notable for scalp hematoma and chronic small vessel disease but no other acute process.  XR Pelvis/Left hip negative for acute process.  A 2-inch laceration was cleaned and repaired in the ED.  Patient's friend reports patient has been having hallucinations at home and was also recently started on Flexeril for a rotator cuff injury.      Fall, likely mechanical, with resulting scalp laceration   Parkinsons disease   Possible polypharmacy  Labs and imaging, as above, encouraging.  TSH added on and normal.  Nothing to suggest syncope.  Very unsteady, quite rigid.  Likely progression of Parkinsons but addition of Flexeril not helping.  Patient acknowledges hallucinations which are relatively new.  Has already been participating in [employer].  Urinary retention new, question if related to progression of Parkinsons?    PLAN:  - Continue scheduled Sinemet  - Stop flexeril  - Tele  - Neuro checks.  Low threshold to rescan if change in Neuros.  - PT/OT, will likely need TCU  - Neuro consult to see if any med adjustments can be made for progressive sx including balance issues, urinary sx, and new " "hallucinations  - Staples out in 10 days     Acute blood loss anemia  Hgb down 3 points to 8.5.  Suspect related to head wound as nothing to suggest active bleed.  Repeat Hgb in AM.      TERRELL  Cr a bit up today to 1.13 with elevated BUN.  Suspect prerenal after being found down overnight.  Gently hydrate.  Encourage PO.  Repeat BMP in AM.     Urinary retention  Noted by nursing.  UA relatively bland.  No prior h/o BPH in records.  Question if related to Parkinsons.  Continue PVRs and bladder scans.  Straight cath as needed.       Hypertension  Continue Toprol      Gout  Continue allopurinol     Nummular eczema  Eucerin cream.  If lesions very itchy, can add steroid cream.       Intertrigenous candida  Quite malodorous.  Miconazole cream BID.     DVT Prophylaxis: PCDs for now.  If Hgb stable tomorrow will consider chemoprophylaxis.   Code Status: Full Code    Disposition:  Likely TCU in 1-2 days    Deja Diallo [facility]    PRIMARY CARE PROVIDER: Enrique Ring    CHIEF COMPLAINT  Fall, head wound    History is obtained from the patient (moderate historian) with info supplemented by ED provider and family friend.     HISTORY OF PRESENT ILLNESS  Chilango Echevarria is a 85 year old male with Parkinsons disease, hypertension, PMR, gout, GERD with Barretts esophagus, and eczema who presented to [facility] ED on 6/22/2020 after being found down at [facility] following unwitnessed fall and resulting scalp laceration.  Patient lives alone in Assisted Living at [facility].  He reports that early this AM, his walker \"got ahead of him\" causing him to fall.  No clear LOC but he hit his head.  He wasn't able to get himself back up and was found down by staff about 2 hours later in a pool of blood.  EMS was called and patient brought in for evaluation.     In the ED, /73, HR 66, RR 16, SpO2 91% on RA, temp 97.2.  BMP notable for chloride 110, BUN 43, and Cr 1.13.  CBC notable for Hgb 8.5 (down from 11.5 in April). . INR 1.29.  " C-Spine cleared by CT.  CT Head notable for scalp hematoma and chronic small vessel disease but no other acute process.  XR Pelvis/Left hip negative for acute process.  A 2-inch laceration was cleaned and repaired in the ED.  ED staff was later contact by patient's friend who reported patient to recently be having hallucinations and weakness over the last few days.  He also noted patient was recently started on Flexeril for a suspected rotator cuff injury.      Patient seen on the floor where he is pleasant and quite calm.  He denies pain and doesn't remember that he had a scalp lac repaired in the ED.  He maintains that his fall was because his walker got away from him, rather than him tripping or fainting.  He denies shortness of breath, chest pain, abdominal pain, headache, vision changes, leg swelling or pain, fever, chills, or any other concerning symptoms.  Patient was admitted for similar weakness 3/2020 and was discharged to [facility] TCU.  It appears he then discharged to an assisted living at the [facility] rather than to his prior living arrangement (home alone).      PAST MEDICAL HISTORY  1. Parkinson's disease  2. Hypertension  3. Gout  4. GERD  5. Ying's esophagus  6. Nummular eczema  7. Diastolic dysfunction.  Noted on Echo 5/2013 with normal LVEF, G1LVDD, 1+ MR, 1+ AR, and mild valvular AS.  8. Very large hiatal hernia s/p Laparoscopic hernia reduction, perc G-tube placement, lap J-tube placement, and bilateral tube thoracostomies 5/2015.    9. Polymyalgia Rheumatica.  Diagnosis noted but is on no medications for this and does not see Rheumatology.       PAST SURGICAL HISTORY  I have reviewed this patient's surgical history and updated it with pertinent information if needed.  Past Surgical History:   Procedure Laterality Date     C NONSPECIFIC PROCEDURE      T&A     C NONSPECIFIC PROCEDURE      umbilical herniorraphy     ESOPHAGOSCOPY, GASTROSCOPY, DUODENOSCOPY (EGD), COMBINED N/A 5/11/2015     Procedure: COMBINED ESOPHAGOSCOPY, GASTROSCOPY, DUODENOSCOPY (EGD);  Surgeon: Thad Ferro MD;  Location: [facility] OR     LAPAROSCOPIC ASSISTED INSERTION TUBE JEJUNOSTOMY N/A 5/14/2015    Procedure: LAPAROSCOPIC ASSISTED INSERTION TUBE JEJUNOSTOMY;  Surgeon: Thad Ferro MD;  Location: [facility] OR     LAPAROSCOPIC HERNIORRHAPHY HIATAL N/A 5/14/2015    Procedure: LAPAROSCOPIC HERNIORRHAPHY HIATAL;  Surgeon: Thad Ferro MD;  Location: [facility] OR     LAPAROSCOPIC HERNIORRHAPHY INGUINAL Right 5/14/2015    Procedure: LAPAROSCOPIC HERNIORRHAPHY INGUINAL;  Surgeon: Thad Ferro MD;  Location: [facility] OR     PICC INSERTION Right 5/11/2015    5fr DL Power PICC, 39cm (1cm external) in the R medial brachial vein w/ tip in the low SVC.       PRIOR TO ADMISSION MEDICATIONS  Prior to Admission Medications   Prescriptions Last Dose Informant Patient Reported? Taking?   acetaminophen (TYLENOL) 325 MG tablet   No No   Sig: Take 2 tablets (650 mg) by mouth every 4 hours as needed for mild pain   allopurinol (ZYLOPRIM) 100 MG tablet   No No   Sig: Take 1 tablet (100 mg) by mouth daily   bisacodyl (DULCOLAX) 5 MG EC tablet   No No   Sig: Take 1 tablet (5 mg) by mouth daily as needed for constipation   carbidopa-levodopa (SINEMET)  MG per tablet   Yes No   Sig: Take 2 tablets by mouth 3 times daily    cyclobenzaprine (FLEXERIL) 5 MG tablet   No No   Sig: Take 1 tablet (5 mg) by mouth 3 times daily as needed for muscle spasms   fluocinonide (LIDEX) 0.05 % external cream   No No   Sig: Apply a thin layer to affected area on arms, legs,  and back BID x 2 weeks, tapering with improvement. Do not apply to face or body folds.   metoprolol succinate ER (TOPROL-XL) 25 MG 24 hr tablet   No No   Sig: TAKE 1 TABLET(25 MG) BY MOUTH EVERY MORNING   omeprazole (PRILOSEC) 20 MG DR capsule   No No   Sig: TAKE 1 CAPSULE BY MOUTH 30 TO 60 MINUTES BEFORE FIRST MEAL OF THE DAY   order for DME   No No   Sig: Equipment  being ordered: walker   polyethylene glycol (MIRALAX) powder   No No   Sig: Take 17 g by mouth daily as needed for constipation (1 capful)      Facility-Administered Medications: None       ALLERGIES  Allergies   Allergen Reactions     Lisinopril      rash, exacerbation of eczema       SOCIAL HISTORY  Lives alone at Assisted LIving ([facility]  Doesn't smoke.  Previously drank quite a bit but no longer drinking since at assisted living.  A family friend helps transport him to and from appointments.  Patient typically ambulates with a walker.      FAMILY HISTORY  I have reviewed this patient's family history and updated it with pertinent information if needed.   Family History   Problem Relation Age of Onset     Breast Cancer Mother      Diabetes Paternal Grandfather      Cancer Son 37        lung       REVIEW OF SYSTEMS:  14 point comprehensive ROS undertaken with pertinent positives and negatives as above and otherwise unremarkable.     PHYSICAL EXAM  Temp: 97.2  F (36.2  C) Temp src: Oral BP: 133/71 Pulse: 73 Heart Rate: 67 Resp: (!) 0 SpO2: 99 % O2 Device: None (Room air)    Vital Signs with Ranges  Temp:  [97.2  F (36.2  C)] 97.2  F (36.2  C)  Pulse:  [65-76] 73  Heart Rate:  [64-67] 67  Resp:  [0-17] 0  BP: (107-142)/(65-80) 133/71  SpO2:  [87 %-99 %] 99 %  0 lbs 0 oz    GENERAL:  Pleasant, cooperative, alert.  Somewhat frail, chronically ill appearing.  Strong odor of yeast.   HEENT:  2-inch scalp lac noted on left parietal scalp repaired with staples.  Sclerae clear but Conjunctiva on left eye with some yellow-rosa discharge and crusting.  Pupils equal, round.  MM a bit tacky.    PULMONARY: Clear to auscultation bilaterally anteriorly  CARDIAC: Regular rate and rhythm.  No appreciated murmur.   ABDOMEN: Soft, nontender non distended.  Well healed incisions  MUSCULOSKELETAL:  Moving x 4 spontaneously with CMS intact x4.  A fair amount of rigidity but not much spasticity.  Trace - 1+ BLE edema.  Scattered  areas of nummular eczema.   strength 2+ bilaterally   :  Intertriginous candida, quite malodorous   NEURO: Alert and oriented to self and seemingly to place.  CN II-XII grossly intact and symmetric.  Again, a bit rigid.  Masked facies.    SKIN:  Warm, pink, dry.    DATA  Data reviewed today:  I personally reviewed the EKG tracing showing NSR.    Recent Labs   Lab 06/22/20  0759   WBC 6.4   HGB 8.5*   MCV 98   *   INR 1.29*      POTASSIUM 3.8   CHLORIDE 110*   CO2 27   BUN 43*   CR 1.13   ANIONGAP 7   MESERET 8.0*   GLC 90       Recent Results (from the past 24 hour(s))   CT Head w/o Contrast    Narrative    CT SCAN OF THE HEAD WITHOUT CONTRAST   6/22/2020 8:36 AM     HISTORY: Fall, head injury.    TECHNIQUE: Axial images of the head and coronal reformations without  IV contrast material. Radiation dose for this scan was reduced using  automated exposure control, adjustment of the mA and/or kV according  to patient size, or iterative reconstruction technique.    COMPARISON: 3/15/2020.    FINDINGS: There is some mild-to-moderate cerebral atrophy. Extra-axial  spaces are slightly more prominent over the left convexity than the  right convexity but this is unchanged since the prior exam. Some  minimal nonspecific white matter changes are seen bilaterally without  mass effect. There is no evidence for intracranial hemorrhage, acute  infarct, or a skull fracture. There is some extracalvarial soft tissue  swelling over the left lateral posterior frontal region near the  vertex which is probably a scalp hematoma. There is chronic  opacification of the right maxillary sinus with a soft tissue mass in  the right nasal cavity. This is probably due to an antrochoanal polyp  although it is difficult to exclude malignancy. Remainder of paranasal  sinuses are clear. Mastoid air cells are clear. Vascular  calcifications noted.      Impression    IMPRESSION:  1. Left lateral scalp hematoma. No evidence for fracture  or  intracranial hemorrhage.  2. Cerebral atrophy with scattered white matter changes which are most  likely due to chronic small vessel ischemic disease.  3. Persistent right nasal cavity mass with opacified right maxillary  sinus and some chronic osteitis. This is probably due to an  antrochoanal polyp. It is always difficult to exclude malignancy with  unilateral sinus disease. If clinically indicated, ENT consultation  might be helpful in further evaluation.    IZABELLA ALEXIS MD   Cervical spine CT w/o contrast    Narrative    CT CERVICAL SPINE WITHOUT CONTRAST   6/22/2020 8:37 AM     HISTORY: Fall, neck pain.     TECHNIQUE: Axial images of the cervical spine were obtained without  intravenous contrast. Multiplanar reformations were performed.  Radiation dose for this scan was reduced using automated exposure  control, adjustment of the mA and/or kV according to patient size, or  iterative reconstruction technique.     COMPARISON: None.    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 There is some retrolisthesis of C4 on C5 of  approximately 3 mm. There is retrolisthesis at C5-C6 of approximately  2 mm. There is degenerative spondylolisthesis at C7-T1 of  approximately 4 mm. Posterior alignment is otherwise normal. There is  no evidence for acute fracture. There is some multilevel degenerative  disc and facet disease most advanced at C4-C5 and C5-C6 where there  are moderate central canal stenoses. There is also multilevel  foraminal stenoses.      Impression    IMPRESSION: Degenerative changes. No evidence for fracture. Multilevel  central canal and foraminal stenoses are present.    IZABELLA ALEXIS MD   XR Pelvis and Hip Left 2 Views    Narrative    XR PELVIS AND HIP LEFT 2 VIEWS 6/22/2020 8:42 AM     HISTORY: Pain following fall earlier in the day.    COMPARISON: 3/15/2020      Impression    IMPRESSION: No left-sided hip fracture is evident. The right proximal  femur is  internally rotated, however no fractures are evident.  Osteopenia. Mild hypertrophic changes in both hip joints. Degenerative  changes lower lumbar spine.    BRITTANY GONZALES MD

## 2020-06-22 NOTE — PROGRESS NOTES
Patient can be very impulsive and moved from 202-2 to 223. Patient stated he was getting up to get french fries. The call light system went off, but bed alarm did not go off. NST heard the patient yelling and rushed to room. When NST arrived in patients room he found the patient on the floor, head towards the bathroom door, feet towards to the bed, IV stretched across the bed and still intact. Blood seen on (L) elbow and on the wall. Shannon VIVAS, notified right away. VS obtained. Neuro check completed. x3 assist with gait belt to get back in bed.       /57 (BP Location: Right arm)   Pulse 73   Temp 95.7  F (35.4  C) (Oral)   Resp 20   Wt 67.7 kg (149 lb 3.2 oz)   SpO2 100%   BMI 24.83 kg/m

## 2020-06-22 NOTE — PROGRESS NOTES
PRIMARY DIAGNOSIS: Falls     OUTPATIENT/OBSERVATION GOALS TO BE MET BEFORE DISCHARGE  1. Orthostatic performed: N/A    2. Tolerating PO medications: Yes    3. Return to near baseline physical activity: No    4. Cleared for discharge by consultants (if involved): No    Discharge Planner Nurse   Safe discharge environment identified: Yes  Barriers to discharge: Yes       Entered by: Alayna Smalls 06/22/2020 3:21 PM     Please review provider order for any additional goals.   Nurse to notify provider when observation goals have been met and patient is ready for discharge.    Patient is alert and oriented, but can be forgetful. VS WNL and documented on the FS. Lung sounds clear in all lobes and patient is on RA. Denies SOB. Active bowel sounds in all 4 quadrants and per patient statement LBM was 1-2 days ago. Patient has urgency to void, but had not been able to void. Bladder scannned patient for 449 ml and then at 1500 patient voided 150 mls (dark yellow) Urine specimen sent to lab. Patient denies pain. Skin assessment completed and patients feet are very dry with the (L) heel sloughing. Generalized eczema (cream ordered). Generalized bruising noted with a lg bruise on (L) bicep. (L) parietal lobe region has a hematoma with 4 staples in place. Perineal area cleansed with soap and water and nystatin applied. Tele in place and patient is SR HR 60's. Patient is a two assist with gait belt and walker when up (very unsteady). Placed a commode at bedside. IV fluids infusing at 100 ml/hr. Patient passed swallow study and now is on a combination diet 2gm Na diet. Neuro checks Q2hrs then Q4hrs. Neuro's WNL. Patient states he has some chronic numbness on (L) side, but strength is equal bilateral. Patient states his (R) eye feels like something is in it and PA ordered refresh gtts. Fall precautions. Plan: PT, SW, Psych consults.    /53 (BP Location: Left arm)   Pulse 73   Temp 96.6  F (35.9  C) (Oral)   Resp 16   Wt 67.7  kg (149 lb 3.2 oz)   SpO2 91%   BMI 24.83 kg/m

## 2020-06-22 NOTE — TELEPHONE ENCOUNTER
Prior Authorization Approval    Authorization Effective Date: 6/5/2020  Authorization Expiration Date: 6/5/2021  Medication: cyclobenzaprine (FLEXERIL) 5 MG tablet-APPROVED  Approved Dose/Quantity:   Reference #:     Insurance Company: Other (see comments)Comment:  [employer] [phone]  Expected CoPay:       CoPay Card Available:      Foundation Assistance Needed:    Which Pharmacy is filling the prescription (Not needed for infusion/clinic administered): [id number] [address] AT [facility] [address]  Pharmacy Notified: Yes  Patient Notified: No    Pharmacy will notify patient when medication is ready.

## 2020-06-22 NOTE — PLAN OF CARE
ROOM # 202-2    Living Situation (if not independent, order SW consult):   Facility name: [facility]   : Abena (friend) [phone]    Activity level at baseline: Walker  Activity level on admit: x2 assist with walker and gait belt.       Patient registered to observation; given Patient Bill of Rights; given the opportunity to ask questions about observation status and their plan of care.  Patient has been oriented to the observation room, bathroom and call light is in place.    Discussed discharge goals and expectations with patient/family.

## 2020-06-22 NOTE — ED PROVIDER NOTES
History     Chief Complaint:    Fall and Laceration    The history is provided by the patient and the EMS personnel.      Chilango Echevarria is a 85 year old male with a history of hypertension and Parkinson's disease who arrived via EMS from his assisted living facility and presents with a laceration after a fall. The patient reports having an unwitnessed fall this morning. He states that he fell backward while trying to get to his walker. He was on the ground for approximately 2 hours before he was found by staff as he was unable to get up on his own. The patient is unsure if he lost consciousness. Here the patient endorses left arm pain and stiffness which is chronic for patient and has a laceration to his posterior scalp. Left arm pain and stiffness has been ongoing for several weeks and is not new today. He denies any vomiting, abdominal pain, chest pain, neck pain, or hip pain. Denies any pain from fall today. The patient denies any recent illness and is not on any blood thinners. The patient lives alone.    Family friend came to ED later in ED visit and said that his left arm has been painful/difficult to move and thought to be rotator cuff injury. Has been taking flexeril and friend thinks that has been causing hallucinations and weakness last few days.     Allergies:  Lisinopril     Medications:    Zyloprim  Dulcolax  Sinemet  Flexeril  Lidex  Toprol-XL  Prilosec  Miralax    Past Medical History:    Parkinson's disease  History of fall  Aortic stenosis  Ying esophagus  Contact dermatitis  Esophageal reflux  Gout  Incarcerated hiatal hernia  Inguinal hernia, right  Polymyalgia rheumatica  Umbilical hernia  Hypertension  Vasomotor rhinitis     Past Surgical History:    T&A  Umbilical herniorrhaphy  EGD, combined  Laparoscopic assisted insertion tube jejunostomy  Laparoscopic herniorrhaphy hiatal  Laparoscopic herniorrhaphy inguinal, right  PICC insertion, right    Family History:    Breast cancer -  mother  Lung cancer - son    Social History:  Negative for tobacco use.  Positive for alcohol use - beer only, 3 bottles/day.  Negative for drug use.  Marital Status:  Single [1]     Review of Systems   Cardiovascular: Negative for chest pain.   Gastrointestinal: Negative for abdominal pain and vomiting.   Musculoskeletal: Negative for arthralgias (hips) and neck pain.        Reports left arm pain   Skin: Positive for wound (posterior scalp).   All other systems reviewed and are negative.    Physical Exam     Patient Vitals for the past 24 hrs:   BP Temp Temp src Pulse Heart Rate Resp SpO2   06/22/20 0945 133/71 -- -- 73 -- -- 99 %   06/22/20 0930 (!) 142/68 -- -- 76 -- -- --   06/22/20 0915 115/80 -- -- 65 -- -- (!) 87 %   06/22/20 0900 111/69 -- -- 68 -- -- --   06/22/20 0800 133/67 -- -- 68 67 (!) 0 97 %   06/22/20 0745 107/65 -- -- 68 64 17 --   06/22/20 0730 125/77 -- -- 69 67 15 99 %   06/22/20 0715 -- -- -- -- 67 13 95 %   06/22/20 0708 133/73 -- -- -- -- -- --   06/22/20 0707 -- 97.2  F (36.2  C) Oral -- 66 16 91 %     Physical Exam    General: Lying flat on gurney  Eyes:  The pupils are equal and round    Conjunctivae and sclerae are normal  ENT:    Dressing covering his entire head. Laceration on left side of scalp  Neck:  In cervical collar, no midline neck tenderness  CV:  Regular rate     Skin warm and well perfused   Resp:  Non labored breathing on room air  GI:  Abdomen is soft, there is no rigidity    No distension    No rebound tenderness     No abdominal tenderness  Rectal:  Non bloody brown stool. Female nurse in room during exam  MS:  Normal muscular tone, no tenderness on UE/LE, pelvis, spine  Skin:  Ecchymosis on left upper arm with no tenderness on this area  Neuro:   Awake, alert.      Oriented to name, location, year    Speech is normal and fluent.    Face is symmetric.     Moves all extremities equally  Psych: Normal affect.  Appropriate interactions.    Emergency Department Course     ECG  (8:03:46):  Rate 69 bpm. NE interval 188. QRS duration 66. QT/QTc 396/424. P-R-T axes 53 52 62. Normal sinus rhythm. Normal ECG. No significant change compared to EKG dated 3/15/2020. Interpreted at 0807 by Afshan Lala MD.    Imaging:  Radiology findings were communicated with the patient who voiced understanding of the findings.    CT Head w/o IV contrast:   1. Left lateral scalp hematoma. No evidence for fracture or intracranial hemorrhage.   2. Cerebral atrophy with scattered white matter changes which are most likely due to chronic small vessel ischemic disease.   3. Persistent right nasal cavity mass with opacified right maxillary sinus and some chronic osteitis. 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 It is always difficult to exclude malignancy with unilateral sinus disease. If clinically indicated, ENT consultation might be helpful in further evaluation, as per radiology.    CT Cervical Spine w/o IV contrast:   Degenerative changes. No evidence for fracture. Multilevel central canal and foraminal stenoses are present, as per radiology.    XR  Pelvis and Hip Left 2 Views:   No left-sided hip fracture is evident. The right proximal femur is internally rotated, however no fractures are evident. Osteopenia. Mild hypertrophic changes in both hip joints. Degenerative changes lower lumbar spine, as per radiology.     Laboratory:  Laboratory findings were communicated with the patient who voiced understanding of the findings.    CK total: 193  CBC: HGB 8.5 L,  L o/w WNL (WBC 6.4)  INR: 1.29 H  BMP: Chloride 110 H, BUN 43 H, GFR Estimate 59 L, Calcium 8.0 L o/w WNL  ABO/Rh type and screen: ABO A, RH(D) Pos, Antibody Screen Neg      Procedures       Laceration Repair        LACERATION:  A simple clean 2.0 cm laceration.      LOCATION:  Posterior scalp      FUNCTION:  Distally sensation and circulation are intact.      ANESTHESIA:  LET - Topical      PREPARATION:  Irrigation and Scrubbing with  Karla Burns      DEBRIDEMENT:  no debridement      CLOSURE:  Wound was closed with 4 Staples    Interventions:  0931: Tdap 0.5 mL IM    Emergency Department Course:  Past medical records, nursing notes, and vitals reviewed.    0707: I performed an exam of the patient as documented above.     EKG obtained in the ED, see results above.     IV was inserted and blood was drawn for laboratory testing, results above.    The patient provided a urine sample here in the emergency department. This was sent for laboratory testing, findings above.    The patient was sent for a head CT, a cervical spine CT, and  Pelvis with left hip x-ray while in the emergency department, results above.     0854: I rechecked the patient and discussed the results of his workup thus far.     0923: Patient rechecked and updated.    0942: I performed a laceration repair on the patient, see procedure note above.    0957: Patient rechecked and updated. Patient' family friend states that the patient ha been more weak over the last few days and has been hallucinating.    I personally reviewed the laboratory, EKG, and imaging results with the Patient and answered all related questions prior to admission.    Findings and plan explained to the Patient who consents to admission.     1016: Discussed the patient with Dr. Poon, who will admit the patient to an observation bed for further monitoring, evaluation, and treatment.     Impression & Plan     Medical Decision Making:  Chilango Echevarria is a 85 year old male who presented to the ED with fall and head laceration. Patient had a mechanical fall at home but laid on ground for a while before help came. Labs show anemia. Facility said that there was a lot of blood on ground from head laceration. Possible that anemia is from this versus possible slow GI bleed. Non bloody stool on rectal exam today and he denies bloody stool or melanotic stool or other blood loss. EKG showed no acute abnormalities. CT head  and CT cervical spine were unremarkable. Told nurse he was having left hip pain so xray obtained which showed no fracture. He is not on any anticoagulation. Tetanus updated. Laceration repaired as above. No other injuries apparent from fall. Did not ambulate well in ED and almost fell again due to unsteady gait/generalized weakness so will admit. Possibly fall may be related to recent flexeril use. Also has been hallucinating. No focal findings to suggest CVA.    Diagnosis:    ICD-10-CM    1. Fall, initial encounter  W19.XXXA Basic metabolic panel     INR     CBC with platelets differential     CK total     ABO/Rh type and screen   2. Laceration of scalp without foreign body, initial encounter  S01.01XA    3. Anemia, unspecified type  D64.9    4. Generalized muscle weakness  M62.81      Disposition:  Admitted to obs.    Scribe Disclosure:  Savanna MOSLEY, am serving as a scribe on 6/22/2020 at 7:19 AM to personally document services performed by Afshan Lala MD based on my observations and the provider's statements to me.      Savanna Montalvo  6/22/2020   [facility] EMERGENCY DEPARTMENT       Afshan Lala MD  06/22/20 1211

## 2020-06-22 NOTE — ED NOTES
I spoke with Maddison WOLFE, nurse from [facility], where patient receives assisted living. She reports to me that patient is his own POA and manages his own medications. He has a family friend who assists with transportation needs. She reports there was a lot of blood found in his apartment this morning. She has been updated about patients admission.

## 2020-06-22 NOTE — PHARMACY-ADMISSION MEDICATION HISTORY
Admission medication history interview status for this patient is complete. See [facility] admission navigator for allergy information, prior to admission medications and immunization status.     Medication history interview done via telephone during Covid-19 pandemic, indicate source(s): Patient (manages own meds)  Medication history resources (including written lists, pill bottles, clinic record): [employer] dispense records    Changes made to PTA medication list:  Added: none  Deleted: bisacodyl, miralax, fluocinonide  Changed: none    Actions taken by pharmacist (provider contacted, etc):None     Additional medication history information:None    Medication reconciliation/reorder completed by provider prior to medication history?  N   (Y/N)     For patients on insulin therapy: N  (Y/N)        Prior to Admission medications    Medication Sig Last Dose Taking? Auth Provider   acetaminophen (TYLENOL) 325 MG tablet Take 2 tablets (650 mg) by mouth every 4 hours as needed for mild pain Past Month at Unknown time Yes Thom Rios MD   allopurinol (ZYLOPRIM) 100 MG tablet Take 1 tablet (100 mg) by mouth daily 6/21/2020 at am Yes Enrique Ring MD   carbidopa-levodopa (SINEMET)  MG per tablet Take 2 tablets by mouth 3 times daily (9am - 12pm - 6pm) 6/21/2020 at pm Yes David De Anda MD   cyclobenzaprine (FLEXERIL) 5 MG tablet Take 1 tablet (5 mg) by mouth 3 times daily as needed for muscle spasms Past Week at Unknown time Yes Dillon Jim CNP   metoprolol succinate ER (TOPROL-XL) 25 MG 24 hr tablet TAKE 1 TABLET(25 MG) BY MOUTH EVERY MORNING 6/21/2020 at am Yes Enrique Ring MD   omeprazole (PRILOSEC) 20 MG DR capsule TAKE 1 CAPSULE BY MOUTH 30 TO 60 MINUTES BEFORE FIRST MEAL OF THE DAY 6/21/2020 at am Yes Enrique Ring MD   order for DME Equipment being ordered: walker Unknown at Unknown time  David De Anda MD

## 2020-06-22 NOTE — ED TRIAGE NOTES
Pt presents via EMS after sustaining unwitnessed fall. Pt was approximately on ground for 2 hrs. Pt has lac on head. Pt denies blood thinners. ABCs intact.

## 2020-06-22 NOTE — ED NOTES
[facility]  ED Nurse Handoff Report    Chilango Echevarria is a 85 year old male   ED Chief complaint: Fall and Laceration  . ED Diagnosis:   Final diagnoses:   Fall, initial encounter   Laceration of scalp without foreign body, initial encounter   Anemia, unspecified type   Generalized muscle weakness     Allergies:   Allergies   Allergen Reactions     Lisinopril      rash, exacerbation of eczema       Code Status: Full Code  Activity level - Baseline/Home:  Independent with a 2 wheeled walker. Activity Level - Current:   Patient unable to stand with assist. Lift room needed: No. Bariatric: No   Needed: No   Isolation: No. Infection: Not Applicable.     Vital Signs:   Vitals:    06/22/20 0900 06/22/20 0915 06/22/20 0930 06/22/20 0945   BP: 111/69 115/80 (!) 142/68 133/71   Pulse: 68 65 76 73   Resp:       Temp:       TempSrc:       SpO2:  (!) 87%  99%       Cardiac Rhythm:  ,      Pain level:    Patient confused: No (forgetful). Patient Falls Risk: Yes.   Elimination Status: Unable to void   Patient Report - Initial Complaint:Pt presents via EMS after sustaining unwitnessed fall. Pt was approximately on ground for 2 hrs. Pt has lac on head. Pt denies blood thinners. ABCs intact. Focused Assessment:   08:22 Skin Color/Condition Skin - Skin WDL: -WDL except  Skin Integrity: bruised (ecchymotic); other (see comments) (old bruise L upper arm, head lac L) BF      08:22 Neurological Cognitive - Cognitive/Neuro/Behavioral WDL: -WDL except (forgetful) Arousal Level: arouses to pain; arouses to voice; arouses to touch/gentle shaking; opens eyes spontaneously  Follows Commands: yes  Speech: garbled; logical  Mood/Behavior: calm; cooperative   Rock Coma Scale - Best Eye Response: 4-->(E4) spontaneous  Best Motor Response: 6-->(M6) obeys commands  Best Verbal Response: 5-->(V5) oriented  Thornton Coma Scale Score: 15   Motor Strength - Left Upper: 5 - active movement against gravity and full resistance   Right Upper: 5 - active movement against gravity and full resistance  Right Lower: 5 - active movement against gravity and full resistance  BF     08:21 HEENT HEENT - HEENT WDL: -WDL except (head lac) Neck WDL: WDL (C collar on) BF     08:20 Musculoskeletal Musculoskeletal - Musculoskeletal WDL: -WDL except  General Mobility: generalized weakness  Pain Body Location: hip (head, L groin) CMS Intact: Yes (forgetful) BF        Tests Performed: Labs, imaging. Abnormal Results:   Labs Ordered and Resulted from Time of ED Arrival Up to the Time of Departure from the ED   BASIC METABOLIC PANEL - Abnormal; Notable for the following components:       Result Value    Chloride 110 (*)     Urea Nitrogen 43 (*)     GFR Estimate 59 (*)     Calcium 8.0 (*)     All other components within normal limits   INR - Abnormal; Notable for the following components:    INR 1.29 (*)     All other components within normal limits   CBC WITH PLATELETS DIFFERENTIAL - Abnormal; Notable for the following components:    RBC Count 2.81 (*)     Hemoglobin 8.5 (*)     Hematocrit 27.5 (*)     MCHC 30.9 (*)     Platelet Count 137 (*)     All other components within normal limits   CK TOTAL   ROUTINE UA WITH MICROSCOPIC   PERIPHERAL IV CATHETER   ABO/RH TYPE AND SCREEN     XR Pelvis and Hip Left 2 Views   Final Result   IMPRESSION: No left-sided hip fracture is evident. The right proximal   femur is internally rotated, however no fractures are evident.   Osteopenia. Mild hypertrophic changes in both hip joints. Degenerative   changes lower lumbar spine.      BRITTANY GONZALES MD      Cervical spine CT w/o contrast   Final Result   IMPRESSION: Degenerative changes. No evidence for fracture. Multilevel   central canal and foraminal stenoses are present.      IZABELLA ALEXIS MD      CT Head w/o Contrast   Final Result   IMPRESSION:   1. Left lateral scalp hematoma. No evidence for fracture or   intracranial hemorrhage.   2. Cerebral atrophy with scattered  white matter changes which are most   likely due to chronic small vessel ischemic disease.   3. Persistent right nasal cavity mass with opacified right maxillary   sinus and some chronic osteitis. This is probably due to an   antrochoanal polyp. It is always difficult to exclude malignancy with   unilateral sinus disease. If clinically indicated, ENT consultation   might be helpful in further evaluation.      IZABELLA ALEXIS MD         Treatments provided: head laceration wound care and stapling  Family Comments: family friend, Abena, called by patient and present at bedside  OBS brochure/video discussed/provided to patient:  N/A  ED Medications:   Medications   lidocaine 1% with EPINEPHrine 1:100,000 1 %-1:[id number] injection (has no administration in time range)   Tdap (tetanus-diphtheria-acell pertussis) (ADACEL) injection 0.5 mL (0.5 mLs Intramuscular Given 6/22/20 0931)     Drips infusing:  No  For the majority of the shift, the patient's behavior Green. Interventions performed were none.    Sepsis treatment initiated: No       ED Nurse Name/Phone Number: Gabby Santos RN,   10:21 AM    RECEIVING UNIT ED HANDOFF REVIEW    Above ED Nurse Handoff Report was reviewed: Yes  Reviewed by: Alayna Smalls RN on June 22, 2020 at 11:47 AM

## 2020-06-23 NOTE — PROGRESS NOTES
"[facility]  Medicine Progress Note - Hospitalist Service       Date of Admission:  6/22/2020  Assessment & Plan   Chilango Echevarria is a 85 year old male with Parkinsons disease, hypertension, PMR, gout, GERD with Barretts esophagus, and eczema who presented to [facility] ED on 6/22/2020 after being found down following fall and resulting scalp laceration.  Admitted for Observation and placement.     Patient lives in assisted living ([facility]) and reports that early this AM, he was walking with his walker when it \"got ahead of him\" causing him to fall.  Fall was unwitnessed.  No LOC but he hit his head.  He was found down by staff about 2 hours later in a pool of blood.  VSS.  BMP notable for chloride 110, BUN 43, and Cr 1.13.  CBC noatble for Hgb 8.5 (down from 11.5 in April). . INR 1.29.  C-Spine cleared by CT.  CT Head notable for scalp hematoma and chronic small vessel disease but no other acute process.  XR Pelvis/Left hip negative for acute process.  A 2-inch laceration was cleaned and repaired in the ED.  Patient's friend reports patient has been having hallucinations at home and was also recently started on Flexeril for a rotator cuff injury.       Fall, likely mechanical, with resulting scalp laceration   Parkinsons disease   Possible polypharmacy  Labs and imaging, as above, encouraging.  TSH normal.  Nothing to suggest syncope.  Very unsteady, quite rigid.  May be related to Flexeril.  Hallucinations overnight causing fall but neuros intact.  Today much more rigid and dysarthric, likely related to missing 1-2 doses of Sinemet yesterday in ED.   Patient acknowledges hallucinations which are relatively new.  Has already been participating in [employer].  Urinary retention new, question if related to Parkinsons?       PLAN:  - Continue scheduled Sinemet: 9AM, 12PM, and 6PM  - Stop flexeril  - Tele  - PT/OT, hopefully to see tomorrow if rigidity improving.  Will likely need TCU  - SLP consult " "for suspected dysphagia related to rigidity/dysarthria  - Neuro to see later today  - Staples out in 10 days      Hypotension  Likely related to volume depletion (head bleed, not eating or drinking).  Received 1L bolus LR with some improvement.  Followed by a second bolus, this time 500 mL LR.  BP improving.      PLAN:  - Continue IV fluids: D5 LR at 100 mL/hr  - Metoprolol with hold parameters     Acute blood loss anemia  Hgb down 3 points to 8.5.  Suspect related to head wound as nothing to suggest active bleed.  Repeat Hgb this AM was 7.1.  Suspect dilution from fluids overnight.  Repeat Hgb in AM.       TERRELL, resolved  Cr 1.13 with elevated BUN, improved with fluids.  Cr back to baseline 0.8.  BUN remains somewhat elevated.  Likely prerenal as nothing to suggest GIB..  Encourage PO.  Repeat BMP in AM.      Urinary retention  Noted by nursing.  UA relatively bland.  No prior h/o BPH in records.  Question if related to Parkinsons.  Continue PVRs and bladder scans.  Straight cath as needed.        Hypertension  Continue Toprol with hold parameters      Gout  Continue allopurinol      Nummular eczema  Eucerin cream.  If lesions very itchy, can add steroid cream.       Intertrigenous candida  Quite malodorous.  Miconazole cream BID.     Diet: Regular Diet Adult    DVT Prophylaxis: PCDs  Code Status: FULL    DISPO: Change to Inpatient.  Will likely require ongoing monitoring and care.      SANGEETHA Vega  Hospitalist Service  [facility]    ______________________________________________________________________    Interval History   Fell last night when got out of bed to \"get french fries\".  Set off bed alarm which somehow triggered an alert as a call light, not a bed alarm.  Patient found on the ground awake with a new left forehead goose egg and bleeding from his elbow.  CT deferred as patient was neuro intact at the time and already on neuro checks.  This AM, was found to be much more rigid and " having difficulty talking although upon careful listening, he was not significantly more confused than yesterday.  He received his AM Sinemet and was practically choking on the pudding used to administer it.  BP soft in the AM but appeared to be improving.      Data reviewed today: I reviewed all medications, new labs and imaging results over the last 24 hours. I personally reviewed no images or EKG's today.    Physical Exam   Vital Signs: Temp: 97.7  F (36.5  C) Temp src: Axillary BP: 98/42 Pulse: 67 Heart Rate: 67 Resp: 16 SpO2: 100 % O2 Device: Nasal cannula Oxygen Delivery: 2 LPM  Weight: 137 lbs 9.6 oz    GENERAL:  Pleasant but very dystonic today - much more than yesterday.  Can barely open his mouth or form words.  Doesn't appear toxic or distressed.    HEENT:  Left forehead bruising, not longer raised.  Left parietal region laceration repaired, intact and dry.  Eyes remain matted and crusted.  Crust forming along mouth as well.    PULMONOLOGY: Clear to auscultation bilaterally   CARDIAC: Regular, no murmur  ABDOMEN: Soft, nontender non distended.   MUSCULOSKELETAL:  Significant dystonia/rigidity compared to yesterday.   strength remains intact, equal. No LE edema or bruising.    NEURO: Alert and appears oriented to self and place.  Reports some hallucinations.  Profound dystonia so full neuro exam could not be undertaken.  SKIN: Pale, dry.        Data   Recent Labs   Lab 06/23/20  0529 06/22/20  0759   WBC  --  6.4   HGB 7.1* 8.5*   MCV  --  98   PLT  --  137*   INR  --  1.29*    144   POTASSIUM 3.7 3.8   CHLORIDE 112* 110*   CO2 27 27   BUN 37* 43*   CR 0.81 1.13   ANIONGAP 3 7   MESERET 7.8* 8.0*   GLC 74 90   ALBUMIN  --  3.0*   PROTTOTAL  --  5.9*   BILITOTAL  --  0.5   ALKPHOS  --  51   ALT  --  10   AST  --  22     Recent Results (from the past 24 hour(s))   CT Head w/o Contrast    Narrative    CT SCAN OF THE HEAD WITHOUT CONTRAST   6/23/2020 12:55 PM     HISTORY: Altered level of consciousness  (LOC), unexplained. Recent  head trauma with lethargy, rule out bleed.    TECHNIQUE: Axial images of the head and coronal reformations without  IV contrast material. Radiation dose for this scan was reduced using  automated exposure control, adjustment of the mA and/or kV according  to patient size, or iterative reconstruction technique.    COMPARISON: 6/22/2020    FINDINGS: Cerebral and cerebellar atrophy are present. There are some  minimal nonspecific white matter changes without mass effect. There is  some low-density in the left anterior external capsule region which  was present on prior exams probably due to old ischemic change. There  is also a small low-density in the right basal ganglia which is  probably due to an old lacunar infarct. There is no evidence for  intracranial hemorrhage, mass effect, acute infarct, or a skull  fracture. Skin staples are seen in the left lateral scalp region at  site of previous soft tissue swelling. Visualized mastoid air cells  and paranasal sinuses are clear. Arterial sclerotic calcifications are  seen at the skull base. There is chronic abnormality involving the  right maxillary sinus and nasal cavity.      Impression    IMPRESSION: No significant change from prior days examination. No  evidence for intracranial hemorrhage or any acute process.    IZABELLA ALEXIS MD   XR Chest Port 1 View    Narrative    XR CHEST PORT 1 VW 6/23/2020 1:20 PM    HISTORY: dysphagia, decreasing O2 sats. Eval for aspiration    COMPARISON: 5/20/2015    FINDINGS: Heart is normal in size. Lungs are clear.    SID RACHEL MD     Medications     IV infusion builder WITH additives         allopurinol  100 mg Oral Daily     Pino Protect   Topical BID     carbidopa-levodopa  2 tablet Oral TID     nystatin   Topical BID     omeprazole  40 mg Oral QAM AC

## 2020-06-23 NOTE — PROGRESS NOTES
INTERIM NOTE    As previously noted, was hypotensive and more dystonic.  Hgb down to 7.1.      Pursued further work-up including stat CT Head which was negative for bleed.  Upon return to room from CT, was noted to be hypoxic to 84% and having trouble managing secretions.  He had coughed up phlegm and wasn't able to spit it out or swallow it.  He was sat upright in bed with improvement in sats.  Stat CXR ordered which was clear.  Procalcitonin, lactate, blood culture ordered.  Lactate 0.9.      After CXR, patient appears to perk up.  He was more alert and rigidity mildly improved.  Able to swallow water via straw without choking or coughing.     BP to 97/45 after bolus but then dropped to 76/39.  Discussed with Dr Poon who recommended repeat bolus 500 mL LR and starting maintenance fluid overnight.      Start DD2 with nectar pending SLP eval.      Continue very strict dosing of Sinemet.     [facility]

## 2020-06-23 NOTE — PLAN OF CARE
PRIMARY DIAGNOSIS: Falls, GENERALIZED WEAKNESS    OUTPATIENT/OBSERVATION GOALS TO BE MET BEFORE DISCHARGE  1. Orthostatic performed: No    2. Tolerating PO medications: Yes    3. Return to near baseline physical activity: No    4. Cleared for discharge by consultants (if involved): No    Discharge Planner Nurse   Safe discharge environment identified: No  Barriers to discharge: Yes       Entered by: Radhames Oates 06/23/2020 4:54 AM     Vitals are Temp: 95.8  F (35.4  C) Temp src: Oral BP: 98/46 Pulse: 73 Heart Rate: 64 Resp: 16 SpO2: 98 %.  Patient is alert, disoriented to place. Bed alarm on. Up with assist of two. On a 2 gm sodium diet. Denies pain. PIV SL.  Denies dizziness, SOB, and nausea. On tele- SR in 60's per tele tech. Incontinent of urine. Bladder scan PRN. Reports numbness to left arm. Radial and Dorsalis pedis pulses +2. Mild bilateral lower extremity edema. Scattered bruises and dry patchy areas of skin noted. Excoriation to inner thighs. Mepilex to skin tear on left elbow and back CDI. Hematoma to left temporal.  Staples x4 to scalp laceration. Weakness to left side of body, baseline per pt. Neuro checks q4h. PT, SW, and Neurology consulted. PRN melatonin and lavender patch given for sleep.  Will continue to monitor and provide supportive cares.           Please review provider order for any additional goals.   Nurse to notify provider when observation goals have been met and patient is ready for discharge.

## 2020-06-23 NOTE — PLAN OF CARE
PT: Orders received. Chart reviewed. Per discussion with PA, pt with waxing/waning medical status this date. Not optimal for PT evaluation at this time. Will hold PT evaluation this date.

## 2020-06-23 NOTE — CONSULTS
Neurology Consult Note  [facility]       [2020]                                                                                       Admission Date: 2020  Hospital Day: 2  Code Status: Prior    Patient: Chilango Echevarria      : 10/24/1934  MRN:  [id number]     CC:      Chief Complaint   Patient presents with     Fall     Laceration       Consult Request:  Referring Provider:  Mitul Poon MD    Indication for Consultation: Parkinson disease    Primary Care Provider:  Enrique Ring MD        HPI:  Chilango Echevarria is a 85 year old yo male admitted for a fall backward associated with a scalp laceration. A friend of his was interviewed in the ER, and reported that Mr. Echevarria was recently started on Flexeril for treatment of a shoulder impingement, and he apparently developed hallucinations after starting this medication (2020). Mr. Echevarria is a patient of my partner, Dr. Kd Hurley, and was last seen in clinic 2019. Dr. Hurley prescribed Sinemet 25/100 ii po tid, and his script has not been altered since that time. Mr. Echevarria underwent head CT in the ER, showing no acute changes, and C-spine CT, also showing no acute changes resulting from his fall. Lab testing shows a marked anemia, worsening since admission with Hg 7.1, cause uncertain. He has not had blood in his stools since admission. He also has thrombocytopenia, and an elevated INR, not on an anticoagulant. U/A shows an elevated WBC at 6/HPF, positive leukocyte esterase, positive ketones, and proteinuria. His Cr is normal at 0.81, with with markedly elevated BUN at 37 (ratio 46:1), consistent with notable prerenal azotemia. I have been asked to consult on Mr. Echevarria for his confusion, hallucinations, increasing dysarthria, and hypotension, most recently documented at 89/46 (lying down). In speaking with the patient, he reports that he is feeling better. He denies headache, but  reports fairly severe left shoulder pain.    A complete review of symptoms was performed including vascular, infectious, cardiovascular, pulmonary, gastrointestinal, endocrinological, hematologic, dermatologic, musculoskeletal, and neurological. All were normal except as above.    CURRENT PROBLEM LIST:  Patient Active Problem List    Diagnosis Date Noted     Falls 06/22/2020     Priority: Medium     Fall 03/16/2020     Priority: Medium     Parkinson's disease (H) 10/22/2015     Priority: Medium     Slow transit constipation 10/22/2015     Priority: Medium     Health Care Home 06/02/2015     Priority: Medium     Status:  Declined    See Letters for Emergency  Care Plan  Date:  July 1, 2015           Status post laparoscopic hernia repair-5/14/15 05/21/2015     Priority: Medium     Dermatitis 05/21/2015     Priority: Medium     HTN (hypertension) 05/21/2015     Priority: Medium     Umbilical hernia 05/21/2015     Priority: Medium     Right inguinal hernia 05/21/2015     Priority: Medium     Advanced directives, counseling/discussion 03/02/2015     Priority: Medium     Vasomotor rhinitis      Priority: Medium     CARDIOVASCULAR SCREENING; LDL GOAL LESS THAN 130 10/31/2010     Priority: Medium     Aortic stenosis      Priority: Medium     Gout 03/15/2005     Priority: Medium     Problem list name updated by automated process. Provider to review       Esophageal reflux 03/15/2005     Priority: Medium       PAST MEDICAL HISTORY:  ALLERGIES:   Allergies   Allergen Reactions     Lisinopril      rash, exacerbation of eczema     Tobacco:    History   Smoking Status     Never Smoker   Smokeless Tobacco     Never Used     Alcohol:  Social History    Substance and Sexual Activity      Alcohol use: Yes        Comment: beer only, 3 bottles per day    MEDICATIONS:       CURRENTLY SCHEDULED MEDICATIONS     allopurinol  100 mg Oral Daily     Pino Protect   Topical BID     carbidopa-levodopa  2 tablet Oral TID     metoprolol succinate  ER  25 mg Oral Daily     nystatin   Topical BID     omeprazole  40 mg Oral QAM AC          HOME MEDICATIONS  Medications Prior to Admission   Medication Sig Dispense Refill Last Dose     acetaminophen (TYLENOL) 325 MG tablet Take 2 tablets (650 mg) by mouth every 4 hours as needed for mild pain   Past Month at Unknown time     allopurinol (ZYLOPRIM) 100 MG tablet Take 1 tablet (100 mg) by mouth daily 90 tablet 3 6/21/2020 at am     carbidopa-levodopa (SINEMET)  MG per tablet Take 2 tablets by mouth 3 times daily (9am - 12pm - 6pm)   6/21/2020 at pm     cyclobenzaprine (FLEXERIL) 5 MG tablet Take 1 tablet (5 mg) by mouth 3 times daily as needed for muscle spasms 30 tablet 0 Past Week at Unknown time     metoprolol succinate ER (TOPROL-XL) 25 MG 24 hr tablet TAKE 1 TABLET(25 MG) BY MOUTH EVERY MORNING 90 tablet 3 6/21/2020 at am     omeprazole (PRILOSEC) 20 MG DR capsule TAKE 1 CAPSULE BY MOUTH 30 TO 60 MINUTES BEFORE FIRST MEAL OF THE DAY 90 capsule 0 6/21/2020 at am     order for DME Equipment being ordered: walker 1 each 0 Unknown at Unknown time     MEDICAL HISTORY  Past Medical History:   Diagnosis Date     Aortic stenosis     very mild     Ying esophagus      Contact dermatitis and other eczema, due to unspecified cause      Esophageal reflux      Gout, unspecified      Incarcerated hiatal hernia 5/21/2015     Inguinal hernia without mention of obstruction or gangrene, unilateral or unspecified, (not specified as recurrent)      Polymyalgia rheumatica (H)      Umbilical hernia without mention of obstruction or gangrene      Unspecified essential hypertension      Vasomotor rhinitis      SURGICAL HISTORY  Past Surgical History:   Procedure Laterality Date     C NONSPECIFIC PROCEDURE      T&A     C NONSPECIFIC PROCEDURE      umbilical herniorraphy     ESOPHAGOSCOPY, GASTROSCOPY, DUODENOSCOPY (EGD), COMBINED N/A 5/11/2015    Procedure: COMBINED ESOPHAGOSCOPY, GASTROSCOPY, DUODENOSCOPY (EGD);  Surgeon:  Thad Ferro MD;  Location: [facility]     LAPAROSCOPIC ASSISTED INSERTION TUBE JEJUNOSTOMY N/A 5/14/2015    Procedure: LAPAROSCOPIC ASSISTED INSERTION TUBE JEJUNOSTOMY;  Surgeon: Thad Ferro MD;  Location: [facility]     LAPAROSCOPIC HERNIORRHAPHY HIATAL N/A 5/14/2015    Procedure: LAPAROSCOPIC HERNIORRHAPHY HIATAL;  Surgeon: Thad Ferro MD;  Location: [facility]     LAPAROSCOPIC HERNIORRHAPHY INGUINAL Right 5/14/2015    Procedure: LAPAROSCOPIC HERNIORRHAPHY INGUINAL;  Surgeon: Thad Ferro MD;  Location: [facility] OR     PICC INSERTION Right 5/11/2015    5fr DL Power PICC, 39cm (1cm external) in the R medial brachial vein w/ tip in the low SVC.     FAMILY HISTORY    Family History   Problem Relation Age of Onset     Breast Cancer Mother      Diabetes Paternal Grandfather      Cancer Son 37        lung     SOCIAL HISTORY  Social History     Socioeconomic History     Marital status: Single     Spouse name: None     Number of children: 1     Years of education: None     Highest education level: None   Occupational History     Occupation: retired   Social Needs     Financial resource strain: None     Food insecurity     Worry: None     Inability: None     Transportation needs     Medical: None     Non-medical: None   Tobacco Use     Smoking status: Never Smoker     Smokeless tobacco: Never Used   Substance and Sexual Activity     Alcohol use: Yes     Comment: beer only, 3 bottles per day     Drug use: No     Sexual activity: Not Currently     Partners: Female   Lifestyle     Physical activity     Days per week: None     Minutes per session: None     Stress: None   Relationships     Social connections     Talks on phone: None     Gets together: None     Attends Orthodox service: None     Active member of club or organization: None     Attends meetings of clubs or organizations: None     Relationship status: None     Intimate partner violence     Fear of current or ex partner: None      "Emotionally abused: None     Physically abused: None     Forced sexual activity: None   Other Topics Concern     Parent/sibling w/ CABG, MI or angioplasty before 65F 55M? Not Asked   Social History Narrative     None         GENERAL EXAMINATION:    He is an elderly, well-developed, well-nourished man, 137 lbs 9.6 oz. Blood pressure is 89/46. Supine. Peripheral pulses are intact at 71 and regular.  His conjunctiva are normal. His oropharynx is without lesions or inflammation. He has audible rales. Skin examination is notable for a wound on his occiput, and a bruise over his left temple, apparently sustained in a fall in the hospital last night. He has no pretibial edema, but he has redness and excoriation of the skin on his toes. Nail examination shows no clubbing, cyanosis, or deformities. Respiratory examination is unremarkable, with rate of 16, unlabored. He is afebrile. He has no abdominal tenderness to palpation. His left AC joint is exquisitely tender to palpation..                 Temp: 97.6  F (36.4  C)   Weight: 62.4 kg (137 lb 9.6 oz)   Temp src: Oral         BP: (!) 89/46   Heart Rate: 64     Estimated body mass index is 22.9 kg/m  as calculated from the following:    Height as of 20: 1.651 m (5' 5\").    Weight as of this encounter: 62.4 kg (137 lb 9.6 oz).    Resp: 16   SpO2: 94 %   O2 Device: None (Room air)     Blood Pressure:   BP Readings from Last 3 Encounters:   20 (!) 89/46   20 98/62   20 111/59       T24 : Temp (24hrs), Av.3  F (35.7  C), Min:95.5  F (35.3  C), Max:97.6  F (36.4  C)    NEUROLOGICAL EXAMINATION  Mental Status:  He is awake, responsive, and communicates with the examiner. He is hard of hearing, but answers appropriately, though at times with generalities. He follows simple commands, e.g. grasp my fingers, let go of my fingers, raise your arm, etc. He was able to raise his right arm, to grasp my fingers, but did not look at my fingers to perform the task, " and missed. He was able to grasp and let go of my fingers to command. He was on the phone when I entered the room, but did not answer the person on the phone, likely due to his hearing loss. His speech was notable for monotonic speech, with mild hypomimia.    Station and Gait: He cannot stand.    Skull and Spine: His head is shows the wounds over his occiput and left temporoparietal scalp.     Cranial Nerves: . He has no apparent nystagmus. Pupils are poorly reactive. His face is symmetric at rest and with movement. Tongue is midline.    Motor: Muscle bulk is reduced throughout. He has moderate rigidity in his arms and legs, likely baseline for him. He has no resting tremor. Muscles are non-tender to palpation. He uses his left arm minimally due to severe pain ni the shoulder. He can raise his right arm and grasp with both hand without apparent weakness.    Sensory: Sensation is symmetric in his arms and legs.     Coordination: He has no resting, postural, or action tremor.       .  LABORATORY RESULTS      SMA-7:  Recent Labs   Lab Test 06/23/20  0529 06/22/20  0759 04/02/20  0940 03/26/20  0744    144 141 140   POTASSIUM 3.7 3.8 4.0 3.6   CHLORIDE 112* 110* 108 109   CO2 27 27 27 29   GLC 74 90 87 83   BUN 37* 43* 42* 38*   CR 0.81 1.13 0.82 0.90   MESERET 7.8* 8.0* 8.7 8.5     Recent Labs   Lab Test 05/20/15  0901 05/19/15  0637 05/18/15  0717   MAG 2.1 2.1 2.1     Recent Labs   Lab Test 05/20/15  0901 05/18/15  0717 05/16/15  0128   PHOS 2.8 3.2 2.4*   0.62 mg (actual weight)  CMP:  Recent Labs   Lab 06/22/20  0759   PROTTOTAL 5.9*   ALBUMIN 3.0*   ALKPHOS 51   AST 22   ALT 10   BILITOTAL 0.5     CBC:    Recent Labs   Lab 06/23/20  0529 06/22/20  0759   WBC  --  6.4   RBC  --  2.81*   HGB 7.1* 8.5*   HCT  --  27.5*   MCV  --  98   PLT  --  137*     U/A:    Recent Labs   Lab Test 06/22/20  1456   COLOR Yellow   APPEARANCE Clear   URINEGLC Negative   URINEBILI Negative   URINEKETONE 20*   SG 1.033   UBLD Negative  "  URINEPH 5.5   PROTEIN 20*   NITRITE Negative   LEUKEST Moderate*   RBCU 3*   WBCU 6*     Cholesterol Panel:   Lab Results   Component Value Date    CHOL 180 03/11/2008    HDL 65 03/11/2008     03/11/2008    TRIG 53 05/18/2015    CHOLHDLRATIO 2.8 03/11/2008    VLDL 15 03/11/2008     Lipase:   Lab Results   Component Value Date    LIPASE 351 05/10/2015     HgA1c: No results found for: A1C  TSH:   Recent Labs   Lab 06/22/20  0759   TSH 0.99     CK: No results found for: CKTOTAL  No results found for: CKTOTAL, CKMB, TROPN  Ammonia:  No lab results found.  Vitamin B12:   Recent Labs   Lab Test 10/26/18  1547   B12 311     Homocysteine: No results found for: HOMOCYSTEINE  Vitamin D: No results for input(s): VITDT in the last 168 hours.  RPR: @LABALLVALUES(RPR:1)@  ESR:   Recent Labs   Lab Test 04/16/15  1137   SED 5     CRP:   Lab Results   Component Value Date    CRP <2.9 04/16/2015     Lab Results   Component Value Date    TROPI <0.015 03/15/2020        COAGULATION PANEL    Lab Results   Component Value Date    PATH  06/30/2009     Patient Name: JENNIFER DYSON  MR#: [id number]  Specimen #: [id number]  Collected: 6/30/2009  Received: 6/30/2009  Reported: 7/1/2009 16:03  Ordering Phy(s): JOHN JARAMILLO  Additional Phy(s): EVELYNE NELSON              SPECIMEN(S):  Gastroesophageal junction biopsy at 32 cm    FINAL DIAGNOSIS:  Esophagus, GE junction at 32 cm, biopsy -  1.          Fragments of squamous and columnar mucosa of fundic and  cardiac types.  2.     Chronic inflammation.  3.     No evidence of ulceration, reflux esophagitis, eosinophilic  esophagitis, intestinal metaplasia (goblet cells absent), dysplasia or  malignancy.    COMMENT:  Endoscopy report was reviewed.      Electronically signed out by:    Pedro Soriano M.D.      CLINICAL HISTORY:  History of Ying's.      GROSS:  The specimen, labeled \"esophagus biopsy/G. E. junction at 32 cm\",  consists of five fragments of soft tan to pink " tissue measuring between  0.1 and 0.3 cm in diameter each.  The specimen is submitted in its  entirety.  MGP/kd    MICROSCOPIC:  Microscopic examination was performed.    MGP/kd  DT/7-1-09      TESTING LAB LOCATION:  [facility]  [address] [place]  [address]  [phone]    COLLECTION SITE:  Client: [facility]  Location: ENDO (R)     --CRP Cardiac Risk:    Lab Results   Component Value Date    CRP <2.9 04/16/2015   INR:    Recent Labs   Lab 06/22/20  0759   INR 1.29*   Blood Cultures:   Recent Labs   Lab 06/22/20  1456   CULT PENDING       IMAGING RESULTS   Xr Pelvis And Hip Left 2 Views    Result Date: 6/22/2020  XR PELVIS AND HIP LEFT 2 VIEWS 6/22/2020 8:42 AM HISTORY: Pain following fall earlier in the day. COMPARISON: 3/15/2020     IMPRESSION: No left-sided hip fracture is evident. The right proximal femur is internally rotated, however no fractures are evident. Osteopenia. Mild hypertrophic changes in both hip joints. Degenerative changes lower lumbar spine. BRITTANY GONZALES MD    Xr Shoulder Left 2 Views    Result Date: 6/19/2020  LEFT SHOULDER TWO VIEWS  6/19/2020 11:59 AM HISTORY:  Acute pain of left shoulder.     IMPRESSION: Mild AC arthrosis. Superior translation of the humeral head consistent with rotator cuff pathology. No acute fracture identified. SULEIMAN FONSECA MD    Cervical Spine Ct W/o Contrast    Result Date: 6/22/2020  CT CERVICAL SPINE WITHOUT CONTRAST   6/22/2020 8:37 AM HISTORY: Fall, neck pain.  TECHNIQUE: Axial images of the cervical spine were obtained without intravenous contrast. Multiplanar reformations were performed. Radiation dose for this scan was reduced using automated exposure control, adjustment of the mA and/or kV according to patient size, or iterative reconstruction technique.  COMPARISON: None. 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 There is some retrolisthesis of C4 on C5 of  approximately 3 mm. There is retrolisthesis at C5-C6 of approximately 2 mm. There is degenerative spondylolisthesis at C7-T1 of approximately 4 mm. Posterior alignment is otherwise normal. There is no evidence for acute fracture. There is some multilevel degenerative disc and facet disease most advanced at C4-C5 and C5-C6 where there are moderate central canal stenoses. There is also multilevel foraminal stenoses.     IMPRESSION: Degenerative changes. No evidence for fracture. Multilevel central canal and foraminal stenoses are present. IZABELLA ALEXIS MD    Ct Head W/o Contrast    Result Date: 6/22/2020  CT SCAN OF THE HEAD WITHOUT CONTRAST   6/22/2020 8:36 AM HISTORY: Fall, head injury. TECHNIQUE: Axial images of the head and coronal reformations without IV contrast material. Radiation dose for this scan was reduced using automated exposure control, adjustment of the mA and/or kV according to patient size, or iterative reconstruction technique. COMPARISON: 3/15/2020. FINDINGS: There is some mild-to-moderate cerebral atrophy. Extra-axial spaces are slightly more prominent over the left convexity than the right convexity but this is unchanged since the prior exam. Some minimal nonspecific white matter changes are seen bilaterally without mass effect. There is no evidence for intracranial hemorrhage, acute infarct, or a skull fracture. There is some extracalvarial soft tissue swelling over the left lateral posterior frontal region near the vertex which is probably a scalp hematoma. There is chronic opacification of the right maxillary sinus with a soft tissue mass in the right nasal cavity. 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 Remainder of paranasal sinuses are clear. Mastoid air cells are clear. Vascular calcifications noted.     IMPRESSION: 1. Left lateral scalp hematoma. No evidence for fracture or intracranial hemorrhage. 2. Cerebral atrophy with scattered  "white matter changes which are most likely due to chronic small vessel ischemic disease. 3. Persistent right nasal cavity mass with opacified right maxillary sinus and some chronic osteitis. 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 It is always difficult to exclude malignancy with unilateral sinus disease. If clinically indicated, ENT consultation might be helpful in further evaluation. IZABELLA ALEXIS MD      No results found for this or any previous visit (from the past 24 hour(s)).    CXR: Performed earlier today, no apparent infiltrates    _____________________________________________________________________________    EKG: NSR, no evidence of ischemia.        ASSESSMENT     1. Parkinson disease.   2. No tremor on examination, moderate rigidity, probably at baseline for him.  3. Fall. This was likely precipitated by dehydration and orthostatic hypotension.  4. Elevated BUN, \"normal\" Cr.  5. Severe anemia.  6. Thrombocytopenia.  7. Abnormal U/A, with 6 WBC's, positive red cells, positive leukocyte esterase, proteinuria, and positive ketones, demonstrating a mild UTI. Even mild infections will cause decompensation in individuals with chronic neurological conditions, particularly in Parkinson patients with impaired gait stability at baseline.  8. Encephalopathy, by report, now improved, likely the result of his saline infusions.  9. Elevated INR, not prescribed anticoagulation. This likely represents mild liver dysfunction in the setting of protein malnutrition.  10. Probable mild/moderate dementia.  11. Prior very low B12 testing. Anything less than 300 pg/ml should be treated, especially in the setting of even subtle cognitive impairment. I recommend repeat vitamin B12 testing, with B12 replacement if the level is less the 300 pg/ml.    RECOMMENDATIONS   1. No change in his Sinemet is recommended. His present decompensation is not due to his underlying Parkinson disease.  2. Continue supportive " care.  3. Evaluation is required for hs severe anemia, which will appear worse once he is adequately hydrated.  4. His abnormal U/A likely represents a symptomatic UTI, and I recommend treatment to improve recovery.  5. Probable orthostatic hypotension should be tested for with orthostatic blood pressures. Since he is unable to stand without risk for fall at this time, I suggest supine and sitting blood pressures and pulsesto assess his degree of orthostatic intolerance. He will not be able to discharge back to his facility until his orthostatic intolerance has resolved.  6. Probable B12 deficiency. I suspect his B12 levels have dropped further since his prior testing showed a low normal level of 311 pg/ml. I recommend repeat testing, and replacement injections as appropriate.    Please call if there are further questions.        Harjit Tomas M.D., Ph.D.    [facility]

## 2020-06-23 NOTE — PLAN OF CARE
PRIMARY DIAGNOSIS: Falls, GENERALIZED WEAKNESS    OUTPATIENT/OBSERVATION GOALS TO BE MET BEFORE DISCHARGE  1. Orthostatic performed: No    2. Tolerating PO medications: Yes    3. Return to near baseline physical activity: No    4. Cleared for discharge by consultants (if involved): No    Discharge Planner Nurse   Safe discharge environment identified: No  Barriers to discharge: Yes       Entered by: Radhames Oates 06/23/2020 2:31 AM     Vitals are Temp: 96.7  F (35.9  C) Temp src: Axillary BP: 106/49 Pulse: 73 Heart Rate: 63 Resp: 20 SpO2: 93 %.  Patient is alert, disoriented to place. Bed alarm on. Up with assist of two. On a 2 gm sodium diet. Denies pain. NS at 100 mL/hr. Denies dizziness, SOB, and nausea. On tele- SR in 60's per tele tech. Incontinent of urine. Bladder scan PRN. Reports numbness to left arm. Extremities cool to the touch. Radial and Dorsalis pedis pulses +2. Mild bilateral lower extremity edema. Scattered bruises and dry patchy areas of skin noted. Eucerin bream applied. Excoriation to inner thighs; cleaned, dried, and nystatin cream applied. Mepilex to skin tear on left elbow and back CDI. Hematoma to left temporal.  Staples x4 to scalp laceration. Weakness to left side of body, baseline per pt. Neuro checks q2h x4 then q4h. PT, SW, and Neurology consulted. Will continue to monitor.            Please review provider order for any additional goals.   Nurse to notify provider when observation goals have been met and patient is ready for discharge.

## 2020-06-23 NOTE — CONSULTS
Care Transition Initial Assessment - SW     Met with: Spoke with AIDEN Washburn at [facility], [phone], (L) [phone].    Active Problems:    Falls       DATA  Lives With: facility resident      Quality of Family Relationships: helpful, involved, supportive  Description of Support System: Supportive, Involved  Who is your support system?: Facility resident(s)/Staff  Support Assessment: Lacks necessary supervision and assistance.   Identified issues/concerns regarding health management: Pt admitted under observation after a fall. Pt has a history of Parkinsons. Pt resides at the [facility]. Spoke with AIDEN Washburn, who reports that pt receives assistance with bathing and wears a staff pendant. He had [facility] which ended 5/20. At baseline, pt is independent with a 2ww and ADL's. He currently manages his own medications.      Quality of Family Relationships: helpful, involved, supportive     ASSESSMENT  Cognitive Status:  Awake and confused.  Concerns to be addressed: Discharge planning. PT and neurology consults pending.      PLAN  Patient anticipates discharging to:  TBD, likely TCU. Awaiting PT eval. SW to continue following.     KAMINI Huitron   Inpatient Care Coordination  [facility]   [phone]

## 2020-06-23 NOTE — PROGRESS NOTES
INTERIM NOTE    Seen earlier this AM.  Pressures soft.  Much more dystonic today with dysarthria and increased rigidity.  Difficult to understand.  RN reporting patient having significant issues swallowing and also increasing somnolence.     Fall noted yesterday in-house in addition to fall prior to admission.  Hgb dropped 8.5 >> 7.1 initially felt related to fluids overnight.  No e/o ongoing bleed - no blood stools, hematemesis, hematomas.  Abd exam benign.      Discussed with Neurology (Dr Tomas) regarding concerns for increased rigidity and dysarthria.  He recommends continuing current dose of Sinemet and addressing any underlying infection but otherwise does not have additional suggestions at this time.     PLAN:     Will get a STAT CT Head    1L bolus LR     Repeat Hgb at 1400    Type and cross.  Prepare 1u pRBC as anticipate Hgb to drop with additional fluids    Continue monitor BP     SJohnson PAC

## 2020-06-23 NOTE — PLAN OF CARE
PRIMARY DIAGNOSIS: Falls, GENERALIZED WEAKNESS    OUTPATIENT/OBSERVATION GOALS TO BE MET BEFORE DISCHARGE  1. Orthostatic performed: No    2. Tolerating PO medications: Yes    3. Return to near baseline physical activity: No    4. Cleared for discharge by consultants (if involved): No    Discharge Planner Nurse   Safe discharge environment identified: No  Barriers to discharge: Yes       Entered by: Radhames Oates 06/23/2020 2:30 AM         Please review provider order for any additional goals.   Nurse to notify provider when observation goals have been met and patient is ready for discharge.

## 2020-06-24 NOTE — PLAN OF CARE
Vitals: Blood pressure much improved after bolus and maintance fluids. Systolic was in 70s today before bolus. Now 120-130s systolic.   Neuro: wnl- answered all orientation questions correctly. Patient mumbled/garbled speech at times due to parkinson's. Occasionally setting off bed alarm.   GI: Pt reports last bowel movement today  : unable to void today, last cath'd at 1500   Skin: Patient has overall bruising. In between coccyx and in negrito area patient is reddened- barrier cream applied. Dry/flaky feet. Many many bruises.   Activity: x2 due to hypotension   Diet: DD2 diet   Pain: denies   Plan: TCU placement, monitor blood pressures.

## 2020-06-24 NOTE — PLAN OF CARE
Notified by PT of BP 74/34. MD notified. Patient assessed; denies dizziness/lightheadedness but appeared significantly fatigued. Abdominal binder in place. HOB lowered. BP recheck 119/54. Plan to place IV and start IVF (bolus over two hours, maintenance after).

## 2020-06-24 NOTE — PLAN OF CARE
Discharge Planner PT   Patient plan for discharge: not stated  Current status: PT: Order received for discharge recommendations; at baseline patient reports living in an [facility] but also reports owning a condo; unable to give PLOF and assist given to him; oriented to self and month/year; thinks he is at the bank;reports using a walker; up in bedside chair upon therapist arrival; BP 89/56 at rest; reports no dizziness; sit>stand with mod A of 1 and sequencing/safety cues; use of walker in standing; R lean noted in sitting; slight R lean during gait; short distance gait in room with min/mod A of 1; some festination of gait; mod A during turns with walker with LOB backward and R; needing to use bathroom; patient became max A x 2 and less conversational; leaning heavily backward and R; assisted to wheelchair and then back to bed; no LOB; BP after walking was 74/34; min A to return to supine from sitting; updated MD and nurse. Not safe to ambulate with drops of BP currently; 1x eval and treatment; will plan to defer further PT to next level of care  Barriers to return to prior living situation: current level of assist; hypotension; impaired balance; impaired cognition  Recommendations for discharge: TCU  Rationale for recommendations: Patient would benefit from further PTonce hypotension is controlled; Recommend trial of PT at TCU to maximize return of mobility; May need to transition to LTC after if he doesn't progress to level of independence to return to [facility]       Entered by: Jennie Panchal 06/24/2020 1:58 PM

## 2020-06-24 NOTE — PLAN OF CARE
Patient BP 82/37, 99/40 after ambulating. MD notified; ordered bolus over two hours & maintenance fluid after. Patient removed IV this AM. Writer attempted to place new IV; patient adamantly refused. MD updated; BP recheck 103/42. Plan to push PO intake and reassess this afternoon. Patient updated, water provided.

## 2020-06-24 NOTE — PROGRESS NOTES
"[facility]    Medicine Progress Note - Hospitalist Service       Date of Admission:  6/22/2020  Assessment & Plan   Chilango Echevarria is a 85 year old male with Parkinsons disease, hypertension, PMR, gout, GERD with Barretts esophagus, and eczema who presented to [facility] ED on 6/22/2020 after being found down following mechanical fall and resulting scalp laceration.     Fall, reported to be mechanical    - also had fall here in hospital on 6/22 (imaging showed no injury- CT head)    - underlying Parkinsons contributing    - will be seen by PT today    - likely will need TCU    - seen by Neurology here (I will order the B12 recommended, urine cultures are negative, so will not start antibiotics)    Hypotension    - also is orthostatic    - stopped his toprol    - IVF bolus    - will order abdominal binder    Hallucinations/altered mental status    - at home may have been due to flexeril    - here, likely was due to hypotension    - at his baseline today    Hypoxia    - yesterday    - now resolved    - may have been related to his episodes of hypotension yesterday    - CXR was done and showed no acute abnormality    Acute anemia    - no current signs/symptoms of blood loss    - may have been due to blood loss related to laceration/fall    - monitor    Parkinsons    - follows with Dr. Hurley ([facility])    - cont sinemet    - uses walker at baseline    Drooling/question of dysphagia    - patient denies and states he has drooled since he was a teen and that \"the front of my shirt is always wet)    - did well with speech    HTN    - discontinued Toprol    GERD/Barretts esophagus    - cont PPI    I asked who I could update. He identified his daughter in-law Heidi. I spoke with April. She was  to the patient's only son who passed > a decade ago. Patient has a brother as well. Patient's will identifies Heidi as his health care proxy. She has already applied to have a guardian/conservator " appointed through his  (patient does not yet know this). His friends (Carlos and Abena) are a couple he met in a bar a few years ago and should not have any say in his care. There is a question on how involved they are with his financials. I did confirm with Heidi that the patient has been not taking care of himself, not eating, has been a fall risk for a long time (she last saw him in December). She also did confirm that his drooling has been present for at least a couple years.       Diet: Dysphagia Diet Level 2 [facility] Altered Thin Liquids (water, ice chips, juice, milk gelatin, ice cream, etc)    DVT Prophylaxis: Enoxaparin (Lovenox) SQ  Garza Catheter: not present  Code Status: Full Code           Disposition Plan   Expected discharge: 1-3 days, recommended to transitional care unit once safe disposition plan/ TCU bed available.  Entered: Mitul Poon MD 06/24/2020, 10:24 AM       The patient's care was discussed with the Bedside Nurse, Patient and Patient's Family.    Mitul Poon MD  Hospitalist Service  [facility]    ______________________________________________________________________    Interval History   Patient in chair. Denies chest pain, sob, abdo pain, n/v/d. No lightheadedness. He states he is at [facility]. He knows the year and why he is here.     Data reviewed today: I reviewed all medications, new labs and imaging results over the last 24 hours. I personally reviewed no images or EKG's today.    Physical Exam   Vital Signs: Temp: 96  F (35.6  C) Temp src: Oral BP: 105/41 Pulse: 69 Heart Rate: 63 Resp: 16 SpO2: 98 % O2 Device: None (Room air) Oxygen Delivery: 2 LPM  Weight: 143 lbs 1.6 oz  Constitutional: awake, alert, cooperative, no apparent distress, and appears stated age  Eyes: Lids and lashes normal, pupils equal, round and reactive to light, extra ocular muscles intact, sclera clear, conjunctiva normal  ENT: Normocephalic, without obvious abnormality,  atraumatic, sinuses nontender on palpation, external ears without lesions, oral pharynx with moist mucous membranes, tonsils without erythema or exudates, gums normal and good dentition.  Respiratory: No increased work of breathing, good air exchange, clear to auscultation bilaterally, no crackles or wheezing  Cardiovascular: Normal apical impulse, regular rate and rhythm, normal S1 and S2, no S3 or S4, and no murmur noted  GI: No scars, normal bowel sounds, soft, non-distended, non-tender, no masses palpated, no hepatosplenomegally  Skin: no bruising or bleeding  Musculoskeletal: no lower extremity pitting edema present    Data   Recent Labs   Lab 06/24/20  0933 06/23/20  0529 06/22/20  0759   WBC  --   --  6.4   HGB 7.5* 7.1* 8.5*   MCV  --   --  98   PLT  --   --  137*   INR  --   --  1.29*    142 144   POTASSIUM 3.8 3.7 3.8   CHLORIDE 111* 112* 110*   CO2 26 27 27   BUN 22 37* 43*   CR 0.77 0.81 1.13   ANIONGAP 5 3 7   MESERET 7.9* 7.8* 8.0*   GLC 88 74 90   ALBUMIN  --   --  3.0*   PROTTOTAL  --   --  5.9*   BILITOTAL  --   --  0.5   ALKPHOS  --   --  51   ALT  --   --  10   AST  --   --  22     Recent Results (from the past 24 hour(s))   CT Head w/o Contrast    Narrative    CT SCAN OF THE HEAD WITHOUT CONTRAST   6/23/2020 12:55 PM     HISTORY: Altered level of consciousness (LOC), unexplained. Recent  head trauma with lethargy, rule out bleed.    TECHNIQUE: Axial images of the head and coronal reformations without  IV contrast material. Radiation dose for this scan was reduced using  automated exposure control, adjustment of the mA and/or kV according  to patient size, or iterative reconstruction technique.    COMPARISON: 6/22/2020    FINDINGS: Cerebral and cerebellar atrophy are present. There are some  minimal nonspecific white matter changes without mass effect. There is  some low-density in the left anterior external capsule region which  was present on prior exams probably due to old ischemic change.  There  is also a small low-density in the right basal ganglia which is  probably due to an old lacunar infarct. There is no evidence for  intracranial hemorrhage, mass effect, acute infarct, or a skull  fracture. Skin staples are seen in the left lateral scalp region at  site of previous soft tissue swelling. Visualized mastoid air cells  and paranasal sinuses are clear. Arterial sclerotic calcifications are  seen at the skull base. There is chronic abnormality involving the  right maxillary sinus and nasal cavity.      Impression    IMPRESSION: No significant change from prior days examination. No  evidence for intracranial hemorrhage or any acute process.    IZABELLA ALEXIS MD   XR Chest Port 1 View    Narrative    XR CHEST PORT 1 VW 6/23/2020 1:20 PM    HISTORY: dysphagia, decreasing O2 sats. Eval for aspiration    COMPARISON: 5/20/2015    FINDINGS: Heart is normal in size. Lungs are clear.    SID RACHEL MD

## 2020-06-24 NOTE — PLAN OF CARE
Discharge Planner SLP   Patient plan for discharge: did not state  Current status: Pt with hx of parkinson's disease, GERD with licona's esophagus, hiatal hernia but no hx of oropharyngeal dysphagia per chart. Pt does endorse difficulty/globus sensation with foods such as popcorn and hard summer sausages, but denies any other issues. Per chart, he apparently has increased rigidity/dysarthria with difficulty with secretion management/swallowing and hypoxia yesterday - chest xray was ordered and was clear. RN and PA from today reported significant improvements in mentation, respiratory status and swallowing today.    Clinical swallow evaluation completed. Pt on room air. Oromotor evaluation revealed mild-moderate left sided asymmetry,  labial/lingual weakness - mild left sided drooling noted (no new CVA observed on CT scans from this admission, but do note chronic R basal ganglia CVA). Mild dysarthria per informal conversations. Some missing posterior dentition. Pt assessed with thin liquids, nectar thick liquids, DD2 solids and regular solids - he currently presents with mild-moderate oropharyngeal dysphagia with suspected esophageal component given the above hx. Mastication with soft/moist items appears functional, mild oral residuals with dryer solids (greater on left side). Swallow appears generally timely and robust laryngeal elevation to palpation. No overt signs/sx aspiration across all textures.     Recommendations:  - Continue dysphagia diet level 2, upgrade to thin liquids (pt does not like straws and therefore not assessed, rec avoiding straws) --- if any difficulty with thin liquids, downgrade back to nectar thick liquids, both RN and PA aware  - Medications as tolerated  - Fully upright in chair for meals with remaining upright for 30-60 minutes post, slow pacing, small single bites, alternate between solids liquids  - Will closely monitor and complete a video swallow if indicated     Barriers to return  to prior living situation: none  Recommendations for discharge: return to care home with  SLP  Rationale for recommendations: ongoing SLP for dysphagia management        Entered by: Elisha Hsu 06/24/2020 9:59 AM

## 2020-06-24 NOTE — PLAN OF CARE
PRIMARY DIAGNOSIS: Falls     OUTPATIENT/OBSERVATION GOALS TO BE MET BEFORE DISCHARGE  1. Orthostatic performed: No     2. Tolerating PO medications: Yes     3. Return to near baseline physical activity: No pt was ind     4. Cleared for discharge by consultants (if involved): No     Discharge Planner Nurse   Safe discharge environment identified: No  Barriers to discharge: Yes       Entered by: Erica Jameson 06/24/2020 0621      BM, restless, LR 100mL/hr in RA neuros intact Q4hrs.  Pt refused labs this am.  Blood cultures show no growth.  DD2 [place] thick speech eval today.  Bedrest A-2 gaitbelt walker, bedside commode. 4 staples head laceration.  BM x1.  Blanchable redness on coccyx and mepilex on left elbow.       Please review provider order for any additional goals.   Nurse to notify provider when observation goals have been met and patient is ready for discharge.

## 2020-06-24 NOTE — PLAN OF CARE
/53 (BP Location: Left arm)   Pulse 67   Temp 97.8  F (36.6  C) (Axillary)   Resp 16   Wt 62.4 kg (137 lb 9.6 oz)   SpO2 93%   BMI 22.90 kg/m    Neuro: confused, garbled speech, unable to lift arms/legs off of bed.  Unable to assess pupils as pt does not hold eyes open long enough.  STAT head CT negative.  Cardiac: Hypotensive entire shift, fluid bolus x 2, maintenance fluids infusing  Lungs: difficult to assess due to patient's cognitive status, sats dipping into 70's on RA at times, pt now on 2L NC.  Portable cxr negative this afternoon.  Patient not swallowing well, speech eval ordered.    GI: very large BM this evening  : incontinent at times but also retaining, pt straight cath'd for 650ml this evening.  Pain: denies  IV: D5LR at 100 infusing  Meds: sinemet for parkinsons, gave once crushed with applesauce and once whole in applesauce.    Diet: dysphagia nectar thick w/ speech eval ordered - pt has not been safe to eat a meal today  Activity: bedrest, pt unable to stand  Plan: blood cultures drawn, speech eval ordered, iv fluids overnight.  PT to see tomorrow.  Unlikely patient will be able to return to assisted living facility he currently resides in.

## 2020-06-24 NOTE — PROGRESS NOTES
"   06/24/20 1410   Quick Adds   Type of Visit Initial PT Evaluation   Living Environment   Lives With facility resident   Living Arrangements assisted living   Home Accessibility no concerns   Living Environment Comment reports he lives in an [facility] and also owns a condo   Self-Care   Usual Activity Tolerance moderate   Current Activity Tolerance poor   Equipment Currently Used at Home walker, rolling   Activity/Exercise/Self-Care Comment patient had difficulty relaying PLOF   Functional Level Prior   Ambulation 1-->assistive equipment   Transferring 1-->assistive equipment   Cognition 1 - attention or memory deficits   Fall history within last six months yes   Number of times patient has fallen within last six months   (patient unable to quantify; \"Many\")   Which of the above functional risks had a recent onset or change? ambulation;transferring;toileting;fall history;bathing   Prior Functional Level Comment patient only reports using a walker; unable to give assist needed   General Information   Onset of Illness/Injury or Date of Surgery - Date 06/22/20   Referring Physician Deja Diallo PA    Patient/Family Goals Statement not stated   Pertinent History of Current Problem (include personal factors and/or comorbidities that impact the POC) per chart: Chilango Echevarria is a 85 year old male with Parkinsons disease, hypertension, PMR, gout, GERD with Barretts esophagus, and eczema who presented to [facility] ED on 6/22/2020 after being found down following mechanical fall and resulting scalp laceration; found to have orthostatic hypotension   Precautions/Limitations fall precautions   General Observations patient having orthostatic hypotension   Cognitive Status Examination   Orientation person;time  (thought he was at the bank)   Level of Consciousness alert;confused   Follows Commands and Answers Questions 75% of the time   Personal Safety and Judgment impaired;at risk behaviors demonstrated   Memory impaired " "  Cognitive Comment thought he was at the bank   Pain Assessment   Patient Currently in Pain No   Posture    Posture Comments leans R in sitting and standing   Range of Motion (ROM)   ROM Comment difficulty moving UE's against gravity   Strength   Strength Comments functional weakness; further limited by orthostatic hypotension   Bed Mobility   Bed Mobility Comments min A for sit>supine   Transfer Skills   Transfer Comments sit>stand from chair with min/mod A of 1; max A x 2 when BP dropped   Gait   Gait Comments able to ambulate in room with min/mod A of 1 initially and then max A x 2 when orthostatic   Balance   Balance Comments impaired sitting with lean to R; lean to R in standing; LOB backward and R during turning   Coordination   Coordination Comments decreased in LE's during gait; festination   General Therapy Interventions   Planned Therapy Interventions gait training;transfer training   Intervention Comments will defer further treatment to TCU   Clinical Impression   Criteria for Skilled Therapeutic Intervention yes, treatment indicated   PT Diagnosis impaired gait/transfers   Influenced by the following impairments weakness; impaired cognition; impaired balance; orthostatic hypotension   Functional limitations due to impairments impaired independence with functional mobility   Clinical Presentation Evolving/Changing   Clinical Presentation Rationale clinical judgement   Clinical Decision Making (Complexity) Low complexity   Therapy Frequency Other (see comments)  (1x eval and treatment; defer further treatment to TCU)   Predicted Duration of Therapy Intervention (days/wks) 1x eval and treatment   Anticipated Equipment Needs at Discharge front wheeled walker   Anticipated Discharge Disposition Transitional Care Facility   Risk & Benefits of therapy have been explained Yes   Patient, Family & other staff in agreement with plan of care Yes   [facility] AM-PAC TM \"6 Clicks\"   2016, [employer], under license to [employer].  All rights reserved.   6 Clicks Short Forms Basic Mobility Inpatient Short Form   [facility] AM-PAC  \"6 Clicks\" V.2 Basic Mobility Inpatient Short Form   1. Turning from your back to your side while in a flat bed without using bedrails? 3 - A Little   2. Moving from lying on your back to sitting on the side of a flat bed without using bedrails? 3 - A Little   3. Moving to and from a bed to a chair (including a wheelchair)? 2 - A Lot   4. Standing up from a chair using your arms (e.g., wheelchair, or bedside chair)? 2 - A Lot   5. To walk in hospital room? 2 - A Lot   6. Climbing 3-5 steps with a railing? 1 - Total   Basic Mobility Raw Score (Score out of 24.Lower scores equate to lower levels of function) 13   Total Evaluation Time   Total Evaluation Time (Minutes) 10     "

## 2020-06-24 NOTE — PROGRESS NOTES
Discharge Planner   Discharge Plans in progress: Yes, TCU is recommended at discharge. Patient agreeable to contacting daughter in law Ofelia for further discussion for discharge. Spoke with Ofelia over the phone.   Family was provided with the Medicare Compare list for SNF.  Discussed associated Medicare star ratings to assist with choice for referrals/discharge planning Yes    Education was given to pt/family that star ratings are updated/maintained by Medicare and can be reviewed by visiting www.medicare.gov Yes  Transportation at discharge to be decided friend or [facility] transport.  Ofelia will work on having a friend obtain clothes and personal items for patient prior to discharge.   Barriers to discharge plan: Awaiting TCU choices from daughter in law Ofelia.   Follow up plan: Care coordinator will continue to follow for discharge planning.        Entered by: Deja Casper 06/24/2020 3:00 PM       Deja Casper MA/RN Case Manager  Inpatient Care Coordination  [facility]   [phone]

## 2020-06-24 NOTE — PLAN OF CARE
"RN attempted to give patient Lovenox injection. Patient refused. Education regarding blood clots in legs & lungs while hospitalized - patient continued to adamantly refuse. States, \"I'm not going to get blood clots\" and \"I'll only do it if Dr. Hurley tells me to\". Also states \"this is a con game and I'm not going to play, everybody wants something\". MD updated.   "

## 2020-06-24 NOTE — PLAN OF CARE
PRIMARY DIAGNOSIS: Falls    OUTPATIENT/OBSERVATION GOALS TO BE MET BEFORE DISCHARGE  1. Orthostatic performed: No    2. Tolerating PO medications: Yes    3. Return to near baseline physical activity: No    4. Cleared for discharge by consultants (if involved): No    Discharge Planner Nurse   Safe discharge environment identified: No  Barriers to discharge: Yes       Entered by: Erica Jameson 06/24/2020 2:24 AM      BM, restless, LR 100mL/hr in RA neuros intact Q4hrs    Please review provider order for any additional goals.   Nurse to notify provider when observation goals have been met and patient is ready for discharge.

## 2020-06-24 NOTE — PLAN OF CARE
PRIMARY DIAGNOSIS: FALLS    OUTPATIENT/OBSERVATION GOALS TO BE MET BEFORE DISCHARGE  1. Orthostatic performed: No    2. Completion of appropriate imaging: Yes    3. Tolerating PO medications: Yes    4. Return to near baseline physical activity: No    5. Cleared for discharge by consultants (if involved): No    Temp: 95.6  F (35.3  C) Temp src: Oral BP: 90/44 Pulse: 67 Heart Rate: 63 Resp: 16 SpO2: 94 % O2 Device: None (Room air)     Patient is alert and oriented to self only. Vital signs are WNL. BP is soft. On IVF at 100 mL/hr. Bed alarm on. On tele. Per tele tech, SR with HR in 60's with PACs. Scattered bruises all over body. Eczema patches on BLE. Hematoma to L temporal jenelle. Staples (4 total) to scalp laceration from original fall. Incontinent of urine. Mepilex intact and CDI to L elbow. Neuro checks Q4H. Neurology, PT, SW, and ST consulted. Currently on DD2 diet with NTL until ST evaluates tomorrow 6/24.    Discharge Planner Nurse   Safe discharge environment identified: No  Barriers to discharge: Yes       Entered by: Cher Shrestha 06/23/2020 10:49 PM     Please review provider order for any additional goals.   Nurse to notify provider when observation goals have been met and patient is ready for discharge.

## 2020-06-24 NOTE — PLAN OF CARE
"Vitals: /54  Pulse 69  Temp 95.4  F (35.2  C)  Resp 14  SpO2 100%      Neuro: A&Ox4. Became slightly disoriented with PT when BP was 74/34. Forgetful. Speech garbled at times - appears to be baseline d/t Parkinson's. Irritable with lab draws, IV placement, and Lovenox injection.   Cardiac: WNL ex hypotension. See flow sheet. Abdominal binder in place. PO intake encouraged. Tele SR; now discontinued.  Lungs: WNL  GI: WNL ex retention. Straight cathed for 600mL at 15:00.   : WNL; dribbles occasionally. Voiding into urinal.   Pain: Denies. Reports \"stiff (L) shoulder\" - declined Tylenol.  IV: New IV this shift - LR bolus currently infusing.   Labs/Imaging: Hgb 7.5.  Diet: DD2 w/ thin liquids per speech  Activity: Ax2 w/ GB and walker for safety - fatigues quickly.  Misc: Speech saw - recommends DD2 w/ thin liq. PT saw - recommending TCU/eventually LTC. Daughter in law Gilbert is primary contact.  Plan: Bolus/IVF once IV access established. Monitor hypotension. Monitor urinary retention. SW following for discharge disposition.    "

## 2020-06-24 NOTE — PROGRESS NOTES
Clinical Swallow Evaluation:      06/24/20 1008   General Information   Onset Date 06/22/20   Start of Care Date 06/24/20   Referring Physician Deja Diallo   Patient Profile Review/OT: Additional Occupational Profile Info See Profile for full history and prior level of function   Patient/Family Goals Statement to drink water   Swallowing Evaluation Bedside swallow evaluation   Behaviorial Observations WFL (within functional limits)   Mode of current nutrition Oral diet   Type of oral diet Dysphagia diet level 2;Nectar - thick liquid   Respiratory Status Room air   Comments Pt admitted 6/22 from shelter after fall. He also had another fall on 6/23 while admitted. Has a left lateral scalp hematoma. Pt with hx of parkinson's disease, GERD with licona's esophagus, hiatal hernia but no hx of oropharyngeal dysphagia per chart. Pt does endorse difficulty/globus sensation with foods such as popcorn and hard summer sausages, but denies any other issues. Per chart, he apparently has increased rigidity/dysarthria with difficulty with secretion management/swallowing and hypoxia yesterday - chest xray was ordered and was clear. RN and PA from today reported significant improvements in mentation, respiratory status and swallowing today.   Clinical Swallow Evaluation   Oral Musculature anomalies present   Structural Abnormalities none present   Dentition   (sparse posterior dentition)   Secretion Management left corner drooling   Mucosal Quality good   Mandibular Strength and Mobility intact   Oral Labial Strength and Mobility impaired retraction;impaired pursing;impaired seal  (mild-moderate left)   Lingual Strength and Mobility impaired left lateral movement  (mild left)   Velar Elevation   (BECCA)   Buccal Strength and Mobility intact   Laryngeal Function Cough;Throat clear;Swallow;Voicing initiated;Dry swallow palpated   Oral Musculature Comments mild weak cough   Additional Documentation Yes   Clinical Swallow Eval:  Thin Liquid Texture Trial   Mode of Presentation, Thin Liquids cup;self-fed   Volume of Liquid or Food Presented 4 oz   Oral Phase of Swallow WFL   Pharyngeal Phase of Swallow repeated swallows   Diagnostic Statement multiple swallows per sip but no signs/sx aspiration, clear vocal quality, no changes in breathing quality/WOB   Clinical Swallow Eval: Nectar Thick Liquid Texture Trial   Mode of Presentation, Nectar cup;self-fed   Volume of Nectar Presented 2 oz   Oral Phase, [place] WFL   Pharyngeal Phase, Nectar repeated swallows   Diagnostic Statement multiple swallows per sip but no signs/sx aspiration, clear vocal quality, no changes in breathing quality/WOB   Clinical Swallow Eval: Semisolid Texture Trial   Mode of Presentation, Semisolid self-fed  (after assist with cutting up)   Volume of Semisolid Food Presented 5 bites of pancakes   Oral Phase, Semisolid WFL   Pharyngeal Phase, Semisolid repeated swallows   Diagnostic Statement multiple swallows per sip but no signs/sx aspiration, clear vocal quality, no changes in breathing quality/WOB; no globus sensation   Clinical Swallow Eval: Solid Food Texture Trial   Mode of Presentation, Solid self-fed   Volume of Solid Food Presented bites of aditi cracker   Oral Phase, Solid Residue in oral cavity;Poor AP movement   Pharyngeal Phase, Solid repeated swallows   Diagnostic Statement slower mastication with mild oral residuals (greater on left side) but no signs/sx aspiration, no globus sensaiton today but does report with dryer/cruchier solids at baseline   Esophageal Phase of Swallow   Esophageal comments hx of GERD, licona's esophagus, hiatal hernia   General Therapy Interventions   Planned Therapy Interventions Dysphagia Treatment   Dysphagia treatment Modified diet education;Instruction of safe swallow strategies   Swallow Eval: Clinical Impressions   Skilled Criteria for Therapy Intervention Skilled criteria met.  Treatment indicated.   Functional Assessment  Scale (FAS) 4   Treatment Diagnosis mild-moderate oropharyngeal dysphagia   Diet texture recommendations Dysphagia diet level 2;Thin liquids   Recommended Feeding/Eating Techniques alternate between small bites and sips of food/liquid;check mouth frequently for oral residue/pocketing;hard swallow w/ each bite or sip;maintain upright posture during/after eating for 30 mins;no straws;small sips/bites   Therapy Frequency 5x/week   Predicted Duration of Therapy Intervention (days/wks) 1 week   Anticipated Discharge Disposition home w/ home health   Risks and Benefits of Treatment have been explained. Yes   Patient, family and/or staff in agreement with Plan of Care Yes   Clinical Impression Comments Clinical swallow evaluation completed. Pt on room air. Oromotor evaluation revealed mild-moderate left sided asymmetry,  labial/lingual weakness - mild left sided drooling noted. Mild dysarthria per informal conversations. Some missing posterior dentition. Pt assessed with thin liquids, nectar thick liquids, DD2 solids and regular solids - he currently presents with mild-moderate oropharyngeal dysphagia with suspected esophageal component given the above hx. Mastication with soft/moist items appears functional, mild oral residuals with dryer solids (greater on left side). Swallow appears generally timely and robust laryngeal elevation to palpation. No overt signs/sx aspiration across all textures.    Total Evaluation Time   Total Evaluation Time (Minutes) 34

## 2020-06-24 NOTE — PLAN OF CARE
Physical Therapy Discharge Summary    Reason for therapy discharge:    Defer further PT treatment to TCU    Progress towards therapy goal(s). See goals on Care Plan in [facility] electronic health record for goal details.  Goals not met.  Barriers to achieving goals:   limited tolerance for therapy.    Therapy recommendation(s):    Continued therapy is recommended.  Rationale/Recommendations:  TCU to maximize return to PLOF.

## 2020-06-25 NOTE — PROGRESS NOTES
Discharge Planner   Discharge Plans in progress: Yes, TCU recommended.  Discussed TCU with patient today. Patient is hopeful to return home to [facility] with increased services. Patient is not agreeable to TCU at this time. Patient gave permission to speak with daughter in law Ofelia.  Ofelia is agreeable and understanding of need for TCU with most likely needing LTC. [facility]s referrals sent to [facility] [place], and [facility].   Ofelia will call the [facility] on Friday to inquire about increased services. Ofelia has been in contact with patient's friend Abena. Ofelia states that friend Abena was assisting with cleaning patient's apartment and found several glasses full of various drinks (water, juice) and several uneaten meals in the fridge.  Barriers to discharge plan: Patient's acceptance of TCU and TCU availability.   Follow up plan: Care coordinator will continue to follow for discharge planning and will keep patient and Ofelia updated on plan.        Entered by: Deja Casper 06/25/2020 11:26 AM     Update 1255: [facility] has been accepted [facility] [place].  Update 1515: Received a call from admissions from [facility] that they will NOT have a bed available for anticipated discharge on Friday 6/26.     Deja Casper MA/RN Case Manager  Inpatient Care Coordination  [facility]   [phone]

## 2020-06-25 NOTE — PLAN OF CARE
Discharge Planner SLP   Patient plan for discharge: go for rehab  Current status: Patient seen for swallowing treatment at AM meal. Patient tolerating his current diet of DD2 with thin liquids. No overt s/sx of aspiration.    Continue with dysphagia diet level 2 with thin liquids. Strategies include: upright in chair for meals with remaining upright for 30-60 minutes post, slow pacing, small single bites, alternate between solids liquids     Barriers to return to prior living situation: none  Recommendations for discharge: return to [facility] with  SLP  Rationale for recommendations: ongoing SLP for dysphagia management        Entered by: Orville Leonardo 06/25/2020 8:56 AM

## 2020-06-25 NOTE — PLAN OF CARE
/55 (BP Location: Left arm)   Pulse 69   Temp 96.2  F (35.7  C) (Oral)   Resp 16   Wt 64.9 kg (143 lb 1.6 oz)   SpO2 91%   BMI 23.81 kg/m      Admit Date: 06/22/20    Admitting Diagnosis: Falls    Pertinent History: Parkinson's, HTN, gout, GERD, eczema    Isolation Precautions: No active isolations    Activity: assistance, 1 person, assistance, 2 people    Pertinent Labs: HGB 6.8 this AM.    Assessment: Alert and oriented x 3. Keeps asking to get dressed and leave. Has set bed alarm off 8 times this shift. Skin is bruised all over. Skin tears to both elbows. New skin tear to back of R hand. Area cleansed and dressing applied. Patient also pulled out IV last evening; new IV started in R arm. Eczema patches to BLE.    LDA's: Peripheral    Diet: Dysphagia Diet Level 2 [facility] Altered Thin Liquids (water, ice chips, juice, milk gelatin, ice cream, etc)  Snacks/Supplements Adult: Boost Shake; Between Meals    Living Situation: [facility]    Consults: PT, Speech, Social Work or Care Coordination and Neurosurgery    Discharge Disposition: Transitional Care Unit

## 2020-06-25 NOTE — PROGRESS NOTES
"[facility]  Hospitalist Progress Note  Alessandra Hector MD 06/25/20  Text Page  Pager: [phone] (7am-6pm)    Reason for Stay (Diagnosis): Fall         Assessment and Plan:      Summary of Stay: Chilango Echevarria is a 85 year old male with Parkinsons disease, hypertension, PMR, gout, GERD with Barretts esophagus, and eczema who presented to [facility] ED on 6/22/2020 after being found down following mechanical fall and resulting scalp laceration. Has had hypotension which is overall improving.  Also has acute on chronic anemia, will receive transfusion.  Needs TCU for discharge.    Problem List/Assessment and Plan:     Fall, reported to be mechanical    - also had fall here in hospital on 6/22 (imaging showed no injury- CT head)    - underlying Parkinsons contributing    - will be seen by PT, needs TCU    - seen by Neurology here (B12 normal, urine cultures are negative, so will not start antibiotics)     Hypotension    - also is orthostatic    - stopped his toprol    - IVF through today, plan to stop this evening    - Blood transfusion as below today    - Continue abdominal binder    - Overall blood pressure improved today     Hallucinations/altered mental status    - at home may have been due to flexeril    - here, likely was due to hypotension    - at his baseline today     Hypoxia - Resolved    - may have been related to his episodes of hypotension yesterday    - CXR was done and showed no acute abnormality     Acute anemia    - no current signs/symptoms of blood loss    - may have been due to blood loss related to laceration/fall    - Hgb today low at 6.8, will transfuse 1 unit PRBC particularly in the setting of hypotension, repeat Hgb tomorrow     Parkinsons    - follows with Dr. Hurley ([facility])    - cont sinemet    - uses walker at baseline     Drooling/question of dysphagia    - patient denies and states he has drooled since he was a teen and that \"the front of my shirt is always " "wet\"    - did well with speech     HTN    - discontinued Toprol     GERD/Barretts esophagus    - cont PPI    Diet: Dysphagia Diet Level 2 [facility] Altered Thin Liquids (water, ice chips, juice, milk gelatin, ice cream, etc)  Snacks/Supplements Adult: Boost Shake; Between Meals    DVT Prophylaxis: Patient refusing Lovenox, stop this  Garza Catheter: not present  Code Status: Full Code      Disposition Plan   Expected discharge: Tomorrow, recommended to transitional care unit once hemoglobin stable and safe disposition plan/ TCU bed available.  Entered: Alessandra Hector MD 06/25/2020, 10:24 AM       The patient's care was discussed with the Bedside Nurse, Care Coordinator/ and Patient.    Hospitalist Service  [facility]          Interval History (Subjective):      The patient was discussed with his bedside nurse.  He is feeling ok today.  Denies lightheadedness at rest.  His Hgb is low today, discussed with patient and he is agreeable to transfusion and consent signed.                  Physical Exam:      Last Vital Signs:  /56 (BP Location: Right arm)   Pulse 74   Temp 96.4  F (35.8  C) (Oral)   Resp 14   Wt 65.2 kg (143 lb 11.2 oz)   SpO2 97%   BMI 23.91 kg/m      General: Alert, awake, no acute distress.  HEENT: Normocephalic and atraumatic, eyes anicteric and without scleral injection, EOMI, face symmetric, MMM.  Cardiac: RRR, normal S1, S2. No m/g/r, trace pitting LE edema.  Pulmonary: Normal chest rise, normal work of breathing.  Lungs CTAB without crackles or wheezing.  Abdomen: soft, non-tender, non-distended.  Normoactive bowel sounds, no guarding or rebound tenderness.  Extremities: no deformities.  Warm, well perfused.  Skin: no rashes or lesions.  Warm and Dry.  Neuro: No focal deficits.  Speech slightly slurred (baseline).  Tremor at baseline but moving all extremities  Psych: Alert and oriented x3. Appropriate affect.         Medications:      All current medications " were reviewed with changes reflected in problem list.         Data:      All new lab and imaging data was reviewed.   Labs:  Recent Labs   Lab 06/25/20  0521 06/24/20  0933 06/23/20  0529    142 142   POTASSIUM 4.2 3.8 3.7   CHLORIDE 110* 111* 112*   CO2 28 26 27   ANIONGAP 4 5 3   GLC 82 88 74   BUN 15 22 37*   CR 0.78 0.77 0.81   GFRESTIMATED 82 82 81   GFRESTBLACK >90 >90 >90   MESERET 7.8* 7.9* 7.8*     Recent Labs   Lab 06/25/20  0521 06/24/20  0933 06/23/20  0529 06/22/20  0759   WBC 3.2*  --   --  6.4   HGB 6.8* 7.5* 7.1* 8.5*   HCT 22.2*  --   --  27.5*   *  --   --  98   *  --   --  137*      Imaging:   No results found for this or any previous visit (from the past 24 hour(s)).    Alessandra Hector MD

## 2020-06-25 NOTE — PROGRESS NOTES
Notified by RN for hemoglobin down from 7.5 to 6.8.  Patient hemodynamically stable.  No evidence of bleeding.  Will order 1 unit of PRBC. Verbal consent obtained from April Echevarria(relative)

## 2020-06-26 NOTE — PROGRESS NOTES
Your information has been submitted on June 26th, 2020 at 02:37:18 PM CDT. The confirmation number is [id number]

## 2020-06-26 NOTE — PROGRESS NOTES
Vitals:wnl   Neuro: Patient aware he is in the hospital but unable to say why. Correct president. Able to say month is June but unable to say year. Forgetful. Not alarming today. Shuffled gait from parkinsons. Tremors present. No numbness/tingling. Patient guarding left arm but able to move arm.   GI: voiding fine today. Not incontinent   : LBM today  Activity: x2 to hypotensive    Diet: DD2 diet with thin liquids.   Pain: denies  Plan: TCU placement hopefully tomorrow

## 2020-06-26 NOTE — PROGRESS NOTES
Discharge Planner   Discharge Plans in progress: Yes. Patient to discharge to TCU. Spoke with [facility] has availability and has accepted patient for discharge today. I called and spoke with patient HARINIApril. April states she has spoken with the [facility] and they are unable to provide increased services at this time.  HARINI is agreeable for patient to discharge to [facility] TCU. Discussed transportation, family would like [facility] transportation arranged for discharge. Reviewed possible out of pocket transportation cost $75/mile and $5 each additional. Family acknowledges understanding.     Met with patient and discussed discharge plan. Patient was understanding and agreed to [facility] TCU. Reviewed transportation cost. Patient friend, Abena, at bedside. Abena given [facility] contact information to coordinate taking his clothes and other belonging he may need.     Transportation arranged for 2:30 pm with [facility] transportation.     HARINI also given [facility] contact information for HCD and guardianship paperwork.      Discharge orders received and faxed to [facility].     Follow up plan:Will continue to follow for any additional discharge needs.        Entered by: Casper Ferreira 06/26/2020 1:47 PM     Casper Ferreira RN BSN PHN CCM   Inpatient Care Coordination   [facility]   [phone]

## 2020-06-26 NOTE — PLAN OF CARE
/60 (BP Location: Right arm)   Pulse 67   Temp 96.1  F (35.6  C) (Axillary)   Resp 20   Wt 65.2 kg (143 lb 11.2 oz)   SpO2 97%   BMI 23.91 kg/m      Admit Date: 06/22/20    Admitting Diagnosis: Falls    Pertinent History: Parkinson's, HTN, gout, GERD, eczema    Isolation Precautions: No active isolations    Activity: assistance, 2 people    Pertinent Labs: HGB 6.8 yesterday AM; 1 unit PRBC. Recheck this AM.    Assessment: Alert and oriented x 3; forgetful at times but has been very pleasant, clear, and coherent throughout shift. Skin is bruised all over. Skin tears to both elbows. Skin tear to back of R hand. Eczema patches to BLE.    LDA's: Peripheral    Diet: Dysphagia Diet Level 2 [facility] Altered Thin Liquids (water, ice chips, juice, milk gelatin, ice cream, etc)  Snacks/Supplements Adult: Boost Shake; Between Meals    [facility] Situation: [facility]    Consults: PT, Speech, Social Work or Care Coordination and Neurosurgery    Discharge Disposition: Acute Rehab

## 2020-06-26 NOTE — DISCHARGE SUMMARY
"[facility]  Hospitalist Discharge Summary      Date of Admission:  6/22/2020  Date of Discharge:  6/26/2020  Discharging Provider: Mitul Poon MD      Discharge Diagnoses   Fall. Weakness.Orthostatic hypotension. Parkinsons    Follow-ups Needed After Discharge   Follow-up Appointments     Follow Up and recommended labs and tests      Follow up with primary care provider in 1-2 weeks.  The following   labs/tests are recommended: cbc.             Unresulted Labs Ordered in the Past 30 Days of this Admission     Date and Time Order Name Status Description    6/23/2020 1304 Blood culture Preliminary     6/23/2020 1304 Blood culture Preliminary       These results will be followed up by Hospitalist Service    Discharge Disposition   Discharged to short-term care facility  Condition at discharge: Stable  Patient ready to discharge to a skilled nursing facility as soon as possible in order to create capacity for patients related to the COVID-19 pandemic.    Hospital Course   Chilango Echevarria is a 85 year old male with Parkinsons disease, hypertension, PMR, gout, GERD with Barretts esophagus, and eczema who presented to [facility] ED on 6/22/2020 after being found down at [facility] following unwitnessed fall and resulting scalp laceration.  Patient lives alone in Assisted Living at [facility].  He reports that early this AM, his walker \"got ahead of him\" causing him to fall.  No clear LOC but he hit his head.  He wasn't able to get himself back up and was found down by staff about 2 hours later in a pool of blood.  EMS was called and patient brought in for evaluation.      In the ED, /73, HR 66, RR 16, SpO2 91% on RA, temp 97.2.  BMP notable for chloride 110, BUN 43, and Cr 1.13.  CBC notable for Hgb 8.5 (down from 11.5 in April). . INR 1.29.  C-Spine cleared by CT.  CT Head notable for scalp hematoma and chronic small vessel disease but no other acute process.  XR Pelvis/Left hip negative for acute " "process.  A 2-inch laceration was cleaned and repaired in the ED.  ED staff was later contact by patient's friend who reported patient to recently be having hallucinations and weakness over the last few days.  He also noted patient was recently started on Flexeril for a suspected rotator cuff injury.       Patient seen on the floor where he is pleasant and quite calm.  He denies pain and doesn't remember that he had a scalp lac repaired in the ED.  He maintains that his fall was because his walker got away from him, rather than him tripping or fainting.  He denies shortness of breath, chest pain, abdominal pain, headache, vision changes, leg swelling or pain, fever, chills, or any other concerning symptoms.  Patient was admitted for similar weakness 3/2020 and [facility]  It appears he then discharged to an assisted living at [facility] rather than to his prior living arrangement (home alone).      Patient was admitted to the hospital. He was found to be severely orthostatic. His Toprol was stopped. He was hydrated. An abdominal binder was placed. His BPs have improved. He was seen by PT who recommended TCU. He did receive 1 unit of blood. Has acute on chronic anemia. No signs of acute blood loss. His \"friend\" Abena and daughter in law in [place] were updated. He will discharge to TCU.    Consultations This Hospital Stay   SOCIAL WORK IP CONSULT  PHYSICAL THERAPY ADULT IP CONSULT  PSYCHIATRY IP CONSULT  NEUROLOGY IP CONSULT  SPEECH LANGUAGE PATH ADULT IP CONSULT  PHYSICAL THERAPY ADULT IP CONSULT  OCCUPATIONAL THERAPY ADULT IP CONSULT    Code Status   Full Code    Time Spent on this Encounter   I, Mitul Poon MD, personally saw the patient today and spent greater than 30 minutes discharging this patient.       Mitul Poon MD  [facility]  ______________________________________________________________________    Physical Exam   Vital Signs: Temp: 96.4  F (35.8  C) Temp " src: Oral BP: 126/56 Pulse: 62 Heart Rate: 63 Resp: 16 SpO2: 96 % O2 Device: None (Room air)    Weight: 151 lbs 9.6 oz  Constitutional: awake, alert, cooperative, no apparent distress, and appears stated age  Eyes: Lids and lashes normal, pupils equal, round and reactive to light, extra ocular muscles intact, sclera clear, conjunctiva normal  ENT: Normocephalic, without obvious abnormality, atraumatic, sinuses nontender on palpation, external ears without lesions, oral pharynx with moist mucous membranes, tonsils without erythema or exudates, gums normal and good dentition.  Respiratory: No increased work of breathing, good air exchange, clear to auscultation bilaterally, no crackles or wheezing  Cardiovascular: Normal apical impulse, regular rate and rhythm, normal S1 and S2, no S3 or S4, and no murmur noted  GI: No scars, normal bowel sounds, soft, non-distended, non-tender, no masses palpated, no hepatosplenomegally  Skin: no bruising or bleeding  Musculoskeletal: no lower extremity pitting edema present       Primary Care Physician   Enrique Ring    Discharge Orders      General info for SNF    Length of Stay Estimate: Short Term Care: Estimated # of Days 31-90  Condition at Discharge: Stable  Level of care:skilled   Rehabilitation Potential: Fair  Admission H&P remains valid and up-to-date: Yes  Recent Chemotherapy: N/A  Use Nursing Home Standing Orders: Yes     Mantoux instructions    Give two-step Mantoux (PPD) Per Facility Policy Yes     Reason for your hospital stay    Falls. Weakness, scalp laceration.     Follow Up and recommended labs and tests    Follow up with primary care provider in 1-2 weeks.  The following labs/tests are recommended: cbc.     Activity - Up with nursing assistance     Wound care    Scalp laceration. Staple removal on 6/30. Has 4 staples.     Additional Discharge Instructions    Wear abdominal binder when out of bed     Full Code     Physical Therapy Adult Consult    Evaluate  and treat as clinically indicated.    Reason:  Weakness, Parkinson's     Occupational Therapy Adult Consult    Evaluate and treat as clinically indicated.    Reason:  Weakness, Parkinson's     Advance Diet as Tolerated    Follow this diet upon discharge: Orders Placed This Encounter      Snacks/Supplements Adult: Boost Shake; Between Meals      Dysphagia Diet Level 2 [facility] Altered Thin Liquids (water, ice chips, juice, milk gelatin, ice cream, etc)       Significant Results and Procedures   Most Recent 3 CBC's:  Recent Labs   Lab Test 06/26/20  0631 06/25/20  0521 06/24/20  0933  06/22/20  0759 04/02/20  0940   WBC  --  3.2*  --   --  6.4 6.6   HGB 8.1* 6.8* 7.5*   < > 8.5* 11.5*   MCV  --  101*  --   --  98 97   PLT  --  121*  --   --  137* 219    < > = values in this interval not displayed.     Most Recent 3 BMP's:  Recent Labs   Lab Test 06/25/20  0521 06/24/20  0933 06/23/20  0529    142 142   POTASSIUM 4.2 3.8 3.7   CHLORIDE 110* 111* 112*   CO2 28 26 27   BUN 15 22 37*   CR 0.78 0.77 0.81   ANIONGAP 4 5 3   MESERET 7.8* 7.9* 7.8*   GLC 82 88 74     Most Recent 2 LFT's:  Recent Labs   Lab Test 06/22/20  0759 03/15/20  2055   AST 22 21   ALT 10 <6   ALKPHOS 51 63   BILITOTAL 0.5 0.5   ,   Results for orders placed or performed during the hospital encounter of 06/22/20   CT Head w/o Contrast    Narrative    CT SCAN OF THE HEAD WITHOUT CONTRAST   6/22/2020 8:36 AM     HISTORY: Fall, head injury.    TECHNIQUE: Axial images of the head and coronal reformations without  IV contrast material. Radiation dose for this scan was reduced using  automated exposure control, adjustment of the mA and/or kV according  to patient size, or iterative reconstruction technique.    COMPARISON: 3/15/2020.    FINDINGS: There is some mild-to-moderate cerebral atrophy. Extra-axial  spaces are slightly more prominent over the left convexity than the  right convexity but this is unchanged since the prior exam. Some  minimal nonspecific  white matter changes are seen bilaterally without  mass effect. There is no evidence for intracranial hemorrhage, acute  infarct, or a skull fracture. There is some extracalvarial soft tissue  swelling over the left lateral posterior frontal region near the  vertex which is probably a scalp hematoma. There is chronic  opacification of the right maxillary sinus with a soft tissue mass in  the right nasal cavity. This is probably due to an antrochoanal polyp  although it is difficult to exclude malignancy. Remainder of paranasal  sinuses are clear. Mastoid air cells are clear. Vascular  calcifications noted.      Impression    IMPRESSION:  1. Left lateral scalp hematoma. No evidence for fracture or  intracranial hemorrhage.  2. Cerebral atrophy with scattered white matter changes which are most  likely due to chronic small vessel ischemic disease.  3. Persistent right nasal cavity mass with opacified right maxillary  sinus and some chronic osteitis. This is probably due to an  antrochoanal polyp. It is always difficult to exclude malignancy with  unilateral sinus disease. If clinically indicated, ENT consultation  might be helpful in further evaluation.    IZABELLA ALEXIS MD   Cervical spine CT w/o contrast    Narrative    CT CERVICAL SPINE WITHOUT CONTRAST   6/22/2020 8:37 AM     HISTORY: Fall, neck pain.     TECHNIQUE: Axial images of the cervical spine were obtained without  intravenous contrast. Multiplanar reformations were performed.  Radiation dose for this scan was reduced using automated exposure  control, adjustment of the mA and/or kV according to patient size, or  iterative reconstruction technique.     COMPARISON: None.    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 There is some retrolisthesis of C4 on C5 of  approximately 3 mm. There is retrolisthesis at C5-C6 of approximately  2 mm. There is degenerative spondylolisthesis at C7-T1 of  approximately 4 mm. Posterior  alignment is otherwise normal. There is  no evidence for acute fracture. There is some multilevel degenerative  disc and facet disease most advanced at C4-C5 and C5-C6 where there  are moderate central canal stenoses. There is also multilevel  foraminal stenoses.      Impression    IMPRESSION: Degenerative changes. No evidence for fracture. Multilevel  central canal and foraminal stenoses are present.    IZABELLA ALEXIS MD   XR Pelvis and Hip Left 2 Views    Narrative    XR PELVIS AND HIP LEFT 2 VIEWS 6/22/2020 8:42 AM     HISTORY: Pain following fall earlier in the day.    COMPARISON: 3/15/2020      Impression    IMPRESSION: No left-sided hip fracture is evident. The right proximal  femur is internally rotated, however no fractures are evident.  Osteopenia. Mild hypertrophic changes in both hip joints. Degenerative  changes lower lumbar spine.    BRITTANY GONZALES MD   CT Head w/o Contrast    Narrative    CT SCAN OF THE HEAD WITHOUT CONTRAST   6/23/2020 12:55 PM     HISTORY: Altered level of consciousness (LOC), unexplained. Recent  head trauma with lethargy, rule out bleed.    TECHNIQUE: Axial images of the head and coronal reformations without  IV contrast material. Radiation dose for this scan was reduced using  automated exposure control, adjustment of the mA and/or kV according  to patient size, or iterative reconstruction technique.    COMPARISON: 6/22/2020    FINDINGS: Cerebral and cerebellar atrophy are present. There are some  minimal nonspecific white matter changes without mass effect. There is  some low-density in the left anterior external capsule region which  was present on prior exams probably due to old ischemic change. There  is also a small low-density in the right basal ganglia which is  probably due to an old lacunar infarct. There is no evidence for  intracranial hemorrhage, mass effect, acute infarct, or a skull  fracture. Skin staples are seen in the left lateral scalp region at  site of  previous soft tissue swelling. Visualized mastoid air cells  and paranasal sinuses are clear. Arterial sclerotic calcifications are  seen at the skull base. There is chronic abnormality involving the  right maxillary sinus and nasal cavity.      Impression    IMPRESSION: No significant change from prior days examination. No  evidence for intracranial hemorrhage or any acute process.    IZABELLA ALEXIS MD   XR Chest Port 1 View    Narrative    XR CHEST PORT 1 VW 6/23/2020 1:20 PM    HISTORY: dysphagia, decreasing O2 sats. Eval for aspiration    COMPARISON: 5/20/2015    FINDINGS: Heart is normal in size. Lungs are clear.    SID RACHEL MD       Discharge Medications   Current Discharge Medication List      START taking these medications    Details   OLANZapine zydis (ZYPREXA) 5 MG ODT Take 1 tablet (5 mg) by mouth every 6 hours as needed for agitation  Qty: 10 tablet, Refills: 1    Associated Diagnoses: Agitation         CONTINUE these medications which have NOT CHANGED    Details   acetaminophen (TYLENOL) 325 MG tablet Take 2 tablets (650 mg) by mouth every 4 hours as needed for mild pain  Qty:      Associated Diagnoses: Fall, initial encounter      allopurinol (ZYLOPRIM) 100 MG tablet Take 1 tablet (100 mg) by mouth daily  Qty: 90 tablet, Refills: 3    Associated Diagnoses: Chronic gout without tophus, unspecified cause, unspecified site      carbidopa-levodopa (SINEMET)  MG per tablet Take 2 tablets by mouth 3 times daily (9am - 12pm - 6pm)    Comments: Prescribed by Neurologist  Associated Diagnoses: Parkinson's disease (H)      omeprazole (PRILOSEC) 20 MG DR capsule TAKE 1 CAPSULE BY MOUTH 30 TO 60 MINUTES BEFORE FIRST MEAL OF THE DAY  Qty: 90 capsule, Refills: 0    Associated Diagnoses: Gastroesophageal reflux disease without esophagitis      order for DME Equipment being ordered: walker  Qty: 1 each, Refills: 0    Associated Diagnoses: Parkinson's disease (H); Dizziness         STOP taking these  medications       cyclobenzaprine (FLEXERIL) 5 MG tablet Comments:   Reason for Stopping:         metoprolol succinate ER (TOPROL-XL) 25 MG 24 hr tablet Comments:   Reason for Stopping:             Allergies   Allergies   Allergen Reactions     Lisinopril      rash, exacerbation of eczema

## 2020-06-26 NOTE — PLAN OF CARE
Vitals: /56 (BP Location: Right arm)   Pulse 62   Temp 96.4  F (35.8  C) (Oral)   Resp 16   Wt 68.8 kg (151 lb 9.6 oz)   SpO2 96%   BMI 25.23 kg/m       Neuro: WDL except for forgetfulness and slow speech  Cardiac: WDL  Lungs: WDL  GI: WDL    : WDL except for urinary dripping   Pain: denies  IV: saline locked  Labs/Imaging: no pending labs  Diet: DD@ with thin liquid   Activity: assist of x2 with walker and gelt belt  Plan: to be discharge at 1430 via HE transportation to [facility] TCU.

## 2020-06-27 NOTE — PLAN OF CARE
Speech Language Therapy Discharge Summary    Reason for therapy discharge:    Discharged to transitional care facility.    Progress towards therapy goal(s). See goals on Care Plan in [facility] electronic health record for goal details.  Goals not met.  Barriers to achieving goals:   discharge from facility.    Therapy recommendation(s):    Continued therapy is recommended.  Rationale/Recommendations:  Patient discharged on DDL2 with thin liquids. Continued ST services for dysphagia management  .

## 2020-06-29 NOTE — PROGRESS NOTES
[facility]  PRIMARY CARE PROVIDER AND CLINIC:  Enrique Ring MD, [address] / [facility] [address]  Chief Complaint   Patient presents with     Hospital F/U     [place] Medical Record Number:  [id number]  Place of Service where encounter took place:  [facility] [[id number]]    Chilango Echevarria  is a 85 year old  (10/24/1934), admitted to the above facility from  [facility]. Hospital stay 6/22/20 through 6/26/20..  Admitted to this facility for  rehab, medical management and nursing care.    HPI:    HPI information obtained from: facility chart records, facility staff and patient report.   Brief Summary of Hospital Course:   Updates on Status Since Skilled nursing Admission:     Patient Chilango Echevarria is a 85 yr old male admitted to [facility] for rehabilitation s/p hospitalization [facility] 6/22-6/26/20 for fall with laceration to scalp (CT head show no injury) and altered mental status. Noted to have hypotension and acute on chronic anemia and received transfusion.  (B12 normal, urine cultures are negative, so will not start antibiotics)  Of note patient recently started on Flexeril for rotator cuff injury    PMHx Parkinsons disease, hypertension, PMR, gout, GERD with Barretts esophagus, and eczema     TODAY  Patient states he feels fine, Denies dizziness  Working with therapies  Denies gastrointestinal upset  Has c/o left shoulder pain at times        Reviewed code status and patient wishes to be FULL code    CODE STATUS/ADVANCE DIRECTIVES DISCUSSION:   CPR/Full code   Patient's living condition: lives in an assisted living facility  ALLERGIES: Lisinopril  PAST MEDICAL HISTORY:  has a past medical history of Aortic stenosis, Ying esophagus, Contact dermatitis and other eczema, due to unspecified cause, Esophageal reflux, Gout, unspecified, Incarcerated hiatal hernia (5/21/2015), Inguinal hernia without  mention of obstruction or gangrene, unilateral or unspecified, (not specified as recurrent), Polymyalgia rheumatica (H), Umbilical hernia without mention of obstruction or gangrene, Unspecified essential hypertension, and Vasomotor rhinitis. He also has no past medical history of Basal cell carcinoma, Malignant melanoma (H), or Squamous cell carcinoma of skin, unspecified.  PAST SURGICAL HISTORY:   has a past surgical history that includes NONSPECIFIC PROCEDURE; NONSPECIFIC PROCEDURE; picc insertion (Right, 5/11/2015); Laparoscopic herniorrhaphy hiatal (N/A, 5/14/2015); Laparoscopic assisted insertion tube jejunostomy (N/A, 5/14/2015); Laparoscopic herniorrhaphy inguinal (Right, 5/14/2015); and Esophagoscopy, gastroscopy, duodenoscopy (EGD), combined (N/A, 5/11/2015).  FAMILY HISTORY: family history includes Breast Cancer in his mother; Cancer (age of onset: 37) in his son; Diabetes in his paternal grandfather.  SOCIAL HISTORY:   reports that he has never smoked. He has never used smokeless tobacco. He reports current alcohol use. He reports that he does not use drugs.    Post Discharge Medication Reconciliation Status: discharge medications reconciled and changed, per note/orders (see AVS)    Current Outpatient Medications   Medication Sig Dispense Refill     acetaminophen (TYLENOL) 325 MG tablet Take 2 tablets (650 mg) by mouth every 4 hours as needed for mild pain       allopurinol (ZYLOPRIM) 100 MG tablet Take 1 tablet (100 mg) by mouth daily 90 tablet 3     carbidopa-levodopa (SINEMET)  MG per tablet Take 2 tablets by mouth 3 times daily (9am - 12pm - 6pm)       OLANZapine zydis (ZYPREXA) 5 MG ODT Take 1 tablet (5 mg) by mouth every 6 hours as needed for agitation 10 tablet 1     omeprazole (PRILOSEC) 20 MG DR capsule TAKE 1 CAPSULE BY MOUTH 30 TO 60 MINUTES BEFORE FIRST MEAL OF THE DAY 90 capsule 0     order for DME Equipment being ordered: walker 1 each 0         ROS:  10 point ROS of systems including  "Constitutional, Eyes, Respiratory, Cardiovascular, Gastroenterology, Genitourinary, Integumentary, Musculoskeletal, Psychiatric were all negative except for pertinent positives noted in my HPI.    Vitals:  /65   Pulse 67   Temp 97.5  F (36.4  C)   Resp 18   Ht 1.651 m (5' 5\")   Wt 65.5 kg (144 lb 8 oz)   SpO2 96%   BMI 24.05 kg/m    Exam:  GENERAL APPEARANCE:  Alert, in no distress  ENT:  Mouth and posterior oropharynx normal, moist mucous membranes  EYES:  EOM, conjunctivae, lids, pupils and irises normal  NECK:  No adenopathy,masses or thyromegaly  RESP:  respiratory effort and palpation of chest normal  CV:  Palpation and auscultation of heart done , regular rate and rhythm, no murmur, rub, or gallop  ABDOMEN:  normal bowel sounds, soft, nontender, no hepatosplenomegaly or other masses  M/S:   sitting in WC  SKIN:  Inspection of skin and subcutaneous tissue baseline  NEURO:   Cranial nerves 2-12 are normal tested and grossly at patient's baseline  PSYCH:  oriented X 3    Lab/Diagnostic data:  Labs done in SNF are in [facility] EPIC. Please refer to them using [facility] Everywhere.    ASSESSMENT/PLAN:     Fall, reported to be mechanical  Parkinson Disease  Parkinsons  Continue Sinemet  Continue therapies; uses walker at baseline  Follow up neurologist Dr. Hurley ([facility])  Monitor blood pressure   Drooling/question of dysphagia  patient denies and states he has drooled since he was a teen and that \"the front of my shirt is always wet\"  did well with speech  Diet: Dysphagia Diet Level 2 [facility] Altered Thin Liquids (water, ice chips, juice, milk gelatin, ice cream, etc)  Snacks/Supplements Adult: Boost Shake; Between Meals    Lives alone in assistive living [facility]    Shoulder injury  Left shoulder pain  Offer Tylenol for pain, monitor   Therapies continue     Head sutures  Remove staples as advised on 6/30/20  Monitor     Hypotension  history HTN  Orthostatic and Metoprolol stopped  Given " IV fluids and blood transfusion in hospital   Started on abdominal binder in hospital.Binder not brought to TCU   Will order TG shapes     Hallucinations/altered mental status  Flexeril and orthostasis may have contributed  Reported at baseline on hospital discharge  Pending cognitive testing   involved in safe plan home   Remains on Zyprexa as needed for hallucinations    Acute anemia  no current signs/symptoms of blood loss and per hospital note  may have been due to blood loss related to laceration/fall  Hgb  6.8 and transfused 1 unit PRBC particularly in the setting of hypotension; repeat Hgb 6/26/20 8.1  GERD/Barretts esophagus  cont PPI      Follow up BMP,CBC 7/6/30    Total time spent with patient visit at the skilled nursing facility was 39 min including patient visit and review of past records. Greater than 50% of total time spent with counseling and coordinating care due to discussion on goals of care, function goals, disease management and follow up appointments      Electronically signed by:  ROSALINA Deutsch CNP

## 2020-06-29 NOTE — LETTER
6/29/2020        RE: Chilango Echevarria  [address]  [address]  [facility] [address]        [facility]  PRIMARY CARE PROVIDER AND CLINIC:  Enrique Ring MD, [address]  [address] / [facility] [address]  Chief Complaint   Patient presents with     Hospital F/U     [place] Medical Record Number:  [id number]  Place of Service where encounter took place:  [facility] ([facility]) [[id number]]    Chilango Echevarria  is a 85 year old  (10/24/1934), admitted to the above facility from  [facility]. Hospital stay 6/22/20 through 6/26/20..  Admitted to this facility for  rehab, medical management and nursing care.    HPI:    HPI information obtained from: facility chart records, facility staff and patient report.   Brief Summary of Hospital Course:   Updates on Status Since Skilled nursing Admission:     Patient Chilango Echevarria is a 85 yr old male admitted to [facility] for rehabilitation s/p hospitalization [facility] 6/22-6/26/20 for fall with laceration to scalp (CT head show no injury) and altered mental status. Noted to have hypotension and acute on chronic anemia and received transfusion.  (B12 normal, urine cultures are negative, so will not start antibiotics)  Of note patient recently started on Flexeril for rotator cuff injury    PMHx Parkinsons disease, hypertension, PMR, gout, GERD with Barretts esophagus, and eczema     TODAY  Patient states he feels fine, Denies dizziness  Working with therapies  Denies gastrointestinal upset  Has c/o left shoulder pain at times        Reviewed code status and patient wishes to be FULL code    CODE STATUS/ADVANCE DIRECTIVES DISCUSSION:   CPR/Full code   Patient's living condition: lives in an assisted living facility  ALLERGIES: Lisinopril  PAST MEDICAL HISTORY:  has a past medical history of Aortic stenosis, Ying esophagus, Contact dermatitis and other eczema, due to unspecified cause,  Esophageal reflux, Gout, unspecified, Incarcerated hiatal hernia (5/21/2015), Inguinal hernia without mention of obstruction or gangrene, unilateral or unspecified, (not specified as recurrent), Polymyalgia rheumatica (H), Umbilical hernia without mention of obstruction or gangrene, Unspecified essential hypertension, and Vasomotor rhinitis. He also has no past medical history of Basal cell carcinoma, Malignant melanoma (H), or Squamous cell carcinoma of skin, unspecified.  PAST SURGICAL HISTORY:   has a past surgical history that includes NONSPECIFIC PROCEDURE; NONSPECIFIC PROCEDURE; picc insertion (Right, 5/11/2015); Laparoscopic herniorrhaphy hiatal (N/A, 5/14/2015); Laparoscopic assisted insertion tube jejunostomy (N/A, 5/14/2015); Laparoscopic herniorrhaphy inguinal (Right, 5/14/2015); and Esophagoscopy, gastroscopy, duodenoscopy (EGD), combined (N/A, 5/11/2015).  FAMILY HISTORY: family history includes Breast Cancer in his mother; Cancer (age of onset: 37) in his son; Diabetes in his paternal grandfather.  SOCIAL HISTORY:   reports that he has never smoked. He has never used smokeless tobacco. He reports current alcohol use. He reports that he does not use drugs.    Post Discharge Medication Reconciliation Status: discharge medications reconciled and changed, per note/orders (see AVS)    Current Outpatient Medications   Medication Sig Dispense Refill     acetaminophen (TYLENOL) 325 MG tablet Take 2 tablets (650 mg) by mouth every 4 hours as needed for mild pain       allopurinol (ZYLOPRIM) 100 MG tablet Take 1 tablet (100 mg) by mouth daily 90 tablet 3     carbidopa-levodopa (SINEMET)  MG per tablet Take 2 tablets by mouth 3 times daily (9am - 12pm - 6pm)       OLANZapine zydis (ZYPREXA) 5 MG ODT Take 1 tablet (5 mg) by mouth every 6 hours as needed for agitation 10 tablet 1     omeprazole (PRILOSEC) 20 MG DR capsule TAKE 1 CAPSULE BY MOUTH 30 TO 60 MINUTES BEFORE FIRST MEAL OF THE DAY 90 capsule 0      "order for DME Equipment being ordered: walker 1 each 0         ROS:  10 point ROS of systems including Constitutional, Eyes, Respiratory, Cardiovascular, Gastroenterology, Genitourinary, Integumentary, Musculoskeletal, Psychiatric were all negative except for pertinent positives noted in my HPI.    Vitals:  /65   Pulse 67   Temp 97.5  F (36.4  C)   Resp 18   Ht 1.651 m (5' 5\")   Wt 65.5 kg (144 lb 8 oz)   SpO2 96%   BMI 24.05 kg/m    Exam:  GENERAL APPEARANCE:  Alert, in no distress  ENT:  Mouth and posterior oropharynx normal, moist mucous membranes  EYES:  EOM, conjunctivae, lids, pupils and irises normal  NECK:  No adenopathy,masses or thyromegaly  RESP:  respiratory effort and palpation of chest normal  CV:  Palpation and auscultation of heart done , regular rate and rhythm, no murmur, rub, or gallop  ABDOMEN:  normal bowel sounds, soft, nontender, no hepatosplenomegaly or other masses  M/S:   sitting in WC  SKIN:  Inspection of skin and subcutaneous tissue baseline  NEURO:   Cranial nerves 2-12 are normal tested and grossly at patient's baseline  PSYCH:  oriented X 3    Lab/Diagnostic data:  Labs done in SNF are in [facility]. Please refer to them using [employer] Everywhere.    ASSESSMENT/PLAN:     Fall, reported to be mechanical  Parkinson Disease  Parkinsons  Continue Sinemet  Continue therapies; uses walker at baseline  Follow up neurologist Dr. Hurley ([facility])  Monitor blood pressure   Drooling/question of dysphagia  patient denies and states he has drooled since he was a teen and that \"the front of my shirt is always wet\"  did well with speech  Diet: Dysphagia Diet Level 2 [facility] Altered Thin Liquids (water, ice chips, juice, milk gelatin, ice cream, etc)  Snacks/Supplements Adult: Boost Shake; Between Meals    Lives alone in assistive living [facility]    Shoulder injury  Left shoulder pain  Offer Tylenol for pain, monitor   Therapies continue     Head sutures  Remove staples " as advised on 6/30/20  Monitor     Hypotension  history HTN  Orthostatic and Metoprolol stopped  Given IV fluids and blood transfusion in hospital   Started on abdominal binder in hospital.Binder not brought to TCU   Will order TG shapes     Hallucinations/altered mental status  Flexeril and orthostasis may have contributed  Reported at baseline on hospital discharge  Pending cognitive testing   involved in safe plan home   Remains on Zyprexa as needed for hallucinations    Acute anemia  no current signs/symptoms of blood loss and per hospital note  may have been due to blood loss related to laceration/fall  Hgb  6.8 and transfused 1 unit PRBC particularly in the setting of hypotension; repeat Hgb 6/26/20 8.1  GERD/Barretts esophagus  cont PPI      Follow up BMP,CBC 7/6/30    Total time spent with patient visit at the skilled nursing facility was 39 min including patient visit and review of past records. Greater than 50% of total time spent with counseling and coordinating care due to discussion on goals of care, function goals, disease management and follow up appointments      Electronically signed by:  ROSALINA Deutsch CNP                         Sincerely,        ROSALINA Deutsch CNP

## 2020-06-30 NOTE — PROGRESS NOTES
RN called to report patient had fall tonight. States vital signs are stable. Has skin tear to L thumb, which RN cleaned, applied steri strips and primapore dressing.         Plan:   - Continue neuro checks and monitoring vital signs per facility fall protocol          ROSALINA Castaneda CNP on 6/29/2020 at 7:20 PM

## 2020-07-01 NOTE — PROGRESS NOTES
[facility]  [facility] Medical Record Number:  [id number]  Place of Service where encounter took place:  No question data found.  No chief complaint on file.      HPI:    Chilango Echevarria  is a 85 year old (10/24/1934), who is being seen today for an episodic care visit.  HPI information obtained from: facility chart records and facility staff. Today's concern is:       Parkinson's disease (H)  Orthostatic hypotension     Low SPB in 60s with standing at times  Is able to walk 50-100ft, contact guard assist transfer  Graduate to 3, eating/drinking  Ordering abdominal binder      Past Medical and Surgical History reviewed in Epic today.    MEDICATIONS:    Current Outpatient Medications   Medication Sig Dispense Refill     midodrine (PROAMATINE) 5 MG tablet Take 5 mg by mouth 3 times daily       acetaminophen (TYLENOL) 325 MG tablet Take 2 tablets (650 mg) by mouth every 4 hours as needed for mild pain       allopurinol (ZYLOPRIM) 100 MG tablet Take 1 tablet (100 mg) by mouth daily 90 tablet 3     carbidopa-levodopa (SINEMET)  MG per tablet Take 2 tablets by mouth 3 times daily (9am - 12pm - 6pm)       OLANZapine zydis (ZYPREXA) 5 MG ODT Take 1 tablet (5 mg) by mouth every 6 hours as needed for agitation 10 tablet 1     omeprazole (PRILOSEC) 20 MG DR capsule TAKE 1 CAPSULE BY MOUTH 30 TO 60 MINUTES BEFORE FIRST MEAL OF THE DAY 90 capsule 0     order for DME Equipment being ordered: walker 1 each 0         REVIEW OF SYSTEMS:      Objective:  There were no vitals taken for this visit.  Exam:  GENERAL APPEARANCE:  Alert, in no distress    Labs:   Labs done in SNF are in [facility]. Please refer to them using [employer]/Care Everywhere.    ASSESSMENT/PLAN:     Parkinson's disease (H)  Orthostatic hypotension     Significant low blood pressure with standing with SBP in 60  Eating/drinking      ORDER  1)Midodrine 5mg 3 x day (8am, noon & 4pm), hold for hypertension  2)200cc with medication  pass  3)dietary eval and treat dt concern poor oral intake              Electronically signed by:  ROSALINA Deutsch CNP

## 2020-07-03 NOTE — PROGRESS NOTES
[facility]  [place] [facility] Medical Record Number:  [id number]  Place of Service where encounter took place:  [facility] ([facility]) [[id number]]  Chief Complaint   Patient presents with     Nursing Home Acute       HPI:    Chilango Echevarria  is a 85 year old (10/24/1934), who is being seen today for an episodic care visit.  HPI information obtained from: facility chart records, facility staff and patient report. Today's concern is:    Patient Chilango Echevarria is a 85 yr old male admitted to [facility] for rehabilitation s/p hospitalization [facility] 6/22-6/26/20 for fall with laceration to scalp (CT head show no injury) and altered mental status. Noted to have hypotension and acute on chronic anemia and received transfusion.  (B12 normal, urine cultures are negative, so will not start antibiotics)  Of note patient recently started on Flexeril for rotator cuff injury    PMHx Parkinsons disease, hypertension, PMR, gout, GERD with Barretts esophagus, and eczema        Parkinson's disease (H)  Orthostatic hypotension  Generalized muscle weakness  Left shoulder pain, unspecified chronicity     Patient still report light headed with standing, does improve after awhile  Progress in therapies (Is able to walk 50-100ft, contact guard assist transfer; Graduate to DD3, eating/drinking)  Recently started on Midodrine due to SBP in 60s when stands  Did have elevated SBP ~180 last evening, denies headache     C/O stiff left shoulder with pain with limited mobility. He is interested in topical rub and tylenol  States not the right shoulder is also starting to hurt  He is open to consult with harrison June if signs and symptoms not improving      Past Medical and Surgical History reviewed in Epic today.    MEDICATIONS:    Current Outpatient Medications   Medication Sig Dispense Refill     acetaminophen (TYLENOL) 325 MG tablet Take 650 mg by mouth 2 times daily & 2 x daily as needed        diclofenac (VOLTAREN) 1 % topical gel Place 2 g onto the skin 2 times daily       allopurinol (ZYLOPRIM) 100 MG tablet Take 1 tablet (100 mg) by mouth daily 90 tablet 3     carbidopa-levodopa (SINEMET)  MG per tablet Take 2 tablets by mouth 3 times daily (9am - 12pm - 6pm)       midodrine (PROAMATINE) 5 MG tablet Take 5 mg by mouth 2 times daily 8AM & NOON       OLANZapine zydis (ZYPREXA) 5 MG ODT Take 1 tablet (5 mg) by mouth every 6 hours as needed for agitation 10 tablet 1     omeprazole (PRILOSEC) 20 MG DR capsule TAKE 1 CAPSULE BY MOUTH 30 TO 60 MINUTES BEFORE FIRST MEAL OF THE DAY 90 capsule 0     order for DME Equipment being ordered: walker 1 each 0         REVIEW OF SYSTEMS:  4 point ROS including Respiratory, CV, GI and , other than that noted in the HPI,  is negative    Objective:  /58   Pulse 75   Temp 98.9  F (37.2  C)   Resp 17   Wt 63 kg (138 lb 14.4 oz)   SpO2 96%   BMI 23.11 kg/m    Exam:  GENERAL: alert and no distress  EYES: Eyes grossly normal to inspection.  No discharge or erythema, or obvious scleral/conjunctival abnormalities.  RESP: No audible wheeze, cough, or visible cyanosis.  No visible retractions or increased work of breathing.    SKIN: Visible skin clear. No significant rash, abnormal pigmentation or lesions.  NEURO: Cranial nerves grossly intact.  Mentation and speech appropriate for age.  PSYCH: Mentation appears normal, mood stable      Labs:   Labs done in SNF are in [place] EPIC. Please refer to them using [facility]/Care Everywhere.    ASSESSMENT/PLAN:     Parkinson's disease (H)  Orthostatic hypotension  Generalized muscle weakness  Left shoulder pain, unspecified chronicity     Left shoulder pain and stiffness, history rotator cuff injury, He would like scheduled tylenol and topical rub.   Plan follow up ortho Jamari June if not improving. Continue therapies     Has orthostasis still despite starting Midodrine. Did have elevated SBP x1 in evening. Will  discontinue afternoon dose Midodrine, monitor   Ortho blood pressure 2 x daily     Progressing in therapies. Plan to continue     ORDER  1)Decrease midodrine 5mg  8AM and noon  2)ortho BP BID   3)Voltaren gel 1% 2 g BID bilateral shoulders  4)Tylenol 650mg BID and daily PRN (pain)      Total time spent with patient visit at the [facility] was 25 min including patient visit and review of past records. Greater than 50% of total time spent with counseling and coordinating care due to discussion on shouler pain management and follow up, plan of care for orthostasis, and progress in therapies .  Electronically signed by:  ROSALINA Deutsch CNP

## 2020-07-06 NOTE — PROGRESS NOTES
[facility]  [place] Medical Record Number:  [id number]  Place of Service where encounter took place:  [facility] ([facility]) [[id number]]  Chief Complaint   Patient presents with     Nursing Home Acute       HPI:    Chilango Echevarria  is a 85 year old (10/24/1934), who is being seen today for an episodic care visit.  HPI information obtained from: facility chart records, facility staff and patient report. Today's concern is:    Patient Chilango Echevarria is a 85 yr old male admitted to [facility] for rehabilitation s/p hospitalization [facility] 6/22-6/26/20 for fall with laceration to scalp (CT head show no injury) and altered mental status. Noted to have hypotension and acute on chronic anemia and received transfusion.  (B12 normal, urine cultures are negative, so will not start antibiotics)  Of note patient recently started on Flexeril for rotator cuff injury    PMHx Parkinsons disease, hypertension, PMR, gout, GERD with Barretts esophagus, and eczema          Parkinson's disease (H)  Left shoulder pain, unspecified chronicity  Orthostatic hypotension     blood pressure improve some, still has some dizziness with standing  Ortho BPs not done  Remains on midodrine 5mg 2 x daily   Pending abdominal binder    Left shoulder pain continue  voltaren gel and tylenol scheduled help some    Seen by ortho specialist Jamari June onsite and he reviewed shoulder xray -noted rotator cuff tear, joint coming out of socket  recommend no AROM, treat pain; ICE, no heat; consider steroid burst and consider cortisone injection if that still not effective  Also, 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    MEDICATIONS:    Current Outpatient Medications   Medication Sig Dispense Refill      "acetaminophen (TYLENOL) 325 MG tablet Take 650 mg by mouth 2 times daily & 2 x daily as needed       allopurinol (ZYLOPRIM) 100 MG tablet Take 1 tablet (100 mg) by mouth daily 90 tablet 3     carbidopa-levodopa (SINEMET)  MG per tablet Take 2 tablets by mouth 3 times daily (9am - 12pm - 6pm)       diclofenac (VOLTAREN) 1 % topical gel Place 2 g onto the skin 2 times daily       midodrine (PROAMATINE) 5 MG tablet Take 5 mg by mouth 2 times daily 8AM & NOON       OLANZapine zydis (ZYPREXA) 5 MG ODT Take 1 tablet (5 mg) by mouth every 6 hours as needed for agitation 10 tablet 1     omeprazole (PRILOSEC) 20 MG DR capsule TAKE 1 CAPSULE BY MOUTH 30 TO 60 MINUTES BEFORE FIRST MEAL OF THE DAY 90 capsule 0     order for DME Equipment being ordered: walker 1 each 0         REVIEW OF SYSTEMS:  4 point ROS including Respiratory, CV, GI and , other than that noted in the HPI,  is negative    Objective:  /66   Pulse 76   Temp 97.1  F (36.2  C)   Resp 18   Ht 1.651 m (5' 5\")   Wt 63 kg (138 lb 14.4 oz)   SpO2 97%   BMI 23.11 kg/m    Exam:  GENERAL: alert and no distress  EYES: Eyes grossly normal to inspection.  No discharge or erythema, or obvious scleral/conjunctival abnormalities.  RESP: No audible wheeze, cough, or visible cyanosis.  No visible retractions or increased work of breathing.    SKIN: Visible skin clear. No significant rash, abnormal pigmentation or lesions.  MS: stiffness with ROM left shoulder  NEURO: Cranial nerves grossly intact.  Mentation and speech appropriate for age.  PSYCH: normal speech and appearance well-groomed.      Labs:   Labs done in SNF are in [place] EPIC. Please refer to them using [employer]/Care Everywhere.    ASSESSMENT/PLAN:  1. Parkinson's disease (H)    2. Left shoulder pain, unspecified chronicity    3. Orthostatic hypotension      Rotator cuff tear left shoulder with pain, will start steroid burst for arthritis, consider steroid injection for management     Pending " Ortho blood pressure readings-nurse manager updated, continue current dose Midodrine for now  Continue therapies         ORDER      1)abdominal binder ON AM and OFF HS (orthostasis)  2)NO AROM LEFT shoulder--update therapies  3)ICE 10 MIN BID LEFT shoulder, NO heat  4)Prednisone 20mg daily X 5 day (arthritis flare left shoulder). Give in AM and can start tomorrow. Give with food    Electronically signed by:  ROSALINA Deutsch CNP

## 2020-07-15 NOTE — PROGRESS NOTES
[facility]  [facility] Medical Record Number:  [id number]  Place of Service where encounter took place:  [facility] ([facility]) [[id number]]  Chief Complaint   Patient presents with     RECHECK       HPI:    Chilango Echevarria  is a 85 year old (10/24/1934), who is being seen today for an episodic care visit.  HPI information obtained from: facility chart records, facility staff and patient report. Today's concern is:    Patient Chilango Echevarria is a 85 yr old male admitted to [facility] for rehabilitation s/p hospitalization [facility] 6/22-6/26/20 for fall with laceration to scalp (CT head show no injury) and altered mental status. Noted to have hypotension and acute on chronic anemia and received transfusion.  (B12 normal, urine cultures are negative, so will not start antibiotics)  Of note patient recently started on Flexeril for rotator cuff injury    PMHx Parkinsons disease, hypertension, PMR, gout, GERD with Barretts esophagus, and eczema        Parkinson's disease (H)  Left shoulder pain, unspecified chronicity  Orthostatic hypotension  Slow transit constipation       Shoulder better, still limited ROM and limits therapies  States he is interested in oral steroid again,consider steroid injection in future if not continue to improve more    Less dizzy when stands  Has abdominal binder today, first time wearing    Adjusting to idea of long term care as this is the plan; had been living assistive living prior    Has own bottle of senna in his room -states he takes when he needs    Past Medical and Surgical History reviewed in Epic today.    MEDICATIONS:    Current Outpatient Medications   Medication Sig Dispense Refill     predniSONE (DELTASONE) 20 MG tablet Take 20 mg by mouth daily Give in AM with food       Sennosides-Docusate Sodium (SENNA S PO) Take 2 tablets by mouth daily as needed       acetaminophen (TYLENOL) 325 MG tablet Take 650 mg by mouth 2 times  "daily & 2 x daily as needed       allopurinol (ZYLOPRIM) 100 MG tablet Take 1 tablet (100 mg) by mouth daily 90 tablet 3     carbidopa-levodopa (SINEMET)  MG per tablet Take 2 tablets by mouth 3 times daily (9am - 12pm - 6pm)       diclofenac (VOLTAREN) 1 % topical gel Place 2 g onto the skin 2 times daily       midodrine (PROAMATINE) 5 MG tablet Take 5 mg by mouth 2 times daily 8AM & NOON       OLANZapine zydis (ZYPREXA) 5 MG ODT Take 1 tablet (5 mg) by mouth every 6 hours as needed for agitation 10 tablet 1     omeprazole (PRILOSEC) 20 MG DR capsule TAKE 1 CAPSULE BY MOUTH 30 TO 60 MINUTES BEFORE FIRST MEAL OF THE DAY 90 capsule 0     order for DME Equipment being ordered: walker 1 each 0         REVIEW OF SYSTEMS:  4 point ROS including Respiratory, CV, GI and , other than that noted in the HPI,  is negative    Objective:  /61   Pulse 67   Temp 97.8  F (36.6  C)   Resp 18   Ht 1.651 m (5' 5\")   Wt 63.5 kg (140 lb)   SpO2 96%   BMI 23.30 kg/m    Exam:  GENERAL:  alert and no distress  EYES: Eyes grossly normal to inspection.  No discharge or erythema, or obvious scleral/conjunctival abnormalities.  RESP: No audible wheeze, cough, or visible cyanosis.  No visible retractions or increased work of breathing.    SKIN: Visible skin clear. No significant rash, abnormal pigmentation or lesions. Wearing abdominal binder  NEURO: Cranial nerves grossly intact.   PSYCH: Mentation appears normal, normal speech and appearance well-groomed.      Labs:   Labs done in SNF are in [place] EPIC. Please refer to them using [employer]/Care Everywhere.    ASSESSMENT/PLAN:     Parkinson's disease (H)  Left shoulder pain, unspecified chronicity  Orthostatic hypotension  Slow transit constipation     Improve pain management left shoulder, appreciates topical rub  Elect to have additional steroid burst to help with inflammation and continue therapies  Will consider follow up steroid injection in future if pain and movement " in left shoulder not continue to improve, monitor     Request as needed Senna S    Ortho blood pressure improve with current midodrine dose and TG shapes  Wearing abdominal binder today and tolerating, monitor       ORDER  1)Prednisone 20mg daily x 5days (arthritis flare). Give AM with food   2)BMP,CBC 7/27/20 (anemia)  3)Senna S 2 tabs daily PRN (BM mgmt). Please remove his personal bottle in his room      Electronically signed by:  ROSALINA Deutsch CNP

## 2020-07-23 NOTE — PROGRESS NOTES
[place] [facility]  [place] Medical Record Number:  [id number]  Place of Service where encounter took place:        Patient was seen by Dr. Hunt at the [facility] for a hospital follow-up visit July 21, 2020      Patient Chilango Echevarria is a 85 yr old male admitted to [facility] for rehabilitation s/p hospitalization [facility] 6/22-6/26/20 for fall with laceration to scalp  and altered mental status.     Noted to have hypotension and acute on chronic anemia and received transfusion.        PMHx Parkinsons disease, hypertension, PMR, gout, GERD with Barretts esophagus, and eczema.  Patient has had chronic left shoulder pain likely secondary to a rotator cuff repair.  He was recently started on a oral steroid burst after being seen by the orthopedic physicians assistant.    Patient states he is feeling a little stronger.  He continues to have some dizziness with standing.  He denies cough, chest pain, shortness of breath, nausea, vomiting.  He denies constipation.  Continues to have left shoulder pain.     Most recent labs from July 6, 2020 remarkable for normal basic metabolic panel, hemoglobin 9.3 with an MCV of 95.    Past medical history reviewed, is remarkable for Parkinson's disease, hypertension, history of orthostatic hypotension, polymyalgia rheumatica, GERD.    Prior to admission, patient lived in assisted living facility.  Long-term care is being considered.    MEDICATIONS:    Current Outpatient Medications   Medication Sig Dispense Refill     acetaminophen (TYLENOL) 325 MG tablet Take 650 mg by mouth 2 times daily & 2 x daily as needed       allopurinol (ZYLOPRIM) 100 MG tablet Take 1 tablet (100 mg) by mouth daily 90 tablet 3     carbidopa-levodopa (SINEMET)  MG per tablet Take 2 tablets by mouth 3 times daily (9am - 12pm - 6pm)       diclofenac (VOLTAREN) 1 % topical gel Place 2 g onto the skin 2 times daily       midodrine (PROAMATINE) 5 MG tablet  Take 5 mg by mouth 2 times daily 8AM & NOON       OLANZapine zydis (ZYPREXA) 5 MG ODT Take 1 tablet (5 mg) by mouth every 6 hours as needed for agitation 10 tablet 1     omeprazole (PRILOSEC) 20 MG DR capsule TAKE 1 CAPSULE BY MOUTH 30 TO 60 MINUTES BEFORE FIRST MEAL OF THE DAY 90 capsule 0     order for DME Equipment being ordered: walker 1 each 0     Sennosides-Docusate Sodium (SENNA S PO) Take 2 tablets by mouth daily as needed           REVIEW OF SYSTEMS:  Negative except as noted above.        Objective:    Exam:  GENERAL:  alert and no distress, pale appearing male, sitting in a chair in occupational therapy  EYES: No eye redness or drainage  RESP: Lungs clear  CV: Regular rhythm  Abdomen soft  SKIN: No rash  NEURO: Alert, oriented to person place and general circumstances.  Speech is soft, mildly dysarthric.  Slight tremors of hands.  No rigidity.  Gait was not assessed.  No focal weakness.  No lower extremity edema.      ASSESSMENT/PLAN:    Fall prior to admission secondary to orthostatic hypotension.  Hypotension likely secondary to medications, Parkinson's disease and anemia.  Some improvement with discontinuation of Toprol, initiation of Midrin and use of abdominal binder.  Plan: Monitor vital signs, continue therapy.    Confusion noted at the time of hospital admission.  Possibly secondary to fall and Flexeril.  Mental status improved.  Flexeril is been discontinued.  Plan: Continue therapies.  Assure safe discharge.    Left shoulder pain likely secondary to rotator cuff injury  Plan: Monitor response to steroid burst  Consider referral to orthopedics    Anemia, possibly related to head laceration with significant bleeding.  Hemoglobin gradually improving  Plan: Monitor hemoglobin      Tyler Hunt MD

## 2020-07-29 NOTE — PROGRESS NOTES
[facility]  [place] Medical Record Number:  [id number]  Place of Service where encounter took place:  [facility] ([facility]) [[id number]]  Chief Complaint   Patient presents with     Nursing Home Acute       HPI:    Chilango Echevarria  is a 85 year old (10/24/1934), who is being seen today for an episodic care visit.  HPI information obtained from: facility chart records, facility staff and patient report. Today's concern is:    Patient Chilango Echevarria is a 85 yr old male admitted to [facility] for rehabilitation s/p hospitalization [facility] 6/22-6/26/20 for fall with laceration to scalp (CT head show no injury) and altered mental status. Noted to have hypotension and acute on chronic anemia and received transfusion.  (B12 normal, urine cultures are negative, so will not start antibiotics)  Of note patient recently started on Flexeril for rotator cuff injury    PMHx Parkinsons disease, hypertension, PMR, gout, GERD with Barretts esophagus, and eczema        Parkinson's disease (H)  Orthostatic hypotension  Urinary problem     Patient no longer in therapies. Should be on walking program per therapies, however, patient states he has not been walking    C/o tingling with urination at times. States he does not have pressure in bladder and no other concerns  Reports he is eating and drinking per usual  Reports having BMs,  Per pharmacy they have short supply Senna and ask that providers change stools softer, patient agree to start miralax    Does still have lightheadedness when standing at times. Wearing abdominal binder  Recent increase in Midodrine this week to 10mg 2 x daily       Past Medical and Surgical History reviewed in Epic today.    MEDICATIONS:    Current Outpatient Medications   Medication Sig Dispense Refill     polyethylene glycol (MIRALAX) 17 g packet Take 1 packet by mouth 2 times daily       acetaminophen (TYLENOL) 325 MG tablet Take 650 mg by mouth  "2 times daily & 2 x daily as needed       allopurinol (ZYLOPRIM) 100 MG tablet Take 1 tablet (100 mg) by mouth daily 90 tablet 3     carbidopa-levodopa (SINEMET)  MG per tablet Take 2 tablets by mouth 3 times daily (9am - 12pm - 6pm)       diclofenac (VOLTAREN) 1 % topical gel Place 2 g onto the skin 2 times daily       midodrine (PROAMATINE) 5 MG tablet Take 10 mg by mouth 2 times daily 8AM & NOON       OLANZapine zydis (ZYPREXA) 5 MG ODT Take 1 tablet (5 mg) by mouth every 6 hours as needed for agitation 10 tablet 1     omeprazole (PRILOSEC) 20 MG DR capsule TAKE 1 CAPSULE BY MOUTH 30 TO 60 MINUTES BEFORE FIRST MEAL OF THE DAY 90 capsule 0     order for DME Equipment being ordered: walker 1 each 0         REVIEW OF SYSTEMS:  10 point ROS of systems including Constitutional, Eyes, Respiratory, Cardiovascular, Gastroenterology, Genitourinary, Integumentary, Musculoskeletal, Psychiatric were all negative except for pertinent positives noted in my HPI.    Objective:  /70   Pulse 68   Temp 97.5  F (36.4  C)   Resp 18   Ht 1.651 m (5' 5\")   Wt 63.3 kg (139 lb 9.6 oz)   SpO2 97%   BMI 23.23 kg/m    Exam:  GENERAL APPEARANCE:  Alert, in no distress  ENT:  Mouth and posterior oropharynx normal, moist mucous membranes  EYES:  EOM, conjunctivae, lids, pupils and irises normal  NECK:  No adenopathy,masses or thyromegaly  RESP:  no respiratory distress  M/S:   sitting up in bed  SKIN:  Inspection of skin and subcutaneous tissue baseline  NEURO:   Cranial nerves 2-12 are normal tested and grossly at patient's baseline  PSYCH:  oriented X 3    Labs:   Labs done in SNF are in [place] EPIC. Please refer to them using [facility]/Care Everywhere.    ASSESSMENT/PLAN:     Parkinson's disease (H)  Orthostatic hypotension  Urinary problem    Noted to have orthostasis still with SBP in 70s, increased Midodrine 10mg 2 x daily and monitor trend   Continue abdominal binder and encourage adequate fluid intake    Did report " tingling with urination at times,  Will check post void residual and monitor   Encourage adequate oral intake    Discontinue Senna and start Miralax 17g 2 x daily due to short supply Senna and may not be able to receive per pharmacy    Plans to move to long term care in future        Electronically signed by:  ROSALINA Deutsch CNP

## 2020-07-31 NOTE — PROGRESS NOTES
[facility]  [place] Medical Record Number:  [id number]  Place of Service where encounter took place:  [facility] ([facility]) [[id number]]  Chief Complaint   Patient presents with     Nursing Home Acute       HPI:    Chilango Echevarria  is a 85 year old (10/24/1934), who is being seen today for an episodic care visit.  HPI information obtained from: facility chart records, facility staff and patient report. Today's concern is:  Patient Chilango Echevarria is a 85 yr old male admitted to [facility] for rehabilitation s/p hospitalization [facility] 6/22-6/26/20 for fall with laceration to scalp (CT head show no injury) and altered mental status. Noted to have hypotension and acute on chronic anemia and received transfusion.  (B12 normal, urine cultures are negative, so will not start antibiotics)  Of note patient recently started on Flexeril for rotator cuff injury    PMHx Parkinsons disease, hypertension, PMR, gout, GERD with Barretts esophagus, and eczema     Insomnia, unspecified type     Patient c/o some insomnia  Reviewed sleep hygiene, with low noise, TV off,   Also, open blinds and stay active during the day and participate in walking program  He is interested in trial of Melatonin. He is not interested in anything he will get dependent on    Past Medical and Surgical History reviewed in Epic today.    MEDICATIONS:    Current Outpatient Medications   Medication Sig Dispense Refill     melatonin 3 MG CAPS Take by mouth At Bedtime       acetaminophen (TYLENOL) 325 MG tablet Take 650 mg by mouth 2 times daily & 2 x daily as needed       allopurinol (ZYLOPRIM) 100 MG tablet Take 1 tablet (100 mg) by mouth daily 90 tablet 3     carbidopa-levodopa (SINEMET)  MG per tablet Take 2 tablets by mouth 3 times daily (9am - 12pm - 6pm)       diclofenac (VOLTAREN) 1 % topical gel Place 2 g onto the skin 2 times daily       midodrine (PROAMATINE) 5 MG tablet Take 10 mg by  "mouth 2 times daily 8AM & NOON       OLANZapine zydis (ZYPREXA) 5 MG ODT Take 1 tablet (5 mg) by mouth every 6 hours as needed for agitation 10 tablet 1     omeprazole (PRILOSEC) 20 MG DR capsule TAKE 1 CAPSULE BY MOUTH 30 TO 60 MINUTES BEFORE FIRST MEAL OF THE DAY 90 capsule 0     order for DME Equipment being ordered: walker 1 each 0     polyethylene glycol (MIRALAX) 17 g packet Take 1 packet by mouth 2 times daily           REVIEW OF SYSTEMS:  4 point ROS including Respiratory, CV, GI and , other than that noted in the HPI,  is negative    Objective:  /76   Pulse 58   Temp 97.3  F (36.3  C)   Resp 18   Ht 1.651 m (5' 5\")   Wt 63 kg (139 lb)   SpO2 98%   BMI 23.13 kg/m    Exam:  GENERAL APPEARANCE:  Alert, in no distress    Labs:   Labs done in SNF are in [place] EPIC. Please refer to them using EPIC/Care Everywhere.    ASSESSMENT/PLAN:  Insomnia, unspecified type     C/o insomnia  Will order Melatonin 3 mg daily   Good sleep hygiene, monitor         Electronically signed by:  ROSALINA Deutsch CNP           "

## 2020-08-19 NOTE — PROGRESS NOTES
[place] [facility]  [place] Medical Record Number:  [id number]  Place of Service where encounter took place:  [facility] ([facility]) [[id number]]  Chief Complaint   Patient presents with     RECHECK       HPI:    Chilango Echevarria  is a 85 year old (10/24/1934), who is being seen today for an episodic care visit.  HPI information obtained from: facility chart records, facility staff and patient report. Today's concern is:    Patient Chilango Echevarria is a 85 yr old male admitted to [facility] for rehabilitation s/p hospitalization [facility] 6/22-6/26/20 for fall with laceration to scalp (CT head show no injury) and altered mental status. Noted to have hypotension and acute on chronic anemia and received transfusion.  (B12 normal, urine cultures are negative, so will not start antibiotics)  Of note patient recently started on Flexeril for rotator cuff injury    PMHx Parkinsons disease, hypertension, PMR, gout, GERD with Barretts esophagus, and eczema          Orthostatic hypotension  Left shoulder pain, unspecified chronicity  Slow transit constipation     Patient states sleeping and eating well  Denies recent episodes of dizziness when stand up , blood pressure stable SBP ~100-130  He does state he has not been ambulating with staff-did update nurse manager regarding patient should be on restorative walking program    Reports left shoulder pain much better and improve with current pain medications    Reports regular stools    Likes his room in C    Past Medical and Surgical History reviewed in Epic today.    MEDICATIONS:    Current Outpatient Medications   Medication Sig Dispense Refill     acetaminophen (TYLENOL) 325 MG tablet Take 650 mg by mouth 2 times daily & 2 x daily as needed       allopurinol (ZYLOPRIM) 100 MG tablet Take 1 tablet (100 mg) by mouth daily 90 tablet 3     carbidopa-levodopa (SINEMET)  MG per tablet Take 2 tablets by mouth 3 times daily (9am  "- 12pm - 6pm)       diclofenac (VOLTAREN) 1 % topical gel Place 2 g onto the skin 2 times daily       melatonin 3 MG CAPS Take by mouth At Bedtime       midodrine (PROAMATINE) 5 MG tablet Take 10 mg by mouth 2 times daily 8AM & NOON       OLANZapine zydis (ZYPREXA) 5 MG ODT Take 1 tablet (5 mg) by mouth every 6 hours as needed for agitation 10 tablet 1     omeprazole (PRILOSEC) 20 MG DR capsule TAKE 1 CAPSULE BY MOUTH 30 TO 60 MINUTES BEFORE FIRST MEAL OF THE DAY 90 capsule 0     order for DME Equipment being ordered: walker 1 each 0     polyethylene glycol (MIRALAX) 17 g packet Take 1 packet by mouth 2 times daily           REVIEW OF SYSTEMS:  4 point ROS including Respiratory, CV, GI and , other than that noted in the HPI,  is negative    Objective:  /74   Pulse 68   Temp 97.3  F (36.3  C)   Resp 18   Ht 1.651 m (5' 5\")   Wt 62.5 kg (137 lb 12.8 oz)   SpO2 96%   BMI 22.93 kg/m    Exam:  GENERAL APPEARANCE:  Alert, in no distress  ENT:  Mouth and posterior oropharynx normal, moist mucous membranes  EYES:  EOM, conjunctivae, lids, pupils and irises normal  NECK:  No adenopathy,masses or thyromegaly  RESP:  no respiratory distress  M/S:   lying in bed  SKIN:  Inspection of skin and subcutaneous tissue baseline  NEURO:   Cranial nerves 2-12 are normal tested and grossly at patient's baseline  PSYCH:  oriented X 3    Labs:   Labs done in SNF are in [place] EPIC. Please refer to them using [facility]/Care Everywhere.    ASSESSMENT/PLAN:     Orthostatic hypotension  Left shoulder pain, unspecified chronicity  Slow transit constipation    Denies recent dizziness, continue current midodrine level, monitor   Left shoulder pain has improved management, continue current pain medications with tylenol and voltaren gel  Reports regular bowel movement medications, continue on current medications to treat for bowel movement management with Miralax  Continue restorative nursing program    Electronically signed " by:  ROSALINA Deutsch CNP

## 2020-09-02 NOTE — PROGRESS NOTES
[facility]  [place] Medical Record Number:  [id number]  Place of Service where encounter took place:  [facility] ([facility]) [[id number]]  Chief Complaint   Patient presents with     RECHECK       HPI:    Chilango Echevarria  is a 85 year old (10/24/1934), who is being seen today for an episodic care visit.  HPI information obtained from: facility chart records, facility staff and patient report. Today's concern is:    Patient Chilango Echevarria is a 85 yr old male admitted to [facility] for rehabilitation s/p hospitalization [facility] 6/22-6/26/20 for fall with laceration to scalp (CT head show no injury) and altered mental status. Noted to have hypotension and acute on chronic anemia and received transfusion.  (B12 normal, urine cultures are negative, so will not start antibiotics)  Of note patient recently started on Flexeril for rotator cuff injury    PMHx Parkinsons disease, hypertension, PMR, gout, GERD with Barretts esophagus, and eczema     1. Parkinson's disease (H)    2. Confusion    3. Paranoia (H)        Some confusion and paranoia noted    Patient in long term care with limited socialization and activity.   He states there was much more activity and energy on TCU. He has not been ambulating outside his room he states  Friend Abena states she talks to patient 3 x daily. Reports patient lonely and paranoid at times  Patient denies feeling ill or acute concerns.   Vitals and labs stable    Past Medical and Surgical History reviewed in Epic today.    MEDICATIONS:    Current Outpatient Medications   Medication Sig Dispense Refill     acetaminophen (TYLENOL) 325 MG tablet Take 650 mg by mouth 2 times daily & 2 x daily as needed       allopurinol (ZYLOPRIM) 100 MG tablet Take 1 tablet (100 mg) by mouth daily 90 tablet 3     carbidopa-levodopa (SINEMET)  MG per tablet Take 2 tablets by mouth 3 times daily (9am - 12pm - 6pm)       diclofenac (VOLTAREN) 1 %  "topical gel Place 2 g onto the skin 2 times daily       melatonin 3 MG CAPS Take by mouth At Bedtime       midodrine (PROAMATINE) 5 MG tablet Take 10 mg by mouth 2 times daily 8AM & NOON       omeprazole (PRILOSEC) 20 MG DR capsule TAKE 1 CAPSULE BY MOUTH 30 TO 60 MINUTES BEFORE FIRST MEAL OF THE DAY 90 capsule 0     order for DME Equipment being ordered: walker 1 each 0     polyethylene glycol (MIRALAX) 17 g packet Take 1 packet by mouth 2 times daily         REVIEW OF SYSTEMS:  10 point ROS of systems including Constitutional, Eyes, Respiratory, Cardiovascular, Gastroenterology, Genitourinary, Integumentary, Musculoskeletal, Psychiatric were all negative except for pertinent positives noted in my HPI.    Objective:  /67   Pulse 68   Temp 97  F (36.1  C)   Resp 18   Ht 1.651 m (5' 5\")   Wt 60.7 kg (133 lb 12.8 oz)   SpO2 98%   BMI 22.27 kg/m    Exam:  GENERAL APPEARANCE:  Alert, in no distress  ENT:  Mouth and posterior oropharynx normal, moist mucous membranes  EYES:  EOM, conjunctivae, lids, pupils and irises normal  NECK:  No adenopathy,masses or thyromegaly  RESP:  no respiratory distress  M/S:   sitting in WC  SKIN:  Inspection of skin and subcutaneous tissue baseline  NEURO:   Cranial nerves 2-12 are normal tested and grossly at patient's baseline  PSYCH:  oriented X 3    Labs:   Labs done in SNF are in [place] EPIC. Please refer to them using EPIC/Care Everywhere.    ASSESSMENT/PLAN:     Parkinson's disease (H)  Confusion  Paranoia (H)    Order house psychologist and  referral   Consider medication for mood and /or paranoia, monitor   Order physical therapy and occupational therapy due to deconditioning  Continue Parkinson medications,    Friends call often and plan to visit      Electronically signed by:  ROSALINA Deutsch CNP           "

## 2020-09-04 NOTE — TELEPHONE ENCOUNTER
[facility] [place] [facility] TELEPHONE ENCOUNTER    Chief Complaint   Patient presents with     Lab Result Notice     Chilango Echevarria is a 85 year old  (10/24/1934), nurse called today to report UA result. Ordered due to increased confusion. Nurse reports resident is slightly more confused than baseline. Family expressing concerning and asking if urine sample result final. VSS. No fever.     ASSESSMENT/PLAN  Comment: Resident stable with slight increased confusion from baseline in setting of PD.  Plan:  - Await UC, result pending  - Staff to monitor BID temp per COVID-19 protocol   - Route note to PCP for follow-up in AM    Electronically signed by:   ROSALINA Farrar CNP

## 2020-09-07 NOTE — ED NOTES
Charge nurse Karin REDMAN has been attempting to get in contact with patient's health care agent, Ofelia, as listed in the facility's paperwork. Patient's friend and financial POA, Abena, is here and is also attempting to contact Ofelia.

## 2020-09-07 NOTE — ED PROVIDER NOTES
"  History     Chief Complaint:  Syncope     HPI   Chilango Echevarria is a 85 year old male with a history of polymyalgia rheumatica and hypertension who presents to the emergency department by EMS from nursing home for evaluation of multiple syncopal episodes.  Per report, patient had unwitnessed fall last night, unknown how long he was on the ground for.  He had 2 syncopal episodes this morning with transfers, another this afternoon.  Patient denies headache, chest pain, shortness of breath, abdominal pain, fever, cough.    Patient states he had intermittent right sided abdominal pain \"where my appendix is\" over the last 2 weeks.  He denies any current abdominal pain, has nausea but denies any vomiting    Per staff at nursing home, patient had unwitnessed fall around 11 PM yesterday was found on the floor.  The put him to bed and was otherwise doing well vital stable.  This morning, he was apparently sitting in his wheelchair and had 2 unresponsive episodes where he appeared to faint.  He was noted to be hypotensive and so they give him another dose of his Midrin.  He felt better after this and again was put back to bed.  At around 3:30 PM today, patient was helped up to the wheelchair and was noted to have another syncopal episode.  Staff called the patient's on-call doctor who told them to send him to the ER for further evaluation.    Allergies:  Lisinopril      Medications:    Zyloprim  Sinemet  Melatonin  Proamatine  Prilosec  MiraLAX     Past Medical History:    Aortic stenosis  Ying esophagus  Contact dermatitis  Gout  Incarcerated hiatal hernia  Inguinal hernia  Polymyalgia rheumatica  Umbilical hernia  Hypertension  Vasomotor rhinitis   Slow transit constipation     Past Surgical History:    Tonsillectomy and adenoidectomy   Umbilical, hiatal, and inguinal herniorrhaphy   Jejunostomy     Family History:    Breast cancer  Diabetes  Lung cancer     Social History:  Tobacco Use: Never  Alcohol Use: Yes  PCP: " Enrique Ring  Marital Status:  Single      Review of Systems   Constitutional: Negative for fever.   Respiratory: Negative for cough and shortness of breath.    Cardiovascular: Negative for chest pain.   Gastrointestinal: Negative for abdominal pain.   Neurological: Positive for syncope. Negative for headaches.   All other systems reviewed and are negative.        Physical Exam     Patient Vitals for the past 24 hrs:   BP Pulse SpO2 Weight   09/07/20 2200 129/67 77 98 % --   09/07/20 2130 115/64 78 98 % --   09/07/20 2115 122/65 80 95 % --   09/07/20 2100 126/70 78 96 % --   09/07/20 2045 113/67 76 95 % --   09/07/20 2030 123/70 75 97 % --   09/07/20 2015 122/69 81 93 % --   09/07/20 2000 119/69 76 99 % --   09/07/20 1945 112/72 77 97 % --   09/07/20 1930 (!) 161/81 75 97 % --   09/07/20 1915 (!) 164/83 78 97 % --   09/07/20 1900 (!) 158/83 80 95 % --   09/07/20 1858 (!) 142/72 82 94 % --   09/07/20 1855 (!) 159/72 84 -- --   09/07/20 1825 -- -- 97 % --   09/07/20 1824 -- 75 96 % --   09/07/20 1803 -- -- -- 61.3 kg (135 lb 2.3 oz)     Physical Exam  General: Alert, no acute distress; nontoxic appearing  Neuro:  PERRL.  EOMI.  No focal deficits; 4/5 bilateral upper and lower extremity strength. SILT to bilateral upper and lower extremities.  CN II-XII grossly intact.    HEENT:  Dry mucous membranes. Conjunctiva normal.   CV:  RRR, no m/r/g, skin warm and well perfused  Pulm:  CTAB, no wheezes/ronchi/rales.  No acute distress, breathing comfortably  GI:  Soft, nontender, nondistended.  No rebound or guarding.  Normal bowel sounds  MSK:  Moving all extremities.  No focal areas of edema, erythema, or tenderness  Skin:  WWP, no rashes, no lower extremity edema, skin color normal, no diaphoresis  Psych:  Well-appearing, normal affect, regular speech    Emergency Department Course   ECG:  @ 1810  Indication: Syncope  Vent. Rate 77 bpm. CA interval 168 ms. QRS duration 82 ms. QT/QTc 374/423 ms. P-R-T axis 68 51 84.    Sinus rhythm. Low voltage QRS. ST elevation consider inferior injury or acute infarct  ** ** ACUTE MI / STEMI ** ** Abnormal ECG.    Read @ by Dr. Gramajo.    ECG #2:  @ 1818  Indication: Syncope  Vent. Rate 76 bpm. MI interval 166 ms. QRS duration 80 ms. QT/QTc 378/425 ms. P-R-T axis 68 49 83.   Sinus rhythm. Low voltage QRS. ST elevation consider inferolateral injury or acute infarct  ** ** ACUTE MI / STEMI ** ** Abnormal ECG.    Read @ by Dr. Gramajo.    ECG #3:  @ 1956  Indication: syncope   Vent. Rate 76 bpm. MI interval 178 ms. QRS duration 88 ms. QT/QTc 392/441 ms. P-R-T axis 70 51 87.   Sinus rhythm. Low voltage QRS. Borderline ECG.    Read @ by Dr. Gramajo.    Imaging:  Chest XR Port 1 View:  IMPRESSION: No significant change. Heart size and pulmonary vessels normal. Tortuous calcified thoracic aorta stable. Lungs are clear.  Reading per radiology.    CT Aortic Survey with contrast:  IMPRESSION:  1.  No evidence for aortic dissection.  2.  3.9 x 3.3 cm partially thrombosed infrarenal abdominal aortic aneurysm.  3.  The stomach is markedly distended with fluid, along with numerous dilated, fluid-filled small bowel loops concerning for high-grade obstruction with the transition point estimated in the region of the mid ileum. Consider NG tube placement and gastric   decompression.  4.  The proximal esophagus is distended with air. The distal esophagus at the GE junction is nondistended which may account for diffuse wall thickening. This should be reviewed on follow-up imaging to exclude neoplasm.  Reading per radiology.    Head CT without contrast:  IMPRESSION:  1.  No CT evidence for acute intracranial process.  2.  Brain atrophy and presumed chronic microvascular ischemic changes as above.  Report per radiology.     Echocardiogram Complete:  Ordered    Laboratory:  CBC: WBC: 16.0 (H), HGB: 13.5, PLT: 222  CMP: Glucose 140 (H), Urea nitrogen: 55 (H), Creatinine: 2.33 (H), GFR: 24 (L), o/w WNL    Creatinine  POCT: Creatinine: 2.4 (H), GFR: 26 (L)    ABO/Rh type and screen: A pos, Antibody Screen negative.    INR: 1.05    CK total: 44    PTT: 30    1818 Troponin I: <0.015  1817 Troponin POCT: 0.1    UA: Slightly cloudy yellow urine, ketones: 10, protein albumin: 30, leukocyte esterase: moderate, WBC: 10 (H), bacteria: many, mucous: present,  Hyaline casts: 3 (H), otherwise WNL    Asymptomatic COVID-19 Virus (Coronavirus) by PCR: In process    Interventions:  2001 Zofran 4 mg IV  2010 Aspirin 300 mg suppository   2116 0.9% Sodium Chloride BOLUS 1000 mLs IV   Medications   sodium chloride 0.9% infusion (has no administration in time range)   cefTRIAXone (ROCEPHIN) 1 g vial to attach to  mL bag for ADULTS or NS 50 mL bag for PEDS (1 g Intravenous New Bag 9/7/20 2239)   cefTRIAXone (ROCEPHIN) 1 g vial to attach to  mL bag for ADULTS or NS 50 mL bag for PEDS (has no administration in time range)   iopamidol (ISOVUE-370) solution 500 mL (80 mLs Intravenous Given 9/7/20 1849)   for CT scan flush use (60 mLs Intravenous Given 9/7/20 1851)   ondansetron (ZOFRAN) injection 4 mg (4 mg Intravenous Given 9/7/20 2001)   aspirin (ASA) Suppository 300 mg (300 mg Rectal Given 9/7/20 2010)   0.9% sodium chloride BOLUS (0 mLs Intravenous Stopped 9/7/20 2236)       Emergency Department Course:  Nursing notes and vitals reviewed. I performed an exam of the patient as documented above.     IV inserted. Medicine administered as documented above. Blood drawn. COVID swab obtained. This was sent to the lab for further testing, results above.    The patient provided a urine sample here in the emergency department. This was sent for laboratory testing, findings above.     The patient was sent for a chest XR, aortic survey CT, head CT, and echocardiogram while in the emergency department, findings above.     1828 I consulted with Dr. Duque, cardiology, regarding the patient's history and presentation here in the emergency  department.    1955 I consulted with Dr. Higginbotham, interventional cardiology, regarding the patient's history and presentation here in the emergency department.    I spoke with patient's family, April, who is patient's POA and medical decision maker.  Discussed patient's presentation, work-up, findings thus far.  She agrees with the treatment thus far including medical management of his EKG changes.  Also had a long discussion with her regarding patient's CODE STATUS.  She states that patient would not want any aggressive interventions and wishes to change his CODE STATUS to DNR/DNI.    2057  I consulted with Dr. Sheets of the hospitalist services. They are in agreement to accept the patient for admission.    2129 PM - I spoke with Dr. Boss of general surgery who agreed with medical management at this time.  Formal general surgery consult to follow.    Findings and plan explained to the Patient and POA who consents to admission. Discussed the patient with Dr. Sheets, who will admit the patient to a cardiac tele bed for further monitoring, evaluation, and treatment.    Impression & Plan    CMS Diagnoses: The patient has a STEMI     The patient was evaluated at 1810   Cath lab transfer was delayed due to atypical presentation   ASA given in the ER  Medical Decision Making:  Chilango Echevarria is a 85 year old male with history significant for Parkinson's disease, PMR, HTN who presents to the ER for evaluation of multiple syncopal episodes at his nursing home.  Please see HPI for specifics.  On arrival to the ER, patient is hypertensive but is otherwise vitally stable.  He has no concerns or complaints.  Neurologic exam is nonfocal.  Doubt stroke.  Broad differential was however considered including ACS, PE, aortic dissection, cardiac dysrhythmia, severe electrolyte disturbance, sepsis amongst other things.  Initial EKG concerning for inferior STEMI and repeat EKG similar.  There does not appear to be any reciprocal  changes and patient denied any chest pain.  Discussed case with cardiology who agreed with initial work-up and rule out other acute pathology.  CT head obtained was normal.  CT aortic survey shows no dissection but does show thrombosed infrarenal AAA as well as high-grade bowel obstruction.  Initial troponin is within normal limits.  Lab studies remarkable for nonspecific leukocytosis, acute kidney injury with creatinine 2.3, urinalysis concerning for possible UTI.  Culture was sent for this patient was given IV ceftriaxone.  Discussed case with interventional cardiology and recommended medical management at this time also in light of discussion with patient's DPOA regarding patient's presentation, findings, plan and she was in agreement.  After long discussion, CODE STATUS changed to DNR/DNI and she does not want or show any aggressive interventions for the patient.  NG tube was placed for gastric decompression. Rectal ASA was given.  We will order echocardiogram for further evaluation of WMA, EF.  Discussed case with medicine who will admit the patient for continued monitoring and care.  Patient made stable in the ER.    Critical Care time:  was 35 minutes for this patient excluding procedures.    Diagnosis:    ICD-10-CM    1. Syncope and collapse  R55 UA with Microscopic     Asymptomatic COVID-19 Virus (Coronavirus) by PCR     Urine Culture Aerobic Bacterial   2. Small bowel obstruction (H)  K56.609    3. Abnormal electrocardiogram  R94.31    4. Acute kidney injury (H)  N17.9    5. Aneurysm of infrarenal abdominal aorta (H)  I71.4    6. Urinary tract infection without hematuria, site unspecified  N39.0        Disposition:  Admitted to Dr. Sudeep Torres Disclosure:  Rodney MOSLEY, am serving as a scribe on 9/7/2020 at 6:04 PM to personally document services performed by Dr. Gramajo, Roman Cruz MD based on my observations and the provider's statements to me.     Rodney Gamboa  9/7/2020   [place]  [facility] EMERGENCY DEPARTMENT       [facility], Roman Cruz MD  09/07/20 0953

## 2020-09-07 NOTE — TELEPHONE ENCOUNTER
"Nursing called to report that Bill has been having episodic \"fainting spells\" through out the day.  Now having left sided weakness and lethargy.    Had the unwitnessed fall in the middle of the night.    Orders:  Send to the ED for evaluation due to change of condition after the fall    Electronically signed by Marie Larios RN, CNP    "

## 2020-09-07 NOTE — ED TRIAGE NOTES
From [facility]. Staff found on floor sometime last night next to his bed. Unwitnessed fall.    Today, patient passed out twice when transferring from bed to chair, and again this afternoon.     Staff at facility were concerned for left sided arm weakness     by EMS    Family is on their way

## 2020-09-08 PROBLEM — R55 SYNCOPE: Status: ACTIVE | Noted: 2020-01-01

## 2020-09-08 NOTE — PLAN OF CARE
VSS on RA. A/O, lethargic at times. Tele: SR. C/o nausea, Zofran and Compazine given, see MAR. NG to low int. suction. IVF@125mL/hr. NPO, tolerated sips of water with medication. General Surg/Palliative following. Assist x2 with lift. Discharge plan TBD. Will continue to monitor and POC.

## 2020-09-08 NOTE — CONSULTS
[facility]    Cardiology Consultation     Date of Admission:  9/7/2020    Assessment & Plan   Chilango Echevarria is a 85 year old male who was admitted on 9/7/2020.  A pleasant but very frail looking 85-year-old gentleman who is admitted with episodes of unresponsiveness/syncope, with comorbidities of Parkinson disease, history of postural hypotension on Midodrine,, history of mild aortic stenosis in 2013.  Cardiology was consulted yesterday because of EKG concerning for possible inferior inferolateral STEMI.  Patient was evaluated by interventional team and it was felt that this is unlikely acute coronary syndrome.  Patient was also noted to have small bowel obstruction and partially thrombosed abdominal aortic aneurysm and is being followed by GI surgery.  At the time of this review patient patient denies any chest discomfort.  He denies any chest discomfort today yesterday or in the last several days.  No shortness of breath.  EKG shows subtle ST elevation more like a repull abnormality.  Initial troponin was negative.  Second troponin is pending from this morning.  Telemetry has not revealed any concerning arrhythmia.  Patient does not remember much about his presentation although he acknowledges that he get dizzy if he stands quickly.  Primary care is working with patient's power of  and patient in terms of defining goals of care and there is a possibility that patient may be evaluated by palliative.    1.  Syncopal episodes.  This could be postural hypotension.  No significant details available about these episodes of syncope although patient acknowledges feeling dizzy when  he gets up.  He is on Middaugh drain.  So far no culprit arrhythmia noted on telemetry.  Depending upon goals of care patient may be discharged on Zile patch monitoring.  2.  No symptoms concerning for acute coronary syndrome.  Subtle EKG changes noted.  Agree with interventional assessment.  Recheck troponin.  3.   Frail status  4.  Acute renal failure  5.  Parkinson disease  6.  Small bowel obstruction, possible bowel ischemia, partially thrombosed abdominal aortic aneurysm.  Being followed by surgery.    Recommendations  Agree with echocardiogram which is pending  Agree with troponin which is pending  Continue telemetry.  Overall syncopal episode may represent orthostatic hypotension although a culprit arrhythmia cannot be completely ruled out but so far no concerning arrhythmia noted on telemetry.  Once patient clinical status improves can check orthostasis at this time he appears to frail and sick to do that.    Thank you for the care of Mr. Echevarria.  Plan discussed with patient, patient's friend Abena and inpatient medicine team.    Michael Reich MD    Primary Care Physician   [facility]    Reason for Consult   Reason for consult: I was asked by Dr. Juárez to evaluate this patient for syncope and abnormal EKG.    History of Present Illness   Chilango Echevarria is a 85 year old male who presents with syncopal episodes from nursing home.  Cardiology was consulted yesterday and patient was reviewed by interventional team because of EKG changes concerning for STEMI, EKG shows subtle ST elevation in inferolateral leads.  Remarkably patient denies any chest discomfort, initial troponin was negative.  He was also noted to have acute renal failure with creatinine up to 2.4 and small bowel obstruction and partially thrombosed abdominal aortic aneurysm.  At the time of this review patient is comfortably laying down in the bed, patient's friend Abena is bedside.  Patient denies any chest discomfort now or in the recent past, no shortness of breath.  He is somewhat of a poor historian but acknowledges feeling dizzy when she gets up.  He does not remember the circumstances surrounding episodes of syncope which happened recently.  Troponin from this morning is pending.  Telemetry has not revealed any concerning arrhythmia.   Primary team is working with patient regarding several social issues and also defining goals of care.  Surgery is following for small bowel obstruction.        Patient Active Problem List   Diagnosis     Gout     Esophageal reflux     Aortic stenosis     CARDIOVASCULAR SCREENING; LDL GOAL LESS THAN 130     Vasomotor rhinitis     Advanced directives, counseling/discussion     Status post laparoscopic hernia repair-5/14/15     Dermatitis     HTN (hypertension)     Umbilical hernia     Right inguinal hernia     Health Care Home     Parkinson's disease (H)     Slow transit constipation     Fall     Falls     Syncope       Past Medical History   I have reviewed this patient's medical history and updated it with pertinent information if needed.   Past Medical History:   Diagnosis Date     Aortic stenosis     very mild     Ying esophagus      Contact dermatitis and other eczema, due to unspecified cause      Esophageal reflux      Gout, unspecified      Incarcerated hiatal hernia 5/21/2015     Inguinal hernia without mention of obstruction or gangrene, unilateral or unspecified, (not specified as recurrent)      Polymyalgia rheumatica (H)      Umbilical hernia without mention of obstruction or gangrene      Unspecified essential hypertension      Vasomotor rhinitis        Past Surgical History   I have reviewed this patient's surgical history and updated it with pertinent information if needed.  Past Surgical History:   Procedure Laterality Date     C NONSPECIFIC PROCEDURE      T&A     C NONSPECIFIC PROCEDURE      umbilical herniorraphy     ESOPHAGOSCOPY, GASTROSCOPY, DUODENOSCOPY (EGD), COMBINED N/A 5/11/2015    Procedure: COMBINED ESOPHAGOSCOPY, GASTROSCOPY, DUODENOSCOPY (EGD);  Surgeon: Thad Ferro MD;  Location:  OR     LAPAROSCOPIC ASSISTED INSERTION TUBE JEJUNOSTOMY N/A 5/14/2015    Procedure: LAPAROSCOPIC ASSISTED INSERTION TUBE JEJUNOSTOMY;  Surgeon: Thad Ferro MD;  Location:  OR      LAPAROSCOPIC HERNIORRHAPHY HIATAL N/A 5/14/2015    Procedure: LAPAROSCOPIC HERNIORRHAPHY HIATAL;  Surgeon: Thad Ferro MD;  Location: [facility] OR     LAPAROSCOPIC HERNIORRHAPHY INGUINAL Right 5/14/2015    Procedure: LAPAROSCOPIC HERNIORRHAPHY INGUINAL;  Surgeon: Thad Ferro MD;  Location: [facility] OR     PICC INSERTION Right 5/11/2015    5fr DL Power PICC, 39cm (1cm external) in the R medial brachial vein w/ tip in the low SVC.       Prior to Admission Medications   Prior to Admission Medications   Prescriptions Last Dose Informant Patient Reported? Taking?   acetaminophen (TYLENOL) 325 MG tablet 9/7/2020 at x1  Yes Yes   Sig: Take 650 mg by mouth 2 times daily & 2 x daily as needed   allopurinol (ZYLOPRIM) 100 MG tablet 9/7/2020 at Unknown time  No Yes   Sig: Take 1 tablet (100 mg) by mouth daily   carbidopa-levodopa (SINEMET)  MG per tablet 9/7/2020 at x2  Yes Yes   Sig: Take 2 tablets by mouth 3 times daily (9am - 12pm - 6pm)   diclofenac (VOLTAREN) 1 % topical gel 9/7/2020 at x1  Yes Yes   Sig: Place 2 g onto the skin 2 times daily   melatonin 3 MG CAPS 9/6/2020 at Unknown time  Yes Yes   Sig: Take 3 mg by mouth At Bedtime    midodrine (PROAMATINE) 5 MG tablet 9/7/2020 at x2  Yes Yes   Sig: Take 10 mg by mouth 2 times daily 8AM & NOON   nystatin (MYCOSTATIN) 474659 UNIT/GM external powder 9/7/2020 at x1  Yes Yes   Sig: Apply topically 2 times daily Apply to groin area as needed for redness/rash   omeprazole (PRILOSEC) 20 MG DR capsule 9/7/2020  No Yes   Sig: TAKE 1 CAPSULE BY MOUTH 30 TO 60 MINUTES BEFORE FIRST MEAL OF THE DAY   polyethylene glycol (MIRALAX) 17 g packet 9/7/2020 at x1  Yes Yes   Sig: Take 1 packet by mouth 2 times daily      Facility-Administered Medications: None     Current Facility-Administered Medications   Medication Dose Route Frequency     carbidopa-levodopa  2 tablet Oral TID     cefTRIAXone  1 g Intravenous Q24H     heparin ANTICOAGULANT  5,000 Units  Subcutaneous Q12H     sodium chloride (PF)  3 mL Intracatheter Q8H     Current Facility-Administered Medications   Medication Last Rate     sodium chloride 1,000 mL (09/08/20 0931)     Allergies   Allergies   Allergen Reactions     Lisinopril      rash, exacerbation of eczema       Social History    reports that he has never smoked. He has never used smokeless tobacco. He reports current alcohol use. He reports that he does not use drugs.    Family History   Family History   Problem Relation Age of Onset     Breast Cancer Mother      Diabetes Paternal Grandfather      Cancer Son 37        lung       Review of Systems   The comprehensive 10 point Review of Systems is negative other than noted in the HPI or here.     Physical Exam   Vital Signs with Ranges  Temp:  [98.5  F (36.9  C)-99.5  F (37.5  C)] 98.6  F (37  C)  Pulse:  [72-84] 73  Resp:  [16-20] 20  BP: (102-164)/(50-83) 123/57  SpO2:  [93 %-100 %] 94 %  Wt Readings from Last 4 Encounters:   09/08/20 61.7 kg (136 lb 0.4 oz)   09/02/20 60.7 kg (133 lb 12.8 oz)   08/19/20 62.5 kg (137 lb 12.8 oz)   07/31/20 63 kg (139 lb)     I/O last 3 completed shifts:  In: 1703 [I.V.:603; IV Piggyback:1100]  Out: 2475 [Urine:225; Emesis/NG output:2250]      Vitals: /57 (BP Location: Left arm)   Pulse 73   Temp 98.6  F (37  C) (Oral)   Resp 20   Wt 61.7 kg (136 lb 0.4 oz)   SpO2 94%   BMI 22.64 kg/m    General patient appears comfortable  Neck normal JVP  Cardiovascular system S1-S2 normal, no particular murmur rub or gallop appreciated  Respiratory some decreased air entry bilaterally at bases no crackles or rhonchi  Abdomen soft, nontender  Extremities no pitting pedal limitation neurological alert, oriented x2  Psych flat affect  Skin no obvious rash  HEENT no pallor    Recent Labs   Lab 09/07/20 1818   TROPI <0.015       Recent Labs   Lab 09/08/20  0726 09/07/20  1819 09/07/20  1818 09/02/20  0741   WBC 13.7*  --  16.0* 4.6   HGB 11.8*  --  13.5 11.6*   MCV 94   --  94 93     --  222 200   INR  --   --  1.05  --      --  139 143   POTASSIUM 4.3  --  4.9 3.6   CHLORIDE 111*  --  104 109   CO2 24  --  28 32   BUN 62*  --  55* 34*   CR 1.90*  --  2.33* 0.76   GFRESTIMATED 31* 26* 24* 83   GFRESTBLACK 36* 31* 28* >90   ANIONGAP 7  --  7 2*   MESERET 7.9*  --  9.5 8.8   *  --  140* 81   ALBUMIN  --   --  4.4  --    PROTTOTAL  --   --  8.3  --    BILITOTAL  --   --  0.5  --    ALKPHOS  --   --  78  --    ALT  --   --  10  --    AST  --   --  23  --    TROPI  --   --  <0.015  --      Recent Labs   Lab Test 05/18/15  0717 05/11/15  1512   TRIG 53 34     Recent Labs   Lab 09/08/20 0726 09/07/20 1818 09/02/20  0741   WBC 13.7* 16.0* 4.6   HGB 11.8* 13.5 11.6*   HCT 38.0* 43.2 37.1*   MCV 94 94 93    222 200     No results for input(s): PH, PHV, PO2, PO2V, SAT, PCO2, PCO2V, HCO3, HCO3V in the last 168 hours.  No results for input(s): NTBNPI, NTBNP in the last 168 hours.  No results for input(s): DD in the last 168 hours.  No results for input(s): SED, CRP in the last 168 hours.  Recent Labs   Lab 09/08/20 0726 09/07/20 1818 09/02/20  0741    222 200     No results for input(s): TSH in the last 168 hours.  Recent Labs   Lab 09/07/20 2057   COLOR Yellow   APPEARANCE Slightly Cloudy   URINEGLC Negative   URINEBILI Negative   URINEKETONE 10*   SG 1.025   UBLD Negative   URINEPH 5.0   PROTEIN 30*   NITRITE Negative   LEUKEST Moderate*   RBCU 1   WBCU 10*       Imaging:  Recent Results (from the past 48 hour(s))   XR Chest Port 1 View    Narrative    EXAM: XR CHEST PORT 1 VW  LOCATION: [facility]  DATE/TIME: 9/7/2020 6:14 PM    INDICATION: Chest pain  COMPARISON: 06/23/2020      Impression    IMPRESSION: No significant change. Heart size and pulmonary vessels normal. Tortuous calcified thoracic aorta stable. Lungs are clear.   Head CT w/o contrast    Narrative    EXAM: CT HEAD WITHOUT CONTRAST  LOCATION: [facility]  DATE/TIME:  09/07/2020, 6:31 PM    INDICATION: Head trauma, minor, GCS>=13, high clinical risk, initial exam.  COMPARISON: Head CT 06/23/2020  TECHNIQUE: Routine without IV contrast. Multiplanar reformats. Dose reduction techniques were used.    FINDINGS:  INTRACRANIAL CONTENTS: No intracranial hemorrhage, extra-axial collection, or mass effect.  No CT evidence of acute infarct. Moderate presumed chronic small vessel ischemic changes. Moderate generalized volume loss. No hydrocephalus.     VISUALIZED ORBITS/SINUSES/MASTOIDS: No intraorbital abnormality. No paranasal sinus mucosal disease. No middle ear or mastoid effusion.    BONES/SOFT TISSUES: No acute abnormality.      Impression    IMPRESSION:  1.  No CT evidence for acute intracranial process.  2.  Brain atrophy and presumed chronic microvascular ischemic changes as above.     CT Aortic Survey w Contrast    Narrative    EXAM: CT AORTIC SURVEY W CONTRAST  LOCATION: [facility]  DATE/TIME: 9/7/2020 6:31 PM    INDICATION: Syncope, elevated troponin.  COMPARISON: CT abdomen and pelvis 05/10/2015  TECHNIQUE: CT angiogram chest abdomen pelvis during arterial phase of injection of IV contrast. 2D and 3D MIP reconstructions were performed by the CT technologist. Dose reduction techniques were used.   CONTRAST: 80mL [id number]-[id number]    FINDINGS:   CT ANGIOGRAM CHEST, ABDOMEN, AND PELVIS: The thoracic and abdominal aorta are negative for dissection. Mild atherosclerotic disease. 3.9 x 3.3 cm infrarenal abdominal aortic aneurysm has increased in size and now with thrombus within the anterior aspect   of the aneurysm on image 121 of series 6 the inferior mesenteric artery arises immediately above the aneurysm. Mild plaque at the origins of the celiac trunk and superior mesenteric artery without significant narrowing. Plaque at the origin of the left   renal artery. Both renal arteries are small in caliber.    LUNGS AND PLEURA: Subsegmental atelectasis or scarring both lower  lobes. No acute infiltrates or pleural effusions.    MEDIASTINUM/AXILLAE: No mediastinal or hilar adenopathy. The proximal esophagus is distended with air. The distal esophagus is nondistended with wall thickening.    HEPATOBILIARY: Fatty infiltration of the liver. Tiny cyst near the liver dome. Mildly dilated common bile duct is unchanged.    PANCREAS: Normal.    SPLEEN: Normal.    ADRENAL GLANDS: Normal.    KIDNEYS/BLADDER: Normal.    BOWEL: Marked gastric distention with fluid as seen on previous study. There are also numerous dilated loops of small bowel throughout the abdomen and pelvis. The distal small bowel and colon are normal caliber. Normal appendix.    LYMPH NODES: Normal.    PELVIC ORGANS: Normal.    MUSCULOSKELETAL: Normal scoliosis. Severe degenerative changes throughout the thoracolumbar spine..      Impression    IMPRESSION:  1.  No evidence for aortic dissection.  2.  3.9 x 3.3 cm partially thrombosed infrarenal abdominal aortic aneurysm.  3.  The stomach is markedly distended with fluid, along with numerous dilated, fluid-filled small bowel loops concerning for high-grade obstruction with the transition point estimated in the region of the mid ileum. Consider NG tube placement and gastric   decompression.  4.  The proximal esophagus is distended with air. The distal esophagus at the GE junction is nondistended which may account for diffuse wall thickening. This should be reviewed on follow-up imaging to exclude neoplasm.         Echo:  No results found for this or any previous visit (from the past 4320 hour(s)).

## 2020-09-08 NOTE — PLAN OF CARE
Patient orientated x 3 this a.m. Pt. Was too lethargic to take 1200 Sinemet. NPO. NG with intermittent suction.

## 2020-09-08 NOTE — PROGRESS NOTES
"    [facility]  Hospitalist Progress Note  Kai Guerrero MD 2020    Reason for Stay (Diagnosis): syncope         Assessment and Plan:      Summary of Stay: Chilango Echevarria is a 85 year old male who returned to the emergency department on 2020 after having been found in his nursing home on the floor on the night of 2020.  His fall was unwitnessed but fortunately appears to not have injured himself significantly.      Mr. Echevarria again fell on the morning of the date of admission when he was gotten back to bed, he was noted to be significantly hypotensive.  Because this is not an unusual finding for this patient, they did not send him directly to the emergency department.  However, because he became \"unresponsive\" and may have had some weakness on his left side, the patient was directed to the emergency department.    Patient social history is challenging.  He is a  and his only son  18 years ago and his daughter-in-law resides with the patient's only grandchild in [place].  Mr. Echevarria is really alone in the world with attentive friends including Abena and her .    I spoke with the nursing supervisor at [facility] at [phone] and she was able to give me additional information on the patient. Ultimately, I spoke with the patient's daughter-in-law who told me that Mr. Ortiz has been declining since about New Year's.  He entered independent living at that time and quickly deteriorated to the point where he needed to be in assisted living.  She has seen him become more confused and more paranoid, less active and more withdrawn as the year has gone by.  Of course the COVID epidemic is not helping anything.  The patient's close friends Abena and her  (last name?)  See him about twice a week and it endorsed the same history.    My note that when the patient first arrived in the emergency department, there was concern for an ST elevation " "MI.  Cardiology was involved and it was deemed inappropriate to reflexively move to angiography.  Further concern arose with the patient who was noted to have gastric distention possibly indicating a small bowel wound obstruction.  General surgery was also consulted at that time and an NG was recommended.    Overall, it is my impression the patient is declining significantly and he is now facing multiple issues.  He does have Parkinson's disease and along with that apparently is developing some hallucinations and confusion which his closest advocates characterize as \"paranoid\".  It is with this in mind that I have talked with the family and friends and recommended a concerted conversation about goals of care with the likelihood that we would end up with a hospice solution for Mr. Echevarria.    Problem List:   1. Syncope, recurrent.   2. Possible SBO v ileus.  NG was placed in the emergency department.  3. TERRELL.  4. 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  5. Possible urinary tract infection for which the patient is on ceftriaxone.  6. General decline with obvious malnutrition.    Plan:  1.  We need to discuss goals of care.  To this end, I have spoken with everyone involved and asked for palliative care to see the patient.  There is no urgent or emergent indication right now to decide on surgery or angiography.  2.  Note that I rechecked a troponin this morning and that is normal.  Echocardiogram also shows no evident wall motion abnormalities for which reason cardiology has now signed off.  3.  Continue empiric treatment of urinary tract infection.   4.  Resume Sinemet.  Other medications can be held or given IV.    DVT Prophylaxis: Heparin SQ  Code Status: DNR / DNI.  This was clarified by Dr. Gramajo in the emergency department last night and I again confirmed today with the patient's daughter-in-law who is his medical " power of .  Discharge Dispo: To be determined  Estimated Disch Date / # of Days until Disch: To be determined        Interval History (Subjective):      Chart reviewed, pt interviewed.      I spent more than an hour talking with family members and consultants.  Mr. Echevarria himself appears to be quite confused.  He is only mildly uncomfortable with that nasogastric tube in place.                  Physical Exam:      Last Vital Signs:  /57 (BP Location: Left arm)   Pulse 73   Temp 99.5  F (37.5  C) (Axillary)   Resp 20   Wt 61.7 kg (136 lb 0.4 oz)   SpO2 97%   BMI 22.64 kg/m      I/O last 3 completed shifts:  In: 1703 [I.V.:603; IV Piggyback:1100]  Out: 2475 [Urine:225; Emesis/NG output:2250]    Constitutional: Awake, alert, cooperative, no apparent distress.  Very cachectic.  Nasogastric tube is in place with only a trace amount of yellow clear fluid out in the 3 hours since it had been recorded.   Respiratory: Clear to auscultation bilaterally, no crackles or wheezing   Cardiovascular: Regular rate and rhythm, normal S1 and S2, and no murmur noted   Abdomen: Normal bowel sounds, soft, non-distended, non-tender   Skin: No rashes, no cyanosis, dry to touch   Neuro: Alert and seems appropriate to the situation.   Extremities: No edema.  Tone is normal, bulk is decreased.   Other(s):        All other systems: Negative          Medications:      All current medications were reviewed with changes reflected in problem list.         Data:      All new lab and imaging data was reviewed.   Labs/Imaging:  Results for orders placed or performed during the hospital encounter of 09/07/20 (from the past 24 hour(s))   EKG 12 lead   Result Value Ref Range    Interpretation ECG Click View Image link to view waveform and result    Troponin POCT   Result Value Ref Range    Troponin I 0.01 0.00 - 0.08 ug/L   CBC + differential   Result Value Ref Range    WBC 16.0 (H) 4.0 - 11.0 10e9/L    RBC Count 4.62 4.4 - 5.9  10e12/L    Hemoglobin 13.5 13.3 - 17.7 g/dL    Hematocrit 43.2 40.0 - 53.0 %    MCV 94 78 - 100 fl    MCH 29.2 26.5 - 33.0 pg    MCHC 31.3 (L) 31.5 - 36.5 g/dL    RDW 15.2 (H) 10.0 - 15.0 %    Platelet Count 222 150 - 450 10e9/L    Diff Method Automated Method     % Neutrophils 92.9 %    % Lymphocytes 3.9 %    % Monocytes 2.7 %    % Eosinophils 0.0 %    % Basophils 0.2 %    % Immature Granulocytes 0.3 %    Nucleated RBCs 0 0 /100    Absolute Neutrophil 14.9 (H) 1.6 - 8.3 10e9/L    Absolute Lymphocytes 0.6 (L) 0.8 - 5.3 10e9/L    Absolute Monocytes 0.4 0.0 - 1.3 10e9/L    Absolute Eosinophils 0.0 0.0 - 0.7 10e9/L    Absolute Basophils 0.0 0.0 - 0.2 10e9/L    Abs Immature Granulocytes 0.0 0 - 0.4 10e9/L    Absolute Nucleated RBC 0.0    Comprehensive metabolic panel   Result Value Ref Range    Sodium 139 133 - 144 mmol/L    Potassium 4.9 3.4 - 5.3 mmol/L    Chloride 104 94 - 109 mmol/L    Carbon Dioxide 28 20 - 32 mmol/L    Anion Gap 7 3 - 14 mmol/L    Glucose 140 (H) 70 - 99 mg/dL    Urea Nitrogen 55 (H) 7 - 30 mg/dL    Creatinine 2.33 (H) 0.66 - 1.25 mg/dL    GFR Estimate 24 (L) >60 mL/min/[1.73_m2]    GFR Estimate If Black 28 (L) >60 mL/min/[1.73_m2]    Calcium 9.5 8.5 - 10.1 mg/dL    Bilirubin Total 0.5 0.2 - 1.3 mg/dL    Albumin 4.4 3.4 - 5.0 g/dL    Protein Total 8.3 6.8 - 8.8 g/dL    Alkaline Phosphatase 78 40 - 150 U/L    ALT 10 0 - 70 U/L    AST 23 0 - 45 U/L   Troponin I   Result Value Ref Range    Troponin I ES <0.015 0.000 - 0.045 ug/L   CK total   Result Value Ref Range    CK Total 44 30 - 300 U/L   ABO/Rh type and screen   Result Value Ref Range    ABO A     RH(D) Pos     Antibody Screen Neg     Test Valid Only At [facility]        Specimen Expires 09/10/2020    INR   Result Value Ref Range    INR 1.05 0.86 - 1.14   Partial thromboplastin time   Result Value Ref Range    PTT 30 22 - 37 sec   EKG 12 lead   Result Value Ref Range    Interpretation ECG Click View Image link to view waveform and  result    Creatinine POCT   Result Value Ref Range    Creatinine 2.4 (H) 0.66 - 1.25 mg/dL    GFR Estimate 26 (L) >60 mL/min/[1.73_m2]    GFR Estimate If Black 31 (L) >60 mL/min/[1.73_m2]   XR Chest Port 1 View    Narrative    EXAM: XR CHEST PORT 1 VW  LOCATION: [facility]  DATE/TIME: 9/7/2020 6:14 PM    INDICATION: Chest pain  COMPARISON: 06/23/2020      Impression    IMPRESSION: No significant change. Heart size and pulmonary vessels normal. Tortuous calcified thoracic aorta stable. Lungs are clear.   Head CT w/o contrast    Narrative    EXAM: CT HEAD WITHOUT CONTRAST  LOCATION: [facility]  DATE/TIME: 09/07/2020, 6:31 PM    INDICATION: Head trauma, minor, GCS>=13, high clinical risk, initial exam.  COMPARISON: Head CT 06/23/2020  TECHNIQUE: Routine without IV contrast. Multiplanar reformats. Dose reduction techniques were used.    FINDINGS:  INTRACRANIAL CONTENTS: No intracranial hemorrhage, extra-axial collection, or mass effect.  No CT evidence of acute infarct. Moderate presumed chronic small vessel ischemic changes. Moderate generalized volume loss. No hydrocephalus.     VISUALIZED ORBITS/SINUSES/MASTOIDS: No intraorbital abnormality. No paranasal sinus mucosal disease. No middle ear or mastoid effusion.    BONES/SOFT TISSUES: No acute abnormality.      Impression    IMPRESSION:  1.  No CT evidence for acute intracranial process.  2.  Brain atrophy and presumed chronic microvascular ischemic changes as above.     CT Aortic Survey w Contrast    Narrative    EXAM: CT AORTIC SURVEY W CONTRAST  LOCATION: [facility]  DATE/TIME: 9/7/2020 6:31 PM    INDICATION: Syncope, elevated troponin.  COMPARISON: CT abdomen and pelvis 05/10/2015  TECHNIQUE: CT angiogram chest abdomen pelvis during arterial phase of injection of IV contrast. 2D and 3D MIP reconstructions were performed by the CT technologist. Dose reduction techniques were used.   CONTRAST: 80mL Isovue-370    FINDINGS:    CT ANGIOGRAM CHEST, ABDOMEN, AND PELVIS: The thoracic and abdominal aorta are negative for dissection. Mild atherosclerotic disease. 3.9 x 3.3 cm infrarenal abdominal aortic aneurysm has increased in size and now with thrombus within the anterior aspect   of the aneurysm on image 121 of series 6 the inferior mesenteric artery arises immediately above the aneurysm. Mild plaque at the origins of the celiac trunk and superior mesenteric artery without significant narrowing. Plaque at the origin of the left   renal artery. Both renal arteries are small in caliber.    LUNGS AND PLEURA: Subsegmental atelectasis or scarring both lower lobes. No acute infiltrates or pleural effusions.    MEDIASTINUM/AXILLAE: No mediastinal or hilar adenopathy. The proximal esophagus is distended with air. The distal esophagus is nondistended with wall thickening.    HEPATOBILIARY: Fatty infiltration of the liver. Tiny cyst near the liver dome. Mildly dilated common bile duct is unchanged.    PANCREAS: Normal.    SPLEEN: Normal.    ADRENAL GLANDS: Normal.    KIDNEYS/BLADDER: Normal.    BOWEL: Marked gastric distention with fluid as seen on previous study. There are also numerous dilated loops of small bowel throughout the abdomen and pelvis. The distal small bowel and colon are normal caliber. Normal appendix.    LYMPH NODES: Normal.    PELVIC ORGANS: Normal.    MUSCULOSKELETAL: Normal scoliosis. Severe degenerative changes throughout the thoracolumbar spine..      Impression    IMPRESSION:  1.  No evidence for aortic dissection.  2.  3.9 x 3.3 cm partially thrombosed infrarenal abdominal aortic aneurysm.  3.  The stomach is markedly distended with fluid, along with numerous dilated, fluid-filled small bowel loops concerning for high-grade obstruction with the transition point estimated in the region of the mid ileum. Consider NG tube placement and gastric   decompression.  4.  The proximal esophagus is distended with air. The distal  esophagus at the GE junction is nondistended which may account for diffuse wall thickening. This should be reviewed on follow-up imaging to exclude neoplasm.     EKG 12 lead   Result Value Ref Range    Interpretation ECG Click View Image link to view waveform and result    UA with Microscopic   Result Value Ref Range    Color Urine Yellow     Appearance Urine Slightly Cloudy     Glucose Urine Negative NEG^Negative mg/dL    Bilirubin Urine Negative NEG^Negative    Ketones Urine 10 (A) NEG^Negative mg/dL    Specific Gravity Urine 1.025 1.003 - 1.035    Blood Urine Negative NEG^Negative    pH Urine 5.0 5.0 - 7.0 pH    Protein Albumin Urine 30 (A) NEG^Negative mg/dL    Urobilinogen mg/dL 2.0 0.0 - 2.0 mg/dL    Nitrite Urine Negative NEG^Negative    Leukocyte Esterase Urine Moderate (A) NEG^Negative    Source Catheterized Urine     WBC Urine 10 (H) 0 - 5 /HPF    RBC Urine 1 0 - 2 /HPF    Bacteria Urine Many (A) NEG^Negative /HPF    Mucous Urine Present (A) NEG^Negative /LPF    Hyaline Casts 3 (H) 0 - 2 /LPF   Urine Culture Aerobic Bacterial    Specimen: Catheterized Urine   Result Value Ref Range    Specimen Description Catheterized Urine     Special Requests Specimen received in preservative     Culture Micro PENDING    Asymptomatic COVID-19 Virus (Coronavirus) by PCR    Specimen: Nasopharyngeal   Result Value Ref Range    COVID-19 Virus PCR to [facility] - Source Nasopharyngeal     COVID-19 Virus PCR to [facility] - Result       Test received-See reflex to IDDL test SARS CoV2 (COVID-19) Virus RT-PCR   SARS-CoV-2 COVID-19 Virus (Coronavirus) RT-PCR Nasopharyngeal    Specimen: Nasopharyngeal   Result Value Ref Range    SARS-CoV-2 Virus Specimen Source Nasopharyngeal     SARS-CoV-2 PCR Result NEGATIVE     SARS-CoV-2 PCR Comment       Testing was performed using the Aptima SARS-CoV-2 Assay on the [employer] Instrument System.   Additional information about this Emergency Use Authorization (EUA) assay can be found via   the Lab  Guide.     Cardiology IP Consult: Patient to be seen: Routine - within 24 hours; syncope, abnormal EKG; Consultant may enter orders: Yes; Requesting provider? Hospitalist (if different from attending physician)    Michael Strange MD     9/8/2020  3:13 PM  [facility]    Cardiology Consultation     Date of Admission:  9/7/2020    Assessment & Plan   Chilango Echevarria is a 85 year old male who was admitted on   9/7/2020.  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  Cardiology was consulted yesterday because of EKG   concerning for possible inferior inferolateral STEMI.  Patient   was evaluated by interventional team and it was felt that this is   unlikely acute coronary syndrome.  Patient was also noted to have   small bowel obstruction and partially thrombosed abdominal aortic   aneurysm and is being followed by GI surgery.  At the time of   this review patient patient denies any chest discomfort.  He   denies any chest discomfort today yesterday or in the last   several days.  No shortness of breath.  EKG shows subtle ST   elevation more like a repull abnormality.  Initial troponin was   negative.  Second troponin is pending from this morning.    Telemetry has not revealed any concerning arrhythmia.  Patient   does not remember much about his presentation although he   acknowledges that he get dizzy if he stands quickly.  Primary   care is working with patient's power of  and patient in   terms of defining goals of care and there is a possibility that   patient may be evaluated by palliative.    1.  Syncopal episodes.  This could be postural hypotension.  No   significant details available about these episodes of syncope   although patient acknowledges feeling dizzy when  he  gets up.  He   is on Middaugh drain.  So far no culprit arrhythmia noted on   telemetry.  Depending upon goals of care patient may be   discharged on Zile patch monitoring.  2.  No symptoms concerning for acute coronary syndrome.  Subtle   EKG changes noted.  Agree with interventional assessment.    Recheck troponin.  3.  Frail status  4.  Acute renal failure  5.  Parkinson disease  6.  Small bowel obstruction, possible bowel ischemia, partially   thrombosed abdominal aortic aneurysm.  Being followed by surgery.    Recommendations  Agree with echocardiogram which is pending  Agree with troponin which is pending  Continue telemetry.  Overall syncopal episode may represent orthostatic hypotension   although a culprit arrhythmia cannot be completely ruled out but   so far no concerning arrhythmia noted on telemetry.  Once patient   clinical status improves can check orthostasis at this time he   appears to frail and sick to do that.    Thank you for the care of Mr. Echevarria.  Plan discussed with   patient, patient's friend Abena and inpatient medicine team.    Michael Reich MD    Primary Care Physician   Enrique Ring    Reason for Consult   Reason for consult: I was asked by Dr. Juárez to evaluate this   patient for syncope and abnormal EKG.    History of Present Illness   Chilango Echevarria is a 85 year old male who presents with   syncopal episodes from nursing home.  Cardiology was consulted   yesterday and patient was reviewed by interventional team because   of EKG changes concerning for STEMI, EKG shows subtle ST   elevation in inferolateral leads.  Remarkably patient denies any   chest discomfort, initial troponin was negative.  He was also   noted to have acute renal failure with creatinine up to 2.4 and   small bowel obstruction and partially thrombosed abdominal aortic   aneurysm.  At the time of this review patient is comfortably   laying down in the bed, patient's friend Abena is bedside.    Patient  denies any chest discomfort now or in the recent past, no   shortness of breath.  He is somewhat of a poor historian but   acknowledges feeling dizzy when she gets up.  He does not   remember the circumstances surrounding episodes of syncope which   happened recently.  Troponin from this morning is pending.    Telemetry has not revealed any concerning arrhythmia.  Primary   team is working with patient regarding several social issues and   also defining goals of care.  Surgery is following for small   bowel obstruction.        Patient Active Problem List   Diagnosis     Gout     Esophageal reflux     Aortic stenosis     CARDIOVASCULAR SCREENING; LDL GOAL LESS THAN 130     Vasomotor rhinitis     Advanced directives, counseling/discussion     Status post laparoscopic hernia repair-5/14/15     Dermatitis     HTN (hypertension)     Umbilical hernia     Right inguinal hernia     Health Care Home     Parkinson's disease (H)     Slow transit constipation     Fall     Falls     Syncope       Past Medical History   I have reviewed this patient's medical history and updated it   with pertinent information if needed.   Past Medical History:   Diagnosis Date     Aortic stenosis     very mild     Ying esophagus      Contact dermatitis and other eczema, due to unspecified cause      Esophageal reflux      Gout, unspecified      Incarcerated hiatal hernia 5/21/2015     Inguinal hernia without mention of obstruction or gangrene,   unilateral or unspecified, (not specified as recurrent)      Polymyalgia rheumatica (H)      Umbilical hernia without mention of obstruction or gangrene      Unspecified essential hypertension      Vasomotor rhinitis        Past Surgical History   I have reviewed this patient's surgical history and updated it   with pertinent information if needed.  Past Surgical History:   Procedure Laterality Date     C NONSPECIFIC PROCEDURE      T&A     C NONSPECIFIC PROCEDURE      umbilical herniorraphy      ESOPHAGOSCOPY, GASTROSCOPY, DUODENOSCOPY (EGD), COMBINED N/A   5/11/2015    Procedure: COMBINED ESOPHAGOSCOPY, GASTROSCOPY, DUODENOSCOPY   (EGD);  Surgeon: Thad Ferro MD;  Location: [facility] OR     LAPAROSCOPIC ASSISTED INSERTION TUBE JEJUNOSTOMY N/A 5/14/2015    Procedure: LAPAROSCOPIC ASSISTED INSERTION TUBE JEJUNOSTOMY;    Surgeon: Thad Ferro MD;  Location: [facility] OR     LAPAROSCOPIC HERNIORRHAPHY HIATAL N/A 5/14/2015    Procedure: LAPAROSCOPIC HERNIORRHAPHY HIATAL;  Surgeon: Thad Ferro MD;  Location: [facility] OR     LAPAROSCOPIC HERNIORRHAPHY INGUINAL Right 5/14/2015    Procedure: LAPAROSCOPIC HERNIORRHAPHY INGUINAL;  Surgeon:   Thad Ferro MD;  Location: [facility] OR     PICC INSERTION Right 5/11/2015    5fr DL Power PICC, 39cm (1cm external) in the R medial brachial   vein w/ tip in the low SVC.       Prior to Admission Medications   Prior to Admission Medications   Prescriptions Last Dose Informant Patient Reported? Taking?   acetaminophen (TYLENOL) 325 MG tablet 9/7/2020 at x1  Yes Yes   Sig: Take 650 mg by mouth 2 times daily & 2 x daily as needed   allopurinol (ZYLOPRIM) 100 MG tablet 9/7/2020 at Unknown time  No   Yes   Sig: Take 1 tablet (100 mg) by mouth daily   carbidopa-levodopa (SINEMET)  MG per tablet 9/7/2020 at x2    Yes Yes   Sig: Take 2 tablets by mouth 3 times daily (9am - 12pm - 6pm)   diclofenac (VOLTAREN) 1 % topical gel 9/7/2020 at x1  Yes Yes   Sig: Place 2 g onto the skin 2 times daily   melatonin 3 MG CAPS 9/6/2020 at Unknown time  Yes Yes   Sig: Take 3 mg by mouth At Bedtime    midodrine (PROAMATINE) 5 MG tablet 9/7/2020 at x2  Yes Yes   Sig: Take 10 mg by mouth 2 times daily 8AM & NOON   nystatin (MYCOSTATIN) 360060 UNIT/GM external powder 9/7/2020 at   x1  Yes Yes   Sig: Apply topically 2 times daily Apply to groin area as needed   for redness/rash   omeprazole (PRILOSEC) 20 MG DR capsule 9/7/2020  No Yes   Sig: TAKE 1 CAPSULE BY MOUTH 30 TO  60 MINUTES BEFORE FIRST MEAL   OF THE DAY   polyethylene glycol (MIRALAX) 17 g packet 9/7/2020 at x1  Yes Yes     Sig: Take 1 packet by mouth 2 times daily      Facility-Administered Medications: None     Current Facility-Administered Medications   Medication Dose Route Frequency     carbidopa-levodopa  2 tablet Oral TID     cefTRIAXone  1 g Intravenous Q24H     heparin ANTICOAGULANT  5,000 Units Subcutaneous Q12H     sodium chloride (PF)  3 mL Intracatheter Q8H     Current Facility-Administered Medications   Medication Last Rate     sodium chloride 1,000 mL (09/08/20 0931)     Allergies   Allergies   Allergen Reactions     Lisinopril      rash, exacerbation of eczema       Social History    reports that he has never smoked. He has never used smokeless   tobacco. He reports current alcohol use. He reports that he does   not use drugs.    Family History   Family History   Problem Relation Age of Onset     Breast Cancer Mother      Diabetes Paternal Grandfather      Cancer Son 37        lung       Review of Systems   The comprehensive 10 point Review of Systems is negative other   than noted in the HPI or here.     Physical Exam   Vital Signs with Ranges  Temp:  [98.5  F (36.9  C)-99.5  F (37.5  C)] 98.6  F (37  C)  Pulse:  [72-84] 73  Resp:  [16-20] 20  BP: (102-164)/(50-83) 123/57  SpO2:  [93 %-100 %] 94 %  Wt Readings from Last 4 Encounters:   09/08/20 61.7 kg (136 lb 0.4 oz)   09/02/20 60.7 kg (133 lb 12.8 oz)   08/19/20 62.5 kg (137 lb 12.8 oz)   07/31/20 63 kg (139 lb)     I/O last 3 completed shifts:  In: 1703 [I.V.:603; IV Piggyback:1100]  Out: 2475 [Urine:225; Emesis/NG output:2250]      Vitals: /57 (BP Location: Left arm)   Pulse 73   Temp   98.6  F (37  C) (Oral)   Resp 20   Wt 61.7 kg (136 lb 0.4 oz)     SpO2 94%   BMI 22.64 kg/m    General patient appears comfortable  Neck normal JVP  Cardiovascular system S1-S2 normal, no particular murmur rub or   gallop appreciated  Respiratory some  decreased air entry bilaterally at bases no   crackles or rhonchi  Abdomen soft, nontender  Extremities no pitting pedal limitation neurological alert,   oriented x2  Psych flat affect  Skin no obvious rash  HEENT no pallor    Recent Labs   Lab 09/07/20 1818   TROPI <0.015       Recent Labs   Lab 09/08/20 0726 09/07/20 1819 09/07/20 1818 09/02/20  0741   WBC 13.7*  --  16.0* 4.6   HGB 11.8*  --  13.5 11.6*   MCV 94  --  94 93     --  222 200   INR  --   --  1.05  --      --  139 143   POTASSIUM 4.3  --  4.9 3.6   CHLORIDE 111*  --  104 109   CO2 24  --  28 32   BUN 62*  --  55* 34*   CR 1.90*  --  2.33* 0.76   GFRESTIMATED 31* 26* 24* 83   GFRESTBLACK 36* 31* 28* >90   ANIONGAP 7  --  7 2*   MESERET 7.9*  --  9.5 8.8   *  --  140* 81   ALBUMIN  --   --  4.4  --    PROTTOTAL  --   --  8.3  --    BILITOTAL  --   --  0.5  --    ALKPHOS  --   --  78  --    ALT  --   --  10  --    AST  --   --  23  --    TROPI  --   --  <0.015  --      Recent Labs   Lab Test 05/18/15  0717 05/11/15  1512   TRIG 53 34     Recent Labs   Lab 09/08/20 0726 09/07/20 1818 09/02/20  0741   WBC 13.7* 16.0* 4.6   HGB 11.8* 13.5 11.6*   HCT 38.0* 43.2 37.1*   MCV 94 94 93    222 200     No results for input(s): PH, PHV, PO2, PO2V, SAT, PCO2, PCO2V,   HCO3, HCO3V in the last 168 hours.  No results for input(s): NTBNPI, NTBNP in the last 168 hours.  No results for input(s): DD in the last 168 hours.  No results for input(s): SED, CRP in the last 168 hours.  Recent Labs   Lab 09/08/20 0726 09/07/20 1818 09/02/20  0741    222 200     No results for input(s): TSH in the last 168 hours.  Recent Labs   Lab 09/07/20 2057   COLOR Yellow   APPEARANCE Slightly Cloudy   URINEGLC Negative   URINEBILI Negative   URINEKETONE 10*   SG 1.025   UBLD Negative   URINEPH 5.0   PROTEIN 30*   NITRITE Negative   LEUKEST Moderate*   RBCU 1   WBCU 10*       Imaging:  Recent Results (from the past 48 hour(s))   XR Chest Port 1 View     Narrative    EXAM: XR CHEST PORT 1 VW  LOCATION: [facility]  DATE/TIME: 9/7/2020 6:14 PM    INDICATION: Chest pain  COMPARISON: 06/23/2020      Impression    IMPRESSION: No significant change. Heart size and pulmonary   vessels normal. Tortuous calcified thoracic aorta stable. Lungs   are clear.   Head CT w/o contrast    Narrative    EXAM: CT HEAD WITHOUT CONTRAST  LOCATION: [facility]  DATE/TIME: 09/07/2020, 6:31 PM    INDICATION: Head trauma, minor, GCS>=13, high clinical risk,   initial exam.  COMPARISON: Head CT 06/23/2020  TECHNIQUE: Routine without IV contrast. Multiplanar reformats.   Dose reduction techniques were used.    FINDINGS:  INTRACRANIAL CONTENTS: No intracranial hemorrhage, extra-axial   collection, or mass effect.  No CT evidence of acute infarct.   Moderate presumed chronic small vessel ischemic changes. Moderate   generalized volume loss. No hydrocephalus.     VISUALIZED ORBITS/SINUSES/MASTOIDS: No intraorbital abnormality.   No paranasal sinus mucosal disease. No middle ear or mastoid   effusion.    BONES/SOFT TISSUES: No acute abnormality.      Impression    IMPRESSION:  1.  No CT evidence for acute intracranial process.  2.  Brain atrophy and presumed chronic microvascular ischemic   changes as above.     CT Aortic Survey w Contrast    Narrative    EXAM: CT AORTIC SURVEY W CONTRAST  LOCATION: [facility]  DATE/TIME: 9/7/2020 6:31 PM    INDICATION: Syncope, elevated troponin.  COMPARISON: CT abdomen and pelvis 05/10/2015  TECHNIQUE: CT angiogram chest abdomen pelvis during arterial   phase of injection of IV contrast. 2D and 3D MIP reconstructions   were performed by the CT technologist. Dose reduction techniques   were used.   CONTRAST: 80mL Isovue-370    FINDINGS:   CT ANGIOGRAM CHEST, ABDOMEN, AND PELVIS: The thoracic and   abdominal aorta are negative for dissection. Mild atherosclerotic   disease. 3.9 x 3.3 cm infrarenal abdominal aortic aneurysm  has   increased in size and now with thrombus within the anterior   aspect   of the aneurysm on image 121 of series 6 the inferior mesenteric   artery arises immediately above the aneurysm. Mild plaque at the   origins of the celiac trunk and superior mesenteric artery   without significant narrowing. Plaque at the origin of the left   renal artery. Both renal arteries are small in caliber.    LUNGS AND PLEURA: Subsegmental atelectasis or scarring both lower   lobes. No acute infiltrates or pleural effusions.    MEDIASTINUM/AXILLAE: No mediastinal or hilar adenopathy. The   proximal esophagus is distended with air. The distal esophagus is   nondistended with wall thickening.    HEPATOBILIARY: Fatty infiltration of the liver. Tiny cyst near   the liver dome. Mildly dilated common bile duct is unchanged.    PANCREAS: Normal.    SPLEEN: Normal.    ADRENAL GLANDS: Normal.    KIDNEYS/BLADDER: Normal.    BOWEL: Marked gastric distention with fluid as seen on previous   study. There are also numerous dilated loops of small bowel   throughout the abdomen and pelvis. The distal small bowel and   colon are normal caliber. Normal appendix.    LYMPH NODES: Normal.    PELVIC ORGANS: Normal.    MUSCULOSKELETAL: Normal scoliosis. Severe degenerative changes   throughout the thoracolumbar spine..      Impression    IMPRESSION:  1.  No evidence for aortic dissection.  2.  3.9 x 3.3 cm partially thrombosed infrarenal abdominal aortic   aneurysm.  3.  The stomach is markedly distended with fluid, along with   numerous dilated, fluid-filled small bowel loops concerning for   high-grade obstruction with the transition point estimated in the   region of the mid ileum. Consider NG tube placement and gastric   decompression.  4.  The proximal esophagus is distended with air. The distal   esophagus at the GE junction is nondistended which may account   for diffuse wall thickening. This should be reviewed on follow-up   imaging to exclude  "neoplasm.         Echo:  No results found for this or any previous visit (from the past   4320 hour(s)).              Surgery General IP Consult: Patient to be seen: Routine within 24 hrs; SBO; Consultant may enter orders: Yes; Requesting provider? Hospitalist (if different from attending physician)    Michael Bolton MD     9/8/2020  9:53 AM  [facility] Surgery Consultation    Chilango Echevarria MRN# [id number]   Age: 85 year old YOB: 1934     Date of Admission:  9/7/2020    Reason for consult: SBO on CT       Requesting physician: Sudeep       Level of consult: Consult, follow and place orders           Assessment and Plan:   Assessment:   SBO vs motility disorder vs ischemia of SB  Prior surgery includes laparoscopic hernia repair and G-tube   (possible adhesions), he carries a DX of \" slow transit   constipation\" suggesting a possible motility problem, Aortic   thrombosis raises the possibility of reduced mesenteric blood   flow  Patient Active Problem List    Diagnosis Date Noted     Syncope 09/08/2020     Priority: Medium     Falls 06/22/2020     Priority: Medium     Fall 03/16/2020     Priority: Medium     Parkinson's disease (H) 10/22/2015     Priority: Medium     Slow transit constipation 10/22/2015     Priority: Medium     Health Care Home 06/02/2015     Priority: Medium     Status:  Declined    See Letters for Emergency  Care Plan  Date:  July 1, 2015           Status post laparoscopic hernia repair-5/14/15 05/21/2015     Priority: Medium     Dermatitis 05/21/2015     Priority: Medium     HTN (hypertension) 05/21/2015     Priority: Medium     Umbilical hernia 05/21/2015     Priority: Medium     Right inguinal hernia 05/21/2015     Priority: Medium     Advanced directives, counseling/discussion 03/02/2015     Priority: Medium     Vasomotor rhinitis      Priority: Medium     CARDIOVASCULAR SCREENING; LDL GOAL LESS THAN 130 10/31/2010     Priority: Medium     Aortic stenosis " "     Priority: Medium     Gout 03/15/2005     Priority: Medium     Problem list name updated by automated process. Provider to   review       Esophageal reflux 03/15/2005     Priority: Medium           Plan:   Abdominal films as needed to follow SBO  NG suction  Adhesion lysis to be considered if fails to resolve - however,   per chart notes, family has expressed desire to avoid \"aggressive   intervention\"  Syncope evaluation underway            Chief Complaint:   Syncope     History is obtained from the electronic health record         History of Present Illness:   This patient is a 85 year old  male with a significant   past medical history of hypotension, parkinson's, AAA, \"giant\"   hiatal hernia repair (2015 [facility])), GERD with Ying's, prior   laparoscopically inserted J tube (2015 at [facility]) who presents   with the following condition requiring a hospital admission:   syncope.  Several episodes of syncope over the last few days. Per NH staff.   Contacted primary who recommended ER evaluation. Pt reported 2   weeks of intermittent pain \"where my appendix is\" (although none   at time of presentation) so CT obtained.           Past Medical History:     Past Medical History:   Diagnosis Date     Aortic stenosis     very mild     Ying esophagus      Contact dermatitis and other eczema, due to unspecified cause      Esophageal reflux      Gout, unspecified      Incarcerated hiatal hernia 5/21/2015     Inguinal hernia without mention of obstruction or gangrene,   unilateral or unspecified, (not specified as recurrent)      Polymyalgia rheumatica (H)      Umbilical hernia without mention of obstruction or gangrene      Unspecified essential hypertension      Vasomotor rhinitis              Past Surgical History:     Past Surgical History:   Procedure Laterality Date     C NONSPECIFIC PROCEDURE      T&A     C NONSPECIFIC PROCEDURE      umbilical herniorraphy     ESOPHAGOSCOPY, GASTROSCOPY, DUODENOSCOPY " (EGD), COMBINED N/A   5/11/2015    Procedure: COMBINED ESOPHAGOSCOPY, GASTROSCOPY, DUODENOSCOPY   (EGD);  Surgeon: Thad Ferro MD;  Location: [facility] OR     LAPAROSCOPIC ASSISTED INSERTION TUBE JEJUNOSTOMY N/A 5/14/2015    Procedure: LAPAROSCOPIC ASSISTED INSERTION TUBE JEJUNOSTOMY;    Surgeon: Thad Ferro MD;  Location: [facility] OR     LAPAROSCOPIC HERNIORRHAPHY HIATAL N/A 5/14/2015    Procedure: LAPAROSCOPIC HERNIORRHAPHY HIATAL;  Surgeon: Thad Ferro MD;  Location: [facility] OR     LAPAROSCOPIC HERNIORRHAPHY INGUINAL Right 5/14/2015    Procedure: LAPAROSCOPIC HERNIORRHAPHY INGUINAL;  Surgeon:   Thad Ferro MD;  Location: [facility] OR     PICC INSERTION Right 5/11/2015    5fr DL Power PICC, 39cm (1cm external) in the R medial brachial   vein w/ tip in the low SVC.             Social History:     Social History     Tobacco Use     Smoking status: Never Smoker     Smokeless tobacco: Never Used   Substance Use Topics     Alcohol use: Yes     Comment: beer only, 3 bottles per day             Family History:     Family History   Problem Relation Age of Onset     Breast Cancer Mother      Diabetes Paternal Grandfather      Cancer Son 37        lung             Immunizations:     VACCINE/DOSE   Diptheria   DPT   DTAP   HBIG   Hepatitis A   Hepatitis B   HIB   Influenza   Measles   Meningococcal   MMR   Mumps   Pneumococcal   Polio   Rubella   Small Pox   TDAP   Varicella   Zoster             Allergies:     Allergies   Allergen Reactions     Lisinopril      rash, exacerbation of eczema             Medications:     Current Facility-Administered Medications   Medication     acetaminophen (TYLENOL) tablet 650 mg     cefTRIAXone (ROCEPHIN) 1 g vial to attach to  mL bag for   ADULTS or NS 50 mL bag for PEDS     heparin ANTICOAGULANT injection 5,000 Units     lidocaine (LMX4) cream     lidocaine 1 % 0.1-1 mL     melatonin tablet 1 mg     naloxone (NARCAN) injection 0.1-0.4 mg      ondansetron (ZOFRAN-ODT) ODT tab 4 mg    Or     ondansetron (ZOFRAN) injection 4 mg     sodium chloride (PF) 0.9% PF flush 3 mL     sodium chloride (PF) 0.9% PF flush 3 mL     sodium chloride 0.9% infusion             Review of Systems:   Pt sleepy now, ROS negative on prior notes          Physical Exam:   All vitals have been reviewed  Patient Vitals for the past 24 hrs:   BP Temp Temp src Pulse Resp SpO2 Weight   09/08/20 0620 -- -- -- -- -- 95 % --   09/08/20 0615 -- -- -- -- -- 94 % --   09/08/20 0532 -- -- -- -- -- 96 % 61.7 kg (136 lb 0.4 oz)   09/08/20 0421 110/50 98.5  F (36.9  C) Axillary 73 20 96 % --   09/08/20 0308 -- -- -- -- -- 96 % --   09/08/20 0102 123/54 99.5  F (37.5  C) Axillary 72 20 99 % --   09/08/20 0015 124/68 -- -- 75 16 98 % --   09/08/20 0000 138/73 -- -- 75 -- 99 % --   09/07/20 2345 127/71 -- -- 76 -- 97 % --   09/07/20 2330 130/59 -- -- 75 -- 96 % --   09/07/20 2300 102/56 -- -- 76 -- 99 % --   09/07/20 2245 131/66 -- -- 76 -- 97 % --   09/07/20 2230 107/59 -- -- 81 -- 99 % --   09/07/20 2215 (!) 142/76 -- -- 75 -- 97 % --   09/07/20 2200 129/67 -- -- 77 -- 98 % --   09/07/20 2145 133/70 -- -- 77 -- 100 % --   09/07/20 2130 115/64 -- -- 78 -- 98 % --   09/07/20 2115 122/65 -- -- 80 -- 95 % --   09/07/20 2100 126/70 -- -- 78 -- 96 % --   09/07/20 2045 113/67 -- -- 76 -- 95 % --   09/07/20 2030 123/70 -- -- 75 -- 97 % --   09/07/20 2015 122/69 -- -- 81 -- 93 % --   09/07/20 2000 119/69 -- -- 76 -- 99 % --   09/07/20 1945 112/72 -- -- 77 -- 97 % --   09/07/20 1930 (!) 161/81 -- -- 75 -- 97 % --   09/07/20 1915 (!) 164/83 -- -- 78 -- 97 % --   09/07/20 1900 (!) 158/83 -- -- 80 -- 95 % --   09/07/20 1858 (!) 142/72 -- -- 82 -- 94 % --   09/07/20 1855 (!) 159/72 -- -- 84 -- -- --   09/07/20 1825 -- -- -- -- -- 97 % --   09/07/20 1824 -- -- -- 75 -- 96 % --   09/07/20 1803 -- -- -- -- -- -- 61.3 kg (135 lb 2.3 oz)       Intake/Output Summary (Last 24 hours) at 9/8/2020 0643  Last data filed  at 9/8/2020 0613  Gross per 24 hour   Intake 1703 ml   Output 2475 ml   Net -772 ml     Neck:   skin normal and no stridor     Chest / Breast:   Nl resp effort     Abdomen:   scars noted left upper quadrant ( G-tube site), soft,   non-distended, non-tender, voluntary guarding absent and no   masses palpated             Data:   All laboratory data reviewed  Results for orders placed or performed during the hospital   encounter of 09/07/20   Head CT w/o contrast     Status: None    Narrative    EXAM: CT HEAD WITHOUT CONTRAST  LOCATION: [facility]  DATE/TIME: 09/07/2020, 6:31 PM    INDICATION: Head trauma, minor, GCS>=13, high clinical risk,   initial exam.  COMPARISON: Head CT 06/23/2020  TECHNIQUE: Routine without IV contrast. Multiplanar reformats.   Dose reduction techniques were used.    FINDINGS:  INTRACRANIAL CONTENTS: No intracranial hemorrhage, extra-axial   collection, or mass effect.  No CT evidence of acute infarct.   Moderate presumed chronic small vessel ischemic changes. Moderate   generalized volume loss. No hydrocephalus.     VISUALIZED ORBITS/SINUSES/MASTOIDS: No intraorbital abnormality.   No paranasal sinus mucosal disease. No middle ear or mastoid   effusion.    BONES/SOFT TISSUES: No acute abnormality.      Impression    IMPRESSION:  1.  No CT evidence for acute intracranial process.  2.  Brain atrophy and presumed chronic microvascular ischemic   changes as above.     XR Chest Port 1 View     Status: None    Narrative    EXAM: XR CHEST PORT 1 VW  LOCATION: [facility]  DATE/TIME: 9/7/2020 6:14 PM    INDICATION: Chest pain  COMPARISON: 06/23/2020      Impression    IMPRESSION: No significant change. Heart size and pulmonary   vessels normal. Tortuous calcified thoracic aorta stable. Lungs   are clear.   CT Aortic Survey w Contrast     Status: None    Narrative    EXAM: CT AORTIC SURVEY W CONTRAST  LOCATION: [facility]  DATE/TIME: 9/7/2020 6:31  PM    INDICATION: Syncope, elevated troponin.  COMPARISON: CT abdomen and pelvis 05/10/2015  TECHNIQUE: CT angiogram chest abdomen pelvis during arterial   phase of injection of IV contrast. 2D and 3D MIP reconstructions   were performed by the CT technologist. Dose reduction techniques   were used.   CONTRAST: 80mL Isovue-370    FINDINGS:   CT ANGIOGRAM CHEST, ABDOMEN, AND PELVIS: The thoracic and   abdominal aorta are negative for dissection. Mild atherosclerotic   disease. 3.9 x 3.3 cm infrarenal abdominal aortic aneurysm has   increased in size and now with thrombus within the anterior   aspect   of the aneurysm on image 121 of series 6 the inferior mesenteric   artery arises immediately above the aneurysm. Mild plaque at the   origins of the celiac trunk and superior mesenteric artery   without significant narrowing. Plaque at the origin of the left   renal artery. Both renal arteries are small in caliber.    LUNGS AND PLEURA: Subsegmental atelectasis or scarring both lower   lobes. No acute infiltrates or pleural effusions.    MEDIASTINUM/AXILLAE: No mediastinal or hilar adenopathy. The   proximal esophagus is distended with air. The distal esophagus is   nondistended with wall thickening.    HEPATOBILIARY: Fatty infiltration of the liver. Tiny cyst near   the liver dome. Mildly dilated common bile duct is unchanged.    PANCREAS: Normal.    SPLEEN: Normal.    ADRENAL GLANDS: Normal.    KIDNEYS/BLADDER: Normal.    BOWEL: Marked gastric distention with fluid as seen on previous   study. There are also numerous dilated loops of small bowel   throughout the abdomen and pelvis. The distal small bowel and   colon are normal caliber. Normal appendix.    LYMPH NODES: Normal.    PELVIC ORGANS: Normal.    MUSCULOSKELETAL: Normal scoliosis. Severe degenerative changes   throughout the thoracolumbar spine..      Impression    IMPRESSION:  1.  No evidence for aortic dissection.  2.  3.9 x 3.3 cm partially thrombosed  infrarenal abdominal aortic   aneurysm.  3.  The stomach is markedly distended with fluid, along with   numerous dilated, fluid-filled small bowel loops concerning for   high-grade obstruction with the transition point estimated in the   region of the mid ileum. Consider NG tube placement and gastric   decompression.  4.  The proximal esophagus is distended with air. The distal   esophagus at the GE junction is nondistended which may account   for diffuse wall thickening. This should be reviewed on follow-up   imaging to exclude neoplasm.     CBC + differential     Status: Abnormal   Result Value Ref Range    WBC 16.0 (H) 4.0 - 11.0 10e9/L    RBC Count 4.62 4.4 - 5.9 10e12/L    Hemoglobin 13.5 13.3 - 17.7 g/dL    Hematocrit 43.2 40.0 - 53.0 %    MCV 94 78 - 100 fl    MCH 29.2 26.5 - 33.0 pg    MCHC 31.3 (L) 31.5 - 36.5 g/dL    RDW 15.2 (H) 10.0 - 15.0 %    Platelet Count 222 150 - 450 10e9/L    Diff Method Automated Method     % Neutrophils 92.9 %    % Lymphocytes 3.9 %    % Monocytes 2.7 %    % Eosinophils 0.0 %    % Basophils 0.2 %    % Immature Granulocytes 0.3 %    Nucleated RBCs 0 0 /100    Absolute Neutrophil 14.9 (H) 1.6 - 8.3 10e9/L    Absolute Lymphocytes 0.6 (L) 0.8 - 5.3 10e9/L    Absolute Monocytes 0.4 0.0 - 1.3 10e9/L    Absolute Eosinophils 0.0 0.0 - 0.7 10e9/L    Absolute Basophils 0.0 0.0 - 0.2 10e9/L    Abs Immature Granulocytes 0.0 0 - 0.4 10e9/L    Absolute Nucleated RBC 0.0    Comprehensive metabolic panel     Status: Abnormal   Result Value Ref Range    Sodium 139 133 - 144 mmol/L    Potassium 4.9 3.4 - 5.3 mmol/L    Chloride 104 94 - 109 mmol/L    Carbon Dioxide 28 20 - 32 mmol/L    Anion Gap 7 3 - 14 mmol/L    Glucose 140 (H) 70 - 99 mg/dL    Urea Nitrogen 55 (H) 7 - 30 mg/dL    Creatinine 2.33 (H) 0.66 - 1.25 mg/dL    GFR Estimate 24 (L) >60 mL/min/[1.73_m2]    GFR Estimate If Black 28 (L) >60 mL/min/[1.73_m2]    Calcium 9.5 8.5 - 10.1 mg/dL    Bilirubin Total 0.5 0.2 - 1.3 mg/dL    Albumin  4.4 3.4 - 5.0 g/dL    Protein Total 8.3 6.8 - 8.8 g/dL    Alkaline Phosphatase 78 40 - 150 U/L    ALT 10 0 - 70 U/L    AST 23 0 - 45 U/L   Troponin I     Status: None   Result Value Ref Range    Troponin I ES <0.015 0.000 - 0.045 ug/L   UA with Microscopic     Status: Abnormal   Result Value Ref Range    Color Urine Yellow     Appearance Urine Slightly Cloudy     Glucose Urine Negative NEG^Negative mg/dL    Bilirubin Urine Negative NEG^Negative    Ketones Urine 10 (A) NEG^Negative mg/dL    Specific Gravity Urine 1.025 1.003 - 1.035    Blood Urine Negative NEG^Negative    pH Urine 5.0 5.0 - 7.0 pH    Protein Albumin Urine 30 (A) NEG^Negative mg/dL    Urobilinogen mg/dL 2.0 0.0 - 2.0 mg/dL    Nitrite Urine Negative NEG^Negative    Leukocyte Esterase Urine Moderate (A) NEG^Negative    Source Catheterized Urine     WBC Urine 10 (H) 0 - 5 /HPF    RBC Urine 1 0 - 2 /HPF    Bacteria Urine Many (A) NEG^Negative /HPF    Mucous Urine Present (A) NEG^Negative /LPF    Hyaline Casts 3 (H) 0 - 2 /LPF   CK total     Status: None   Result Value Ref Range    CK Total 44 30 - 300 U/L   Creatinine POCT     Status: Abnormal   Result Value Ref Range    Creatinine 2.4 (H) 0.66 - 1.25 mg/dL    GFR Estimate 26 (L) >60 mL/min/[1.73_m2]    GFR Estimate If Black 31 (L) >60 mL/min/[1.73_m2]   INR     Status: None   Result Value Ref Range    INR 1.05 0.86 - 1.14   Partial thromboplastin time     Status: None   Result Value Ref Range    PTT 30 22 - 37 sec   Asymptomatic COVID-19 Virus (Coronavirus) by PCR     Status: None      Specimen: Nasopharyngeal   Result Value Ref Range    COVID-19 Virus PCR to [facility] - Source Nasopharyngeal     COVID-19 Virus PCR to [facility] - Result       Test received-See reflex to IDDL test SARS CoV2 (COVID-19)   Virus RT-PCR   EKG 12 lead     Status: None (Preliminary result)   Result Value Ref Range    Interpretation ECG Click View Image link to view waveform and   result    EKG 12 lead     Status: None (Preliminary  result)   Result Value Ref Range    Interpretation ECG Click View Image link to view waveform and   result    EKG 12 lead     Status: None (Preliminary result)   Result Value Ref Range    Interpretation ECG Click View Image link to view waveform and   result    Troponin POCT     Status: None   Result Value Ref Range    Troponin I 0.01 0.00 - 0.08 ug/L   ABO/Rh type and screen     Status: None   Result Value Ref Range    ABO A     RH(D) Pos     Antibody Screen Neg     Test Valid Only At [facility]        Specimen Expires 09/10/2020    Urine Culture Aerobic Bacterial     Status: None (Preliminary   result)    Specimen: Catheterized Urine   Result Value Ref Range    Specimen Description Catheterized Urine     Special Requests Specimen received in preservative     Culture Micro PENDING         Attestation:  I have reviewed today's vital signs, notes, medications, labs and   imaging.  Amount of time performed on this consult: 60 minutes.    Michael Obando MD        Basic metabolic panel   Result Value Ref Range    Sodium 142 133 - 144 mmol/L    Potassium 4.3 3.4 - 5.3 mmol/L    Chloride 111 (H) 94 - 109 mmol/L    Carbon Dioxide 24 20 - 32 mmol/L    Anion Gap 7 3 - 14 mmol/L    Glucose 121 (H) 70 - 99 mg/dL    Urea Nitrogen 62 (H) 7 - 30 mg/dL    Creatinine 1.90 (H) 0.66 - 1.25 mg/dL    GFR Estimate 31 (L) >60 mL/min/[1.73_m2]    GFR Estimate If Black 36 (L) >60 mL/min/[1.73_m2]    Calcium 7.9 (L) 8.5 - 10.1 mg/dL   CBC with platelets differential   Result Value Ref Range    WBC 13.7 (H) 4.0 - 11.0 10e9/L    RBC Count 4.06 (L) 4.4 - 5.9 10e12/L    Hemoglobin 11.8 (L) 13.3 - 17.7 g/dL    Hematocrit 38.0 (L) 40.0 - 53.0 %    MCV 94 78 - 100 fl    MCH 29.1 26.5 - 33.0 pg    MCHC 31.1 (L) 31.5 - 36.5 g/dL    RDW 15.5 (H) 10.0 - 15.0 %    Platelet Count 179 150 - 450 10e9/L    Diff Method Automated Method     % Neutrophils 91.0 %    % Lymphocytes 5.5 %    % Monocytes 3.0 %    % Eosinophils 0.0 %    % Basophils  0.1 %    % Immature Granulocytes 0.4 %    Nucleated RBCs 0 0 /100    Absolute Neutrophil 12.5 (H) 1.6 - 8.3 10e9/L    Absolute Lymphocytes 0.8 0.8 - 5.3 10e9/L    Absolute Monocytes 0.4 0.0 - 1.3 10e9/L    Absolute Eosinophils 0.0 0.0 - 0.7 10e9/L    Absolute Basophils 0.0 0.0 - 0.2 10e9/L    Abs Immature Granulocytes 0.1 0 - 0.4 10e9/L    Absolute Nucleated RBC 0.0    Troponin I   Result Value Ref Range    Troponin I ES <0.015 0.000 - 0.045 ug/L   Echocardiogram Complete    Narrative    [id number]  [id number]  [id number]  [id number]^MAIA^GUERO^[facility]  Echocardiography Laboratory  [address] [place] [address]        Name: JENNIFER DYSON  MRN: [id number]  : 10/24/1934  Study Date: 2020 07:38 AM  Age: 85 yrs  Gender: Male  Patient Location: [facility]  Reason For Study: Abn EKG  Ordering Physician: GUERO CARVALHO  Referring Physician: Enrique Ring  Performed By: Rachelle De Anda RDCS     BSA: 1.7 m2  Height: 67 in  Weight: 135 lb  HR: 73  BP: 110/72 mmHg  _____________________________________________________________________________  __        Procedure  Complete Portable Echo Adult. Optison (NDC #8613-5223) given intravenously.  _____________________________________________________________________________  __        Interpretation Summary     Probably normal left ventricular systolic function though subtle wall motion  abnormalities could be missed on this study. The apical two chamber view was  significantly foreshortened.  The visual ejection fraction is estimated at 60-65%.  Mild valvular aortic stenosis.  Technically difficult, suboptimal study.  _____________________________________________________________________________  __        Left Ventricle  The left ventricle is normal in size. There is normal left ventricular wall  thickness. Diastolic Doppler findings (E/E' ratio and/or other parameters)  suggest left ventricular filling pressures are indeterminate.  Grade I or early  diastolic dysfunction. The visual ejection fraction is estimated at 60-65%.  Probably normal left ventricular systolic function though subtle wall motion  abnormalities could be missed on this study. The apical two chamber view was  significantly foreshortened.     Right Ventricle  The right ventricle is normal in size and function.     Atria  Normal left atrial size. Right atrial size is normal. There is no color  Doppler evidence of an atrial shunt.     Mitral Valve  There is mild mitral annular calcification. There is trace mitral  regurgitation.        Tricuspid Valve  There is trace tricuspid regurgitation. Right ventricular systolic pressure  could not be approximated due to inadequate tricuspid regurgitation.     Aortic Valve  The aortic valve is not well visualized. No aortic regurgitation is present.  The calculated aortic valve are is 1.6 cm^2. The peak AoV pressure gradient  is  18.7 mmHg. The mean AoV pressure gradient is 8.9 mmHg. Mild valvular aortic  stenosis.     Pulmonic Valve  The pulmonic valve is not well visualized. There is no pulmonic valvular  regurgitation.     Vessels  The aortic root is normal size. IVC diameter <2.1 cm collapsing >50% with  sniff suggests a normal RA pressure of 3 mmHg.     Pericardium  There is no pericardial effusion.        Rhythm  Sinus rhythm was noted.  _____________________________________________________________________________  __  MMode/2D Measurements & Calculations  IVSd: 0.92 cm     LVIDd: 4.3 cm  LVIDs: 2.5 cm  LVPWd: 1.0 cm  FS: 41.8 %  LV mass(C)d: 136.4 grams  LV mass(C)dI: 79.7 grams/m2  Ao root diam: 3.2 cm  LA dimension: 3.5 cm  LA/Ao: 1.1  LVOT diam: 2.2 cm  LVOT area: 3.9 cm2  LA Volume (BP): 48.3 ml  LA Volume Index (BP): 28.2 ml/m2  RWT: 0.48           Doppler Measurements & Calculations  MV E max estefany: 55.3 cm/sec  MV A max estefany: 100.4 cm/sec  MV E/A: 0.55  MV max P.3 mmHg  MV mean P.6 mmHg  MV V2 VTI: 24.1 cm  MVA(VTI):  2.8 cm2  MV dec time: 0.36 sec  Ao V2 max: 216.3 cm/sec  Ao max P.7 mmHg  Ao V2 mean: 138.5 cm/sec  Ao mean P.9 mmHg  Ao V2 VTI: 42.4 cm  EMILIANO(I,D): 1.6 cm2  EMILIANO(V,D): 1.8 cm2  LV V1 max P.1 mmHg  LV V1 max: 100.9 cm/sec  LV V1 VTI: 17.3 cm  SV(LVOT): 66.9 ml  SI(LVOT): 39.1 ml/m2  PA acc time: 0.10 sec  AV Jl Ratio (DI): 0.47  EMILIANO Index (cm2/m2): 0.92  E/E' avg: 10.6  Lateral E/e': 8.2  Medial E/e': 13.0              _____________________________________________________________________________  __        Report approved by: Nadine Cabezas 2020 10:54 AM

## 2020-09-08 NOTE — ED NOTES
[facility]  ED Nurse Handoff Report    Chilango Echevarria is a 85 year old male   ED Chief complaint: Syncope and Fall  . ED Diagnosis:   Final diagnoses:   Syncope and collapse   Small bowel obstruction (H)   Abnormal electrocardiogram   Acute kidney injury (H)   Aneurysm of infrarenal abdominal aorta (H)     Allergies:   Allergies   Allergen Reactions     Lisinopril      rash, exacerbation of eczema       Code Status: DNR / DNI  Activity level - Baseline/Home:  Assist X 1. Activity Level - Current:   Assist X 2. Lift room needed: No. Bariatric: No   Needed: No   Isolation: No. Infection: Not Applicable.     Vital Signs:   Vitals:    09/07/20 2000 09/07/20 2015 09/07/20 2030 09/07/20 2045   BP: 119/69 122/69 123/70 113/67   Pulse: 76 81 75 76   SpO2: 99% 93% 97% 95%   Weight:           Cardiac Rhythm:  ,      Pain level:    Patient confused: Yes. Patient Falls Risk: Yes.   Elimination Status: Has voided   Patient Report - Initial Complaint:    ED Triage Notes Filed From shane arita. Staff found on floor sometime last night next to his bed. Unwitnessed fall.     Today, patient passed out twice when transferring from bed to chair, and again this afternoon.      Staff at facility were concerned for left sided arm weakness   Patient has a history of parkinsons. Pt does respond to questions but is sometimes hard to understand. Patient did drop his oxygen saturations during CT scan and was placed on 3L NC as a result, maintaining saturations in the mid to high 90's. 1400mL of gastric contents removed from NG tube via low intermittent wall suction. Pt is tolerating NG tube well.   . Focused Assessment: Cardiac: Patient is in SR on the monitor. VSS. Respiratory: WDL, Neuro: alert to self  Tests Performed: labs, imaging. Abnormal Results:   Labs Ordered and Resulted from Time of ED Arrival Up to the Time of Departure from the ED   CBC WITH PLATELETS DIFFERENTIAL - Abnormal; Notable for the  following components:       Result Value    WBC 16.0 (*)     MCHC 31.3 (*)     RDW 15.2 (*)     Absolute Neutrophil 14.9 (*)     Absolute Lymphocytes 0.6 (*)     All other components within normal limits   COMPREHENSIVE METABOLIC PANEL - Abnormal; Notable for the following components:    Glucose 140 (*)     Urea Nitrogen 55 (*)     Creatinine 2.33 (*)     GFR Estimate 24 (*)     GFR Estimate If Black 28 (*)     All other components within normal limits   ROUTINE UA WITH MICROSCOPIC - Abnormal; Notable for the following components:    Ketones Urine 10 (*)     Protein Albumin Urine 30 (*)     Leukocyte Esterase Urine Moderate (*)     WBC Urine 10 (*)     Bacteria Urine Many (*)     Mucous Urine Present (*)     Hyaline Casts 3 (*)     All other components within normal limits   CREATININE POCT - Abnormal; Notable for the following components:    Creatinine 2.4 (*)     GFR Estimate 26 (*)     GFR Estimate If Black 31 (*)     All other components within normal limits   TROPONIN I   CK TOTAL   INR   PARTIAL THROMBOPLASTIN TIME   COVID-19 VIRUS (CORONAVIRUS) BY PCR   ISTAT TROPONIN NURSING POCT   ISTAT CREATININE NURSING POCT   CARDIAC CONTINUOUS MONITORING   PULSE OXIMETRY NURSING   PERIPHERAL IV CATHETER   IV ACCESS   NASOGASTRIC TUBE DECOMPRESSION   NOTIFY PHYSICIAN   TROPONIN POCT   ABO/RH TYPE AND SCREEN     CT Aortic Survey w Contrast   Final Result   IMPRESSION:   1.  No evidence for aortic dissection.   2.  3.9 x 3.3 cm partially thrombosed infrarenal abdominal aortic aneurysm.   3.  The stomach is markedly distended with fluid, along with numerous dilated, fluid-filled small bowel loops concerning for high-grade obstruction with the transition point estimated in the region of the mid ileum. Consider NG tube placement and gastric    decompression.   4.  The proximal esophagus is distended with air. The distal esophagus at the GE junction is nondistended which may account for diffuse wall thickening. This should be  reviewed on follow-up imaging to exclude neoplasm.         Head CT w/o contrast   Final Result   IMPRESSION:   1.  No CT evidence for acute intracranial process.   2.  Brain atrophy and presumed chronic microvascular ischemic changes as above.         XR Chest Port 1 View   Final Result   IMPRESSION: No significant change. Heart size and pulmonary vessels normal. Tortuous calcified thoracic aorta stable. Lungs are clear.      Echocardiogram Complete    (Results Pending)     .   Treatments provided: see MAR  Family Comments: April is patient's MPOA, Abena is patient's financial POA  OBS brochure/video discussed/provided to patient:  N/A  ED Medications:   Medications   0.9% sodium chloride BOLUS (1,000 mLs Intravenous New Bag 9/7/20 2116)   sodium chloride 0.9% infusion (has no administration in time range)   iopamidol (ISOVUE-370) solution 500 mL (80 mLs Intravenous Given 9/7/20 1849)   for CT scan flush use (60 mLs Intravenous Given 9/7/20 1851)   ondansetron (ZOFRAN) injection 4 mg (4 mg Intravenous Given 9/7/20 2001)   aspirin (ASA) Suppository 300 mg (300 mg Rectal Given 9/7/20 2010)     Drips infusing:  No  For the majority of the shift, the patient's behavior Green. Interventions performed were N/A.    Sepsis treatment initiated: No     Patient tested for COVID 19 prior to admission: YES, asymptomatic swab completed.     ED Nurse Name/Phone Number: Abigail Yuan RN,   9:22 PM    RECEIVING UNIT ED HANDOFF REVIEW    Above ED Nurse Handoff Report was reviewed: Yes  Reviewed by: Nerissa Rivera RN on September 8, 2020 at 12:32 AM

## 2020-09-08 NOTE — PROGRESS NOTES
Brief SW note:     SW placed call to [facility], confirmed with admissions staff that pr resides in LTC, and does have a bed hold. Per MD note, discharge in 4-7 days. SW will continue to follow.     VIRGEN Huitron, [facility]   Inpatient Care Coordination  [facility]   [phone]

## 2020-09-08 NOTE — CONSULTS
[facility]    Palliative Care Consultation   Text Page    Date of Admission:  2020    Assessment & Plan   Chilango Echevarria is a 85 year old male who was admitted on 2020. I was asked by Hospitalist aKi Guerrero MD to see the patient with very complicated problem with close relative not alive.     Recommendations:  1.  Decisional Capacity -  Questionable. Patient does not have an advance directive. Per  informed consent policy next of kin should be involved in all consent and decision making. His daughter-in-law April Echevarria (wife of son who  18 years ago) and 18 year old granddaughter Michelle Echevarria are next-of-kin.      2.  Abdominal pain - Patient currently denies pain with NG to intermittent suction for SBO.     3.  Spiritual Care - Consultation placed for  to follow.    4.  Care Planning - Family would like to see the outcome of the next few days, before making any decisions.     Goals of Care: No CPR - Do NOT Intubate - Restorative care.     Disease Process/es & Symptoms:  Chilango Echevarria is a 85 year old patient admitted 2020 from [facility] following  syncopal episodes and complains of abdominal pain. The patient has a history of Parkinson's diease, polymyalgia rheumatica, aortic stenosis, Ying esophagus, Gout, hypertension and slow transit constipation. 2020 CT Aortic survey with contrast demonstrates 3.9 x 3.3 cm partially thrombosed infrarenal abdominal aortic aneurysm and the stomach is markedly distended with fluid and concerning for high-grade obstruction. EKG was worrisome for inferior MI and Cardiology was consulted. Echo was obtained demonstrating normal LVEF with mild aortic valve stenosis. Given the patient's frail status angiogram was deferred. Surgery was consulted regarding small bowel obstruction and patient was placed on NG suction.     This is the patient's third hospitalization in the year , as he was seen March 15  "through 17 following a fall at his town home. He was dismissed to TCU and later moved to [facility]. He was Hospitalized  through  after a fall and was found to have orthostatic hypotension. He has lost 18 pounds in the past year as he was documented at 154 pounds on November 15, 2019 and is now at 136 pounds     Findings & plan of care discussed with: Hospitalist Kai Guerrero MD and bedside nurse Norah Hook RN.   Follow-up plan from palliative team: Will follow closely during this hospitalization.   Thank you for involving us in the patient's care.     Stacia Staples MS, RN, CNS, APRN, ACHPN, FAACVPR  Pain and Palliative Care  Pager [phone]  Office [phone]     Time Spent on this Encounter   Total unit/floor time 1:55 Pm until 4:20 PM, time consisted of the following, examination of the patient, reviewing the record and completing documentation. >50% of time spent in counseling and coordination of care.  Time spend counseling with patient and family consisted of the following topics, goals of care and symptom management.  Time spent in coordination of care with Bedside Nurse Norah Hook, AIDEN and Hospitalist Kai Guerrero MD.     Primary Care Physician   Enrique Ring    Chief Complaint   Syncope and Fall    History is obtained from the electronic health record and patient's daughter-in-law    Decision-Making & Goals of Care:  Discussed on 2020 with Stacia ROMANO, CNS:     Met with patient as he was resting in bed. His speech is garbled, but he was able to indicate he was not having pain or other discomfort. He was not able to give insight on his hospitalization. With his permission I phoned his daughter-in-law April Echevarria at [phone]. She explained that Nick is  and their son  18 years ago. Her daughter Michelle is Nick's granddaughter. April explained\"I spoke with Dr. Guerrero at 9:21 AM about Nick's poor health. He mentioned keeping " "him comfortable because it doesn't look that good.\" She explained that she had last visited Nick at [place] time and early January. She attempts to call Nick, but it seems like he can't hear on the phone. 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 She asked if she and her daughter should come to [place] to visit. She acknowledged that Nick is friends with Abena and Carlos Vasquez whom she had me a few times, and he seemed to communicate with both of them more often than she and her daughter. I offered the option of trying to communicate with Nick using the patient tablet device. Appreciate assistance from bedside nurse Norah Hook RN as I called back Abena Vasquez. Abena works Senior Financial Operational Analyst for [employer] and has visited Nick to offer support. Abena joined the patient at bedside when I return to his room to make certain April and Michelle were able to converse with Nick on the tablet. They confirmed the request for No CPR-Do NOT Intubate and would like to see how Nick recovers in the next few days before making further decisions.       Past Medical History   I have reviewed this patient's medical history and updated it with pertinent information if needed.   Past Medical History:   Diagnosis Date     Aortic stenosis     very mild     Ying esophagus      Contact dermatitis and other eczema, due to unspecified cause      Esophageal reflux      Gout, unspecified      Incarcerated hiatal hernia 5/21/2015     Inguinal hernia without mention of obstruction or gangrene, unilateral or unspecified, (not specified as recurrent)      Polymyalgia rheumatica (H)      Umbilical hernia without mention of obstruction or gangrene      Unspecified essential hypertension      Vasomotor rhinitis        Past Surgical History   I have reviewed this patient's surgical history and updated it with pertinent information if needed.  Past " Surgical History:   Procedure Laterality Date     C NONSPECIFIC PROCEDURE      T&A     C NONSPECIFIC PROCEDURE      umbilical herniorraphy     ESOPHAGOSCOPY, GASTROSCOPY, DUODENOSCOPY (EGD), COMBINED N/A 5/11/2015    Procedure: COMBINED ESOPHAGOSCOPY, GASTROSCOPY, DUODENOSCOPY (EGD);  Surgeon: Thad Ferro MD;  Location: [facility] OR     LAPAROSCOPIC ASSISTED INSERTION TUBE JEJUNOSTOMY N/A 5/14/2015    Procedure: LAPAROSCOPIC ASSISTED INSERTION TUBE JEJUNOSTOMY;  Surgeon: Thad Ferro MD;  Location: [facility] OR     LAPAROSCOPIC HERNIORRHAPHY HIATAL N/A 5/14/2015    Procedure: LAPAROSCOPIC HERNIORRHAPHY HIATAL;  Surgeon: Thad Ferro MD;  Location: [facility] OR     LAPAROSCOPIC HERNIORRHAPHY INGUINAL Right 5/14/2015    Procedure: LAPAROSCOPIC HERNIORRHAPHY INGUINAL;  Surgeon: Thad Ferro MD;  Location: [facility] OR     PICC INSERTION Right 5/11/2015    5fr DL Power PICC, 39cm (1cm external) in the R medial brachial vein w/ tip in the low SVC.       Prior to Admission Medications   Prior to Admission Medications   Prescriptions Last Dose Informant Patient Reported? Taking?   acetaminophen (TYLENOL) 325 MG tablet 9/7/2020 at x1  Yes Yes   Sig: Take 650 mg by mouth 2 times daily & 2 x daily as needed   allopurinol (ZYLOPRIM) 100 MG tablet 9/7/2020 at Unknown time  No Yes   Sig: Take 1 tablet (100 mg) by mouth daily   carbidopa-levodopa (SINEMET)  MG per tablet 9/7/2020 at x2  Yes Yes   Sig: Take 2 tablets by mouth 3 times daily (9am - 12pm - 6pm)   diclofenac (VOLTAREN) 1 % topical gel 9/7/2020 at x1  Yes Yes   Sig: Place 2 g onto the skin 2 times daily   melatonin 3 MG CAPS 9/6/2020 at Unknown time  Yes Yes   Sig: Take 3 mg by mouth At Bedtime    midodrine (PROAMATINE) 5 MG tablet 9/7/2020 at x2  Yes Yes   Sig: Take 10 mg by mouth 2 times daily 8AM & NOON   nystatin (MYCOSTATIN) 539548 UNIT/GM external powder 9/7/2020 at x1  Yes Yes   Sig: Apply topically 2 times daily Apply to groin  area as needed for redness/rash   omeprazole (PRILOSEC) 20 MG DR capsule 2020  No Yes   Sig: TAKE 1 CAPSULE BY MOUTH 30 TO 60 MINUTES BEFORE FIRST MEAL OF THE DAY   polyethylene glycol (MIRALAX) 17 g packet 2020 at x1  Yes Yes   Sig: Take 1 packet by mouth 2 times daily      Facility-Administered Medications: None     Allergies   Allergies   Allergen Reactions     Lisinopril      rash, exacerbation of eczema       Social History   Living situation: [facility]  Family system:  and son  18 years ago. Daughter-in-law April Echevarria and granddaughter Michelle Echevarria are supportive, but live in [place].   Functional status: Needs help with ADLs   Employment/education: Retired salesman for [employer]  History of substance use/abuse:  reports that he has never smoked. He has never used smokeless tobacco. reports current alcohol use.  Taoist affiliation: [employer]    Family History   I have reviewed this patient's family history and updated it with pertinent information if needed.   Family History   Problem Relation Age of Onset     Breast Cancer Mother      Diabetes Paternal Grandfather      Cancer Son 37        lung       Review of Systems   The 10 point Review of Systems is negative other than noted in the HPI or here.     Palliative Symptom Review (0=no symptom/no concern, 1=mild, 2=moderate, 3=severe):      Pain: 0-none      Fatigue: 1-mild      Nausea: 1-mild      Constipation: 0-none      Diarrhea: 0-none      Depressive Symptoms: 0-none      Anxiety: 1-mild      Drowsiness: 1-mild      Poor Appetite: 0-none      Shortness of Breath: 0-none      Insomnia: 0-none        Physical Exam   Temp:  [98.2  F (36.8  C)-99.5  F (37.5  C)] 98.2  F (36.8  C)  Pulse:  [72-84] 74  Resp:  [16-20] 20  BP: (102-164)/(50-83) 131/61  SpO2:  [93 %-100 %] 95 %  136 lbs .38 oz  GEN:  Elderly, cachetic and frail  male. Alert, oriented to self, appears comfortable, no apparent distress.  HEENT:   Normocephalic/atraumatic, no scleral icterus, no nasal discharge with NG in nare, mouth moist.  CV:  RRR, S1, S2; no murmurs or other irregularities noted.  +3 DP/PT pulses bilaterally; no edema bilaterally.  RESP:  Clear to auscultation bilaterally without rales/rhonchi/wheezing/retractions.  Symmetric chest rise on inhalation noted.  Normal respiratory effort.  ABD:  Rounded, soft, non-tender/non-distended.  +BS  EXT:  CMS intact x 4.     M/S:   No wincing or grimace with palpation of extremities.    SKIN:  Dry to touch, no exanthems noted in the visualized areas.    NEURO: Symmetric strength +5/5.  Sensation to touch intact all extremities.   There is no area of allodynia or hyperesthesia.  Psych:  Flat affect, calm, cooperative, conversant and demeanor improved when he saw his daughter-in-law and granddaughter on the computer tablet.       Data   Results for orders placed or performed during the hospital encounter of 09/07/20   Head CT w/o contrast     Status: None    Narrative    EXAM: CT HEAD WITHOUT CONTRAST  LOCATION: [facility]  DATE/TIME: 09/07/2020, 6:31 PM    INDICATION: Head trauma, minor, GCS>=13, high clinical risk, initial exam.  COMPARISON: Head CT 06/23/2020  TECHNIQUE: Routine without IV contrast. Multiplanar reformats. Dose reduction techniques were used.    FINDINGS:  INTRACRANIAL CONTENTS: No intracranial hemorrhage, extra-axial collection, or mass effect.  No CT evidence of acute infarct. Moderate presumed chronic small vessel ischemic changes. Moderate generalized volume loss. No hydrocephalus.     VISUALIZED ORBITS/SINUSES/MASTOIDS: No intraorbital abnormality. No paranasal sinus mucosal disease. No middle ear or mastoid effusion.    BONES/SOFT TISSUES: No acute abnormality.      Impression    IMPRESSION:  1.  No CT evidence for acute intracranial process.  2.  Brain atrophy and presumed chronic microvascular ischemic changes as above.     XR Chest Port 1 View     Status: None     Narrative    EXAM: XR CHEST PORT 1 VW  LOCATION: [facility]  DATE/TIME: 9/7/2020 6:14 PM    INDICATION: Chest pain  COMPARISON: 06/23/2020      Impression    IMPRESSION: No significant change. Heart size and pulmonary vessels normal. Tortuous calcified thoracic aorta stable. Lungs are clear.   CT Aortic Survey w Contrast     Status: None    Narrative    EXAM: CT AORTIC SURVEY W CONTRAST  LOCATION: [facility]  DATE/TIME: 9/7/2020 6:31 PM    INDICATION: Syncope, elevated troponin.  COMPARISON: CT abdomen and pelvis 05/10/2015  TECHNIQUE: CT angiogram chest abdomen pelvis during arterial phase of injection of IV contrast. 2D and 3D MIP reconstructions were performed by the CT technologist. Dose reduction techniques were used.   CONTRAST: 80mL Isovue-370    FINDINGS:   CT ANGIOGRAM CHEST, ABDOMEN, AND PELVIS: The thoracic and abdominal aorta are negative for dissection. Mild atherosclerotic disease. 3.9 x 3.3 cm infrarenal abdominal aortic aneurysm has increased in size and now with thrombus within the anterior aspect   of the aneurysm on image 121 of series 6 the inferior mesenteric artery arises immediately above the aneurysm. Mild plaque at the origins of the celiac trunk and superior mesenteric artery without significant narrowing. Plaque at the origin of the left   renal artery. Both renal arteries are small in caliber.    LUNGS AND PLEURA: Subsegmental atelectasis or scarring both lower lobes. No acute infiltrates or pleural effusions.    MEDIASTINUM/AXILLAE: No mediastinal or hilar adenopathy. The proximal esophagus is distended with air. The distal esophagus is nondistended with wall thickening.    HEPATOBILIARY: Fatty infiltration of the liver. Tiny cyst near the liver dome. Mildly dilated common bile duct is unchanged.    PANCREAS: Normal.    SPLEEN: Normal.    ADRENAL GLANDS: Normal.    KIDNEYS/BLADDER: Normal.    BOWEL: Marked gastric distention with fluid as seen on previous  study. There are also numerous dilated loops of small bowel throughout the abdomen and pelvis. The distal small bowel and colon are normal caliber. Normal appendix.    LYMPH NODES: Normal.    PELVIC ORGANS: Normal.    MUSCULOSKELETAL: Normal scoliosis. Severe degenerative changes throughout the thoracolumbar spine..      Impression    IMPRESSION:  1.  No evidence for aortic dissection.  2.  3.9 x 3.3 cm partially thrombosed infrarenal abdominal aortic aneurysm.  3.  The stomach is markedly distended with fluid, along with numerous dilated, fluid-filled small bowel loops concerning for high-grade obstruction with the transition point estimated in the region of the mid ileum. Consider NG tube placement and gastric   decompression.  4.  The proximal esophagus is distended with air. The distal esophagus at the GE junction is nondistended which may account for diffuse wall thickening. This should be reviewed on follow-up imaging to exclude neoplasm.     CBC + differential     Status: Abnormal   Result Value Ref Range    WBC 16.0 (H) 4.0 - 11.0 10e9/L    RBC Count 4.62 4.4 - 5.9 10e12/L    Hemoglobin 13.5 13.3 - 17.7 g/dL    Hematocrit 43.2 40.0 - 53.0 %    MCV 94 78 - 100 fl    MCH 29.2 26.5 - 33.0 pg    MCHC 31.3 (L) 31.5 - 36.5 g/dL    RDW 15.2 (H) 10.0 - 15.0 %    Platelet Count 222 150 - 450 10e9/L    Diff Method Automated Method     % Neutrophils 92.9 %    % Lymphocytes 3.9 %    % Monocytes 2.7 %    % Eosinophils 0.0 %    % Basophils 0.2 %    % Immature Granulocytes 0.3 %    Nucleated RBCs 0 0 /100    Absolute Neutrophil 14.9 (H) 1.6 - 8.3 10e9/L    Absolute Lymphocytes 0.6 (L) 0.8 - 5.3 10e9/L    Absolute Monocytes 0.4 0.0 - 1.3 10e9/L    Absolute Eosinophils 0.0 0.0 - 0.7 10e9/L    Absolute Basophils 0.0 0.0 - 0.2 10e9/L    Abs Immature Granulocytes 0.0 0 - 0.4 10e9/L    Absolute Nucleated RBC 0.0    Comprehensive metabolic panel     Status: Abnormal   Result Value Ref Range    Sodium 139 133 - 144 mmol/L     Potassium 4.9 3.4 - 5.3 mmol/L    Chloride 104 94 - 109 mmol/L    Carbon Dioxide 28 20 - 32 mmol/L    Anion Gap 7 3 - 14 mmol/L    Glucose 140 (H) 70 - 99 mg/dL    Urea Nitrogen 55 (H) 7 - 30 mg/dL    Creatinine 2.33 (H) 0.66 - 1.25 mg/dL    GFR Estimate 24 (L) >60 mL/min/[1.73_m2]    GFR Estimate If Black 28 (L) >60 mL/min/[1.73_m2]    Calcium 9.5 8.5 - 10.1 mg/dL    Bilirubin Total 0.5 0.2 - 1.3 mg/dL    Albumin 4.4 3.4 - 5.0 g/dL    Protein Total 8.3 6.8 - 8.8 g/dL    Alkaline Phosphatase 78 40 - 150 U/L    ALT 10 0 - 70 U/L    AST 23 0 - 45 U/L   Troponin I     Status: None   Result Value Ref Range    Troponin I ES <0.015 0.000 - 0.045 ug/L   UA with Microscopic     Status: Abnormal   Result Value Ref Range    Color Urine Yellow     Appearance Urine Slightly Cloudy     Glucose Urine Negative NEG^Negative mg/dL    Bilirubin Urine Negative NEG^Negative    Ketones Urine 10 (A) NEG^Negative mg/dL    Specific Gravity Urine 1.025 1.003 - 1.035    Blood Urine Negative NEG^Negative    pH Urine 5.0 5.0 - 7.0 pH    Protein Albumin Urine 30 (A) NEG^Negative mg/dL    Urobilinogen mg/dL 2.0 0.0 - 2.0 mg/dL    Nitrite Urine Negative NEG^Negative    Leukocyte Esterase Urine Moderate (A) NEG^Negative    Source Catheterized Urine     WBC Urine 10 (H) 0 - 5 /HPF    RBC Urine 1 0 - 2 /HPF    Bacteria Urine Many (A) NEG^Negative /HPF    Mucous Urine Present (A) NEG^Negative /LPF    Hyaline Casts 3 (H) 0 - 2 /LPF   CK total     Status: None   Result Value Ref Range    CK Total 44 30 - 300 U/L   Creatinine POCT     Status: Abnormal   Result Value Ref Range    Creatinine 2.4 (H) 0.66 - 1.25 mg/dL    GFR Estimate 26 (L) >60 mL/min/[1.73_m2]    GFR Estimate If Black 31 (L) >60 mL/min/[1.73_m2]   INR     Status: None   Result Value Ref Range    INR 1.05 0.86 - 1.14   Partial thromboplastin time     Status: None   Result Value Ref Range    PTT 30 22 - 37 sec   Asymptomatic COVID-19 Virus (Coronavirus) by PCR     Status: None    Specimen:  Nasopharyngeal   Result Value Ref Range    COVID-19 Virus PCR to [facility] - Source Nasopharyngeal     COVID-19 Virus PCR to [facility] - Result       Test received-See reflex to IDDL test SARS CoV2 (COVID-19) Virus RT-PCR   Basic metabolic panel     Status: Abnormal   Result Value Ref Range    Sodium 142 133 - 144 mmol/L    Potassium 4.3 3.4 - 5.3 mmol/L    Chloride 111 (H) 94 - 109 mmol/L    Carbon Dioxide 24 20 - 32 mmol/L    Anion Gap 7 3 - 14 mmol/L    Glucose 121 (H) 70 - 99 mg/dL    Urea Nitrogen 62 (H) 7 - 30 mg/dL    Creatinine 1.90 (H) 0.66 - 1.25 mg/dL    GFR Estimate 31 (L) >60 mL/min/[1.73_m2]    GFR Estimate If Black 36 (L) >60 mL/min/[1.73_m2]    Calcium 7.9 (L) 8.5 - 10.1 mg/dL   CBC with platelets differential     Status: Abnormal   Result Value Ref Range    WBC 13.7 (H) 4.0 - 11.0 10e9/L    RBC Count 4.06 (L) 4.4 - 5.9 10e12/L    Hemoglobin 11.8 (L) 13.3 - 17.7 g/dL    Hematocrit 38.0 (L) 40.0 - 53.0 %    MCV 94 78 - 100 fl    MCH 29.1 26.5 - 33.0 pg    MCHC 31.1 (L) 31.5 - 36.5 g/dL    RDW 15.5 (H) 10.0 - 15.0 %    Platelet Count 179 150 - 450 10e9/L    Diff Method Automated Method     % Neutrophils 91.0 %    % Lymphocytes 5.5 %    % Monocytes 3.0 %    % Eosinophils 0.0 %    % Basophils 0.1 %    % Immature Granulocytes 0.4 %    Nucleated RBCs 0 0 /100    Absolute Neutrophil 12.5 (H) 1.6 - 8.3 10e9/L    Absolute Lymphocytes 0.8 0.8 - 5.3 10e9/L    Absolute Monocytes 0.4 0.0 - 1.3 10e9/L    Absolute Eosinophils 0.0 0.0 - 0.7 10e9/L    Absolute Basophils 0.0 0.0 - 0.2 10e9/L    Abs Immature Granulocytes 0.1 0 - 0.4 10e9/L    Absolute Nucleated RBC 0.0    Troponin I     Status: None   Result Value Ref Range    Troponin I ES <0.015 0.000 - 0.045 ug/L   EKG 12 lead     Status: None   Result Value Ref Range    Interpretation ECG Click View Image link to view waveform and result    EKG 12 lead     Status: None   Result Value Ref Range    Interpretation ECG Click View Image link to view waveform and result     EKG 12 lead     Status: None   Result Value Ref Range    Interpretation ECG Click View Image link to view waveform and result    Cardiology IP Consult: Patient to be seen: Routine - within 24 hours; syncope, abnormal EKG; Consultant may enter orders: Yes; Requesting provider? Hospitalist (if different from attending physician)     Status: None ()    Michael Strange MD     9/8/2020 10:46 AM  [facility]    Cardiology Consultation     Date of Admission:  9/7/2020    Assessment & Plan   Chilango Echevarria is a 85 year old male who was admitted on   9/7/2020.  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  Cardiology was consulted yesterday because of EKG   concerning for possible inferior inferolateral STEMI.  Patient   was evaluated by interventional team and it was felt that this is   unlikely acute coronary syndrome.  Patient was also noted to have   small bowel obstruction and partially thrombosed abdominal aortic   aneurysm and is being followed by GI surgery.  At the time of   this review patient patient denies any chest discomfort.  He   denies any chest discomfort today yesterday or in the last   several days.  No shortness of breath.  EKG shows subtle ST   elevation more like a repull abnormality.  Initial troponin was   negative.  Second troponin is pending from this morning.    Telemetry has not revealed any concerning arrhythmia.  Patient   does not remember much about his presentation although he   acknowledges that he get dizzy if he stands quickly.  Primary   care is working with patient's power of  and patient in   terms of defining goals of care and there is a possibility that   patient may be evaluated by palliative.    1.  Syncopal episodes.  This could be  postural hypotension.  No   significant details available about these episodes of syncope   although patient acknowledges feeling dizzy when  he gets up.  He   is on Middaugh drain.  So far no culprit arrhythmia noted on   telemetry.  Depending upon goals of care patient may be   discharged on Zile patch monitoring.  2.  Endocrine no symptoms concerning for acute coronary syndrome.    Subtle EKG changes noted.  Agree with interventional assessment.    Recheck troponin.  3.  Frail status  4.  Acute renal failure  5.  Parkinson disease  6.  Small bowel obstruction, possible bowel ischemia, partially   thrombosed abdominal aortic aneurysm.  Being followed by surgery.    Recommendations  Agree with echocardiogram which is pending  Agree with troponin which is pending  Continue telemetry.  Overall syncopal episode may represent orthostatic hypotension   although a culprit arrhythmia cannot be completely ruled out but   so far no concerning arrhythmia noted on telemetry.  Once patient   clinical status improves can check orthostasis at this time he   appears to frail and sick to do that.    Thank you for the care of Mr. Echevarria.  Plan discussed with   patient, patient's friend Abena and inpatient medicine team.    Michael Reich MD    Primary Care Physician   Enrique Ring    Reason for Consult   Reason for consult: I was asked by Dr. Juárez to evaluate this   patient for syncope and abnormal EKG.    History of Present Illness   Chilango Echevarria is a 85 year old male who presents with   syncopal episodes from nursing home.  Cardiology was consulted   yesterday and patient was reviewed by interventional team because   of EKG changes concerning for STEMI, EKG shows subtle ST   elevation in inferolateral leads.  Remarkably patient denies any   chest discomfort, initial troponin was negative.  He was also   noted to have acute renal failure with creatinine up to 2.4 and   small bowel obstruction and partially thrombosed  abdominal aortic   aneurysm.  At the time of this review patient is comfortably   laying down in the bed, patient's friend Abena is bedside.    Patient denies any chest discomfort now or in the recent past, no   shortness of breath.  He is somewhat of a poor historian but   acknowledges feeling dizzy when she gets up.  He does not   remember the circumstances surrounding episodes of syncope which   happened recently.  Troponin from this morning is pending.    Telemetry has not revealed any concerning arrhythmia.  Primary   team is working with patient regarding several social issues and   also defining goals of care.  Surgery is following for small   bowel obstruction.        Patient Active Problem List   Diagnosis     Gout     Esophageal reflux     Aortic stenosis     CARDIOVASCULAR SCREENING; LDL GOAL LESS THAN 130     Vasomotor rhinitis     Advanced directives, counseling/discussion     Status post laparoscopic hernia repair-5/14/15     Dermatitis     HTN (hypertension)     Umbilical hernia     Right inguinal hernia     Health Care Home     Parkinson's disease (H)     Slow transit constipation     Fall     Falls     Syncope       Past Medical History   I have reviewed this patient's medical history and updated it   with pertinent information if needed.   Past Medical History:   Diagnosis Date     Aortic stenosis     very mild     Ying esophagus      Contact dermatitis and other eczema, due to unspecified cause      Esophageal reflux      Gout, unspecified      Incarcerated hiatal hernia 5/21/2015     Inguinal hernia without mention of obstruction or gangrene,   unilateral or unspecified, (not specified as recurrent)      Polymyalgia rheumatica (H)      Umbilical hernia without mention of obstruction or gangrene      Unspecified essential hypertension      Vasomotor rhinitis        Past Surgical History   I have reviewed this patient's surgical history and updated it   with pertinent information if  needed.  Past Surgical History:   Procedure Laterality Date     C NONSPECIFIC PROCEDURE      T&A     C NONSPECIFIC PROCEDURE      umbilical herniorraphy     ESOPHAGOSCOPY, GASTROSCOPY, DUODENOSCOPY (EGD), COMBINED N/A   5/11/2015    Procedure: COMBINED ESOPHAGOSCOPY, GASTROSCOPY, DUODENOSCOPY   (EGD);  Surgeon: Thad Ferro MD;  Location: [facility]     LAPAROSCOPIC ASSISTED INSERTION TUBE JEJUNOSTOMY N/A 5/14/2015    Procedure: LAPAROSCOPIC ASSISTED INSERTION TUBE JEJUNOSTOMY;    Surgeon: Thad Ferro MD;  Location: [facility]     LAPAROSCOPIC HERNIORRHAPHY HIATAL N/A 5/14/2015    Procedure: LAPAROSCOPIC HERNIORRHAPHY HIATAL;  Surgeon: Thad Ferro MD;  Location: [facility]     LAPAROSCOPIC HERNIORRHAPHY INGUINAL Right 5/14/2015    Procedure: LAPAROSCOPIC HERNIORRHAPHY INGUINAL;  Surgeon:   Thad Ferro MD;  Location: [facility]     PICC INSERTION Right 5/11/2015    5fr DL Power PICC, 39cm (1cm external) in the R medial brachial   vein w/ tip in the low SVC.       Prior to Admission Medications   Prior to Admission Medications   Prescriptions Last Dose Informant Patient Reported? Taking?   acetaminophen (TYLENOL) 325 MG tablet 9/7/2020 at x1  Yes Yes   Sig: Take 650 mg by mouth 2 times daily & 2 x daily as needed   allopurinol (ZYLOPRIM) 100 MG tablet 9/7/2020 at Unknown time  No   Yes   Sig: Take 1 tablet (100 mg) by mouth daily   carbidopa-levodopa (SINEMET)  MG per tablet 9/7/2020 at x2    Yes Yes   Sig: Take 2 tablets by mouth 3 times daily (9am - 12pm - 6pm)   diclofenac (VOLTAREN) 1 % topical gel 9/7/2020 at x1  Yes Yes   Sig: Place 2 g onto the skin 2 times daily   melatonin 3 MG CAPS 9/6/2020 at Unknown time  Yes Yes   Sig: Take 3 mg by mouth At Bedtime    midodrine (PROAMATINE) 5 MG tablet 9/7/2020 at x2  Yes Yes   Sig: Take 10 mg by mouth 2 times daily 8AM & NOON   nystatin (MYCOSTATIN) 265800 UNIT/GM external powder 9/7/2020 at   x1  Yes Yes   Sig: Apply topically  2 times daily Apply to groin area as needed   for redness/rash   omeprazole (PRILOSEC) 20 MG DR capsule 9/7/2020  No Yes   Sig: TAKE 1 CAPSULE BY MOUTH 30 TO 60 MINUTES BEFORE FIRST MEAL   OF THE DAY   polyethylene glycol (MIRALAX) 17 g packet 9/7/2020 at x1  Yes Yes     Sig: Take 1 packet by mouth 2 times daily      Facility-Administered Medications: None     Current Facility-Administered Medications   Medication Dose Route Frequency     carbidopa-levodopa  2 tablet Oral TID     cefTRIAXone  1 g Intravenous Q24H     heparin ANTICOAGULANT  5,000 Units Subcutaneous Q12H     sodium chloride (PF)  3 mL Intracatheter Q8H     Current Facility-Administered Medications   Medication Last Rate     sodium chloride 1,000 mL (09/08/20 0931)     Allergies   Allergies   Allergen Reactions     Lisinopril      rash, exacerbation of eczema       Social History    reports that he has never smoked. He has never used smokeless   tobacco. He reports current alcohol use. He reports that he does   not use drugs.    Family History   Family History   Problem Relation Age of Onset     Breast Cancer Mother      Diabetes Paternal Grandfather      Cancer Son 37        lung       Review of Systems   The comprehensive 10 point Review of Systems is negative other   than noted in the HPI or here.     Physical Exam   Vital Signs with Ranges  Temp:  [98.5  F (36.9  C)-99.5  F (37.5  C)] 98.6  F (37  C)  Pulse:  [72-84] 73  Resp:  [16-20] 20  BP: (102-164)/(50-83) 123/57  SpO2:  [93 %-100 %] 94 %  Wt Readings from Last 4 Encounters:   09/08/20 61.7 kg (136 lb 0.4 oz)   09/02/20 60.7 kg (133 lb 12.8 oz)   08/19/20 62.5 kg (137 lb 12.8 oz)   07/31/20 63 kg (139 lb)     I/O last 3 completed shifts:  In: 1703 [I.V.:603; IV Piggyback:1100]  Out: 2475 [Urine:225; Emesis/NG output:2250]      Vitals: /57 (BP Location: Left arm)   Pulse 73   Temp   98.6  F (37  C) (Oral)   Resp 20   Wt 61.7 kg (136 lb 0.4 oz)     SpO2 94%   BMI 22.64 kg/m     General patient appears comfortable  Neck normal JVP  Cardiovascular system S1-S2 normal, no particular murmur rub or   gallop appreciated  Respiratory some decreased air entry bilaterally at bases no   crackles or rhonchi  Abdomen soft, nontender  Extremities no pitting pedal limitation neurological alert,   oriented x2  Psych flat affect  Skin no obvious rash  HEENT no pallor    Recent Labs   Lab 09/07/20 1818   TROPI <0.015       Recent Labs   Lab 09/08/20  0726 09/07/20 1819 09/07/20 1818 09/02/20  0741   WBC 13.7*  --  16.0* 4.6   HGB 11.8*  --  13.5 11.6*   MCV 94  --  94 93     --  222 200   INR  --   --  1.05  --      --  139 143   POTASSIUM 4.3  --  4.9 3.6   CHLORIDE 111*  --  104 109   CO2 24  --  28 32   BUN 62*  --  55* 34*   CR 1.90*  --  2.33* 0.76   GFRESTIMATED 31* 26* 24* 83   GFRESTBLACK 36* 31* 28* >90   ANIONGAP 7  --  7 2*   MESERET 7.9*  --  9.5 8.8   *  --  140* 81   ALBUMIN  --   --  4.4  --    PROTTOTAL  --   --  8.3  --    BILITOTAL  --   --  0.5  --    ALKPHOS  --   --  78  --    ALT  --   --  10  --    AST  --   --  23  --    TROPI  --   --  <0.015  --      Recent Labs   Lab Test 05/18/15  0717 05/11/15  1512   TRIG 53 34     Recent Labs   Lab 09/08/20 0726 09/07/20 1818 09/02/20  0741   WBC 13.7* 16.0* 4.6   HGB 11.8* 13.5 11.6*   HCT 38.0* 43.2 37.1*   MCV 94 94 93    222 200     No results for input(s): PH, PHV, PO2, PO2V, SAT, PCO2, PCO2V,   HCO3, HCO3V in the last 168 hours.  No results for input(s): NTBNPI, NTBNP in the last 168 hours.  No results for input(s): DD in the last 168 hours.  No results for input(s): SED, CRP in the last 168 hours.  Recent Labs   Lab 09/08/20  0726 09/07/20  1818 09/02/20  0741    222 200     No results for input(s): TSH in the last 168 hours.  Recent Labs   Lab 09/07/20 2057   COLOR Yellow   APPEARANCE Slightly Cloudy   URINEGLC Negative   URINEBILI Negative   URINEKETONE 10*   SG 1.025   UBLD Negative   URINEPH  5.0   PROTEIN 30*   NITRITE Negative   LEUKEST Moderate*   RBCU 1   WBCU 10*       Imaging:  Recent Results (from the past 48 hour(s))   XR Chest Port 1 View    Narrative    EXAM: XR CHEST PORT 1 VW  LOCATION: [facility]  DATE/TIME: 9/7/2020 6:14 PM    INDICATION: Chest pain  COMPARISON: 06/23/2020      Impression    IMPRESSION: No significant change. Heart size and pulmonary   vessels normal. Tortuous calcified thoracic aorta stable. Lungs   are clear.   Head CT w/o contrast    Narrative    EXAM: CT HEAD WITHOUT CONTRAST  LOCATION: [facility]  DATE/TIME: 09/07/2020, 6:31 PM    INDICATION: Head trauma, minor, GCS>=13, high clinical risk,   initial exam.  COMPARISON: Head CT 06/23/2020  TECHNIQUE: Routine without IV contrast. Multiplanar reformats.   Dose reduction techniques were used.    FINDINGS:  INTRACRANIAL CONTENTS: No intracranial hemorrhage, extra-axial   collection, or mass effect.  No CT evidence of acute infarct.   Moderate presumed chronic small vessel ischemic changes. Moderate   generalized volume loss. No hydrocephalus.     VISUALIZED ORBITS/SINUSES/MASTOIDS: No intraorbital abnormality.   No paranasal sinus mucosal disease. No middle ear or mastoid   effusion.    BONES/SOFT TISSUES: No acute abnormality.      Impression    IMPRESSION:  1.  No CT evidence for acute intracranial process.  2.  Brain atrophy and presumed chronic microvascular ischemic   changes as above.     CT Aortic Survey w Contrast    Narrative    EXAM: CT AORTIC SURVEY W CONTRAST  LOCATION: [facility]  DATE/TIME: 9/7/2020 6:31 PM    INDICATION: Syncope, elevated troponin.  COMPARISON: CT abdomen and pelvis 05/10/2015  TECHNIQUE: CT angiogram chest abdomen pelvis during arterial   phase of injection of IV contrast. 2D and 3D MIP reconstructions   were performed by the CT technologist. Dose reduction techniques   were used.   CONTRAST: 80mL Isovue-370    FINDINGS:   CT ANGIOGRAM CHEST,  ABDOMEN, AND PELVIS: The thoracic and   abdominal aorta are negative for dissection. Mild atherosclerotic   disease. 3.9 x 3.3 cm infrarenal abdominal aortic aneurysm has   increased in size and now with thrombus within the anterior   aspect   of the aneurysm on image 121 of series 6 the inferior mesenteric   artery arises immediately above the aneurysm. Mild plaque at the   origins of the celiac trunk and superior mesenteric artery   without significant narrowing. Plaque at the origin of the left   renal artery. Both renal arteries are small in caliber.    LUNGS AND PLEURA: Subsegmental atelectasis or scarring both lower   lobes. No acute infiltrates or pleural effusions.    MEDIASTINUM/AXILLAE: No mediastinal or hilar adenopathy. The   proximal esophagus is distended with air. The distal esophagus is   nondistended with wall thickening.    HEPATOBILIARY: Fatty infiltration of the liver. Tiny cyst near   the liver dome. Mildly dilated common bile duct is unchanged.    PANCREAS: Normal.    SPLEEN: Normal.    ADRENAL GLANDS: Normal.    KIDNEYS/BLADDER: Normal.    BOWEL: Marked gastric distention with fluid as seen on previous   study. There are also numerous dilated loops of small bowel   throughout the abdomen and pelvis. The distal small bowel and   colon are normal caliber. Normal appendix.    LYMPH NODES: Normal.    PELVIC ORGANS: Normal.    MUSCULOSKELETAL: Normal scoliosis. Severe degenerative changes   throughout the thoracolumbar spine..      Impression    IMPRESSION:  1.  No evidence for aortic dissection.  2.  3.9 x 3.3 cm partially thrombosed infrarenal abdominal aortic   aneurysm.  3.  The stomach is markedly distended with fluid, along with   numerous dilated, fluid-filled small bowel loops concerning for   high-grade obstruction with the transition point estimated in the   region of the mid ileum. Consider NG tube placement and gastric   decompression.  4.  The proximal esophagus is distended with air.  "The distal   esophagus at the GE junction is nondistended which may account   for diffuse wall thickening. This should be reviewed on follow-up   imaging to exclude neoplasm.         Echo:  No results found for this or any previous visit (from the past   4320 hour(s)).              Surgery General IP Consult: Patient to be seen: Routine within 24 hrs; SBO; Consultant may enter orders: Yes; Requesting provider? Hospitalist (if different from attending physician)     Status: None ()    Narrative    Michael Obando MD     9/8/2020  9:53 AM  [facility] Surgery Consultation    Chilango Echevarria MRN# [id number]   Age: 85 year old YOB: 1934     Date of Admission:  9/7/2020    Reason for consult: SBO on CT       Requesting physician: Sudeep       Level of consult: Consult, follow and place orders           Assessment and Plan:   Assessment:   SBO vs motility disorder vs ischemia of SB  Prior surgery includes laparoscopic hernia repair and G-tube   (possible adhesions), he carries a DX of \" slow transit   constipation\" suggesting a possible motility problem, Aortic   thrombosis raises the possibility of reduced mesenteric blood   flow  Patient Active Problem List    Diagnosis Date Noted     Syncope 09/08/2020     Priority: Medium     Falls 06/22/2020     Priority: Medium     Fall 03/16/2020     Priority: Medium     Parkinson's disease (H) 10/22/2015     Priority: Medium     Slow transit constipation 10/22/2015     Priority: Medium     Health Care Home 06/02/2015     Priority: Medium     Status:  Declined    See Letters for Emergency  Care Plan  Date:  July 1, 2015           Status post laparoscopic hernia repair-5/14/15 05/21/2015     Priority: Medium     Dermatitis 05/21/2015     Priority: Medium     HTN (hypertension) 05/21/2015     Priority: Medium     Umbilical hernia 05/21/2015     Priority: Medium     Right inguinal hernia 05/21/2015     Priority: Medium     Advanced directives, " "counseling/discussion 03/02/2015     Priority: Medium     Vasomotor rhinitis      Priority: Medium     CARDIOVASCULAR SCREENING; LDL GOAL LESS THAN 130 10/31/2010     Priority: Medium     Aortic stenosis      Priority: Medium     Gout 03/15/2005     Priority: Medium     Problem list name updated by automated process. Provider to   review       Esophageal reflux 03/15/2005     Priority: Medium           Plan:   Abdominal films as needed to follow SBO  NG suction  Adhesion lysis to be considered if fails to resolve - however,   per chart notes, family has expressed desire to avoid \"aggressive   intervention\"  Syncope evaluation underway            Chief Complaint:   Syncope     History is obtained from the electronic health record         History of Present Illness:   This patient is a 85 year old  male with a significant   past medical history of hypotension, parkinson's, AAA, \"giant\"   hiatal hernia repair (2015 [facility])), GERD with Ying's, prior   laparoscopically inserted J tube (2015 at [facility]) who presents   with the following condition requiring a hospital admission:   syncope.  Several episodes of syncope over the last few days. Per NH staff.   Contacted primary who recommended ER evaluation. Pt reported 2   weeks of intermittent pain \"where my appendix is\" (although none   at time of presentation) so CT obtained.           Past Medical History:     Past Medical History:   Diagnosis Date     Aortic stenosis     very mild     Ying esophagus      Contact dermatitis and other eczema, due to unspecified cause      Esophageal reflux      Gout, unspecified      Incarcerated hiatal hernia 5/21/2015     Inguinal hernia without mention of obstruction or gangrene,   unilateral or unspecified, (not specified as recurrent)      Polymyalgia rheumatica (H)      Umbilical hernia without mention of obstruction or gangrene      Unspecified essential hypertension      Vasomotor rhinitis              Past Surgical " History:     Past Surgical History:   Procedure Laterality Date     C NONSPECIFIC PROCEDURE      T&A     C NONSPECIFIC PROCEDURE      umbilical herniorraphy     ESOPHAGOSCOPY, GASTROSCOPY, DUODENOSCOPY (EGD), COMBINED N/A   5/11/2015    Procedure: COMBINED ESOPHAGOSCOPY, GASTROSCOPY, DUODENOSCOPY   (EGD);  Surgeon: Thad Ferro MD;  Location: [facility]     LAPAROSCOPIC ASSISTED INSERTION TUBE JEJUNOSTOMY N/A 5/14/2015    Procedure: LAPAROSCOPIC ASSISTED INSERTION TUBE JEJUNOSTOMY;    Surgeon: Thad Ferro MD;  Location: [facility]     LAPAROSCOPIC HERNIORRHAPHY HIATAL N/A 5/14/2015    Procedure: LAPAROSCOPIC HERNIORRHAPHY HIATAL;  Surgeon: Thad Ferro MD;  Location: [facility]     LAPAROSCOPIC HERNIORRHAPHY INGUINAL Right 5/14/2015    Procedure: LAPAROSCOPIC HERNIORRHAPHY INGUINAL;  Surgeon:   Thad Ferro MD;  Location: [facility]     PICC INSERTION Right 5/11/2015    5fr DL Power PICC, 39cm (1cm external) in the R medial brachial   vein w/ tip in the low SVC.             Social History:     Social History     Tobacco Use     Smoking status: Never Smoker     Smokeless tobacco: Never Used   Substance Use Topics     Alcohol use: Yes     Comment: beer only, 3 bottles per day             Family History:     Family History   Problem Relation Age of Onset     Breast Cancer Mother      Diabetes Paternal Grandfather      Cancer Son 37        lung             Immunizations:     VACCINE/DOSE   Diptheria   DPT   DTAP   HBIG   Hepatitis A   Hepatitis B   HIB   Influenza   Measles   Meningococcal   MMR   Mumps   Pneumococcal   Polio   Rubella   Small Pox   TDAP   Varicella   Zoster             Allergies:     Allergies   Allergen Reactions     Lisinopril      rash, exacerbation of eczema             Medications:     Current Facility-Administered Medications   Medication     acetaminophen (TYLENOL) tablet 650 mg     cefTRIAXone (ROCEPHIN) 1 g vial to attach to  mL bag for   ADULTS or NS  50 mL bag for PEDS     heparin ANTICOAGULANT injection 5,000 Units     lidocaine (LMX4) cream     lidocaine 1 % 0.1-1 mL     melatonin tablet 1 mg     naloxone (NARCAN) injection 0.1-0.4 mg     ondansetron (ZOFRAN-ODT) ODT tab 4 mg    Or     ondansetron (ZOFRAN) injection 4 mg     sodium chloride (PF) 0.9% PF flush 3 mL     sodium chloride (PF) 0.9% PF flush 3 mL     sodium chloride 0.9% infusion             Review of Systems:   Pt sleepy now, ROS negative on prior notes          Physical Exam:   All vitals have been reviewed  Patient Vitals for the past 24 hrs:   BP Temp Temp src Pulse Resp SpO2 Weight   09/08/20 0620 -- -- -- -- -- 95 % --   09/08/20 0615 -- -- -- -- -- 94 % --   09/08/20 0532 -- -- -- -- -- 96 % 61.7 kg (136 lb 0.4 oz)   09/08/20 0421 110/50 98.5  F (36.9  C) Axillary 73 20 96 % --   09/08/20 0308 -- -- -- -- -- 96 % --   09/08/20 0102 123/54 99.5  F (37.5  C) Axillary 72 20 99 % --   09/08/20 0015 124/68 -- -- 75 16 98 % --   09/08/20 0000 138/73 -- -- 75 -- 99 % --   09/07/20 2345 127/71 -- -- 76 -- 97 % --   09/07/20 2330 130/59 -- -- 75 -- 96 % --   09/07/20 2300 102/56 -- -- 76 -- 99 % --   09/07/20 2245 131/66 -- -- 76 -- 97 % --   09/07/20 2230 107/59 -- -- 81 -- 99 % --   09/07/20 2215 (!) 142/76 -- -- 75 -- 97 % --   09/07/20 2200 129/67 -- -- 77 -- 98 % --   09/07/20 2145 133/70 -- -- 77 -- 100 % --   09/07/20 2130 115/64 -- -- 78 -- 98 % --   09/07/20 2115 122/65 -- -- 80 -- 95 % --   09/07/20 2100 126/70 -- -- 78 -- 96 % --   09/07/20 2045 113/67 -- -- 76 -- 95 % --   09/07/20 2030 123/70 -- -- 75 -- 97 % --   09/07/20 2015 122/69 -- -- 81 -- 93 % --   09/07/20 2000 119/69 -- -- 76 -- 99 % --   09/07/20 1945 112/72 -- -- 77 -- 97 % --   09/07/20 1930 (!) 161/81 -- -- 75 -- 97 % --   09/07/20 1915 (!) 164/83 -- -- 78 -- 97 % --   09/07/20 1900 (!) 158/83 -- -- 80 -- 95 % --   09/07/20 1858 (!) 142/72 -- -- 82 -- 94 % --   09/07/20 1855 (!) 159/72 -- -- 84 -- -- --   09/07/20 1825 -- --  -- -- -- 97 % --   09/07/20 1824 -- -- -- 75 -- 96 % --   09/07/20 1803 -- -- -- -- -- -- 61.3 kg (135 lb 2.3 oz)       Intake/Output Summary (Last 24 hours) at 9/8/2020 0635  Last data filed at 9/8/2020 0613  Gross per 24 hour   Intake 1703 ml   Output 2475 ml   Net -772 ml     Neck:   skin normal and no stridor     Chest / Breast:   Nl resp effort     Abdomen:   scars noted left upper quadrant ( G-tube site), soft,   non-distended, non-tender, voluntary guarding absent and no   masses palpated             Data:   All laboratory data reviewed  Results for orders placed or performed during the hospital   encounter of 09/07/20   Head CT w/o contrast     Status: None    Narrative    EXAM: CT HEAD WITHOUT CONTRAST  LOCATION: [facility]  DATE/TIME: 09/07/2020, 6:31 PM    INDICATION: Head trauma, minor, GCS>=13, high clinical risk,   initial exam.  COMPARISON: Head CT 06/23/2020  TECHNIQUE: Routine without IV contrast. Multiplanar reformats.   Dose reduction techniques were used.    FINDINGS:  INTRACRANIAL CONTENTS: No intracranial hemorrhage, extra-axial   collection, or mass effect.  No CT evidence of acute infarct.   Moderate presumed chronic small vessel ischemic changes. Moderate   generalized volume loss. No hydrocephalus.     VISUALIZED ORBITS/SINUSES/MASTOIDS: No intraorbital abnormality.   No paranasal sinus mucosal disease. No middle ear or mastoid   effusion.    BONES/SOFT TISSUES: No acute abnormality.      Impression    IMPRESSION:  1.  No CT evidence for acute intracranial process.  2.  Brain atrophy and presumed chronic microvascular ischemic   changes as above.     XR Chest Port 1 View     Status: None    Narrative    EXAM: XR CHEST PORT 1 VW  LOCATION: [facility]  DATE/TIME: 9/7/2020 6:14 PM    INDICATION: Chest pain  COMPARISON: 06/23/2020      Impression    IMPRESSION: No significant change. Heart size and pulmonary   vessels normal. Tortuous calcified thoracic aorta stable.  Lungs   are clear.   CT Aortic Survey w Contrast     Status: None    Narrative    EXAM: CT AORTIC SURVEY W CONTRAST  LOCATION: [facility]  DATE/TIME: 9/7/2020 6:31 PM    INDICATION: Syncope, elevated troponin.  COMPARISON: CT abdomen and pelvis 05/10/2015  TECHNIQUE: CT angiogram chest abdomen pelvis during arterial   phase of injection of IV contrast. 2D and 3D MIP reconstructions   were performed by the CT technologist. Dose reduction techniques   were used.   CONTRAST: 80mL Isovue-370    FINDINGS:   CT ANGIOGRAM CHEST, ABDOMEN, AND PELVIS: The thoracic and   abdominal aorta are negative for dissection. Mild atherosclerotic   disease. 3.9 x 3.3 cm infrarenal abdominal aortic aneurysm has   increased in size and now with thrombus within the anterior   aspect   of the aneurysm on image 121 of series 6 the inferior mesenteric   artery arises immediately above the aneurysm. Mild plaque at the   origins of the celiac trunk and superior mesenteric artery   without significant narrowing. Plaque at the origin of the left   renal artery. Both renal arteries are small in caliber.    LUNGS AND PLEURA: Subsegmental atelectasis or scarring both lower   lobes. No acute infiltrates or pleural effusions.    MEDIASTINUM/AXILLAE: No mediastinal or hilar adenopathy. The   proximal esophagus is distended with air. The distal esophagus is   nondistended with wall thickening.    HEPATOBILIARY: Fatty infiltration of the liver. Tiny cyst near   the liver dome. Mildly dilated common bile duct is unchanged.    PANCREAS: Normal.    SPLEEN: Normal.    ADRENAL GLANDS: Normal.    KIDNEYS/BLADDER: Normal.    BOWEL: Marked gastric distention with fluid as seen on previous   study. There are also numerous dilated loops of small bowel   throughout the abdomen and pelvis. The distal small bowel and   colon are normal caliber. Normal appendix.    LYMPH NODES: Normal.    PELVIC ORGANS: Normal.    MUSCULOSKELETAL: Normal scoliosis.  Severe degenerative changes   throughout the thoracolumbar spine..      Impression    IMPRESSION:  1.  No evidence for aortic dissection.  2.  3.9 x 3.3 cm partially thrombosed infrarenal abdominal aortic   aneurysm.  3.  The stomach is markedly distended with fluid, along with   numerous dilated, fluid-filled small bowel loops concerning for   high-grade obstruction with the transition point estimated in the   region of the mid ileum. Consider NG tube placement and gastric   decompression.  4.  The proximal esophagus is distended with air. The distal   esophagus at the GE junction is nondistended which may account   for diffuse wall thickening. This should be reviewed on follow-up   imaging to exclude neoplasm.     CBC + differential     Status: Abnormal   Result Value Ref Range    WBC 16.0 (H) 4.0 - 11.0 10e9/L    RBC Count 4.62 4.4 - 5.9 10e12/L    Hemoglobin 13.5 13.3 - 17.7 g/dL    Hematocrit 43.2 40.0 - 53.0 %    MCV 94 78 - 100 fl    MCH 29.2 26.5 - 33.0 pg    MCHC 31.3 (L) 31.5 - 36.5 g/dL    RDW 15.2 (H) 10.0 - 15.0 %    Platelet Count 222 150 - 450 10e9/L    Diff Method Automated Method     % Neutrophils 92.9 %    % Lymphocytes 3.9 %    % Monocytes 2.7 %    % Eosinophils 0.0 %    % Basophils 0.2 %    % Immature Granulocytes 0.3 %    Nucleated RBCs 0 0 /100    Absolute Neutrophil 14.9 (H) 1.6 - 8.3 10e9/L    Absolute Lymphocytes 0.6 (L) 0.8 - 5.3 10e9/L    Absolute Monocytes 0.4 0.0 - 1.3 10e9/L    Absolute Eosinophils 0.0 0.0 - 0.7 10e9/L    Absolute Basophils 0.0 0.0 - 0.2 10e9/L    Abs Immature Granulocytes 0.0 0 - 0.4 10e9/L    Absolute Nucleated RBC 0.0    Comprehensive metabolic panel     Status: Abnormal   Result Value Ref Range    Sodium 139 133 - 144 mmol/L    Potassium 4.9 3.4 - 5.3 mmol/L    Chloride 104 94 - 109 mmol/L    Carbon Dioxide 28 20 - 32 mmol/L    Anion Gap 7 3 - 14 mmol/L    Glucose 140 (H) 70 - 99 mg/dL    Urea Nitrogen 55 (H) 7 - 30 mg/dL    Creatinine 2.33 (H) 0.66 - 1.25 mg/dL     GFR Estimate 24 (L) >60 mL/min/[1.73_m2]    GFR Estimate If Black 28 (L) >60 mL/min/[1.73_m2]    Calcium 9.5 8.5 - 10.1 mg/dL    Bilirubin Total 0.5 0.2 - 1.3 mg/dL    Albumin 4.4 3.4 - 5.0 g/dL    Protein Total 8.3 6.8 - 8.8 g/dL    Alkaline Phosphatase 78 40 - 150 U/L    ALT 10 0 - 70 U/L    AST 23 0 - 45 U/L   Troponin I     Status: None   Result Value Ref Range    Troponin I ES <0.015 0.000 - 0.045 ug/L   UA with Microscopic     Status: Abnormal   Result Value Ref Range    Color Urine Yellow     Appearance Urine Slightly Cloudy     Glucose Urine Negative NEG^Negative mg/dL    Bilirubin Urine Negative NEG^Negative    Ketones Urine 10 (A) NEG^Negative mg/dL    Specific Gravity Urine 1.025 1.003 - 1.035    Blood Urine Negative NEG^Negative    pH Urine 5.0 5.0 - 7.0 pH    Protein Albumin Urine 30 (A) NEG^Negative mg/dL    Urobilinogen mg/dL 2.0 0.0 - 2.0 mg/dL    Nitrite Urine Negative NEG^Negative    Leukocyte Esterase Urine Moderate (A) NEG^Negative    Source Catheterized Urine     WBC Urine 10 (H) 0 - 5 /HPF    RBC Urine 1 0 - 2 /HPF    Bacteria Urine Many (A) NEG^Negative /HPF    Mucous Urine Present (A) NEG^Negative /LPF    Hyaline Casts 3 (H) 0 - 2 /LPF   CK total     Status: None   Result Value Ref Range    CK Total 44 30 - 300 U/L   Creatinine POCT     Status: Abnormal   Result Value Ref Range    Creatinine 2.4 (H) 0.66 - 1.25 mg/dL    GFR Estimate 26 (L) >60 mL/min/[1.73_m2]    GFR Estimate If Black 31 (L) >60 mL/min/[1.73_m2]   INR     Status: None   Result Value Ref Range    INR 1.05 0.86 - 1.14   Partial thromboplastin time     Status: None   Result Value Ref Range    PTT 30 22 - 37 sec   Asymptomatic COVID-19 Virus (Coronavirus) by PCR     Status: None      Specimen: Nasopharyngeal   Result Value Ref Range    COVID-19 Virus PCR to [facility] - Source Nasopharyngeal     COVID-19 Virus PCR to [facility] - Result       Test received-See reflex to IDDL test SARS CoV2 (COVID-19)   Virus RT-PCR   EKG 12 lead      Status: None (Preliminary result)   Result Value Ref Range    Interpretation ECG Click View Image link to view waveform and   result    EKG 12 lead     Status: None (Preliminary result)   Result Value Ref Range    Interpretation ECG Click View Image link to view waveform and   result    EKG 12 lead     Status: None (Preliminary result)   Result Value Ref Range    Interpretation ECG Click View Image link to view waveform and   result    Troponin POCT     Status: None   Result Value Ref Range    Troponin I 0.01 0.00 - 0.08 ug/L   ABO/Rh type and screen     Status: None   Result Value Ref Range    ABO A     RH(D) Pos     Antibody Screen Neg     Test Valid Only At [facility]        Specimen Expires 09/10/2020    Urine Culture Aerobic Bacterial     Status: None (Preliminary   result)    Specimen: Catheterized Urine   Result Value Ref Range    Specimen Description Catheterized Urine     Special Requests Specimen received in preservative     Culture Micro PENDING         Attestation:  I have reviewed today's vital signs, notes, medications, labs and   imaging.  Amount of time performed on this consult: 60 minutes.    Michael Obando MD        Troponin POCT     Status: None   Result Value Ref Range    Troponin I 0.01 0.00 - 0.08 ug/L   Echocardiogram Complete     Status: None    Narrative    [id number]  [id number]  [id number]  [id number]^MAIA^GUERO^[facility]  Echocardiography Laboratory  [address] [place] [address]        Name: JENNIFER DYSON  MRN: [id number]  : 10/24/1934  Study Date: 2020 07:38 AM  Age: 85 yrs  Gender: Male  Patient Location: [facility]  Reason For Study: Abn EKG  Ordering Physician: GUERO CARVALHO  Referring Physician: Enrique Ring  Performed By: Rachelle De Anda RDCS     BSA: 1.7 m2  Height: 67 in  Weight: 135 lb  HR: 73  BP: 110/72 mmHg  _____________________________________________________________________________  __         Procedure  Complete Portable Echo Adult. Optison (NDC #[id number]) given intravenously.  _____________________________________________________________________________  __        Interpretation Summary     Probably normal left ventricular systolic function though subtle wall motion  abnormalities could be missed on this study. The apical two chamber view was  significantly foreshortened.  The visual ejection fraction is estimated at 60-65%.  Mild valvular aortic stenosis.  Technically difficult, suboptimal study.  _____________________________________________________________________________  __        Left Ventricle  The left ventricle is normal in size. There is normal left ventricular wall  thickness. Diastolic Doppler findings (E/E' ratio and/or other parameters)  suggest left ventricular filling pressures are indeterminate. Grade I or early  diastolic dysfunction. The visual ejection fraction is estimated at 60-65%.  Probably normal left ventricular systolic function though subtle wall motion  abnormalities could be missed on this study. The apical two chamber view was  significantly foreshortened.     Right Ventricle  The right ventricle is normal in size and function.     Atria  Normal left atrial size. Right atrial size is normal. There is no color  Doppler evidence of an atrial shunt.     Mitral Valve  There is mild mitral annular calcification. There is trace mitral  regurgitation.        Tricuspid Valve  There is trace tricuspid regurgitation. Right ventricular systolic pressure  could not be approximated due to inadequate tricuspid regurgitation.     Aortic Valve  The aortic valve is not well visualized. No aortic regurgitation is present.  The calculated aortic valve are is 1.6 cm^2. The peak AoV pressure gradient  is  18.7 mmHg. The mean AoV pressure gradient is 8.9 mmHg. Mild valvular aortic  stenosis.     Pulmonic Valve  The pulmonic valve is not well visualized. There is no pulmonic  valvular  regurgitation.     Vessels  The aortic root is normal size. IVC diameter <2.1 cm collapsing >50% with  sniff suggests a normal RA pressure of 3 mmHg.     Pericardium  There is no pericardial effusion.        Rhythm  Sinus rhythm was noted.  _____________________________________________________________________________  __  MMode/2D Measurements & Calculations  IVSd: 0.92 cm     LVIDd: 4.3 cm  LVIDs: 2.5 cm  LVPWd: 1.0 cm  FS: 41.8 %  LV mass(C)d: 136.4 grams  LV mass(C)dI: 79.7 grams/m2  Ao root diam: 3.2 cm  LA dimension: 3.5 cm  LA/Ao: 1.1  LVOT diam: 2.2 cm  LVOT area: 3.9 cm2  LA Volume (BP): 48.3 ml  LA Volume Index (BP): 28.2 ml/m2  RWT: 0.48           Doppler Measurements & Calculations  MV E max jl: 55.3 cm/sec  MV A max jl: 100.4 cm/sec  MV E/A: 0.55  MV max P.3 mmHg  MV mean P.6 mmHg  MV V2 VTI: 24.1 cm  MVA(VTI): 2.8 cm2  MV dec time: 0.36 sec  Ao V2 max: 216.3 cm/sec  Ao max P.7 mmHg  Ao V2 mean: 138.5 cm/sec  Ao mean P.9 mmHg  Ao V2 VTI: 42.4 cm  EMILIANO(I,D): 1.6 cm2  EMILIANO(V,D): 1.8 cm2  LV V1 max P.1 mmHg  LV V1 max: 100.9 cm/sec  LV V1 VTI: 17.3 cm  SV(LVOT): 66.9 ml  SI(LVOT): 39.1 ml/m2  PA acc time: 0.10 sec  AV Jl Ratio (DI): 0.47  EMILIANO Index (cm2/m2): 0.92  E/E' avg: 10.6  Lateral E/e': 8.2  Medial E/e': 13.0              _____________________________________________________________________________  __        Report approved by: Nadine Cabezas 2020 10:54 AM      ABO/Rh type and screen     Status: None   Result Value Ref Range    ABO A     RH(D) Pos     Antibody Screen Neg     Test Valid Only At [facility]        Specimen Expires 09/10/2020    Urine Culture Aerobic Bacterial     Status: None (Preliminary result)    Specimen: Catheterized Urine   Result Value Ref Range    Specimen Description Catheterized Urine     Special Requests Specimen received in preservative     Culture Micro PENDING

## 2020-09-08 NOTE — PHARMACY-ADMISSION MEDICATION HISTORY
Admission medication history interview status for this patient is complete. See [facility] admission navigator for allergy information, prior to admission medications and immunization status.     Medication history interview source(s): [facility]  Medication history resources (including written lists, pill bottles, clinic record):[facility]  Primary pharmacy:unknown    Changes made to PTA medication list:  Added: nystatin  Deleted: none  Changed: none    Actions taken by pharmacist (provider contacted, etc):None     Additional medication history information:None    Medication reconciliation/reorder completed by provider prior to medication history? No    For patients on insulin therapy: No (Yes/No)     Prior to Admission medications    Medication Sig Last Dose Taking? Auth Provider   acetaminophen (TYLENOL) 325 MG tablet Take 650 mg by mouth 2 times daily & 2 x daily as needed 9/7/2020 at x1 Yes Reported, Patient   allopurinol (ZYLOPRIM) 100 MG tablet Take 1 tablet (100 mg) by mouth daily 9/7/2020 at Unknown time Yes Enrique Ring MD   carbidopa-levodopa (SINEMET)  MG per tablet Take 2 tablets by mouth 3 times daily (9am - 12pm - 6pm) 9/7/2020 at x2 Yes David De Anda MD   diclofenac (VOLTAREN) 1 % topical gel Place 2 g onto the skin 2 times daily 9/7/2020 at x1 Yes Reported, Patient   melatonin 3 MG CAPS Take 3 mg by mouth At Bedtime  9/6/2020 at Unknown time Yes Reported, Patient   midodrine (PROAMATINE) 5 MG tablet Take 10 mg by mouth 2 times daily 8AM & NOON 9/7/2020 at x2 Yes Reported, Patient   nystatin (MYCOSTATIN) 965681 UNIT/GM external powder Apply topically 2 times daily Apply to groin area as needed for redness/rash 9/7/2020 at x1 Yes Unknown, Entered By History   omeprazole (PRILOSEC) 20 MG DR capsule TAKE 1 CAPSULE BY MOUTH 30 TO 60 MINUTES BEFORE FIRST MEAL OF THE DAY 9/7/2020 Yes Enrique Ring MD   polyethylene glycol (MIRALAX) 17 g packet Take 1 packet by mouth 2 times daily 9/7/2020  at x1 Yes Reported, Patient

## 2020-09-08 NOTE — CONSULTS
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  Patient Active Problem List    Diagnosis Date Noted     Syncope 09/08/2020     Priority: Medium     Falls 06/22/2020     Priority: Medium     Fall 03/16/2020     Priority: Medium     Parkinson's disease (H) 10/22/2015     Priority: Medium     Slow transit constipation 10/22/2015     Priority: Medium     Health Care Home 06/02/2015     Priority: Medium     Status:  Declined    See Letters for Emergency  Care Plan  Date:  July 1, 2015           Status post laparoscopic hernia repair-5/14/15 05/21/2015     Priority: Medium     Dermatitis 05/21/2015     Priority: Medium     HTN (hypertension) 05/21/2015     Priority: Medium     Umbilical hernia 05/21/2015     Priority: Medium     Right inguinal hernia 05/21/2015     Priority: Medium     Advanced directives, counseling/discussion 03/02/2015     Priority: Medium     Vasomotor rhinitis      Priority: Medium     CARDIOVASCULAR SCREENING; LDL GOAL LESS THAN 130 10/31/2010     Priority: Medium     Aortic stenosis      Priority: Medium     Gout 03/15/2005     Priority: Medium     Problem list name updated by automated process. Provider to review       Esophageal reflux 03/15/2005     Priority: Medium           Plan:   Abdominal films as needed to follow SBO  NG suction  Adhesion lysis to be considered if fails to resolve - however, per chart notes, family has expressed " "desire to avoid \"aggressive intervention\"  Syncope evaluation underway            Chief Complaint:   Syncope     History is obtained from the electronic health record         History of Present Illness:   This patient is a 85 year old  male with a significant past medical history of hypotension, parkinson's, AAA, \"giant\" hiatal hernia repair (2015 [facility])), GERD with Ying's, prior laparoscopically inserted J tube (2015 at [facility]) who presents with the following condition requiring a hospital admission: syncope.  Several episodes of syncope over the last few days. Per NH staff. Contacted primary who recommended ER evaluation. Pt reported 2 weeks of intermittent pain \"where my appendix is\" (although none at time of presentation) so CT obtained.           Past Medical History:     Past Medical History:   Diagnosis Date     Aortic stenosis     very mild     Ying esophagus      Contact dermatitis and other eczema, due to unspecified cause      Esophageal reflux      Gout, unspecified      Incarcerated hiatal hernia 5/21/2015     Inguinal hernia without mention of obstruction or gangrene, unilateral or unspecified, (not specified as recurrent)      Polymyalgia rheumatica (H)      Umbilical hernia without mention of obstruction or gangrene      Unspecified essential hypertension      Vasomotor rhinitis              Past Surgical History:     Past Surgical History:   Procedure Laterality Date     C NONSPECIFIC PROCEDURE      T&A     C NONSPECIFIC PROCEDURE      umbilical herniorraphy     ESOPHAGOSCOPY, GASTROSCOPY, DUODENOSCOPY (EGD), COMBINED N/A 5/11/2015    Procedure: COMBINED ESOPHAGOSCOPY, GASTROSCOPY, DUODENOSCOPY (EGD);  Surgeon: Thad Ferro MD;  Location: [facility] OR     LAPAROSCOPIC ASSISTED INSERTION TUBE JEJUNOSTOMY N/A 5/14/2015    Procedure: LAPAROSCOPIC ASSISTED INSERTION TUBE JEJUNOSTOMY;  Surgeon: Thad Ferro MD;  Location: [facility] OR     LAPAROSCOPIC HERNIORRHAPHY HIATAL N/A " 5/14/2015    Procedure: LAPAROSCOPIC HERNIORRHAPHY HIATAL;  Surgeon: Thad Ferro MD;  Location: [facility] OR     LAPAROSCOPIC HERNIORRHAPHY INGUINAL Right 5/14/2015    Procedure: LAPAROSCOPIC HERNIORRHAPHY INGUINAL;  Surgeon: Thad Ferro MD;  Location: [facility] OR     PICC INSERTION Right 5/11/2015    5fr DL Power PICC, 39cm (1cm external) in the R medial brachial vein w/ tip in the low SVC.             Social History:     Social History     Tobacco Use     Smoking status: Never Smoker     Smokeless tobacco: Never Used   Substance Use Topics     Alcohol use: Yes     Comment: beer only, 3 bottles per day             Family History:     Family History   Problem Relation Age of Onset     Breast Cancer Mother      Diabetes Paternal Grandfather      Cancer Son 37        lung             Immunizations:     VACCINE/DOSE   Diptheria   DPT   DTAP   HBIG   Hepatitis A   Hepatitis B   HIB   Influenza   Measles   Meningococcal   MMR   Mumps   Pneumococcal   Polio   Rubella   Small Pox   TDAP   Varicella   Zoster             Allergies:     Allergies   Allergen Reactions     Lisinopril      rash, exacerbation of eczema             Medications:     Current Facility-Administered Medications   Medication     acetaminophen (TYLENOL) tablet 650 mg     cefTRIAXone (ROCEPHIN) 1 g vial to attach to  mL bag for ADULTS or NS 50 mL bag for PEDS     heparin ANTICOAGULANT injection 5,000 Units     lidocaine (LMX4) cream     lidocaine 1 % 0.1-1 mL     melatonin tablet 1 mg     naloxone (NARCAN) injection 0.1-0.4 mg     ondansetron (ZOFRAN-ODT) ODT tab 4 mg    Or     ondansetron (ZOFRAN) injection 4 mg     sodium chloride (PF) 0.9% PF flush 3 mL     sodium chloride (PF) 0.9% PF flush 3 mL     sodium chloride 0.9% infusion             Review of Systems:   Pt sleepy now, ROS negative on prior notes          Physical Exam:   All vitals have been reviewed  Patient Vitals for the past 24 hrs:   BP Temp Temp src Pulse Resp SpO2  Weight   09/08/20 0620 -- -- -- -- -- 95 % --   09/08/20 0615 -- -- -- -- -- 94 % --   09/08/20 0532 -- -- -- -- -- 96 % 61.7 kg (136 lb 0.4 oz)   09/08/20 0421 110/50 98.5  F (36.9  C) Axillary 73 20 96 % --   09/08/20 0308 -- -- -- -- -- 96 % --   09/08/20 0102 123/54 99.5  F (37.5  C) Axillary 72 20 99 % --   09/08/20 0015 124/68 -- -- 75 16 98 % --   09/08/20 0000 138/73 -- -- 75 -- 99 % --   09/07/20 2345 127/71 -- -- 76 -- 97 % --   09/07/20 2330 130/59 -- -- 75 -- 96 % --   09/07/20 2300 102/56 -- -- 76 -- 99 % --   09/07/20 2245 131/66 -- -- 76 -- 97 % --   09/07/20 2230 107/59 -- -- 81 -- 99 % --   09/07/20 2215 (!) 142/76 -- -- 75 -- 97 % --   09/07/20 2200 129/67 -- -- 77 -- 98 % --   09/07/20 2145 133/70 -- -- 77 -- 100 % --   09/07/20 2130 115/64 -- -- 78 -- 98 % --   09/07/20 2115 122/65 -- -- 80 -- 95 % --   09/07/20 2100 126/70 -- -- 78 -- 96 % --   09/07/20 2045 113/67 -- -- 76 -- 95 % --   09/07/20 2030 123/70 -- -- 75 -- 97 % --   09/07/20 2015 122/69 -- -- 81 -- 93 % --   09/07/20 2000 119/69 -- -- 76 -- 99 % --   09/07/20 1945 112/72 -- -- 77 -- 97 % --   09/07/20 1930 (!) 161/81 -- -- 75 -- 97 % --   09/07/20 1915 (!) 164/83 -- -- 78 -- 97 % --   09/07/20 1900 (!) 158/83 -- -- 80 -- 95 % --   09/07/20 1858 (!) 142/72 -- -- 82 -- 94 % --   09/07/20 1855 (!) 159/72 -- -- 84 -- -- --   09/07/20 1825 -- -- -- -- -- 97 % --   09/07/20 1824 -- -- -- 75 -- 96 % --   09/07/20 1803 -- -- -- -- -- -- 61.3 kg (135 lb 2.3 oz)       Intake/Output Summary (Last 24 hours) at 9/8/2020 0635  Last data filed at 9/8/2020 0613  Gross per 24 hour   Intake 1703 ml   Output 2475 ml   Net -772 ml     Neck:   skin normal and no stridor     Chest / Breast:   Nl resp effort     Abdomen:   scars noted left upper quadrant ( G-tube site), soft, non-distended, non-tender, voluntary guarding absent and no masses palpated             Data:   All laboratory data reviewed  Results for orders placed or performed during the  hospital encounter of 09/07/20   Head CT w/o contrast     Status: None    Narrative    EXAM: CT HEAD WITHOUT CONTRAST  LOCATION: [facility]  DATE/TIME: 09/07/2020, 6:31 PM    INDICATION: Head trauma, minor, GCS>=13, high clinical risk, initial exam.  COMPARISON: Head CT 06/23/2020  TECHNIQUE: Routine without IV contrast. Multiplanar reformats. Dose reduction techniques were used.    FINDINGS:  INTRACRANIAL CONTENTS: No intracranial hemorrhage, extra-axial collection, or mass effect.  No CT evidence of acute infarct. Moderate presumed chronic small vessel ischemic changes. Moderate generalized volume loss. No hydrocephalus.     VISUALIZED ORBITS/SINUSES/MASTOIDS: No intraorbital abnormality. No paranasal sinus mucosal disease. No middle ear or mastoid effusion.    BONES/SOFT TISSUES: No acute abnormality.      Impression    IMPRESSION:  1.  No CT evidence for acute intracranial process.  2.  Brain atrophy and presumed chronic microvascular ischemic changes as above.     XR Chest Port 1 View     Status: None    Narrative    EXAM: XR CHEST PORT 1 VW  LOCATION: [facility]  DATE/TIME: 9/7/2020 6:14 PM    INDICATION: Chest pain  COMPARISON: 06/23/2020      Impression    IMPRESSION: No significant change. Heart size and pulmonary vessels normal. Tortuous calcified thoracic aorta stable. Lungs are clear.   CT Aortic Survey w Contrast     Status: None    Narrative    EXAM: CT AORTIC SURVEY W CONTRAST  LOCATION: [facility]  DATE/TIME: 9/7/2020 6:31 PM    INDICATION: Syncope, elevated troponin.  COMPARISON: CT abdomen and pelvis 05/10/2015  TECHNIQUE: CT angiogram chest abdomen pelvis during arterial phase of injection of IV contrast. 2D and 3D MIP reconstructions were performed by the CT technologist. Dose reduction techniques were used.   CONTRAST: 80mL Isovue-370    FINDINGS:   CT ANGIOGRAM CHEST, ABDOMEN, AND PELVIS: The thoracic and abdominal aorta are negative for dissection.  Mild atherosclerotic disease. 3.9 x 3.3 cm infrarenal abdominal aortic aneurysm has increased in size and now with thrombus within the anterior aspect   of the aneurysm on image 121 of series 6 the inferior mesenteric artery arises immediately above the aneurysm. Mild plaque at the origins of the celiac trunk and superior mesenteric artery without significant narrowing. Plaque at the origin of the left   renal artery. Both renal arteries are small in caliber.    LUNGS AND PLEURA: Subsegmental atelectasis or scarring both lower lobes. No acute infiltrates or pleural effusions.    MEDIASTINUM/AXILLAE: No mediastinal or hilar adenopathy. The proximal esophagus is distended with air. The distal esophagus is nondistended with wall thickening.    HEPATOBILIARY: Fatty infiltration of the liver. Tiny cyst near the liver dome. Mildly dilated common bile duct is unchanged.    PANCREAS: Normal.    SPLEEN: Normal.    ADRENAL GLANDS: Normal.    KIDNEYS/BLADDER: Normal.    BOWEL: Marked gastric distention with fluid as seen on previous study. There are also numerous dilated loops of small bowel throughout the abdomen and pelvis. The distal small bowel and colon are normal caliber. Normal appendix.    LYMPH NODES: Normal.    PELVIC ORGANS: Normal.    MUSCULOSKELETAL: Normal scoliosis. Severe degenerative changes throughout the thoracolumbar spine..      Impression    IMPRESSION:  1.  No evidence for aortic dissection.  2.  3.9 x 3.3 cm partially thrombosed infrarenal abdominal aortic aneurysm.  3.  The stomach is markedly distended with fluid, along with numerous dilated, fluid-filled small bowel loops concerning for high-grade obstruction with the transition point estimated in the region of the mid ileum. Consider NG tube placement and gastric   decompression.  4.  The proximal esophagus is distended with air. The distal esophagus at the GE junction is nondistended which may account for diffuse wall thickening. This should be  reviewed on follow-up imaging to exclude neoplasm.     CBC + differential     Status: Abnormal   Result Value Ref Range    WBC 16.0 (H) 4.0 - 11.0 10e9/L    RBC Count 4.62 4.4 - 5.9 10e12/L    Hemoglobin 13.5 13.3 - 17.7 g/dL    Hematocrit 43.2 40.0 - 53.0 %    MCV 94 78 - 100 fl    MCH 29.2 26.5 - 33.0 pg    MCHC 31.3 (L) 31.5 - 36.5 g/dL    RDW 15.2 (H) 10.0 - 15.0 %    Platelet Count 222 150 - 450 10e9/L    Diff Method Automated Method     % Neutrophils 92.9 %    % Lymphocytes 3.9 %    % Monocytes 2.7 %    % Eosinophils 0.0 %    % Basophils 0.2 %    % Immature Granulocytes 0.3 %    Nucleated RBCs 0 0 /100    Absolute Neutrophil 14.9 (H) 1.6 - 8.3 10e9/L    Absolute Lymphocytes 0.6 (L) 0.8 - 5.3 10e9/L    Absolute Monocytes 0.4 0.0 - 1.3 10e9/L    Absolute Eosinophils 0.0 0.0 - 0.7 10e9/L    Absolute Basophils 0.0 0.0 - 0.2 10e9/L    Abs Immature Granulocytes 0.0 0 - 0.4 10e9/L    Absolute Nucleated RBC 0.0    Comprehensive metabolic panel     Status: Abnormal   Result Value Ref Range    Sodium 139 133 - 144 mmol/L    Potassium 4.9 3.4 - 5.3 mmol/L    Chloride 104 94 - 109 mmol/L    Carbon Dioxide 28 20 - 32 mmol/L    Anion Gap 7 3 - 14 mmol/L    Glucose 140 (H) 70 - 99 mg/dL    Urea Nitrogen 55 (H) 7 - 30 mg/dL    Creatinine 2.33 (H) 0.66 - 1.25 mg/dL    GFR Estimate 24 (L) >60 mL/min/[1.73_m2]    GFR Estimate If Black 28 (L) >60 mL/min/[1.73_m2]    Calcium 9.5 8.5 - 10.1 mg/dL    Bilirubin Total 0.5 0.2 - 1.3 mg/dL    Albumin 4.4 3.4 - 5.0 g/dL    Protein Total 8.3 6.8 - 8.8 g/dL    Alkaline Phosphatase 78 40 - 150 U/L    ALT 10 0 - 70 U/L    AST 23 0 - 45 U/L   Troponin I     Status: None   Result Value Ref Range    Troponin I ES <0.015 0.000 - 0.045 ug/L   UA with Microscopic     Status: Abnormal   Result Value Ref Range    Color Urine Yellow     Appearance Urine Slightly Cloudy     Glucose Urine Negative NEG^Negative mg/dL    Bilirubin Urine Negative NEG^Negative    Ketones Urine 10 (A) NEG^Negative mg/dL     Specific Gravity Urine 1.025 1.003 - 1.035    Blood Urine Negative NEG^Negative    pH Urine 5.0 5.0 - 7.0 pH    Protein Albumin Urine 30 (A) NEG^Negative mg/dL    Urobilinogen mg/dL 2.0 0.0 - 2.0 mg/dL    Nitrite Urine Negative NEG^Negative    Leukocyte Esterase Urine Moderate (A) NEG^Negative    Source Catheterized Urine     WBC Urine 10 (H) 0 - 5 /HPF    RBC Urine 1 0 - 2 /HPF    Bacteria Urine Many (A) NEG^Negative /HPF    Mucous Urine Present (A) NEG^Negative /LPF    Hyaline Casts 3 (H) 0 - 2 /LPF   CK total     Status: None   Result Value Ref Range    CK Total 44 30 - 300 U/L   Creatinine POCT     Status: Abnormal   Result Value Ref Range    Creatinine 2.4 (H) 0.66 - 1.25 mg/dL    GFR Estimate 26 (L) >60 mL/min/[1.73_m2]    GFR Estimate If Black 31 (L) >60 mL/min/[1.73_m2]   INR     Status: None   Result Value Ref Range    INR 1.05 0.86 - 1.14   Partial thromboplastin time     Status: None   Result Value Ref Range    PTT 30 22 - 37 sec   Asymptomatic COVID-19 Virus (Coronavirus) by PCR     Status: None    Specimen: Nasopharyngeal   Result Value Ref Range    COVID-19 Virus PCR to [facility] - Source Nasopharyngeal     COVID-19 Virus PCR to [facility] - Result       Test received-See reflex to IDDL test SARS CoV2 (COVID-19) Virus RT-PCR   EKG 12 lead     Status: None (Preliminary result)   Result Value Ref Range    Interpretation ECG Click View Image link to view waveform and result    EKG 12 lead     Status: None (Preliminary result)   Result Value Ref Range    Interpretation ECG Click View Image link to view waveform and result    EKG 12 lead     Status: None (Preliminary result)   Result Value Ref Range    Interpretation ECG Click View Image link to view waveform and result    Troponin POCT     Status: None   Result Value Ref Range    Troponin I 0.01 0.00 - 0.08 ug/L   ABO/Rh type and screen     Status: None   Result Value Ref Range    ABO A     RH(D) Pos     Antibody Screen Neg     Test Valid Only At [facility]        Specimen Expires 09/10/2020    Urine Culture Aerobic Bacterial     Status: None (Preliminary result)    Specimen: Catheterized Urine   Result Value Ref Range    Specimen Description Catheterized Urine     Special Requests Specimen received in preservative     Culture Micro PENDING         Attestation:  I have reviewed today's vital signs, notes, medications, labs and imaging.  Amount of time performed on this consult: 60 minutes.    Michael Obando MD

## 2020-09-08 NOTE — PROGRESS NOTES
Interventional Cardiology    Asked to evaluate patient via phone with ER at [facility] regarding EKG changes noted and potential role for emergent coronary angiography.      Patient is an 85-year-old gentleman who lives in a nursing home.  He has Parkinson's disease, chronic hypotension on Midodrone, hypertension, PMR, gout who apparently was found down after an unwitnessed fall for a unknown period of time.  EKG demonstrates sinus rhythm with subtle inferior changes suggestive of inferior MI however, patient is not complaining of any chest pain.   A review of his chart demonstrates many episodes of hypotension and unsteady gait and falls with a recent admission in June 2020.  His most recent echocardiogram is from 2013 at which time he had preserved LV systolic function with mild aortic stenosis noted.  Patient had a aortic survey due to his unclear symptomology and presentation and this was notable for a infrarenal AAA with thrombus as well as likely small bowel obstruction with multiple loops noted.  Please see that report for additional details.  He received contrast as a result of obtaining this study which was per report 80 cc.  Lab work is notable for normal troponin.  However his creatinine is significantly elevated.  He had a normal creatinine in July 2020 and it is currently 2.4.  He also had an admission earlier this year with anemia with a hemoglobin of 6.8, current hemoglobin is stable.  There is also some cognitive issues however patient is in charge of his care.  There are some family in [place] which the ER team is trying to contact.  We are asked from interventional cardiology to comment whether patient should go to Cath Lab.  We have reviewed his clinical status and given the fact that he is currently chest pain-free, has normal vital signs including oxygen saturations, normal chest x-ray, has acute renal failure, and has a small bowel obstruction we are electing to proceed with conservative  therapy.      Imaging studies:    CXR:                                                                   IMPRESSION: No significant change. Heart size and pulmonary vessels normal. Tortuous calcified thoracic aorta stable. Lungs are clear.    CT    FINDINGS:   CT ANGIOGRAM CHEST, ABDOMEN, AND PELVIS: The thoracic and abdominal aorta are negative for dissection. Mild atherosclerotic disease. 3.9 x 3.3 cm infrarenal abdominal aortic aneurysm has increased in size and now with thrombus within the anterior aspect   of the aneurysm on image 121 of series 6 the inferior mesenteric artery arises immediately above the aneurysm. Mild plaque at the origins of the celiac trunk and superior mesenteric artery without significant narrowing. Plaque at the origin of the left   renal artery. Both renal arteries are small in caliber.     LUNGS AND PLEURA: Subsegmental atelectasis or scarring both lower lobes. No acute infiltrates or pleural effusions.     MEDIASTINUM/AXILLAE: No mediastinal or hilar adenopathy. The proximal esophagus is distended with air. The distal esophagus is nondistended with wall thickening.     HEPATOBILIARY: Fatty infiltration of the liver. Tiny cyst near the liver dome. Mildly dilated common bile duct is unchanged.     PANCREAS: Normal.     SPLEEN: Normal.     ADRENAL GLANDS: Normal.     KIDNEYS/BLADDER: Normal.     BOWEL: Marked gastric distention with fluid as seen on previous study. There are also numerous dilated loops of small bowel throughout the abdomen and pelvis. The distal small bowel and colon are normal caliber. Normal appendix.     LYMPH NODES: Normal.     PELVIC ORGANS: Normal.     MUSCULOSKELETAL: Normal scoliosis. Severe degenerative changes throughout the thoracolumbar spine..                                                                      IMPRESSION:  1.  No evidence for aortic dissection.  2.  3.9 x 3.3 cm partially thrombosed infrarenal abdominal aortic aneurysm.  3.  The stomach is  markedly distended with fluid, along with numerous dilated, fluid-filled small bowel loops concerning for high-grade obstruction with the transition point estimated in the region of the mid ileum. Consider NG tube placement and gastric   decompression.  4.  The proximal esophagus is distended with air. The distal esophagus at the GE junction is nondistended which may account for diffuse wall thickening. This should be reviewed on follow-up imaging to exclude neoplasm.        Recommendations    1.  Echocardiogram has been ordered, will evaluate for ejection fraction, wall motion abilities, and the status of the previously described aortic stenosis which was evaluated in 2013    2.  Agree with NG tube placement for decompression of his stomach.    3.  May need IV fluids given his acute renal failure, unclear how long he was found down.  He may be prerenal as a result.    4.  Given his overall medical status I feel that coronary angiography with resulting additional contrast in a asymptomatic patient who may not be able to antiplatelet therapy orally due to bowel obstruction is prohibitively high risk.  Would pursue medical therapy with aspirin, heparin if no obvious contraindications and NG tube decompression.    5.  Reassessment can be made after additional data is obtained.    6.  Case was discussed with my ER colleagues and jointly decision made in his care.        Thank you kindly for involving us in this gentleman's care.        Greater than 30 minutes critical care time coordinating his care with [place] fellow, ER team and reviewing chart.        Adrienne Bills MD

## 2020-09-08 NOTE — ED NOTES
I spoke to Ofelia, the patient's healthcare agent. She was updated on patient's status and confirmed that the patient is full code

## 2020-09-08 NOTE — H&P
[facility]    History and Physical  Hospitalist       Date of Admission:  9/7/2020    Assessment & Plan   Chilango Echevarria is a 85 year old male who presents with syncopal episodes at his nursing home. Pt was admitted from 6/22/20 - 6/26/20 to [facility] after an unwitnessed fall and diagnosed with orthostatic hypotension.  Pt was also found to be anemic at the time and transfused PRBC.  Pt was discharged to SNF for further rehab.    Syncope  - Check echo  - IV fluids - 0.9% @ 125cc/hr  - Telemetry  - Consult Cardiology  - Repeat troponin    Small Bowel Obstruction  - Continue IVF as noted above  - Nasogastric tube to suction, with relief of symptoms  - Consult General Surgery  - Discussed with DPOA and daughter-in-law plan of care.  She states she does not want any aggressive intervention for the pt.  She is aware that the patient is very ill and states that he has been going through several rehabs and has been deteriorating over the last several months.  She speak to him on the phone every now and again but she states he has had hearing issues that are getting worse.    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    Kei Sheets DO    Primary Care Physician   Enrique Ring    Chief Complaint   Syncope    Unable to obtain a history from the patient due to mental status    History of Present  Illness   Chilango Echevarria is a 85 year old male who presents with syncopal episodes reported by staff at [facility].  Per the nursing home staff, the patient had a unwitnessed fall last night around 11 PM.  The staff put the patient back in bed.  The following morning the patient was noted to have 2 syncopal events while in his wheelchair.  The staff noted his blood pressure to be low and so gave him an extra dose of his Midodrine.  The patient was then helped to the bathroom and was noted to have a another syncopal episode.  At that time the patient's primary doctor was notified and instructed to bring the patient to the emergency department.  Upon arrival the patient did complain of intermittent abdominal pain for the past 2 weeks.  The patient was seen in the emergency department with his friend Abena at bedside.  Patient was not able to provide much information regarding why he was at the hospital.  History was obtained from emergency room physician as well as nursing home staff and the patient's ALDO Gilbert via phone.    Past Medical History    I have reviewed this patient's medical history and updated it with pertinent information if needed.   Past Medical History:   Diagnosis Date     Aortic stenosis     very mild     Ying esophagus      Contact dermatitis and other eczema, due to unspecified cause      Esophageal reflux      Gout, unspecified      Incarcerated hiatal hernia 5/21/2015     Inguinal hernia without mention of obstruction or gangrene, unilateral or unspecified, (not specified as recurrent)      Polymyalgia rheumatica (H)      Umbilical hernia without mention of obstruction or gangrene      Unspecified essential hypertension      Vasomotor rhinitis        Past Surgical History   I have reviewed this patient's surgical history and updated it with pertinent information if needed.  Past Surgical History:   Procedure Laterality Date     C NONSPECIFIC PROCEDURE      T&A     C  NONSPECIFIC PROCEDURE      umbilical herniorraphy     ESOPHAGOSCOPY, GASTROSCOPY, DUODENOSCOPY (EGD), COMBINED N/A 5/11/2015    Procedure: COMBINED ESOPHAGOSCOPY, GASTROSCOPY, DUODENOSCOPY (EGD);  Surgeon: Thad Ferro MD;  Location: [facility] OR     LAPAROSCOPIC ASSISTED INSERTION TUBE JEJUNOSTOMY N/A 5/14/2015    Procedure: LAPAROSCOPIC ASSISTED INSERTION TUBE JEJUNOSTOMY;  Surgeon: Thad Ferro MD;  Location: [facility] OR     LAPAROSCOPIC HERNIORRHAPHY HIATAL N/A 5/14/2015    Procedure: LAPAROSCOPIC HERNIORRHAPHY HIATAL;  Surgeon: Thad Ferro MD;  Location: [facility] OR     LAPAROSCOPIC HERNIORRHAPHY INGUINAL Right 5/14/2015    Procedure: LAPAROSCOPIC HERNIORRHAPHY INGUINAL;  Surgeon: Thad Ferro MD;  Location: [facility] OR     PICC INSERTION Right 5/11/2015    5fr DL Power PICC, 39cm (1cm external) in the R medial brachial vein w/ tip in the low SVC.       Prior to Admission Medications   Prior to Admission Medications   Prescriptions Last Dose Informant Patient Reported? Taking?   acetaminophen (TYLENOL) 325 MG tablet 9/7/2020 at x1  Yes Yes   Sig: Take 650 mg by mouth 2 times daily & 2 x daily as needed   allopurinol (ZYLOPRIM) 100 MG tablet 9/7/2020 at Unknown time  No Yes   Sig: Take 1 tablet (100 mg) by mouth daily   carbidopa-levodopa (SINEMET)  MG per tablet 9/7/2020 at x2  Yes Yes   Sig: Take 2 tablets by mouth 3 times daily (9am - 12pm - 6pm)   diclofenac (VOLTAREN) 1 % topical gel 9/7/2020 at x1  Yes Yes   Sig: Place 2 g onto the skin 2 times daily   melatonin 3 MG CAPS 9/6/2020 at Unknown time  Yes Yes   Sig: Take 3 mg by mouth At Bedtime    midodrine (PROAMATINE) 5 MG tablet 9/7/2020 at x2  Yes Yes   Sig: Take 10 mg by mouth 2 times daily 8AM & NOON   nystatin (MYCOSTATIN) 567565 UNIT/GM external powder 9/7/2020 at x1  Yes Yes   Sig: Apply topically 2 times daily Apply to groin area as needed for redness/rash   omeprazole (PRILOSEC) 20 MG DR capsule 9/7/2020  No Yes    Sig: TAKE 1 CAPSULE BY MOUTH 30 TO 60 MINUTES BEFORE FIRST MEAL OF THE DAY   polyethylene glycol (MIRALAX) 17 g packet 9/7/2020 at x1  Yes Yes   Sig: Take 1 packet by mouth 2 times daily      Facility-Administered Medications: None     Allergies   Allergies   Allergen Reactions     Lisinopril      rash, exacerbation of eczema       Social History   I have reviewed this patient's social history and updated it with pertinent information if needed. Chilango Echevarria  reports that he has never smoked. He has never used smokeless tobacco. He reports current alcohol use. He reports that he does not use drugs.    Family History   I have reviewed this patient's family history and updated it with pertinent information if needed.   Family History   Problem Relation Age of Onset     Breast Cancer Mother      Diabetes Paternal Grandfather      Cancer Son 37        lung       Review of Systems   The 10 point Review of Systems is negative other than noted in the HPI or here.     Physical Exam       BP: 113/67 Pulse: 76     SpO2: 95 % O2 Device: Nasal cannula Oxygen Delivery: 3 LPM  Vital Signs with Ranges  Pulse:  [75-84] 76  BP: (112-164)/(67-83) 113/67  SpO2:  [93 %-99 %] 95 %  135 lbs 2.27 oz    Constitutional: Awake, alert, cooperative, no apparent distress, confused  Eyes: Conjunctiva and pupils examined and normal.  HEENT: Moist mucous membranes, normal dentition.  Respiratory: Clear to auscultation bilaterally, no crackles or wheezing.  Cardiovascular: Regular rate and rhythm, normal S1 and S2, and no murmur noted.  GI: Soft, non-distended, non-tender, normal bowel sounds, NG tube in place  Lymph/Hematologic: No anterior cervical or supraclavicular adenopathy.  Skin: No rashes, no cyanosis, no edema.  Musculoskeletal: No joint swelling, erythema or tenderness.  Neurologic: Cranial nerves 2-12 intact, normal strength and sensation.  Psychiatric: Alert, oriented to person, place and time, no obvious anxiety or depression.      Data   Recent Labs   Lab 09/07/20  1818 09/07/20  1817 09/02/20  0741   WBC 16.0*  --  4.6   HGB 13.5  --  11.6*   MCV 94  --  93     --  200   INR 1.05  --   --      --  143   POTASSIUM 4.9  --  3.6   CHLORIDE 104  --  109   CO2 28  --  32   BUN 55*  --  34*   CR 2.33*  --  0.76   ANIONGAP 7  --  2*   MESERET 9.5  --  8.8   *  --  81   ALBUMIN 4.4  --   --    PROTTOTAL 8.3  --   --    BILITOTAL 0.5  --   --    ALKPHOS 78  --   --    ALT 10  --   --    AST 23  --   --    TROPI <0.015  --   --    TROPONIN  --  0.01  --        Recent Results (from the past 24 hour(s))   XR Chest Port 1 View    Narrative    EXAM: XR CHEST PORT 1 VW  LOCATION: [facility]  DATE/TIME: 9/7/2020 6:14 PM    INDICATION: Chest pain  COMPARISON: 06/23/2020      Impression    IMPRESSION: No significant change. Heart size and pulmonary vessels normal. Tortuous calcified thoracic aorta stable. Lungs are clear.   Head CT w/o contrast    Narrative    EXAM: CT HEAD WITHOUT CONTRAST  LOCATION: [facility]  DATE/TIME: 09/07/2020, 6:31 PM    INDICATION: Head trauma, minor, GCS>=13, high clinical risk, initial exam.  COMPARISON: Head CT 06/23/2020  TECHNIQUE: Routine without IV contrast. Multiplanar reformats. Dose reduction techniques were used.    FINDINGS:  INTRACRANIAL CONTENTS: No intracranial hemorrhage, extra-axial collection, or mass effect.  No CT evidence of acute infarct. Moderate presumed chronic small vessel ischemic changes. Moderate generalized volume loss. No hydrocephalus.     VISUALIZED ORBITS/SINUSES/MASTOIDS: No intraorbital abnormality. No paranasal sinus mucosal disease. No middle ear or mastoid effusion.    BONES/SOFT TISSUES: No acute abnormality.      Impression    IMPRESSION:  1.  No CT evidence for acute intracranial process.  2.  Brain atrophy and presumed chronic microvascular ischemic changes as above.     CT Aortic Survey w Contrast    Narrative    EXAM: CT AORTIC SURVEY W  CONTRAST  LOCATION: [facility]  DATE/TIME: 9/7/2020 6:31 PM    INDICATION: Syncope, elevated troponin.  COMPARISON: CT abdomen and pelvis 05/10/2015  TECHNIQUE: CT angiogram chest abdomen pelvis during arterial phase of injection of IV contrast. 2D and 3D MIP reconstructions were performed by the CT technologist. Dose reduction techniques were used.   CONTRAST: 80mL Isovue-370    FINDINGS:   CT ANGIOGRAM CHEST, ABDOMEN, AND PELVIS: The thoracic and abdominal aorta are negative for dissection. Mild atherosclerotic disease. 3.9 x 3.3 cm infrarenal abdominal aortic aneurysm has increased in size and now with thrombus within the anterior aspect   of the aneurysm on image 121 of series 6 the inferior mesenteric artery arises immediately above the aneurysm. Mild plaque at the origins of the celiac trunk and superior mesenteric artery without significant narrowing. Plaque at the origin of the left   renal artery. Both renal arteries are small in caliber.    LUNGS AND PLEURA: Subsegmental atelectasis or scarring both lower lobes. No acute infiltrates or pleural effusions.    MEDIASTINUM/AXILLAE: No mediastinal or hilar adenopathy. The proximal esophagus is distended with air. The distal esophagus is nondistended with wall thickening.    HEPATOBILIARY: Fatty infiltration of the liver. Tiny cyst near the liver dome. Mildly dilated common bile duct is unchanged.    PANCREAS: Normal.    SPLEEN: Normal.    ADRENAL GLANDS: Normal.    KIDNEYS/BLADDER: Normal.    BOWEL: Marked gastric distention with fluid as seen on previous study. There are also numerous dilated loops of small bowel throughout the abdomen and pelvis. The distal small bowel and colon are normal caliber. Normal appendix.    LYMPH NODES: Normal.    PELVIC ORGANS: Normal.    MUSCULOSKELETAL: Normal scoliosis. Severe degenerative changes throughout the thoracolumbar spine..      Impression    IMPRESSION:  1.  No evidence for aortic dissection.  2.  3.9 x  3.3 cm partially thrombosed infrarenal abdominal aortic aneurysm.  3.  The stomach is markedly distended with fluid, along with numerous dilated, fluid-filled small bowel loops concerning for high-grade obstruction with the transition point estimated in the region of the mid ileum. Consider NG tube placement and gastric   decompression.  4.  The proximal esophagus is distended with air. The distal esophagus at the GE junction is nondistended which may account for diffuse wall thickening. This should be reviewed on follow-up imaging to exclude neoplasm.

## 2020-09-08 NOTE — PLAN OF CARE
Code status: DNR/DNI  COVID-19 result: Pending     Pertinent assessment: Pt disoriented x4 and lethargic. Slow to respond to commands. Tele: SR. LS: clear, equal bilaterally. GI: bowel sounds hypoactive; NG tube in place on low intermittent suction. Thick tan/yellow output. : incontinent. Skin: Blanchable redness on coccyx and spine; Skin tear on L elbow; foam dressing in place. Up Ax2 with mechanical lift. NPO. Sitter at bedside. PCDs on. NS infusing @ 125mL/hr.     Treatment plan: Echo in AM. Continue with IVF. Monitor tele. Cardiology following. See notes. Continue with POC.   Discharge dispo: TBD

## 2020-09-08 NOTE — PROGRESS NOTES
Second troponin undetectable, echocardiogram showed normal LVEF mild aortic valve stenosis.  No new cardiac recommendations.    Cardiology will sign off.  Please feel free to call with any questions.

## 2020-09-09 NOTE — PLAN OF CARE
VSS on RA. A/Ox4, forgetful. Tele: SR. IVF@75mL/hr. NPO with ice. NG to intermittent suction, thick brown output. General Surgery/Palliative following.Pt c/o heartburn IV Protonix given per order with little result. Discharge plan TBD. Will continue to monitor and POC.

## 2020-09-09 NOTE — PROGRESS NOTES
"    [facility]  Hospitalist Progress Note  Kai Guerrero MD 2020    Reason for Stay (Diagnosis): syncope         Assessment and Plan:      Summary of Stay: Chilango Echevarria is a 85 year old male who returned to the emergency department on 2020 after having been found in his nursing home on the floor on the night of 2020.  His fall was unwitnessed but fortunately appears to not have injured himself significantly.      Mr. Echevarria again fell on the morning of the date of admission when he was gotten back to bed, he was noted to be significantly hypotensive.  Because this is not an unusual finding for this patient, they did not send him directly to the emergency department.  However, because he became \"unresponsive\" and may have had some weakness on his left side, the patient was directed to the emergency department.    Patient social history is challenging.  He is a  and his only son  18 years ago and his daughter-in-law resides with the patient's only grandchild in [place].  Mr. Echevarria is really alone in the world with attentive friends including Abena and her .    [phone] and she was able to give me additional information on the patient. Ultimately, I spoke with the patient's daughter-in-law who told me that Mr. Echevarria has been declining since about New Year's.  He entered independent living at that time and quickly deteriorated to the point where he needed to be in assisted living.  She has seen him become more confused and more paranoid, less active and more withdrawn as the year has gone by.  Of course the COVID epidemic is not helping anything.  The patient's close friends Abena and her  (last name?)  See him about twice a week and it endorsed the same history.    I note that when the patient first arrived in the emergency department, there was concern for an ST elevation " "MI.  Cardiology was involved and it was deemed inappropriate to reflexively move to angiography.  Further concern arose with the patient who was noted to have gastric distention possibly indicating a small bowel wound obstruction.  General surgery was also consulted at that time and an NG was recommended.    Overall, it is my impression the patient is declining significantly and he is now facing multiple issues.  He does have Parkinson's disease and along with that apparently is developing some hallucinations and confusion which his closest advocates characterize as \"paranoid\".  It is with this in mind that I have talked with the family and friends and recommended a concerted conversation about goals of care with the likelihood that we would end up with a hospice solution for Mr. Echevarria.    Problem List:   1. Syncope, recurrent.    2. Possible SBO v ileus.  NG was placed in the emergency department, now on clamping trial.  3. TERRELL.  4. 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  5. Possible urinary tract infection for which the patient is on ceftriaxone.  6. General decline with obvious malnutrition.    Plan:  1.  We need to discuss goals of care.  To this end, I have spoken with everyone involved and asked for palliative care to see the patient.  There is no urgent or emergent indication right now to decide on surgery or angiography.  2.  Note that I rechecked a troponin this morning and that is normal.  Echocardiogram also shows no evident wall motion abnormalities for which reason cardiology has now signed off.  3.  Continue empiric treatment of urinary tract infection.   4.  Resume Sinemet.  Other medications can be held or given IV.  5.  Gen Surgery has recommended clamping trial of NG despite quite voluminous output.     DVT Prophylaxis: Heparin SQ  Code Status: DNR / DNI.  This was clarified by Dr. Gramajo in the " emergency department last night and I again confirmed today with the patient's daughter-in-law who is his medical power of .  Discharge Dispo: To be determined  Estimated Disch Date / # of Days until Disch: To be determined        Interval History (Subjective):      Nursing notified me that the patient's NG output was feculent smelling.     Pt was interactive today, but uncomfortable.                   Physical Exam:      Last Vital Signs:  BP (!) 162/72 (BP Location: Left arm)   Pulse 74   Temp 97.9  F (36.6  C) (Oral)   Resp 16   Wt 61.7 kg (136 lb 0.4 oz)   SpO2 98%   BMI 22.64 kg/m      I/O last 3 completed shifts:  In: 2514 [I.V.:2474; NG/GT:40]  Out: 1530 [Urine:970; Emesis/NG output:560]    Constitutional: Awake, alert, cooperative, no apparent distress.  Very cachectic.  Nasogastric tube is in place with only a trace amount of yellow clear fluid out in the 3 hours since it had been recorded.   Respiratory: Clear to auscultation bilaterally, no crackles or wheezing   Cardiovascular: Regular rate and rhythm, normal S1 and S2, and no murmur noted   Abdomen: Soft, non-distended, non-tender   Skin: No rashes, no cyanosis, dry to touch   Neuro: Alert and seems appropriate to the situation.  Oriented to self, place and time.   Extremities: No edema.  Tone is normal, bulk is decreased.   Other(s):        All other systems: Negative          Medications:      All current medications were reviewed with changes reflected in problem list.         Data:      All new lab and imaging data was reviewed.   Labs/Imaging:  Results for orders placed or performed during the hospital encounter of 09/07/20 (from the past 24 hour(s))   XR Abdomen Port 1 View    Narrative    EXAM: ABDOMEN SINGLE VIEW PORTABLE  LOCATION: [facility]  DATE/TIME: 9/8/2020 10:10 PM    INDICATION: Tube placement  COMPARISON: 09/07/2020- Chest radiograph.      Impression    IMPRESSION: An enteric drainage tube is present with  distal tip in the gastric body.    XR Chest Port 1 View    Narrative    EXAM: CHEST SINGLE VIEW PORTABLE  LOCATION: [facility]  DATE/TIME: 9/8/2020 10:10 PM    INDICATION: Crackles.  COMPARISON: 09/07/2020 at 1822 hours.    FINDINGS: The lungs are clear. Normal size cardiac silhouette. Atherosclerotic calcification in the thoracic aorta. Interval placement of an enteric drainage tube distal tube tip in the gastric body.      Impression    IMPRESSION:   1. No evidence of active cardiopulmonary disease.  2. Interval placement of an enteric drainage tube with distal tip in the gastric body.    XR Abdomen 2 Views    Narrative    ABDOMEN TWO VIEWS 9/9/2020 8:36 AM     HISTORY: Small bowel obstruction.    COMPARISON: 9/8/2020      Impression    IMPRESSION: Nasogastric tube unchanged. A few mildly dilated bowel  loops with air-fluid levels are present, grossly similar to prior. No  evidence of free air, abnormal calculus, or organomegaly.    TAM BOWDEN MD

## 2020-09-09 NOTE — PROGRESS NOTES
VSS w/ ex temp at HS- pt received PRN tylenol supp. Alert and oriented x 4- speech garbled. NG tube having scant output- flushed w/ 40 ml water then had adequate output. Repositioned x 1. Voided 300 ml. PIV running NS 75 ml/ hr. Tele SR. NPO. Discharge pending.

## 2020-09-09 NOTE — PROGRESS NOTES
[facility]   General Surgery Progress Note           Assessment and Plan:   Assessment:   Admission for syncope  SBO on CT  AXR today showing mildly dilated bowel loops      Plan:   -trial clamp NGT, record residuals  -tylenol available for pain  -Rocephin for UTI  -syncope workup per hospitalist  -continue conservative management of SBO         Interval History:   Comfortable in bed, no c/o.  Denies abd pain or bloating.  + occasional nausea. + slight appetite, but less than baseline. Denies passing flatus.  Last BM was several days ago.         Physical Exam:   Blood pressure (!) 156/73, pulse 75, temperature 97.4  F (36.3  C), temperature source Oral, resp. rate 16, weight 61.7 kg (136 lb 0.4 oz), SpO2 98 %.    I/O last 3 completed shifts:  In: 1418 [I.V.:1378; NG/GT:40]  Out: 1310 [Urine:750; Emesis/NG output:560]    Abdomen:   soft, non-distended, non-tender and hypoactive bowel sounds             Data:     Recent Labs   Lab 09/08/20  0726 09/07/20  1818   WBC 13.7* 16.0*   HGB 11.8* 13.5   HCT 38.0* 43.2   MCV 94 94    222       Sarah Manning PA-C     Seen and agree. No obvious signs of bowel function, moderate NG output on residual after clamping trial. Continue POC  Amber Mcmahon MD

## 2020-09-09 NOTE — PROGRESS NOTES
"SPIRITUAL HEALTH SERVICES Progress Note  [facility] 3    Spiritual Health visit per Palliative Care team consult order for family support.    Spoke with April by telephone in [place].  Oriented to Spiritual Health and availability of  by phone for support.  Reported to her that this writer has stopped in to see pt and pt has been sleeping comfortably.  She states \"there doesn't seem like there is much we can do but pray\". Pt is Mormon.    Offered silent bedside prayer and April welcomed that on Bill's behalf.  April expressed gratitude for cares and will reach out as needed for SHS.    Plan: Spiritual Health Services remains available for additional emotional/spiritual support.    Fabiano Helms MA  Staff   Pager: [phone]  Phone: [phone]         "

## 2020-09-09 NOTE — PROVIDER NOTIFICATION
This writer noted coarse crackles upon assessing lungs- MD notified. IV fluids decreased to 75 ml/hr. Chest and abdominal x-ray ordered and performed. Will continue to monitor.

## 2020-09-09 NOTE — PLAN OF CARE
Code status: DNR/DNI  COVID-19 result: Negative     Pertinent assessment: Pt disoriented x3 and lethargic. Tele: SR. LS: clear, equal bilaterally. GI: bowel sounds hypoactive; NG tube in place on low intermittent suction w/ brown output. : incontinent at times; also uses urinal at bedside. Blood noted post void. Skin: Blanchable redness on coccyx and spine; Skin tear on L elbow; foam dressing in place. Up Ax2 with mechanical lift. NPO. Sitter at bedside. NS infusing @ 75 mL/hr. IV zofran given for nausea w/ relief.      Treatment plan: Continue with IVF. Monitor tele. Cardiology, surgery, SW and P/P following. See notes. Continue with POC.   Discharge dispo: TBD

## 2020-09-09 NOTE — PLAN OF CARE
Pt. O x 4. Denied pain, nausea, and SOB. Complained of heartburn and received IV pantoprazole. Hypoactive bowel sounds. NG clamped for 4 hours. Total shift NG output 300 ml. Lethargic. Incontinent of urine at times. New IV fluid order, see MAR. Started PCDs. Reposition every two hours.  Tele, SR.

## 2020-09-10 NOTE — PLAN OF CARE
Patient is A/O x 4. Shows confusion of situation at times. NG clamped. New orders to sit-up in chair x 2. Denies pain, SOB, and nausea. Bowel sounds hypoactive. NPO. IVF infusing at 75ml/hr. Discharge date pending. Tele: SR, 2nd degree heart block runs. Continue to monitor and follow POC.

## 2020-09-10 NOTE — PLAN OF CARE
Code status: DNR/DNI  COVID-19 result: Negative     Pertinent assessment: A&Ox1; only oriented to self. VSS. Denies pain. Tele: SR. LS: clear, diminished. GI: BS hypoactive. : incontinent at times. Skin: bruised; skin tear on L elbow; new foam dressing in place. Blanchable redness on coccyx and spine. Repositioned Q2H. D5 1/2NS +K infusing @ 75 mL/hr. Up Ax2 w/ lift. NPO except for meds. NG to low intermittent suction.     Treatment plan: Continue with IVF.   Discharge dispo: TBD

## 2020-09-10 NOTE — PROGRESS NOTES
"[facility]  Hospitalist Progress Note  Kai Guerrero MD 09/10/2020    Reason for Stay (Diagnosis): syncope         Assessment and Plan:      Summary of Stay: Chilango Echevarria is a 85 year old male who returned to the emergency department on 2020 after having been found in his nursing home on the floor on the night of 2020.  His fall was unwitnessed but fortunately appears to not have injured himself significantly.      Mr. Echevarria again fell on the morning of the date of admission when he was gotten back to bed, he was noted to be significantly hypotensive.  Because this is not an unusual finding for this patient, they did not send him directly to the emergency department.  However, because he became \"unresponsive\" and may have had some weakness on his left side, the patient was directed to the emergency department.    Patient social history is challenging.  He is a  and his only son  18 years ago and his daughter-in-law resides with the patient's only grandchild in [place].  Mr. Echevarria is really alone in the world with attentive friends including Abena and her .    [phone] and she was able to give me additional information on the patient. Ultimately, I spoke with the patient's daughter-in-law who told me that Mr. Echevarria has been declining since about New Year's.  He entered independent living at that time and quickly deteriorated to the point where he needed to be in assisted living.  She has seen him become more confused and more paranoid, less active and more withdrawn as the year has gone by.  Of course the COVID epidemic is not helping anything.  The patient's close friends Abena and her  (last name?)  See him about twice a week and it endorsed the same history.    I note that when the patient first arrived in the emergency department, there was concern for an ST elevation MI.  " "Cardiology was involved and it was deemed inappropriate to reflexively move to angiography.  Further concern arose with the patient who was noted to have gastric distention possibly indicating a small bowel wound obstruction.  General surgery was also consulted at that time and an NG was recommended.    Overall, it is my impression the patient is declining significantly and he is now facing multiple issues.  He does have Parkinson's disease and along with that apparently is developing some hallucinations and confusion which his closest advocates characterize as \"paranoid\".  It is with this in mind that I have talked with the family and friends and recommended a concerted conversation about goals of care with the likelihood that we would end up with a hospice solution for Mr. Echevarria.    Problem List:   1. Syncope, recurrent.  Echo shows normal function (limited study).  Carotid ultrasound is negative.  No obvious dysrhythmias noted on telemetry.  2. Possible SBO v ileus.  NG was placed in the emergency department.  Appears nbow to have tolerated clamping trial.  Passing small amount of flatus.  3. TERRELL.  4. 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  5. Urinary tract infection for which the patient is on ceftriaxone empirically.  UC growing 2 strains of E coli, sens pending.  6. General decline with obvious malnutrition.    Plan:  1.  On the day following admission, I spoke with the patient's friends and with his daughter.  To this end, I have spoken with everyone involved and asked for palliative care to see the patient.  There is no urgent or emergent indication right now to decide on surgery or angiography.  I had initially thought that the patient should enter hospice though at this point he may be able to participate in decision-making.  2.  Note that I rechecked a troponin that remains normal.  Echocardiogram also " shows no evident wall motion abnormalities for which reason cardiology has now signed off.   3.  Continue empiric treatment of urinary tract infection.   4.  Resumed Sinemet.  Other medications can be held or given IV.  5.  Did well with clamping trial, but there was inadequate recording of gastrointestinal output overnight.  Surgery then put the patient back on low intermittent suction.  At this time, as of 2:30 PM, I put the patient back on clamping trial.  We will check an x-ray in the morning.  I note that the patient is not reliable to report whether he is passing gas.  He does not recall reliably.        DVT Prophylaxis: Heparin SQ  Code Status: DNR / DNI.  This was clarified by Dr. Gramajo in the emergency department last night and I again confirmed today with the patient's daughter-in-law who is his medical power of .  Discharge Dispo: To be determined  Estimated Disch Date / # of Days until Disch: To be determined        Interval History (Subjective):      More alert and interactive today.  In particular, when the patient is with his friends visiting (Abena from [facility] is a close friend of this patient's) he is a much more interactive person.  When I talked to him alone, he was appropriate but would not open his eyes.                  Physical Exam:      Last Vital Signs:  /77 (BP Location: Left arm)   Pulse 72   Temp 97.7  F (36.5  C) (Axillary)   Resp 16   Wt 62.3 kg (137 lb 5.6 oz)   SpO2 97%   BMI 22.86 kg/m      I/O last 3 completed shifts:  In: 1236 [I.V.:1236]  Out: 550 [Urine:450; Emesis/NG output:100]    Constitutional: Awake, alert, cooperative, no apparent distress.  Very cachectic.  Nasogastric tube is in place with only a trace amount of yellow clear fluid out in the 3 hours since it had been recorded.   Respiratory: Clear to auscultation bilaterally, no crackles or wheezing   Cardiovascular: Regular rate and rhythm, normal S1 and S2, and no murmur noted   Abdomen: Soft,  non-distended, non-tender   Skin: No rashes, no cyanosis, dry to touch   Neuro: Alert and seems appropriate to the situation.  Oriented to self, place and time.   Extremities: No edema.  Tone is normal, bulk is decreased.   Other(s):        All other systems: Negative          Medications:      All current medications were reviewed with changes reflected in problem list.         Data:      All new lab and imaging data was reviewed.   Labs/Imaging:  Results for orders placed or performed during the hospital encounter of 09/07/20 (from the past 24 hour(s))   Platelet count   Result Value Ref Range    Platelet Count 153 150 - 450 10e9/L   US Carotid Bilateral    Narrative    ULTRASOUND CAROTID BILATERAL September 10, 2020 10:46 AM     HISTORY: Syncope.    COMPARISON: None.    FINDINGS: There are scattered hyperechoic plaques in the bilateral  carotid. There is antegrade flow in the bilateral carotids. There is  antegrade flow in the bilateral vertebrals.    Right:      ICA : 96  cm/sec    CCA:  141 cm/sec    VICA/VCCA ratio =  0.7.    Left:    ICA : 101  cm/sec    CCA: 120 cm/sec    VICA/VCCA ratio =  0.8.      Impression    IMPRESSION: Less than 50% stenosis in the bilateral internal carotid  arteries.    MARK SHEPHERD

## 2020-09-10 NOTE — PROGRESS NOTES
[facility]  General Surgery Progress Note           Assessment and Plan:   Assessment:   SBO vs ileus      Plan:   -continue NG, AXR in AM - if improved or passing stool consider removal but if not, possible gastrografin UGI via NG         Interval History:   Denies abdominal pain  No NG output recorded  Pt unclear if he is passing gas or stool, none recorded         Physical Exam:   Blood pressure (!) 157/72, pulse 70, temperature 99.5  F (37.5  C), temperature source Oral, resp. rate 18, weight 62.3 kg (137 lb 5.6 oz), SpO2 97 %.    I/O last 3 completed shifts:  In: 2332 [I.V.:2332]  Out: 520 [Urine:520]    Abdomen:   soft, ?non-distended, non-tender, voluntary guarding absent and no masses palpated             Data:     Recent Labs   Lab 09/08/20  0726 09/07/20  1818   WBC 13.7* 16.0*   HGB 11.8* 13.5   HCT 38.0* 43.2   MCV 94 94    222       Michael Obando MD

## 2020-09-11 NOTE — PLAN OF CARE
Patient is alert to self. Confused this am. VSS. NG clamped, removal pending on surgery. Bowel sounds hypoactive. IVF running at 75 ml/hr. A-2 with gait belt and walker. Up in chair x1. NPO, ice chips okay and small sips of water with pills. Continue treatment for UTI. Anticipate transfer back to LTC. Discharge date pending.

## 2020-09-11 NOTE — PLAN OF CARE
VSS. Tele SR. Afebrile. NG tube discontinued, tip intact. Pt very sleepy and did not want to get up into chair. Assist of 2, gait belt and walker. NPO. Speech, PT/OT to see. Palliative following. Continue to monitor and POC.

## 2020-09-11 NOTE — PROGRESS NOTES
[facility]  General Surgery Progress Note           Assessment and Plan:   Assessment:   SBO vs ileus  AXR today, results pending      Plan:   -continue NGT   -await radiologist read of AXR - if improved consider NGT removal but if not, will order gastrografin UGI via NG         Interval History:   Somnolent, just returned from radiology.  Denies abd pain, reports occasional nausea. Denies flatus or stool.          Physical Exam:   Blood pressure (!) 155/68, pulse 69, temperature 97.2  F (36.2  C), temperature source Axillary, resp. rate 20, weight 63.6 kg (140 lb 3.4 oz), SpO2 96 %.    I/O last 3 completed shifts:  In: 608 [I.V.:608]  Out: 300 [Urine:150; Emesis/NG output:150]    Abdomen:   soft, non-distended, non-tender             Data:     Recent Labs   Lab 09/11/20  0718 09/10/20  0724 09/08/20  0726 09/07/20  1818   WBC 4.5  --  13.7* 16.0*   HGB 10.5*  --  11.8* 13.5   HCT 34.9*  --  38.0* 43.2   MCV 95  --  94 94    153 179 222       Sarah Manning PA-C       As above, AXR improved but not clear that SBO completely resolved. Since tube has been clamped most of the last 36 hours, will trial NG removal. If need to be replaced, need to consider surgery or comfort care.  Michael Obando MD  9/11/2020 3:48 PM

## 2020-09-11 NOTE — CONSULTS
CTS identifies pt as high risk due to Elevated SELINA. Patient admitted after a fall found to have syncope and SBO vs. Ileus. Patient has a history of Parkinson's. Patient has had 2 previous admissions within the past 6 months for falls with weakness and orthostatic hypotension.   SW is following for discharge planning. Anticipate return to [facility]. Patient will follow with facility providers upon return.      No handoff will be given to PCP clinic care coordinator at discharge as no gap in care identified.     CM will continue to follow patient in collaboration with  for any additional discharge needs.    Deja Casper MA/RN Case Manager  Inpatient Care Coordination  [facility]   [phone]

## 2020-09-11 NOTE — PLAN OF CARE
Alert to self, intermittently confused, NG clamped, NPO except ice chips and medication, IVF @75ml/hr, Tele SR, continues on Rocephin IV, Palliative following, will continue with POC.

## 2020-09-11 NOTE — PLAN OF CARE
Patient oriented to self only  Up standing at EOB x2 and assisting with repositioning frequently this shift.   NG tube remains clamped, plan for repeat film today  Denied SOB  LS: rhoncous upon ascultation  Incontinent of urine, no BM, + BS  IVF infusing per oders  Tele: SR  Safety maintained

## 2020-09-11 NOTE — PROGRESS NOTES
[facility]  Hospitalist Progress Note  Name: Chilango Echevarria    MRN: [id number]  Physician:  Zi Martin DO, FHM (Text Page)    Summary of Stay: Chilango Echevarria is a 85 year old male who returned to the emergency department on 9/7/2020 after having been found in his nursing home on the floor on the night of 9/6/2020.  His fall was unwitnessed but fortunately appears to not have injured himself significantly.      Assessment & Plan    PSBO vs ileus:  -  Appreciate surgical service assistance.  They felt NGT could be removed this evening.  Continue NPO overnight, consider cautious diet in AM.    Recurrent syncope/falls:  -  Echo shows normal function (limited study).  Carotid ultrasound is negative.  No obvious dysrhythmias noted on telemetry thus far.      TERRELL:    -  Likely volume depletion/dehyration.  Now resolved with IVF.    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 Should he not be able to tolerate oral meds tomorrow/diet would need to consider replacing tube for continued parkinson's meds.  -  Swallow eval in AM    Urinary tract infection:  -  Ceftriaxone empirically.  UC growing 2 strains of E coli.    Moderate malnutrition:  -  Encourage po intake once able to advance diet.  If not able to meet needs with diet advance consider additional nutrition.    Gout Hx:  -  Holding allopurinol until intake improves    Deconditioning:  -  PT/OT         COVID Status:  COVID-19 PCR Results    COVID-19 PCR Results 7/7/20 7/14/20 7/21/20 7/29/20 8/4/20 8/4/20 8/11/20 8/18/20 8/25/20 9/1/20 9/7/20 9/7/20        0900 0900     2115 2115   COVID-19 Virus PCR to [facility] - Result Undetected Undetected             COVID-19 Virus PCR to [facility] - Result   Not Detected Not Detected Test received-See reflex to IDDL test SARS CoV2 (COVID-19) Virus RT-PCR  Not Detected Not Detected Not Detected Not  Detected Test received-See reflex to IDDL test SARS CoV2 (COVID-19) Virus RT-PCR    COVID-19 Virus PCR to [facility] - Source   Nasopharyngeal Nasopharyngeal Nasopharyngeal  Nasopharyngeal Nasopharyngeal Nasopharyngeal Nasopharyngeal Nasopharyngeal    COVID-19 Virus PCR Source Nasopharyngeal Nasopharyngeal             SARS-CoV-2 Virus Specimen Source      Nasopharyngeal      Nasopharyngeal   SARS-CoV-2 PCR Result      NEGATIVE      NEGATIVE      Comments are available for some flowsheets but are not being displayed.         COVID-19 Antibody Results, Testing for Immunity    COVID-19 Antibody Results, Testing for Immunity   No data to display.            Diet: NPO for Medical/Clinical Reasons Except for: Meds    DVT Prophylaxis: Heparin SQ  Garza Catheter: not present  Code Status: NO CPR - DO NOT INTUBATE    Disposition Plan   Expected discharge in a couple days if tolerates diet advance.     Entered: Zi Martin 09/11/2020, 6:06 PM       Interval History   Assumed care for the day, history reviewed.  Mr. Echevarria had no new complaints today.  He would like NGT out if possible.  No chest pain, sob, nausea.  No significant appetite.      -Data reviewed today: I reviewed all new labs and imaging reports over the last 24 hours. I personally reviewed the abdominal x-ray image(s) showing stable to improved appearance of bowel pattern..    Physical Exam   Temp: 96.6  F (35.9  C) Temp src: Axillary BP: (!) 149/67 Pulse: 65   Resp: 20 SpO2: 93 % O2 Device: None (Room air)    Vitals:    09/08/20 0532 09/10/20 0650 09/11/20 0526   Weight: 61.7 kg (136 lb 0.4 oz) 62.3 kg (137 lb 5.6 oz) 63.6 kg (140 lb 3.4 oz)     Vital Signs with Ranges  Temp:  [96.6  F (35.9  C)-98  F (36.7  C)] 96.6  F (35.9  C)  Pulse:  [65-74] 65  Resp:  [16-20] 20  BP: (144-155)/(67-69) 149/67  SpO2:  [93 %-96 %] 93 %  I/O last 3 completed shifts:  In: 608 [I.V.:608]  Out: 275 [Urine:125; Emesis/NG output:150]    GEN:  Alert, oriented to self/place,  confused with details, appears ill but comfortable.  No overt distress.  HEENT:  Normocephalic/atraumatic, no scleral icterus, no nasal discharge, mouth moist.  CV:  Regular rate and rhythm, loud murmur or rub.  LUNGS:  Clear to auscultation bilaterally without rales/rhonchi/wheezing/retractions.  Symmetric chest rise on inhalation noted.  ABD:  Hypoactive bowel sounds, soft, non-tender, moderately distended.  No guarding/rigidity.  EXT:  Trace edema.  No cyanosis.  No acute joint synovitis noted.  SKIN:  Dry to touch, no exanthems noted in the visualized areas.    Medications     dextrose 5% and 0.45% NaCl + KCl 20 mEq/L 75 mL/hr at 09/11/20 1532       carbidopa-levodopa  2 tablet Oral TID     cefTRIAXone  1 g Intravenous Q24H     heparin ANTICOAGULANT  5,000 Units Subcutaneous Q12H     pantoprazole (PROTONIX) IV  40 mg Intravenous Daily with breakfast     sodium chloride (PF)  3 mL Intracatheter Q8H     Data     Recent Labs   Lab 09/11/20  0718 09/10/20  0724 09/08/20  0726 09/07/20  1818   WBC 4.5  --  13.7* 16.0*   HGB 10.5*  --  11.8* 13.5   HCT 34.9*  --  38.0* 43.2   MCV 95  --  94 94    153 179 222     Recent Labs   Lab 09/11/20 0718 09/08/20  0726 09/07/20  1819 09/07/20  1818    142  --  139   POTASSIUM 3.8 4.3  --  4.9   CHLORIDE 114* 111*  --  104   CO2 25 24  --  28   ANIONGAP 4 7  --  7   GLC 97 121*  --  140*   BUN 27 62*  --  55*   CR 0.82 1.90*  --  2.33*   GFRESTIMATED 80 31* 26* 24*   GFRESTBLACK >90 36* 31* 28*   MESERET 7.9* 7.9*  --  9.5   PROTTOTAL  --   --   --  8.3   ALBUMIN  --   --   --  4.4   BILITOTAL  --   --   --  0.5   ALKPHOS  --   --   --  78   AST  --   --   --  23   ALT  --   --   --  10     Recent Labs   Lab 09/07/20 2057   CULT >100,000 colonies/mL  Escherichia coli  *  <10,000 colonies/mL  Strain 2  Escherichia coli  *     Recent Labs   Lab 09/08/20  0726 09/07/20  1818 09/07/20 1817   TROPONIN  --   --  0.01   TROPI <0.015 <0.015  --      Recent Labs   Lab  09/07/20 2057   COLOR Yellow   APPEARANCE Slightly Cloudy   URINEGLC Negative   URINEBILI Negative   URINEKETONE 10*   SG 1.025   UBLD Negative   URINEPH 5.0   PROTEIN 30*   NITRITE Negative   LEUKEST Moderate*   RBCU 1   WBCU 10*       Recent Results (from the past 24 hour(s))   XR Abdomen 2 Views    Narrative    ABDOMEN TWO-THREE VIEW  9/11/2020 8:59 AM     HISTORY: Small bowel obstruction.    COMPARISON: None.    FINDINGS: Feeding tube tip minimally into the stomach. Small amount of  stool. No free air. Possible dilated small bowel loop in the left  abdomen. The colon is not distended. The lung bases are unremarkable.      Impression    IMPRESSION: Possible dilated small bowel loop in the abdomen. No free  air.    ALETHEA GARDNER MD

## 2020-09-12 NOTE — PROGRESS NOTES
[facility]  General Surgery Progress Note           Assessment and Plan:   Assessment:   Ileus vs SBO, no clinical c/o but no clear clinical evidence of resolution      Plan:   -trial of PO slowly  AXR if needed to assess progress  If fails PO, will need to consider surgery/comfort care/ perc gastrostomy         Interval History:   Pt unsure if passing gas or stool         Physical Exam:   Blood pressure (!) 165/78, pulse 66, temperature 97.8  F (36.6  C), temperature source Oral, resp. rate 18, weight 65.1 kg (143 lb 8.3 oz), SpO2 97 %.    I/O last 3 completed shifts:  In: 1744 [I.V.:1744]  Out: 125 [Urine:125]    Abdomen:   soft, ? Minimally distended, non-tender, voluntary guarding absent and no masses palpated             Data:     Recent Labs   Lab 09/11/20  0718 09/10/20  0724 09/08/20  0726 09/07/20  1818   WBC 4.5  --  13.7* 16.0*   HGB 10.5*  --  11.8* 13.5   HCT 34.9*  --  38.0* 43.2   MCV 95  --  94 94    153 179 222       Michael Obando MD

## 2020-09-12 NOTE — PROGRESS NOTES
09/12/20 1548   Quick Adds   Type of Visit Initial PT Evaluation   Living Environment   Lives With facility resident   Living Arrangements assisted living   Home Accessibility no concerns   Transportation Anticipated family or friend will provide   Living Environment Comment Receives assist for meals and cleaning   Self-Care   Usual Activity Tolerance moderate   Current Activity Tolerance poor   Regular Exercise No   Equipment Currently Used at Home walker, rolling   Functional Level Prior   Ambulation 1-->assistive equipment   Transferring 1-->assistive equipment   Fall history within last six months yes   Number of times patient has fallen within last six months 1   Which of the above functional risks had a recent onset or change? ambulation;transferring  (activity tolerance)   General Information   Onset of Illness/Injury or Date of Surgery - Date 09/07/20   Referring Physician Zi Martin, DO    Patient/Family Goals Statement Discharge to home   Pertinent History of Current Problem (include personal factors and/or comorbidities that impact the POC) Chilango Echevarria is a 85 year old male who returned to the emergency department on 9/7/2020 after having been found in his nursing home on the floor on the night of 9/6/2020.    Precautions/Limitations fall precautions   Weight-Bearing Status - LLE full weight-bearing   Weight-Bearing Status - RLE full weight-bearing   General Observations Pt supine upon intiation, agreeable to session.    Cognitive Status Examination   Orientation orientation to person, place and time   Level of Consciousness alert   Follows Commands and Answers Questions 100% of the time   Personal Safety and Judgment intact   Memory intact   Pain Assessment   Patient Currently in Pain Yes, see Vital Sign flowsheet  (mild ABD pain)   Integumentary/Edema   Integumentary/Edema no deficits were identifed   Posture    Posture Forward head position;Protracted shoulders;Kyphosis   Range of Motion  "(ROM)   ROM Comment L UE limited   Strength   Strength Comments Decreased functional strength as seen by pt's need for assist with mobility   Bed Mobility   Bed Mobility Comments supine>sit with modA, sit>supine with modAx2   Transfer Skills   Transfer Comments sit<>stand with Giselle   Gait   Gait Comments CGA with FWW   Balance   Balance Comments Requires B UE support for safe dynamic mobiltiy   Sensory Examination   Sensory Perception no deficits were identified   Coordination   Coordination Comments Parkinson's at baseline   Muscle Tone   Muscle Tone Comments Parkinson's at baseline   General Therapy Interventions   Planned Therapy Interventions balance training;bed mobility training;gait training;ROM;strengthening;stretching;transfer training;home program guidelines;progressive activity/exercise   Clinical Impression   Criteria for Skilled Therapeutic Intervention yes, treatment indicated   PT Diagnosis IMpaired functional mobility   Influenced by the following impairments Weakness, deconditioning   Functional limitations due to impairments Difficulty with bed mobiltiy, transfers, ambulation   Clinical Presentation Evolving/Changing   Clinical Presentation Rationale medical POC is unsure, pt deciding between restorative and hospice   Clinical Decision Making (Complexity) Low complexity   Therapy Frequency 5x/week   Predicted Duration of Therapy Intervention (days/wks) 5 day   Anticipated Discharge Disposition Transitional Care Facility   Risk & Benefits of therapy have been explained Yes   Patient, Family & other staff in agreement with plan of care Yes   [facility] Apropose-PAC TM \"6 Clicks\"   2016, Trustees of [facility], under license to [employer].  All rights reserved.   6 Clicks Short Forms Basic Mobility Inpatient Short Form   [facility] AM-PAC  \"6 Clicks\" V.2 Basic Mobility Inpatient Short Form   1. Turning from your back to your side while in a flat bed without using bedrails? 3 - A " Little   2. Moving from lying on your back to sitting on the side of a flat bed without using bedrails? 2 - A Lot   3. Moving to and from a bed to a chair (including a wheelchair)? 3 - A Little   4. Standing up from a chair using your arms (e.g., wheelchair, or bedside chair)? 3 - A Little   5. To walk in hospital room? 3 - A Little   6. Climbing 3-5 steps with a railing? 2 - A Lot   Basic Mobility Raw Score (Score out of 24.Lower scores equate to lower levels of function) 16   Total Evaluation Time   Total Evaluation Time (Minutes) 5

## 2020-09-12 NOTE — PLAN OF CARE
PT: Orders received. PT evaluation completed and treatment initiated. Pt reports living in an [facility] where he receives assist for meals, and cleaning. Pt does not receive assist for mobility. Pt ambulates with a FWW at baseline.     Discharge Planner PT   Patient plan for discharge: None stated  Current status: Pt supine upon initiation, agreeable to session. Pt completes supine>sit with modA. Pt completes sit<>stand with Giselle and cues for technique. Pt ambulates 2x5' with use of FWW and CGA with cues for walker management. Pt requires 5 minute seated rest between bouts of ambulation. Pt returns to supine with modAx2 at end of session. Requires Giselle to roll >side. Pt with RN at end of session, all needs in reach.   Barriers to return to prior living situation: Requires assist for mobility, fall risk, decreased activity tolerance  Recommendations for discharge: TCU  Rationale for recommendations: Pt is not currently at baseline for mobility, and is unsafe to discharge home. With continued PT, both IP and after discharge, pt is likely to obtain mobility goals.        Entered by: Tammy Clifford 09/12/2020 4:19 PM

## 2020-09-12 NOTE — PLAN OF CARE
Vitals: VSS. BP elevated at 165/78. Afebrile. Denies pain.   Neuro: Disoriented to situation. Calm, cooperative. Slept well.   Respiratory: Lung sounds diminished. Denies cough or SOB.   Cardiac/Telemetry: SR slight ST elevation. Denies chest pain.   GI/: Bowel sounds hypoactive  Skin: Bruising present   LDAs: PIV to right arm infusing with NS & 75 ml/hr  Diet: NPO  Activity: A2 gb and walker  Plan: Continue with POC.

## 2020-09-12 NOTE — PROGRESS NOTES
"[facility]             Hospitalist Progress Note    Name: Chilango Echevarria    MRN: [id number]  YOB: 1934    Age: 85 year old  Date of admission: 2020  Primary care provider: Enrique Ring      Reason for Stay (Diagnosis): Recurrent falls/questionable syncope/partial small bowel obstruction versus ileus         Assessment and Plan:      Summary of Stay:  Chilango Echevarria is a 85 year old male who returned to the emergency department on 2020 after having been found in his nursing home on the floor on the night of 2020.  His fall was unwitnessed but fortunately appears to not have injured himself significantly.       Mr. Echevarria again fell on the morning of the date of admission when he was helped back to bed, he was noted to be significantly hypotensive.  Because this is not an unusual finding for this patient, they did not send him directly to the emergency department.  However, because he became \"unresponsive\" and may have had some weakness on his left side, the patient was directed to the emergency department.     Patient social history is challenging.  He is a  and his only son  18 years ago and his daughter-in-law resides with the patient's only grandchild in [place].  Mr. Echevarria is really alone in the world with attentive friends including Abena and her .     My colleague spoke with the nursing supervisor at [facility] at [phone] and she was able to give him additional information on the patient. Ultimately, my colleague also spoke with the patient's daughter-in-law who told me that Mr. Echevarria has been declining since around New Year's.  He entered independent living at that time and quickly deteriorated to the point where he needed to be in assisted living.  She has seen him become more confused and more paranoid, less active and more withdrawn as the year has gone by. The patient's close friends Abena " and her  see him about twice a week and they endorsed the same history.     When the patient first arrived in the emergency department, there was concern for an ST elevation MI.  Cardiology was involved and it was deemed inappropriate to reflexively move to angiography.  Further concern arose with the patient who was noted to have gastric distention possibly indicating a small bowel obstruction.  General surgery was also consulted at that time and an NG was recommended.      Assessment & Plan      PSBO vs ileus (current active issue):  Still some notable abdominal distention, hypoactive bowel sounds, and nausea after surgery saw the patient this morning and allowed for a trial of slow p.o intake.  Patient did have an NG tube placed but now discontinued.  -We will make n.p.o.  -Check an abdominal x-ray     Recurrent syncope/falls:  --Echo shows normal function (limited study).  Carotid ultrasound is negative.  No obvious dysrhythmias noted on telemetry thus far.   -No further recommendations by cardiology and has signed off.   -May discontinue telemetry     TERRELL:    Likely volume depletion/dehyration.  Now resolved with IVF.     Parkinsonism with associated progressive dementia and now hallucinations (compatible with frontal dementia associated with parkinsonism):  -Sinemet to continue    Concerns for dysphagia  Speech pathology input appreciated and was consulted to determine what food consistencies patient would be appropriate for when SBO versus ileus resolves.  They felt that patient would tolerate a dysphagia level 1 with thin liquids.  Patient does tend to eat impulsively and probably should be supervised during initial meals to avoid aspiration and be upright for an hour afterwards  -Currently n.p.o. but will advance diet when able     Urinary tract infection:  Urine cultures growing out E. coli, pansensitive.  -Patient has received 5 days worth which should be more than adequate so we will  "discontinue.     Moderate malnutrition:     Gout Hx:  --Holding allopurinol until intake improves     Deconditioning:  -  PT/OT    DVT Prophylaxis: Heparin SQ  Code Status: DNR / DNI  Discharge Dispo: Back to [facility] once medical issues are resolved  Estimated Disch Date / # of Days until Disch: Anticipate 3 more days here in the hospital    Time spent: Greater than 40 minutes        Interval History (Subjective):      Patient does not have any specific complaints.  Initially denied nausea but after I saw the patient, complaining of nausea to nursing staff         Physical Exam:      Vital signs:  Temp: 98.1  F (36.7  C) Temp src: Oral BP: (!) 150/72 Pulse: 64   Resp: 18 SpO2: 95 % O2 Device: None (Room air) Oxygen Delivery: 1 LPM   Weight: 65.1 kg (143 lb 8.3 oz)  Estimated body mass index is 23.88 kg/m  as calculated from the following:    Height as of 9/2/20: 1.651 m (5' 5\").    Weight as of this encounter: 65.1 kg (143 lb 8.3 oz).    I/O last 3 completed shifts:  In: 1744 [I.V.:1744]  Out: 125 [Urine:125]  Vitals:    09/07/20 1803 09/08/20 0532 09/10/20 0650 09/11/20 0526   Weight: 61.3 kg (135 lb 2.3 oz) 61.7 kg (136 lb 0.4 oz) 62.3 kg (137 lb 5.6 oz) 63.6 kg (140 lb 3.4 oz)    09/12/20 0619   Weight: 65.1 kg (143 lb 8.3 oz)       Constitutional: Awake, alert, cooperative, no apparent distress     Respiratory: Nl work of breathing. Clear to auscultation bilaterally, no crackles or wheezing   Cardiovascular: Regular rate and rhythm, normal S1 and S2, and no murmur noted   Abdomen:  Hypoactive bowel sounds, mildly distended and tympanitic   Skin: No rashes, no cyanosis, dry to touch   Neuro: CN 2-12 intact, no localizing weakness   Extremities: No edema, normal range of motion   HEENT Normocephalic, atraumatic, normal nasal turbinates; oropharynx clear   Neck Supple; nl inspection; trachea midline; no thryomegaly   Psychiatric:  Alert but does appear mildly anxious.           " Medications:      All current medications were reviewed with changes reflected in problem list.         Data:      All new lab and imaging data was reviewed.   Labs:  Recent Labs   Lab 09/07/20 2057   CULT >100,000 colonies/mL  Escherichia coli  *  <10,000 colonies/mL  Strain 2  Escherichia coli  *     Recent Labs   Lab 09/11/20  0718 09/10/20  0724 09/08/20  0726 09/07/20  1818   WBC 4.5  --  13.7* 16.0*   HGB 10.5*  --  11.8* 13.5   HCT 34.9*  --  38.0* 43.2   MCV 95  --  94 94    153 179 222     Recent Labs   Lab 09/11/20 0718 09/08/20 0726 09/07/20 1819 09/07/20  1818    142  --  139   POTASSIUM 3.8 4.3  --  4.9   CHLORIDE 114* 111*  --  104   CO2 25 24  --  28   ANIONGAP 4 7  --  7   GLC 97 121*  --  140*   BUN 27 62*  --  55*   CR 0.82 1.90*  --  2.33*   GFRESTIMATED 80 31* 26* 24*   GFRESTBLACK >90 36* 31* 28*   MESERET 7.9* 7.9*  --  9.5   PROTTOTAL  --   --   --  8.3   ALBUMIN  --   --   --  4.4   BILITOTAL  --   --   --  0.5   ALKPHOS  --   --   --  78   AST  --   --   --  23   ALT  --   --   --  10      Imaging:   No results found for this or any previous visit (from the past 24 hour(s)).    Mily Salinas [phone]

## 2020-09-12 NOTE — PLAN OF CARE
Discharge Planner SLP   Patient plan for discharge: not stated  Current status: Swallow evaluation completed.  Pt with Ileus v SBO on clear liquid diet/nectar texture.  Swallow evaluation revealed pt should be able to tolerate DD1/thin liquids but diet not changed at this time due to medical condition.  Pt does have esophageal reflux and needs to be upright for all oral intake and for 60 minutes after.  [facility] oral strength and movement for DD1 diet/thin liquids.  He is missing many teeth, very poor dentition.  Pt cup sipped thin liquids with adequate oral management, good lip seal, no leakage.  No overt s/s aspiration/penetration, laryngeal elevation.  Trials of applesauce revealed mildly prolong A-P movement, could be due to not eating recently.  No oral residue after the swallow, laryngeal elevation.  No overt s/s aspiration/penetration.  Pt should tolerate a DD1/thin liquid diet upright in chair all meals and upright 1 hour after due to reflux.  Pt reports the eats very quickly, he needs to eat slowly, small bites and sips and alternate textures.  Barriers to return to prior living situation: no speech barriers  Recommendations for discharge: home  Rationale for recommendations: Pt expected to meet goals of tolerating  recommended diet before discharge date.       Entered by: Genesis Jordan 09/12/2020 11:45 AM

## 2020-09-12 NOTE — PROGRESS NOTES
09/12/20 1100   General Information   Onset Date 09/11/20   Start of Care Date 09/12/20   Referring Physician Zi Martin   Patient Profile Review/OT: Additional Occupational Profile Info See Profile for full history and prior level of function   Patient/Family Goals Statement not stated   Swallowing Evaluation Bedside swallow evaluation   Behaviorial Observations Alert   Mode of current nutrition Oral diet   Type of oral diet Other (Comment)  (clear liquid diet/nectar)   Respiratory Status Room air   Comments Pt has Parkinson's disease with associated progressive dementia and hallucinations recently.  Admitted via ED after being found down at his care facility.  Noted SBO vs Ileus.  Very poor dentition.   Clinical Swallow Evaluation   Dentition other (see comments)  (missing most teeth, poor dentition)   Mucosal Quality dry   Mandibular Strength and Mobility intact   Oral Labial Strength and Mobility WFL   Lingual Strength and Mobility WFL   Buccal Strength and Mobility intact   Laryngeal Function Swallow;Voicing initiated   Clinical Swallow Eval: Thin Liquid Texture Trial   Mode of Presentation, Thin Liquids cup;self-fed   Volume of Liquid or Food Presented single small sips   Oral Phase of Swallow WFL   Pharyngeal Phase of Swallow intact   Diagnostic Statement No overt s/s aspiration/penetration   Clinical Swallow Eval: Puree Solid Texture Trial   Mode of Presentation, Puree spoon;fed by clinician   Volume of Puree Presented single small bites   Oral Phase, Puree   (mildly slow movement)   Pharyngeal Phase, Puree intact   Diagnostic Statement No overt s/s aspiration/penetration   Esophageal Phase of Swallow   Patient reports or presents with symptoms of esophageal dysphagia Yes   Esophageal comments Esophageal Reflux   General Therapy Interventions   Planned Therapy Interventions Dysphagia Treatment   Swallow Eval: Clinical Impressions   Skilled Criteria for Therapy Intervention Skilled criteria met.   Treatment indicated.   Functional Assessment Scale (FAS) 6   Treatment Diagnosis minimal oral phase dysphagia   Diet texture recommendations Dysphagia diet level 1;Thin liquids   Recommended Feeding/Eating Techniques alternate between small bites and sips of food/liquid;maintain upright posture during/after eating for 30 mins;no straws;small sips/bites   Therapy Frequency 2x/week   Predicted Duration of Therapy Intervention (days/wks)   (1 week)   Anticipated Discharge Disposition extended care facility   Risks and Benefits of Treatment have been explained. Yes   Patient, family and/or staff in agreement with Plan of Care Yes   Clinical Impression Comments Swallow evaluation completed.  Pt with Ileus v SBO on clear liquid diet/nectar texture.  Swallow evaluation revealed pt should be able to tolerate DD1/thin liquids but diet not changed at this time due to medical condition.  Pt does have esophageal reflux and needs to be upright for all oral intake and for 60 minutes after.  Oral mech evaluation revealed functional oral strength and movement for DD1 diet/thin liquids.  He is missing many teeth, very poor dentition.  Pt cup sipped thin liquids with adequate oral management, good lip seal, no leakage.  No overt s/s aspiration/penetration, laryngeal elevation.  Trials of applesauce revealed mildly prolong A-P movement, could be due to not eating recently.  No oral residue after the swallow, laryngeal elevation.  No overt s/s aspiration/penetration.  Pt should tolerate a DD1/thin liquid diet upright in chair all meals and upright 1 hour after due to reflux.  Pt reports the eats very quickly, he needs to eat slowly, small bites and sips and alternate textures.   Total Evaluation Time   Total Evaluation Time (Minutes) 14

## 2020-09-12 NOTE — PROVIDER NOTIFICATION
Nauseated. Bowel sounds hypo this am and now tinkly. Does not remember flatus today. NPO? Abd xray?  Orders received.

## 2020-09-13 NOTE — PLAN OF CARE
Started this morning on clear thickened liquids. Fair breakfast and less at lunch. Some nausea mid afternoon. Returned to NPO and had abd xray-see results. Up to chair again this evening. Visited with family via IPAD. Activity enc. Blanchable redness.

## 2020-09-13 NOTE — PROGRESS NOTES
"[facility]           Hospitalist Progress Note    Name: Chilango Echevarria    MRN: [id number]  YOB: 1934    Age: 85 year old  Date of admission: 2020  Primary care provider: Enrique Ring      Reason for Stay (Diagnosis): Recurrent falls/questionable syncope/partial small bowel obstruction versus ileus         Assessment and Plan:      Summary of Stay:  Chilango Echevarria is a 85 year old male who returned to the emergency department on 2020 after having been found in his nursing home on the floor on the night of 2020.  His fall was unwitnessed but fortunately appears to not have injured himself significantly.       Mr. Echevarria again fell on the morning of the date of admission when he was helped back to bed, he was noted to be significantly hypotensive.  Because this is not an unusual finding for this patient, they did not send him directly to the emergency department.  However, because he became \"unresponsive\" and may have had some weakness on his left side, the patient was directed to the emergency department.     Patient social history is challenging.  He is a  and his only son  18 years ago and his daughter-in-law resides with the patient's only grandchild in [place].      My colleague spoke with the nursing supervisor at [facility] at [phone] and she was able to give him additional information on the patient. Ultimately, my colleague also spoke with the patient's daughter-in-law who told me that Mr. Echevarria has been declining since around New Year's.  He entered independent living at that time and quickly deteriorated to the point where he needed to be in assisted living.  She has seen him become more confused and more paranoid, less active and more withdrawn as the year has gone by. The patient's close friends Abena and her  see him about twice a week and they endorsed the same history.     When the patient " first arrived in the emergency department, there was concern for an ST elevation MI.  Cardiology was involved and it was deemed inappropriate to reflexively move to angiography.  Further concern arose with the patient who was noted to have gastric distention possibly indicating a small bowel obstruction.  General surgery was also consulted at that time and an NG was recommended and has since been removed.    At this point the plan is to gradually advance his diet.     Assessment & Plan      PSBO vs ileus (current active issue):  Still some notable abdominal distention, hypoactive bowel sounds, and nausea.  XR OK.  Continue trial of clears. Patient did have an NG tube placed but now discontinued.  -clears.     Recurrent syncope/falls:  --Echo shows normal function (limited study).  Carotid ultrasound is negative.  No obvious dysrhythmias noted on telemetry thus far.   -No further recommendations by cardiology and has signed off.   -May discontinue telemetry     TERRELL:    Likely volume depletion/dehyration.  Now resolved with IVF.     Parkinsonism with associated progressive dementia and now hallucinations (compatible with frontal dementia associated with parkinsonism):  -Sinemet to continue    Concerns for dysphagia  Speech pathology input appreciated and was consulted to determine what food consistencies patient would be appropriate for when SBO versus ileus resolves.  They felt that patient would tolerate a dysphagia level 1 with thin liquids.  Patient does tend to eat impulsively and probably should be supervised during initial meals to avoid aspiration and be upright for an hour afterwards       Urinary tract infection:  Urine cultures growing out E. coli, pansensitive.  -Patient has received 5 days worth which should be more than adequate so we will discontinue.     Moderate malnutrition:     Gout Hx:  --Holding allopurinol until intake improves     Deconditioning:  -  PT/OT    DVT Prophylaxis: Heparin SQ  Code  "Status: DNR / DNI  [facility] once medical issues are resolved  Estimated Disch Date / # of Days until Disch: Anticipate 2-3 more days here in the hospital    Time spent: Greater than 40 minutes        Interval History (Subjective):      Patient does not have any specific complaints. Tired but feels ok.  Advanced to clears  Updated family at the bedside         Physical Exam:      Vital signs:  Temp: 97.5  F (36.4  C) Temp src: Oral BP: 123/73 Pulse: 66   Resp: 18 SpO2: 95 % O2 Device: None (Room air) Oxygen Delivery: 1 LPM   Weight: 66.1 kg (145 lb 11.6 oz)  Estimated body mass index is 24.25 kg/m  as calculated from the following:    Height as of 9/2/20: 1.651 m (5' 5\").    Weight as of this encounter: 66.1 kg (145 lb 11.6 oz).    I/O last 3 completed shifts:  In: 1034.17 [P.O.:300; I.V.:734.17]  Out: 400 [Urine:400]  Vitals:    09/08/20 0532 09/10/20 0650 09/11/20 0526 09/12/20 0619   Weight: 61.7 kg (136 lb 0.4 oz) 62.3 kg (137 lb 5.6 oz) 63.6 kg (140 lb 3.4 oz) 65.1 kg (143 lb 8.3 oz)    09/13/20 0535   Weight: 66.1 kg (145 lb 11.6 oz)       Constitutional: Awake, alert, cooperative, no apparent distress     Respiratory: Nl work of breathing. Clear to auscultation bilaterally, no crackles or wheezing   Cardiovascular: Regular rate and rhythm, normal S1 and S2, and no murmur noted   Abdomen:  Hypoactive bowel sounds, mildly distended and tympanitic   Skin: No rashes, no cyanosis, dry to touch   Neuro: CN 2-12 intact, no localizing weakness   Extremities: No edema, normal range of motion   HEENT Normocephalic, atraumatic, normal nasal turbinates; oropharynx clear   Neck Supple; nl inspection; trachea midline; no thryomegaly   Psychiatric:  Alert but does appear mildly anxious.           Medications:      All current medications were reviewed with changes reflected in problem list.         Data:      All new lab and imaging data was reviewed.   Labs:  Recent Labs "   Lab 09/07/20 2057   CULT >100,000 colonies/mL  Escherichia coli  *  <10,000 colonies/mL  Strain 2  Escherichia coli  *     Recent Labs   Lab 09/13/20  0804 09/11/20  0718 09/10/20  0724 09/08/20  0726   WBC 6.7 4.5  --  13.7*   HGB 11.6* 10.5*  --  11.8*   HCT 38.1* 34.9*  --  38.0*   MCV 96 95  --  94    154 153 179     Recent Labs   Lab 09/13/20 0804 09/11/20 0718 09/08/20 0726 09/07/20  1818    143 142  --  139   POTASSIUM 4.2 3.8 4.3  --  4.9   CHLORIDE 108 114* 111*  --  104   CO2 25 25 24  --  28   ANIONGAP 5 4 7  --  7   GLC 97 97 121*  --  140*   BUN 23 27 62*  --  55*   CR 0.73 0.82 1.90*  --  2.33*   GFRESTIMATED 84 80 31*   < > 24*   GFRESTBLACK >90 >90 36*   < > 28*   MESERET 7.8* 7.9* 7.9*  --  9.5   PROTTOTAL  --   --   --   --  8.3   ALBUMIN  --   --   --   --  4.4   BILITOTAL  --   --   --   --  0.5   ALKPHOS  --   --   --   --  78   AST  --   --   --   --  23   ALT  --   --   --   --  10    < > = values in this interval not displayed.      Imaging:   No results found for this or any previous visit (from the past 24 hour(s)).    Enrique Olmos MD

## 2020-09-13 NOTE — PLAN OF CARE
Discharge Planner PT   Patient plan for discharge: None stated  Current status: Pt supine upon initiation, agreeable to session. Pt completes sit<>supine with modA. Pt completes sit<>stand with Giselle and cues for technique. Repeated sit<>stand completed in order to progress functional strength and to improve technique. With each sit<>stand pt marches x 10 sec before returning to sitting. Pt fatigues quickly and tolerates 3 reps of this with significant rest break between reps. Pt is slow to rise with all attempts, and requires Giselle with all attempts. Pt returns to supine at end of session, and reports plans to be OOB to bedside chair with nursing later this date. Significant time/effort for pt to return to supine and position self comfortably.   Barriers to return to prior living situation: Requires assist for mobility, fall risk, decreased activity tolerance  Recommendations for discharge: TCU  Rationale for recommendations: Pt is not currently at baseline for mobility, and is unsafe to discharge home. With continued PT, both IP and after discharge, pt is likely to obtain mobility goals.        Entered by: Tammy Clifford 09/13/2020 3:52 PM

## 2020-09-13 NOTE — PLAN OF CARE
Pt. Alert, confused to time and situation at times and speech difficult to understand at times. Pt. Up with assist of 1 gaitbelt and walker. Pt. Has blanchable redness to buttocks. Pt. Denies any c/o nausea this shift. BP dropped on evening's to 91/54, pt. Was sitting in recliner. 500cc bolus given and BP improved to 121/65. Pt. Denies any c/o dizziness or lightheadedness. Pt. Denies any needs at this time. Will continue with POC.

## 2020-09-13 NOTE — PLAN OF CARE
SLP-  Spoke with patient's nurse who reports that pt made NPO last night due to ileus and is now on clear liquids with surgery managing his diet today.  He was seen by SLP yesterday per MD request and tolerated DD1/thin as a diet order when he is cleared of ileus.  He has reflux and should be fully upright for all intake and for 60 minute after intake.  Will check back tomorrow for progress with diet/ileus.

## 2020-09-13 NOTE — PROGRESS NOTES
[facility]  General Surgery Progress Note           Assessment and Plan:   Assessment:   Ileus vs SBO, no clinical c/o but no clear clinical evidence of resolution  Had some nausea yesterday with clears but AXR in the afternoon did not show obstructive pattern      Plan:   -Continue to trial clear liquids, 6 small meals in small amounts  -Dulcolax supp prn  -AXR if needed to assess progress  -If fails PO, will need to consider surgery/comfort care/ perc gastrostomy         Interval History:   Sitting up in chair. States he is doing well. He said he hasn't passed flatus in a while. No BM. Drank a decent amount of clears yesterday afternoon and became nauseated. He was then made NPO yesterday afternoon. This morning He denies pain and nausea.          Physical Exam:   Blood pressure 123/73, pulse 66, temperature 97.5  F (36.4  C), temperature source Oral, resp. rate 18, weight 66.1 kg (145 lb 11.6 oz), SpO2 95 %.    I/O last 3 completed shifts:  In: 1034.17 [P.O.:300; I.V.:734.17]  Out: 400 [Urine:400]    General: alert and in no acute distress  Abdomen:   soft, ?Mildly distended, non-tender, voluntary guarding absent and no masses palpated             Data:     Recent Labs   Lab 09/13/20  0804 09/11/20  0718 09/10/20  0724 09/08/20  0726   WBC 6.7 4.5  --  13.7*   HGB 11.6* 10.5*  --  11.8*   HCT 38.1* 34.9*  --  38.0*   MCV 96 95  --  94    154 153 179     AXR 9/12/20 IMPRESSION: No obstructive bowel gas pattern. No evidence of  pneumoperitoneum.    Surjit Villalta PA-C     As above, if fails to advance will need to consider comfort care (poss perc Gastrostomy) or surgery. Gastrograffin SBFT could be considered as well.  Michael Obando MD  9/13/2020 11:01 AM

## 2020-09-14 NOTE — PLAN OF CARE
Started on clear liquids, not taking much, offered between meals. Results from Dulcolax suppository with a medium and small stools. Ambulation increased and went around the bed to chair and back x3 and to bathroom x2.  Voiced always clearest in morning, seems to have more phlegm when tired. Paste and powder to reddened groin folds.

## 2020-09-14 NOTE — PROGRESS NOTES
"[facility]           Hospitalist Progress Note    Name: Chilango Echevarria    MRN: [id number]  YOB: 1934    Age: 85 year old  Date of admission: 2020  Primary care provider: Enrique Ring      Reason for Stay (Diagnosis): Recurrent falls/questionable syncope/partial small bowel obstruction versus ileus         Assessment and Plan:      Summary of Stay:  Chilango Echevarria is a 85 year old male who returned to the emergency department on 2020 after having been found in his nursing home on the floor on the night of 2020.  His fall was unwitnessed but fortunately appears to not have injured himself significantly.       Mr. Echevarria again fell on the morning of the date of admission when he was helped back to bed, he was noted to be significantly hypotensive.  Because this is not an unusual finding for this patient, they did not send him directly to the emergency department.  However, because he became \"unresponsive\" and may have had some weakness on his left side, the patient was directed to the emergency department.     Patient social history is challenging.  He is a  and his only son  18 years ago and his daughter-in-law resides with the patient's only grandchild in [place].      My colleague spoke with the nursing supervisor at [facility] at [phone] and she was able to give him additional information on the patient. Ultimately, my colleague also spoke with the patient's daughter-in-law who told me that Mr. Echevarria has been declining since around New Year's.  He entered independent living at that time and quickly deteriorated to the point where he needed to be in assisted living.  She has seen him become more confused and more paranoid, less active and more withdrawn as the year has gone by. The patient's close friends Abena and her  see him about twice a week and they endorsed the same history.     When the patient " first arrived in the emergency department, there was concern for an ST elevation MI.  Cardiology was involved and it was deemed inappropriate to reflexively move to angiography.  Further concern arose with the patient who was noted to have gastric distention possibly indicating a small bowel obstruction.  General surgery was also consulted at that time and an NG was recommended and has since been removed.    At this point the plan is to gradually advance his diet.  He did have bowel movements on 9/13 and we have been advancing his diet stepwise.       Assessment & Plan      PSBO vs ileus (current active issue):  Still some notable abdominal distention, hypoactive bowel sounds, and nausea.  XR OK.  Continue to advance to fulls. Patient did have an NG tube placed but now discontinued.  -advanced to fulls. Likely will keep at fulls for ~2 weeks as per surgery.     Recurrent syncope/falls:  --Echo shows normal function (limited study).  Carotid ultrasound is negative.  No obvious dysrhythmias noted on telemetry thus far.   -No further recommendations by cardiology and has signed off.      TERRELL:    Likely volume depletion/dehyration.  Now resolved with IVF.     Parkinsonism with associated progressive dementia and now hallucinations (compatible with frontal dementia associated with parkinsonism):  -Sinemet to continue    Concerns for dysphagia  Speech pathology input appreciated and was consulted to determine what food consistencies patient would be appropriate for when SBO versus ileus resolves.  They felt that patient would tolerate a dysphagia level 1 with thin liquids.  Patient does tend to eat impulsively and probably should be supervised during initial meals to avoid aspiration and be upright for an hour afterwards       Urinary tract infection:  Urine cultures growing out E. coli, pansensitive.  -Patient has received 5 days worth which should be more than adequate so we will discontinue.     Moderate  "malnutrition:     Gout Hx:  --Holding allopurinol until intake improves     Deconditioning:  -  PT/OT    DVT Prophylaxis: Heparin SQ  Code Status: DNR / DNI  Discharge Dispo: Back to [facility]   Estimated Disch Date / # of Days until Disch: Anticipate back to LTC on 9/15 pending ability to tolerate fulls.          Interval History (Subjective):      Tolerating clears, advanced to full liquids  Feeling ok otherwise  Had a BM.  mobilizing         Physical Exam:      Vital signs:  Temp: 96.7  F (35.9  C) Temp src: Axillary BP: 119/50 Pulse: 69   Resp: 20 SpO2: 97 % O2 Device: None (Room air) Oxygen Delivery: 1 LPM   Weight: 66.5 kg (146 lb 9.7 oz)  Estimated body mass index is 24.4 kg/m  as calculated from the following:    Height as of 9/2/20: 1.651 m (5' 5\").    Weight as of this encounter: 66.5 kg (146 lb 9.7 oz).    I/O last 3 completed shifts:  In: 1078 [P.O.:125; I.V.:953]  Out: 150 [Urine:150]  Vitals:    09/10/20 0650 09/11/20 0526 09/12/20 0619 09/13/20 0535   Weight: 62.3 kg (137 lb 5.6 oz) 63.6 kg (140 lb 3.4 oz) 65.1 kg (143 lb 8.3 oz) 66.1 kg (145 lb 11.6 oz)    09/14/20 0545   Weight: 66.5 kg (146 lb 9.7 oz)       Constitutional: Awake, alert, cooperative, no apparent distress     Respiratory: Nl work of breathing. Clear to auscultation bilaterally, no crackles or wheezing   Cardiovascular: Regular rate and rhythm, normal S1 and S2, and no murmur noted   Abdomen:  Hypoactive bowel sounds, mildly distended and tympanitic   Skin: No rashes, no cyanosis, dry to touch   Neuro: CN 2-12 intact, no localizing weakness   Extremities: No edema, normal range of motion   HEENT Normocephalic, atraumatic, normal nasal turbinates; oropharynx clear   Neck Supple; nl inspection; trachea midline; no thryomegaly   Psychiatric:  Alert but does appear mildly anxious.           Medications:      All current medications were reviewed with changes reflected in problem list.         Data:      All new " lab and imaging data was reviewed.   Labs:  Recent Labs   Lab 09/07/20 2057   CULT >100,000 colonies/mL  Escherichia coli  *  <10,000 colonies/mL  Strain 2  Escherichia coli  *     Recent Labs   Lab 09/13/20  0804 09/11/20  0718 09/10/20  0724 09/08/20  0726   WBC 6.7 4.5  --  13.7*   HGB 11.6* 10.5*  --  11.8*   HCT 38.1* 34.9*  --  38.0*   MCV 96 95  --  94    154 153 179     Recent Labs   Lab 09/13/20 0804 09/11/20 0718 09/08/20 0726 09/07/20  1818    143 142  --  139   POTASSIUM 4.2 3.8 4.3  --  4.9   CHLORIDE 108 114* 111*  --  104   CO2 25 25 24  --  28   ANIONGAP 5 4 7  --  7   GLC 97 97 121*  --  140*   BUN 23 27 62*  --  55*   CR 0.73 0.82 1.90*  --  2.33*   GFRESTIMATED 84 80 31*   < > 24*   GFRESTBLACK >90 >90 36*   < > 28*   MESERET 7.8* 7.9* 7.9*  --  9.5   PROTTOTAL  --   --   --   --  8.3   ALBUMIN  --   --   --   --  4.4   BILITOTAL  --   --   --   --  0.5   ALKPHOS  --   --   --   --  78   AST  --   --   --   --  23   ALT  --   --   --   --  10    < > = values in this interval not displayed.      Imaging:   No results found for this or any previous visit (from the past 24 hour(s)).    Enrique Olmos MD

## 2020-09-14 NOTE — PROGRESS NOTES
[facility]  General Surgery Progress Note           Assessment and Plan:   Assessment:   Ileus vs SBO, no clinical c/o but no clear clinical evidence of resolution  Bowel function has returned with +BMs      Plan:   -Advance diet to full liquids, 6 small meals in small amounts. Recommend for 1-2 weeks.  -AXR to assess progress  -If fails PO, will need to consider surgery/comfort care/ perc gastrostomy. Could also do a SBFT.         Interval History:   Sitting up in bed, doing fine. Denies pain. Has tolerated clear liquids. He had two BMs.         Physical Exam:   Blood pressure 119/50, pulse 69, temperature 96.7  F (35.9  C), temperature source Axillary, resp. rate 20, weight 66.5 kg (146 lb 9.7 oz), SpO2 97 %.    I/O last 3 completed shifts:  In: 1078 [P.O.:125; I.V.:953]  Out: 150 [Urine:150]    General: alert and in no acute distress  Abdomen:   soft, ?Mildly distended, non-tender, voluntary guarding absent and no masses palpated             Data:     Recent Labs   Lab 09/13/20  0804 09/11/20  0718 09/10/20  0724 09/08/20  0726   WBC 6.7 4.5  --  13.7*   HGB 11.6* 10.5*  --  11.8*   HCT 38.1* 34.9*  --  38.0*   MCV 96 95  --  94    154 153 179     AXR 9/12/20 IMPRESSION: No obstructive bowel gas pattern. No evidence of  pneumoperitoneum.    Surjit Villalta PA-C     Seen and examined, agree with above.

## 2020-09-14 NOTE — PLAN OF CARE
Denies CP, SOB, pain. Oriented x4. Assist of 2. Incontinent of urine. Turned and repositioned. IV fluids infusing. Pt eye glasses at bedside table. Continue to monitor and with plan of care.

## 2020-09-14 NOTE — PLAN OF CARE
Discharge Planner SLP   Patient plan for discharge: Not stated.   Current status: Patient seen for swallowing treatment.  Patient tolerating a full liquid diet today, limited appetite.  Delayed swallow response with cough x1.  Patient exhibited significant gag response x2 with attempts to swallow single tablets dry, pills cleared with liquid wash. Recommend continue full liquid diet, advance to dysphagia diet level 1 with thin liquids when cleared per GI.    Barriers to return to prior living situation: Weakness.  Recommendations for discharge: TCU  Rationale for recommendations: Recommend SLP services for dysphagia, for safe return to least restrictive diet and use of compensatory swallow strategies.        Entered by: Arin Clark 09/14/2020 1:28 PM

## 2020-09-14 NOTE — PROGRESS NOTES
CLINICAL NUTRITION SERVICES  -  ASSESSMENT NOTE    Recommendations Ordered by Registered Dietitian (RD):     Oral nutrition supplements    MVI+M   Malnutrition:   % Weight Loss: suspect 7% in <3 months, as outlined below  % Intake:</= 50% for >/= 5 days (severe malnutrition)  Subcutaneous Fat Loss: moderate to severe, as outlined below   Muscle Loss: severe in observed areas, as outlined below   Fluid Retention:None noted    Malnutrition Diagnosis: Severe malnutrition  In Context of:  Acute illness or injury with underlying chronic illness or disease     REASON FOR ASSESSMENT  Chilango Echevarria is a 85 year old male seen by Registered Dietitian for LOS    History of Parkinson's diease (and associated progressive dementia), polymyalgia rheumatica, aortic stenosis, Ying esophagus, Gout, hypertension and slow transit constipation  Admitted with PSBO vs ileus, TERRELL    NUTRITION HISTORY    Information obtained from chart review; patient not appropriate to obtain diet history + soundly sleeping    Food allergies/intolerances: NKFA     Living situation: [facility]    CURRENT NUTRITION ORDERS    Diet: Six Small Feedings Adult, Clear Liquid --> full liquids this AM     SLP following, can be impulsive with intake - ok for DD1 when medically cleared for solids    Supplement: none  Current Intake/Tolerance:    Per flow sheet review, 25% intake for 1 documented meal.     Factors affecting nutrition intake include: altered GI function, dysphagia    NUTRITION FOCUSED PHYSICAL ASSESSMENT FOR DIAGNOSING MALNUTRITION + PHYSICAL FINDINGS  Completed and Observed:  Yes, limited as patient was sleeping (did not observe lower extremities)  Fat wasting:    Orbital region and surrounding area - hollow look, loose skin     Upper and lower arm region - lack of ample pinch depth and lower arms very thin    Thoracic and lumbar region - overall thin, iliac crest not fully observed  Muscle wasting:    Restorationism - scooping     Clavicle and acromion  "bone region - prominent clavicle; shoulder to arm joint look square    Scapular bone region - not fully observed    Dorsal hand / Interosseous Muscle - depressed area between thumb and forefinger  Dentition: not observed  Fragile looking skin  Obtained from Chart/Interdisciplinary Team    Jeremiah nutrition score: 2; total score: 16    Last BM: 9/13    Moderate malnutrition    Skin: red, blanchable    Moderately impaired mobility    Edema: none     NG removed 9/10    I/O last 3 completed shifts:    In: 1078 [P.O.:125; I.V.:953]    Out: 150 [Urine:150]  Consider comfort care transition and/or PEG for decompression?    ANTHROPOMETRICS  Height: 5'5\"  Weight: 66 kg - admit weight of 61 kg  Body mass index is 24.4 kg/m .  Weight Status:  Normal BMI  Weight History:  Down trending weight over last 3-6 months, question accuracy of current weight. Suspected >/=7% weight loss using 6/29 and 9/2 weights  Wt Readings from Last 10 Encounters:   09/14/20 66.5 kg (146 lb 9.7 oz)   09/02/20 60.7 kg (133 lb 12.8 oz)   08/19/20 62.5 kg (137 lb 12.8 oz)   07/31/20 63 kg (139 lb)   07/29/20 63.3 kg (139 lb 9.6 oz)   07/15/20 63.5 kg (140 lb)   07/06/20 63 kg (138 lb 14.4 oz)   07/03/20 63 kg (138 lb 14.4 oz)   06/29/20 65.5 kg (144 lb 8 oz)   06/26/20 68.8 kg (151 lb 9.6 oz)       ASSESSED NUTRITION NEEDS (PER APPROVED PRACTICE GUIDELINES, Dosing weight: 66 kg):  Estimated Energy Needs: 4404-6461+ kcals (25-30+ Kcal/Kg)  Justification: acute illness, minimum needs  Estimated Protein Needs: 66-79+ grams protein (1-1.2+ g pro/Kg)  Justification: acute illness, minimum needs to maintain LBM  Estimated Fluid Needs: >1 mL/Kcal  Justification: maintenance    LABS  Labs reviewed  Electrolytes  Potassium (mmol/L)   Date Value   09/13/2020 4.2   09/11/2020 3.8   09/08/2020 4.3     Phosphorus (mg/dL)   Date Value   05/20/2015 2.8    Blood Glucose  Glucose (mg/dL)   Date Value   09/13/2020 97   09/11/2020 97   09/08/2020 121 (H)   09/07/2020 140 " (H)   09/02/2020 81    Inflammatory Markers    WBC (10e9/L)   Date Value   09/13/2020 6.7   09/11/2020 4.5     Albumin (g/dL)   Date Value   09/07/2020 4.4   06/22/2020 3.0 (L)      Magnesium (mg/dL)   Date Value   05/20/2015 2.1   05/19/2015 2.1   05/18/2015 2.1     Sodium (mmol/L)   Date Value   09/13/2020 138   09/11/2020 143   09/08/2020 142    Renal  Urea Nitrogen (mg/dL)   Date Value   09/13/2020 23   09/11/2020 27   09/08/2020 62 (H)     Creatinine (mg/dL)   Date Value   09/13/2020 0.73   09/11/2020 0.82   09/08/2020 1.90 (H)     Additional  Triglycerides (mg/dL)   Date Value   05/18/2015 53   05/11/2015 34   03/11/2008 75     Ketones Urine (mg/dL)   Date Value   09/07/2020 10 (A)          MEDICATIONS    Medications reviewed    carbidopa-levodopa  2 tablet Oral TID     heparin ANTICOAGULANT  5,000 Units Subcutaneous Q12H     pantoprazole (PROTONIX) IV  40 mg Intravenous Daily with breakfast     sodium chloride (PF)  3 mL Intracatheter Q8H       dextrose 5% and 0.45% NaCl + KCl 20 mEq/L 75 mL/hr at 09/14/20 0658     NUTRITION DIAGNOSIS:  Swallowing difficulty related to progressive dementia and Parkinsons as evidenced by DD1 diet allowance (restricted to liquids due to medical reasons)    Inadequate oral intake related to altered GI function, nausea as evidenced by intake of <50% of estimated needs for >7 days, fat and muscle wasting    INTERVENTIONS  Recommendations / Nutrition Prescription  Diet advancement per MD/SLP discretion   Oral nutrition supplements to increase calorie and protein intake when tolerating clear liquids and/or advanced to full liquids  Multivitamin+Mineral    Implementation  Nutrition education: not appropriate   Multivitamin/Minerals: Thera-Vit-M   Medical food supplement: Boost 10-2  Collaboration and Referral of care: Discussed patient during interdisciplinary care rounds this morning and Dr. Olmos    Goals  Patient to consume >/= 50% of liquid meals TID and >/= 2 high  protein supplements per day    MONITORING AND EVALUATION:  Progress towards goals will be monitored and evaluated per protocol and Practice Guidelines      Vy Larios MS, RDN, LD, CNSC  Pager - 3rd floor/ICU: [phone]  Pager - All other floors: [phone]  Pager - Weekend/holiday: [phone]  Office: [phone]

## 2020-09-14 NOTE — PLAN OF CARE
Discharge Planner OT   Patient plan for discharge: return home to LTC  Current status: Pt admitted from LTC after being found on the floor; pt found with UTI and PSBO vs ileus; PMHx parkinsonism with associated dementia. Pt requires A of 1-2 for transfers. Plan for return home tomorrow if able to tolerate diet. At this time no skilled IP OT needs, will complete order.  Barriers to return to prior living situation: defer to PT  Recommendations for discharge: defer to PT  Rationale for recommendations: defer to PT       Entered by: Deja Colon 09/14/2020 3:15 PM

## 2020-09-14 NOTE — PROGRESS NOTES
Care Coordination:  SW following for return to facility on discharge. Pt is from [facility] LT facility where he has a bed hold. Per MD, pt will return to facility tomorrow if he is able to tolerate his diet. Call placed to [facility] admissions and updated them on discharge possibility for tomorrow.    Teresa Mckeon RN BSN CM  Inpatient Care Coordination  [facility]  [phone]

## 2020-09-14 NOTE — PLAN OF CARE
Pt has hx of dementia and parkinsons.  Up to chair with 1-2A w/walker & GB. Confused at times through out shift, but A&O X3.  VSS, diet advanced to full liquids per MD and continue to offer up to 6 small meals daily and nutritional supplements.    Bowel sounds hypoactive and abdominal round and distended.   Pt had loose brown medium BM today  Pt calls appropriately and uses the urinal at bedside.    Speech therapy saw pt today, see note.     Plan is Discharge to facility with TCU serives for therapy  Care transition is following

## 2020-09-15 NOTE — PROGRESS NOTES
"[facility]           Hospitalist Progress Note    Name: Chilango Echevarria    MRN: [id number]  YOB: 1934    Age: 85 year old  Date of admission: 2020  Primary care provider: Enrique Ring      Reason for Stay (Diagnosis): Recurrent falls/questionable syncope/partial small bowel obstruction versus ileus         Assessment and Plan:      Summary of Stay:  Chilango Echevarria is a 85 year old male who returned to the emergency department on 2020 after having been found in his nursing home on the floor on the night of 2020.  His fall was unwitnessed but fortunately appears to not have injured himself significantly.       Mr. Echevarria again fell on the morning of the date of admission when he was helped back to bed, he was noted to be significantly hypotensive.  Because this is not an unusual finding for this patient, they did not send him directly to the emergency department.  However, because he became \"unresponsive\" and may have had some weakness on his left side, the patient was directed to the emergency department.     Patient social history is challenging.  He is a  and his only son  18 years ago and his daughter-in-law resides with the patient's only grandchild in [place].      My colleague spoke with the nursing supervisor at [facility] at [phone] and she was able to give him additional information on the patient. Ultimately, my colleague also spoke with the patient's daughter-in-law who told me that Mr. Echevarria has been declining since around New Year's.  He entered independent living at that time and quickly deteriorated to the point where he needed to be in assisted living.  She has seen him become more confused and more paranoid, less active and more withdrawn as the year has gone by. The patient's close friends Abena and her  see him about twice a week and they endorsed the same history.     When the patient " first arrived in the emergency department, there was concern for an ST elevation MI.  Cardiology was involved and it was deemed inappropriate to reflexively move to angiography.  Further concern arose with the patient who was noted to have gastric distention possibly indicating a small bowel obstruction.  General surgery was also consulted at that time and an NG was recommended and has since been removed.    At this point the plan is to gradually advance his diet.  He did have bowel movements on 9/13 and we have been advancing his diet stepwise.  Tolerating fulls.  Plan to keep him on fulls for 1-2 weeks.    He did have a new fever on 9/15 for which we are holding off on discharge for further evaluation/monitoring.    Update: per my discussion w/ palliative care it sounds as though the new plan is to pursue Hospice enrollment.     Assessment & Plan      PSBO vs ileus (current active issue):  Still some notable abdominal distention, hypoactive bowel sounds, and nausea.  XR OK.  Continue to advance to fulls. Patient did have an NG tube placed but now discontinued.  -advanced to fulls. Likely will keep at fulls for ~2 weeks as per surgery.    Fever: New onset 9/15.  Had some loose stools, though possibly from resolving ileus.  Checking labs, procal, UA/UC, XR, will monitor here another day.     Recurrent syncope/falls:  --Echo shows normal function (limited study).  Carotid ultrasound is negative.  No obvious dysrhythmias noted on telemetry thus far.   -No further recommendations by cardiology and has signed off.      TERRELL:    Likely volume depletion/dehyration.  Now resolved with IVF.     Parkinsonism with associated progressive dementia and now hallucinations (compatible with frontal dementia associated with parkinsonism):  -Sinemet to continue    Concerns for dysphagia  Speech pathology input appreciated and was consulted to determine what food consistencies patient would be appropriate for when SBO versus ileus  "resolves.  They felt that patient would tolerate a dysphagia level 1 with thin liquids.  Patient does tend to eat impulsively and probably should be supervised during initial meals to avoid aspiration and be upright for an hour afterwards       Urinary tract infection:  Urine cultures growing out E. coli, pansensitive.  -Patient has received 5 days worth which should be more than adequate so we will discontinue.     Moderate malnutrition:     Gout Hx:  --Holding allopurinol until intake improves     Deconditioning:  -  PT/OT    DVT Prophylaxis: Heparin SQ  Code Status: DNR / DNI  Discharge Dispo: Back to [facility]   Estimated Disch Date / # of Days until Disch: Anticipate back to LTC on 9/16 if fever resolves and pending fever workup.          Interval History (Subjective):      Tolerating fulls  New fever today, checked XR (neg), awaiting repeat lab workup  Rash on back noted, seems to be from contact w/ bed  \"I feel a little rough\"  Having some loose stools now in the setting of recent ileus  Discussed w/ surgery, plan for ~1-2 weeks of full liquids upon discharge  Updated his contact, April via phone.         Physical Exam:      Vital signs:  Temp: 97.8  F (36.6  C) Temp src: Axillary BP: 102/48 Pulse: 61   Resp: 18 SpO2: 95 % O2 Device: None (Room air) Oxygen Delivery: 1 LPM   Weight: 66.5 kg (146 lb 9.7 oz)  Estimated body mass index is 24.4 kg/m  as calculated from the following:    Height as of 9/2/20: 1.651 m (5' 5\").    Weight as of this encounter: 66.5 kg (146 lb 9.7 oz).    I/O last 3 completed shifts:  In: 2157 [P.O.:50; I.V.:2107]  Out: 150 [Urine:150]  Vitals:    09/11/20 0526 09/12/20 0619 09/13/20 0535 09/14/20 0545   Weight: 63.6 kg (140 lb 3.4 oz) 65.1 kg (143 lb 8.3 oz) 66.1 kg (145 lb 11.6 oz) 66.5 kg (146 lb 9.7 oz)    09/15/20 0447   Weight: 66.5 kg (146 lb 9.7 oz)       Constitutional: Awake, alert, cooperative, no apparent distress     Respiratory: Nl work of " breathing. Clear to auscultation bilaterally, no crackles or wheezing   Cardiovascular: Regular rate and rhythm, normal S1 and S2, and no murmur noted   Abdomen:  Hypoactive bowel sounds, mildly distended and tympanitic   Skin: No rashes, no cyanosis, dry to touch   Neuro: CN 2-12 intact, no localizing weakness   Extremities: No edema, normal range of motion   HEENT Normocephalic, atraumatic, normal nasal turbinates; oropharynx clear   Neck Supple; nl inspection; trachea midline; no thryomegaly   Psychiatric:  Alert but does appear mildly anxious.           Medications:      All current medications were reviewed with changes reflected in problem list.         Data:      All new lab and imaging data was reviewed.   Labs:  No results for input(s): CULT in the last 168 hours.  Recent Labs   Lab 09/15/20  0933 09/13/20  0804 09/11/20  0718   WBC 3.4* 6.7 4.5   HGB 9.8* 11.6* 10.5*   HCT 32.1* 38.1* 34.9*   MCV 94 96 95   * 156 154     Recent Labs   Lab 09/15/20  0933 09/13/20  0804 09/11/20  0718    138 143   POTASSIUM 3.6 4.2 3.8   CHLORIDE 108 108 114*   CO2 23 25 25   ANIONGAP 5 5 4   GLC 78 97 97   BUN 15 23 27   CR 0.86 0.73 0.82   GFRESTIMATED 78 84 80   GFRESTBLACK >90 >90 >90   MESERET 7.5* 7.8* 7.9*      Imaging:   No results found for this or any previous visit (from the past 24 hour(s)).    Enrique Olmos MD

## 2020-09-15 NOTE — PLAN OF CARE
Pt significantly more confused & fatigued this AM,.  Pt incontinent at times 2xBM overnight. Incont x1 today. Surgery following. VSS ex Temp 100.7 PRN tylenol administered, with improvement. New congested cough this AM, lungs diminshed.  MD notified and CXR ordered,see results.  Speech Following, Full liquid diet with thin liquids.    Update: MD added procal & CBC, stopped IV fluids    UA ordered, results pending.     Lost IV access, MD notified and waiting to hear back on the plan around if IV access is needed.     Pt is oriented x2 now and was able to tolerate some fluid intake, but poor appetite.  1x Medium loose BM this shift.      Pt has complained of heart burn this shift, pt to remain upright following meals for at least 60 minutes.  PRN Zofran admin 1x this afternoon.     PT, OT, Speech following patient.

## 2020-09-15 NOTE — PROGRESS NOTES
Discharge Planner   Discharge Plans in progress: Andria spoke with Abigail at [facility] to update her that the pt will likely return tomorrow with  Hospice.  Sw told her that it would be an afternoon discharge in order to have arrangements made with hospice.    Andria spoke with Ofelia regarding the pt's discharge tomorrow back to [facility] with [facility].  Ofelia said that at this time she has no additional questions or concerns.  Andria told her that they have already reached out to [facility] to update them on the pt's return and to let them know that the pt will return on [facility] services.  Transport was discussed and Ofelia requested sw call her back in the morning to confirm transport.  Ofelia would like to see how the pt is doing tomorrow prior to arranging transport.  Andria told Ofelia that  Hospice would be calling her in the morning to follow-up and complete the paperwork.    Andria updated the  Hospice liaison Kris.     09/15/20 9242   Final Resources   Resources List Skilled Nursing Facility;Hospice   Hospice [facility] [phone], Fax: [phone]   Skilled Nursing Facility [facility] [phone], Fax: [phone]   PAS Number [id number]     Barriers to discharge plan: None identified at this time.  Follow up plan: Sw will continue to be available as needed until discharge.  Sw will arrange transport tomorrow.       Entered by: Faina Menard 09/15/2020 4:02 PM     VIRGEN Levy, [facility]  Inpatient Care Coordination  [facility]  [phone]

## 2020-09-15 NOTE — PROGRESS NOTES
[facility]  General Surgery Progress Note         Assessment and Plan:   Assessment:   Ileus vs SBO, no clinical c/o but no clear clinical evidence of resolution  Bowel function has returned with +BMs  Tmax 100.7      Plan:   -Diet: continue full liquids, 6 small meals in small amounts. Recommend for 1-2 weeks.  -If fails PO, will need to consider surgery/comfort care/ perc gastrostomy. Could also do a SBFT.  -Spoke with hospitalist about fever, will check chest Xray as patient is at risk for aspiration.          Interval History:   Sitting up in bed, doing fine. Denies pain. Tolerating full liquids. He is having multiple loose BMs.         Physical Exam:   Blood pressure 119/43, pulse 68, temperature 100.5  F (38.1  C), resp. rate 18, weight 66.5 kg (146 lb 9.7 oz), SpO2 97 %.    I/O last 3 completed shifts:  In: 2157 [P.O.:50; I.V.:2107]  Out: 150 [Urine:150]    General: alert and in no acute distress  Abdomen:   soft, non-distended, non-tender, voluntary guarding absent and no masses palpated             Data:     Recent Labs   Lab 09/13/20  0804 09/11/20  0718 09/10/20  0724   WBC 6.7 4.5  --    HGB 11.6* 10.5*  --    HCT 38.1* 34.9*  --    MCV 96 95  --     154 153     AXR 9/12/20 IMPRESSION: No obstructive bowel gas pattern. No evidence of  pneumoperitoneum.    Surjit Villalta, LESLIE     as above, clinically resolved bowel obstruction  Now with fever and cough, hospitalist beginning evaluation - CXR ordered  No abdominal pain/tenderness although nurses report some possible nausea  Michael Obando MD  9/15/2020 10:27 AM

## 2020-09-15 NOTE — PLAN OF CARE
Admitting Diagnosis: Syncope  Pertinent History: significant hypotensive. Dementia. Parkinsonism, Fall in the nursing home on the 9/6/ 20.  For vital sign and assessment please see flow sheet.   Living Situation: Nursing   Pain plan: prn Tylenol available .  Mobility: assistance, 2 people  Baseline activity:with assistive equipment  Alarms/Safety: BA  LDA's: PIV  Pertinent test results: K+ 4.2, cr: 0.73, , trop: 0.01. hgb, 11.6.   Consults: palliative care/ swallow eval/ speech PT following   Abnormals/Pending: none  Other Cares/Comments: pt confused x4, VSS, LS clear, o2 94% RA.  Discharge Disposition: possible discharge tomorrow.  Discharge Time: TBD

## 2020-09-15 NOTE — PROGRESS NOTES
"[facility]  Palliative Care Progress Note  Text Page    Met with Nick as he was resting in bed. His main concern \"Get me out of here!\" I asked if he would want to return to the hospital in the future: \"No. I don't want to come back here again. Get me home.\" I explained that I would contact his daughter-in-law regarding his dismissal plan, which he agreed.        I phoned the patient's daughter-in-law Ofelia Echevarria at [phone]. April was most concerned that Nick would return to his room at [facility]. She explained \"He's a bullheaded Stateless. When he sets his mind to something. He fixates on it. He says he's in custodial at the hospital and wants to go home.\" I explained our care management team has contacted [facility] to confirm his bed hold and Nick will likely return there tomorrow. April explained that she is upset with the doctor who spoke with Nick's friend Abena and told her that Nick is a DNR/DNI. \"I've met Abena maybe three times and here she was interrogating me about why Nick is a DNR.\" She explained that Nick had only one child who was her  who  in  when she was six months pregnant with Melissa. Nick has always maintained contact in their family as he would visit them in [place] at least twice a year. Nick can be stubborn, as he wanted to take over April's finances when his son . April had to explain to Nick \"I'm a grown woman and I'm perfectly fine taking care of my own finances!\" She acknowledged Nick's stubborn unwillingness to acknowledge his health deterioration. \"He's still homing on to his condo, but he hasn't been back there since February.\" We discussed options for care. Given Nick's repeated hospitalizations and deterioration in health, he does meet the criteria for hospice. April acknowledged \"I do worry about him going back to [facility] and something will happen. He doesn't want to be in the hospital.\" We discussed the hospice benefit at " length. April is interested in pursuing a comfort plan with return to [facility]. We completed a POLST indicating a plan for comfort focused care.      Assessment & Plan   1.  Decisional Capacity -  Questionable and is not demonstrating medical capacity (or at least is unwilling to discuss). Patient does not have an advance directive. Per  informed consent policy next of kin should be involved in all consent and decision making. His daughter-in-law April Echevarria (wife of son who  18 years ago) and 18 year old granddaughter Michelle Echevarria are next-of-kin.       2.  Abdominal pain - SBO resolving with NG removed.      3.  Dysphagia - Appreciate input of Speech Therapist Tammy Collins, MICA recommendation for upgrade to DD1 and thin liquids. Surgeon Michael Redding MD recommending full liquids with 6 small meals for 1-2 weeks.    4.  Severe malnutrition - Appreciate input of Registered Dietitian Vy Larios, RD, LD.    5.  Generalized weakness - Admission following falls at LTC facility. Appreciate input of Physical Therapist Tammy Clifford, PT & Physical Therapy Assistant Teresa Jennings PTA and Occupational Therapist Djea Colon, OT. Patient to return to LTC.     6.  Spiritual Care - Appreciate input of cathy Helms MA.     7.  Care Planning - Appreciate input of  Marianna Sylvester MSA;  Teresa Mckeon, AIDEN and Deja Casper, AIDEN. [facility].      Goals of Care: No CPR - Do NOT Intubate - Restorative care. Plan to enroll in [facility] with return to LTC facility. POLST complete indicating comfort focused care.      Stacia Staples MS, RN, CNS, APRN, ACHPN, FAACVPR  Pain and Palliative Care  Pager [phone]  Office [phone]       Time Spent on this Encounter   Total unit/floor time 1:05 PM until 3 PM, time consisted of the following, examination of the patient, reviewing the record and completing documentation. >50% of time spent in  counseling and coordination of care.  Time spend counseling with patient and family consisted of the following topics, goals of care and care planning for discharge.  Time spent in coordination of care with Bedside Nurse Aye Marmolejo RN, Hospitalist Colton Olmos MD and  KAMINI Levy.     Interval History   Chart reviewed - progressive improvement     Palliative Symptom Review (0=no symptom/no concern, 1=mild, 2=moderate, 3=severe):      Pain: 1-mild      Fatigue: 1-mild      Nausea: 1-mild      Constipation: 0-none      Diarrhea: 0-none      Depressive Symptoms: 0-none      Anxiety: 1-mild      Drowsiness: 0-none      Poor Appetite: 1-mild      Shortness of Breath: 0-none      Insomnia: 0-none        Physical Exam   Temp:  [98.6  F (37  C)-100.7  F (38.2  C)] 99  F (37.2  C)  Pulse:  [61-77] 61  Resp:  [16-20] 18  BP: ()/(42-57) 102/48  SpO2:  [94 %-97 %] 95 %  146 lbs 9.69 oz  GEN:  Alert, oriented to self and situation, appears comfortable, NAD.  HEENT:  Normocephalic/atraumatic, no scleral icterus, no nasal discharge, mouth moist.  RESP:   Symmetric chest rise on inhalation noted.  Normal respiratory effort.      Medications       carbidopa-levodopa  2 tablet Oral TID     heparin ANTICOAGULANT  5,000 Units Subcutaneous Q12H     multivitamin w/minerals  1 tablet Oral Daily     pantoprazole  40 mg Oral QAM AC     sodium chloride (PF)  3 mL Intracatheter Q8H       Data   Results for orders placed or performed during the hospital encounter of 09/07/20 (from the past 24 hour(s))   CBC with platelets differential   Result Value Ref Range    WBC 3.4 (L) 4.0 - 11.0 10e9/L    RBC Count 3.42 (L) 4.4 - 5.9 10e12/L    Hemoglobin 9.8 (L) 13.3 - 17.7 g/dL    Hematocrit 32.1 (L) 40.0 - 53.0 %    MCV 94 78 - 100 fl    MCH 28.7 26.5 - 33.0 pg    MCHC 30.5 (L) 31.5 - 36.5 g/dL    RDW 14.7 10.0 - 15.0 %    Platelet Count 121 (L) 150 - 450 10e9/L    Diff Method Automated Method     % Neutrophils 68.8 %     % Lymphocytes 22.6 %    % Monocytes 7.4 %    % Eosinophils 0.3 %    % Basophils 0.3 %    % Immature Granulocytes 0.6 %    Nucleated RBCs 0 0 /100    Absolute Neutrophil 2.3 1.6 - 8.3 10e9/L    Absolute Lymphocytes 0.8 0.8 - 5.3 10e9/L    Absolute Monocytes 0.3 0.0 - 1.3 10e9/L    Absolute Eosinophils 0.0 0.0 - 0.7 10e9/L    Absolute Basophils 0.0 0.0 - 0.2 10e9/L    Abs Immature Granulocytes 0.0 0 - 0.4 10e9/L    Absolute Nucleated RBC 0.0    Basic metabolic panel   Result Value Ref Range    Sodium 136 133 - 144 mmol/L    Potassium 3.6 3.4 - 5.3 mmol/L    Chloride 108 94 - 109 mmol/L    Carbon Dioxide 23 20 - 32 mmol/L    Anion Gap 5 3 - 14 mmol/L    Glucose 78 70 - 99 mg/dL    Urea Nitrogen 15 7 - 30 mg/dL    Creatinine 0.86 0.66 - 1.25 mg/dL    GFR Estimate 78 >60 mL/min/[1.73_m2]    GFR Estimate If Black >90 >60 mL/min/[1.73_m2]    Calcium 7.5 (L) 8.5 - 10.1 mg/dL   XR Chest 2 Views    Narrative    XR CHEST 2 VW 9/15/2020 11:01 AM    HISTORY: hx of aspiration, new fever and cough.  ?infiltrate or edema.    COMPARISON: 9/8/2020    FINDINGS: Heart is mildly enlarged. Small left effusion with  atelectasis. Lungs are otherwise clear.    SID RACHEL MD

## 2020-09-15 NOTE — PLAN OF CARE
End of Shift Note:  For VS and complete assessments, please see documentation flowsheets.     Pertinent shift assessments: VSS. Denies pain. Confused. AxOx3; disoriented to time and situation. Slow speech. LS diminished, clear. BM x2; medium, brown. Incontinent of B&B. Full liquid diet.    Treatment Plan: Possible discharge today/tomorrow to previous LTC. Will continue with plan of care.

## 2020-09-15 NOTE — PLAN OF CARE
Discharge Planner PT   Patient plan for discharge: is from LTC   Current status: bed mobility with Mod A this date per RN is having a harder day and needing a Annamarie Steady for mobility. Unable to tolerate much in terms of ex and requested to lay down quickly.  Barriers to return to prior living situation: Requires assist for mobility, fall risk, decreased activity tolerance  Recommendations for discharge: TCU per plan established by the PT.   Rationale for recommendations: Pt is not currently at baseline for mobility, and is unsafe to discharge home. With continued PT, both IP and after discharge, pt is likely to obtain mobility goals.        Entered by: Teresa Jennings 09/15/2020 11:53 AM

## 2020-09-15 NOTE — PLAN OF CARE
Discharge Planner SLP   Patient plan for discharge: Did not state  Current status: SLP: Meal follow up with breakfast tray. Pt accepted 25% of cream of wheat and thin liquids with no oral difficulty or s/sx of aspiration noted. Pt requested assistance getting in a chair d/t leg pain - assist with call light.     Okay to upgrade to DD1 and thin liquids from SLP perspective. Defer diet to MD.     Barriers to return to prior living situation: Dysphagia; deconditioning  Recommendations for discharge: TCU  Rationale for recommendations: Continue ST for swallowing goals       Entered by: Tammy Collins 09/15/2020 9:07 AM

## 2020-09-16 NOTE — PLAN OF CARE
End of Shift Note:  For VS and complete assessments, please see documentation flowsheets.     Pertinent shift assessments: Alert, oriented to self only. Reports pain in stomach; PRN tylenol, TUMS, and zofran given. Ice pack placed on abdomen. Incontinent of B&B, BM x2, soft and brown/tan. Groin rash/redness, powder applied.     Treatment Plan: Possible discharge today to previous LTC. Will continue with plan of care.

## 2020-09-16 NOTE — DISCHARGE SUMMARY
"[facility]  Hospitalist Discharge Summary       Date of Admission:  9/7/2020  Date of Discharge:  9/16/2020  1:02 PM  Discharging Provider: Servando Silverio MD      Discharge Diagnoses   Severe malnutrition  Acute cystitis  Parkinsonism with associated progressive dementia and hallucinations  Partial SBO versus ileus  Recurrent syncope/falls  Acute kidney injury    Follow-ups Needed After Discharge   Follow-up Appointments     Follow Up and recommended labs and tests      Follow up with Nursing home physician.               Discharge Disposition   Discharged to TCU  Condition at discharge: Fair    History of Present Illness   Per admission H&P:  \"Chilango Echevarria is a 85 year old male who presents with syncopal episodes reported by staff at [facility].  Per the nursing home staff, the patient had a unwitnessed fall last night around 11 PM.  The staff put the patient back in bed.  The following morning the patient was noted to have 2 syncopal events while in his wheelchair.  The staff noted his blood pressure to be low and so gave him an extra dose of his Midodrine.  The patient was then helped to the bathroom and was noted to have a another syncopal episode.  At that time the patient's primary doctor was notified and instructed to bring the patient to the emergency department.  Upon arrival the patient did complain of intermittent abdominal pain for the past 2 weeks.  The patient was seen in the emergency department with his friend Abena at bedside.  Patient was not able to provide much information regarding why he was at the hospital.  History was obtained from emergency room physician as well as nursing home staff and the patient's ALDO Gilbert via phone.\"    Hospital Course     Chilango Echevarria is an 85 year old male who returned to the emergency department on 9/7/2020 after having been found in his nursing home on the floor on the night of 9/6/2020.  His fall was unwitnessed but " "fortunately appears to not have injured himself significantly.       Mr. Echevarria again fell on the morning of the date of admission when he was helped back to bed, he was noted to be significantly hypotensive.  Because this is not an unusual finding for this patient, they did not send him directly to the emergency department.  However, because he became \"unresponsive\" and may have had some weakness on his left side, the patient was directed to the emergency department.     Patient social history is challenging.  He is a  and his only son  18 years ago and his daughter-in-law resides with the patient's only grandchild in [place].       My colleague spoke with the nursing supervisor at [facility], Kalli Augustin at [phone] and she was able to give him additional information on the patient. Ultimately, my colleague also spoke with the patient's daughter-in-law who told me that Mr. Echevarria has been declining since around New Year's.  He entered independent living at that time and quickly deteriorated to the point where he needed to be in assisted living.  She has seen him become more confused and more paranoid, less active and more withdrawn as the year has gone by. The patient's close friends Abena and her  see him about twice a week and they endorsed the same history.     When the patient first arrived in the emergency department, there was concern for an ST elevation MI.  Cardiology was involved and it was deemed inappropriate to reflexively move to angiography.  Further concern arose with the patient who was noted to have gastric distention possibly indicating a small bowel obstruction.  General surgery was also consulted at that time and an NG was recommended and has since been removed. At this point the plan is to gradually advance his diet.  He did have bowel movements on  and we have been advancing his diet stepwise.  Tolerating fulls.  Plan to keep him on fulls for 1-2 " weeks.     He did have a new fever on 9/15 for which we held off on discharge for further evaluation/monitoring. Workup was negative and fever did not recur.    Palliative care was consulted during the hospital stay.  They met with the patient and spoke to patient's daughter-in-law.  Patient during the hospital stay had limited ability to participate in goals of care discussions or voices wants/needs, this was due to his cognitive dysfunction.  Palliative care discussed with patient's daughter-in-law.  Given his repeated hospitalizations and deterioration in health, he met criteria for hospice.  Ultimately this path was pursued and patient discharged back to long-term care with hospice cares. POLST completed by palliative care with a plan for comfort focused care.     Other issues:  Recurrent syncope/falls:  --Echo shows normal function (limited study).  Carotid ultrasound is negative.  No obvious dysrhythmias noted on telemetry thus far.   -No further recommendations by cardiology     Parkinsonism with associated progressive dementia and now hallucinations (compatible with frontal dementia associated with parkinsonism):  -Sinemet to continue    UTI  Treated with 5 days of ceftriaxone.        Consultations This Hospital Stay   CARDIOLOGY IP CONSULT  SURGERY GENERAL IP CONSULT  PALLIATIVE CARE ADULT IP CONSULT  SPIRITUAL HEALTH SERVICES IP CONSULT  CARE COORDINATOR IP CONSULT  PHYSICAL THERAPY ADULT IP CONSULT  OCCUPATIONAL THERAPY ADULT IP CONSULT  SWALLOW EVAL SPEECH PATH AT BEDSIDE IP CONSULT      Code Status   No CPR- Do NOT Intubate    Time Spent on this Encounter   I, Servando Silverio MD, personally saw the patient today and spent greater than 30 minutes discharging this patient.       Servando Silverio MD  [facility]  ______________________________________________________________________    Physical Exam   Vital Signs: Temp: 97.9  F (36.6  C) Temp src: Axillary BP: 116/76 Pulse: 65    Resp: 20 SpO2: 96 % O2 Device: None (Room air)    Weight: 138 lbs 14.24 oz      General: Sitting up in the chair, seems to be aspirating/coughing frequently.    HEENT: No scleral icterus. Oropharynx moist.     Neck: Supple. Normal range of motion.    Pulmonary: Normal work of breathing. Clear to auscultation bilaterally.    Cardiovascular: Regular rate and rhythm without murmur or extra heart sounds.    Abdomen: Soft and non-tender.    Extremities: No peripheral edema. No clubbing.    Neurologic: Awake, answers questions, confused to situation. Limited ability to provide history.    Skin: Warm and dry.         Primary Care Physician   Enrique Ring    Discharge Orders      HOSPICE REFERRAL      General info for SNF    Length of Stay Estimate: Long Term Care  Condition at Discharge: Stable  Level of care:skilled   Rehabilitation Potential: Poor  Admission H&P remains valid and up-to-date: Yes  Recent Chemotherapy: N/A  Use Nursing Home Standing Orders: Yes     Reason for your hospital stay    Small bowel obstruction.     Activity - Up with nursing assistance     Follow Up and recommended labs and tests    Follow up with Nursing home physician.     No CPR- Do NOT Intubate     Advance Diet as Tolerated    Follow this diet upon discharge: Orders Placed This Encounter      Snacks/Supplements Adult: Boost Plus; Between Meals      Combination Diet Six Small Feedings Adult, Full Liquid       Significant Results and Procedures   Most Recent 3 CBC's:  Recent Labs   Lab Test 09/16/20  0607 09/15/20  0933 09/13/20  0804   WBC 4.4 3.4* 6.7   HGB 10.1* 9.8* 11.6*   MCV 96 94 96   * 121* 156     Most Recent 3 BMP's:  Recent Labs   Lab Test 09/15/20  0933 09/13/20  0804 09/11/20  0718    138 143   POTASSIUM 3.6 4.2 3.8   CHLORIDE 108 108 114*   CO2 23 25 25   BUN 15 23 27   CR 0.86 0.73 0.82   ANIONGAP 5 5 4   MESERET 7.5* 7.8* 7.9*   GLC 78 97 97     Most Recent 2 LFT's:  Recent Labs   Lab Test 09/07/20  1818  06/22/20  0759   AST 23 22   ALT 10 10   ALKPHOS 78 51   BILITOTAL 0.5 0.5     Most Recent 3 INR's:  Recent Labs   Lab Test 09/07/20  1818 06/22/20  0759 05/18/15  0717   INR 1.05 1.29* 1.08   ,   Results for orders placed or performed during the hospital encounter of 09/07/20   Head CT w/o contrast    Narrative    EXAM: CT HEAD WITHOUT CONTRAST  LOCATION: [facility]  DATE/TIME: 09/07/2020, 6:31 PM    INDICATION: Head trauma, minor, GCS>=13, high clinical risk, initial exam.  COMPARISON: Head CT 06/23/2020  TECHNIQUE: Routine without IV contrast. Multiplanar reformats. Dose reduction techniques were used.    FINDINGS:  INTRACRANIAL CONTENTS: No intracranial hemorrhage, extra-axial collection, or mass effect.  No CT evidence of acute infarct. Moderate presumed chronic small vessel ischemic changes. Moderate generalized volume loss. No hydrocephalus.     VISUALIZED ORBITS/SINUSES/MASTOIDS: No intraorbital abnormality. No paranasal sinus mucosal disease. No middle ear or mastoid effusion.    BONES/SOFT TISSUES: No acute abnormality.      Impression    IMPRESSION:  1.  No CT evidence for acute intracranial process.  2.  Brain atrophy and presumed chronic microvascular ischemic changes as above.     XR Chest Port 1 View    Narrative    EXAM: XR CHEST PORT 1 VW  LOCATION: [facility]  DATE/TIME: 9/7/2020 6:14 PM    INDICATION: Chest pain  COMPARISON: 06/23/2020      Impression    IMPRESSION: No significant change. Heart size and pulmonary vessels normal. Tortuous calcified thoracic aorta stable. Lungs are clear.   CT Aortic Survey w Contrast    Narrative    EXAM: CT AORTIC SURVEY W CONTRAST  LOCATION: [facility]  DATE/TIME: 9/7/2020 6:31 PM    INDICATION: Syncope, elevated troponin.  COMPARISON: CT abdomen and pelvis 05/10/2015  TECHNIQUE: CT angiogram chest abdomen pelvis during arterial phase of injection of IV contrast. 2D and 3D MIP reconstructions were performed by the CT  technologist. Dose reduction techniques were used.   CONTRAST: 80mL [id number]-370    FINDINGS:   CT ANGIOGRAM CHEST, ABDOMEN, AND PELVIS: The thoracic and abdominal aorta are negative for dissection. Mild atherosclerotic disease. 3.9 x 3.3 cm infrarenal abdominal aortic aneurysm has increased in size and now with thrombus within the anterior aspect   of the aneurysm on image 121 of series 6 the inferior mesenteric artery arises immediately above the aneurysm. Mild plaque at the origins of the celiac trunk and superior mesenteric artery without significant narrowing. Plaque at the origin of the left   renal artery. Both renal arteries are small in caliber.    LUNGS AND PLEURA: Subsegmental atelectasis or scarring both lower lobes. No acute infiltrates or pleural effusions.    MEDIASTINUM/AXILLAE: No mediastinal or hilar adenopathy. The proximal esophagus is distended with air. The distal esophagus is nondistended with wall thickening.    HEPATOBILIARY: Fatty infiltration of the liver. Tiny cyst near the liver dome. Mildly dilated common bile duct is unchanged.    PANCREAS: Normal.    SPLEEN: Normal.    ADRENAL GLANDS: Normal.    KIDNEYS/BLADDER: Normal.    BOWEL: Marked gastric distention with fluid as seen on previous study. There are also numerous dilated loops of small bowel throughout the abdomen and pelvis. The distal small bowel and colon are normal caliber. Normal appendix.    LYMPH NODES: Normal.    PELVIC ORGANS: Normal.    MUSCULOSKELETAL: Normal scoliosis. Severe degenerative changes throughout the thoracolumbar spine..      Impression    IMPRESSION:  1.  No evidence for aortic dissection.  2.  3.9 x 3.3 cm partially thrombosed infrarenal abdominal aortic aneurysm.  3.  The stomach is markedly distended with fluid, along with numerous dilated, fluid-filled small bowel loops concerning for high-grade obstruction with the transition point estimated in the region of the mid ileum. Consider NG tube placement and  gastric   decompression.  4.  The proximal esophagus is distended with air. The distal esophagus at the GE junction is nondistended which may account for diffuse wall thickening. This should be reviewed on follow-up imaging to exclude neoplasm.     XR Chest Port 1 View    Narrative    EXAM: CHEST SINGLE VIEW PORTABLE  LOCATION: [facility]  DATE/TIME: 9/8/2020 10:10 PM    INDICATION: Crackles.  COMPARISON: 09/07/2020 at 1822 hours.    FINDINGS: The lungs are clear. Normal size cardiac silhouette. Atherosclerotic calcification in the thoracic aorta. Interval placement of an enteric drainage tube distal tube tip in the gastric body.      Impression    IMPRESSION:   1. No evidence of active cardiopulmonary disease.  2. Interval placement of an enteric drainage tube with distal tip in the gastric body.    XR Abdomen Port 1 View    Narrative    EXAM: ABDOMEN SINGLE VIEW PORTABLE  LOCATION: [facility]  DATE/TIME: 9/8/2020 10:10 PM    INDICATION: Tube placement  COMPARISON: 09/07/2020- Chest radiograph.      Impression    IMPRESSION: An enteric drainage tube is present with distal tip in the gastric body.    XR Abdomen 2 Views    Narrative    ABDOMEN TWO VIEWS 9/9/2020 8:36 AM     HISTORY: Small bowel obstruction.    COMPARISON: 9/8/2020      Impression    IMPRESSION: Nasogastric tube unchanged. A few mildly dilated bowel  loops with air-fluid levels are present, grossly similar to prior. No  evidence of free air, abnormal calculus, or organomegaly.    TAM BOWDEN MD   US Carotid Bilateral    Narrative    ULTRASOUND CAROTID BILATERAL September 10, 2020 10:46 AM     HISTORY: Syncope.    COMPARISON: None.    FINDINGS: There are scattered hyperechoic plaques in the bilateral  carotid. There is antegrade flow in the bilateral carotids. There is  antegrade flow in the bilateral vertebrals.    Right:      ICA : 96  cm/sec    CCA:  141 cm/sec    VICA/VCCA ratio =  0.7.    Left:    ICA : 101   cm/sec    CCA: 120 cm/sec    VICA/VCCA ratio =  0.8.      Impression    IMPRESSION: Less than 50% stenosis in the bilateral internal carotid  arteries.    MARK SHEPHERD   XR Abdomen 2 Views    Narrative    ABDOMEN TWO-THREE VIEW  2020 8:59 AM     HISTORY: Small bowel obstruction.    COMPARISON: None.    FINDINGS: Feeding tube tip minimally into the stomach. Small amount of  stool. No free air. Possible dilated small bowel loop in the left  abdomen. The colon is not distended. The lung bases are unremarkable.      Impression    IMPRESSION: Possible dilated small bowel loop in the abdomen. No free  air.    ALETHEA GARDNER MD   XR Abdomen 2 Views    Narrative    ABDOMEN TWO VIEWS 2020 4:10 PM     HISTORY: f/up sbo/ Nausea    COMPARISON: 2020      Impression    IMPRESSION: No obstructive bowel gas pattern. No evidence of  pneumoperitoneum.    ADRIAN GRISSOM MD   XR Chest 2 Views    Narrative    XR CHEST 2 VW 9/15/2020 11:01 AM    HISTORY: hx of aspiration, new fever and cough.  ?infiltrate or edema.    COMPARISON: 2020    FINDINGS: Heart is mildly enlarged. Small left effusion with  atelectasis. Lungs are otherwise clear.    SID RACHEL MD   Echocardiogram Complete    Narrative    [id number]  [id number]  [id number]  [id number]^MAIA^GUERO^[facility]  Echocardiography Laboratory  [address] [place] [address]        Name: JENNIFER DYSON  MRN: [id number]  : 10/24/1934  Study Date: 2020 07:38 AM  Age: 85 yrs  Gender: Male  Patient [facility]: [facility]  Reason For Study: Abn EKG  Ordering Physician: GUERO CARVALHO  Referring Physician: Enrique Ring  Performed By: Rachelle De Anda RDCS     BSA: 1.7 m2  Height: 67 in  Weight: 135 lb  HR: 73  BP: 110/72 mmHg  _____________________________________________________________________________  __        Procedure  Complete Portable Echo Adult. Optison (NDC #[id number]) given  intravenously.  _____________________________________________________________________________  __        Interpretation Summary     Probably normal left ventricular systolic function though subtle wall motion  abnormalities could be missed on this study. The apical two chamber view was  significantly foreshortened.  The visual ejection fraction is estimated at 60-65%.  Mild valvular aortic stenosis.  Technically difficult, suboptimal study.  _____________________________________________________________________________  __        Left Ventricle  The left ventricle is normal in size. There is normal left ventricular wall  thickness. Diastolic Doppler findings (E/E' ratio and/or other parameters)  suggest left ventricular filling pressures are indeterminate. Grade I or early  diastolic dysfunction. The visual ejection fraction is estimated at 60-65%.  Probably normal left ventricular systolic function though subtle wall motion  abnormalities could be missed on this study. The apical two chamber view was  significantly foreshortened.     Right Ventricle  The right ventricle is normal in size and function.     Atria  Normal left atrial size. Right atrial size is normal. There is no color  Doppler evidence of an atrial shunt.     Mitral Valve  There is mild mitral annular calcification. There is trace mitral  regurgitation.        Tricuspid Valve  There is trace tricuspid regurgitation. Right ventricular systolic pressure  could not be approximated due to inadequate tricuspid regurgitation.     Aortic Valve  The aortic valve is not well visualized. No aortic regurgitation is present.  The calculated aortic valve are is 1.6 cm^2. The peak AoV pressure gradient  is  18.7 mmHg. The mean AoV pressure gradient is 8.9 mmHg. Mild valvular aortic  stenosis.     Pulmonic Valve  The pulmonic valve is not well visualized. There is no pulmonic valvular  regurgitation.     Vessels  The aortic root is normal size. IVC diameter <2.1 cm  collapsing >50% with  sniff suggests a normal RA pressure of 3 mmHg.     Pericardium  There is no pericardial effusion.        Rhythm  Sinus rhythm was noted.  _____________________________________________________________________________  __  MMode/2D Measurements & Calculations  IVSd: 0.92 cm     LVIDd: 4.3 cm  LVIDs: 2.5 cm  LVPWd: 1.0 cm  FS: 41.8 %  LV mass(C)d: 136.4 grams  LV mass(C)dI: 79.7 grams/m2  Ao root diam: 3.2 cm  LA dimension: 3.5 cm  LA/Ao: 1.1  LVOT diam: 2.2 cm  LVOT area: 3.9 cm2  LA Volume (BP): 48.3 ml  LA Volume Index (BP): 28.2 ml/m2  RWT: 0.48           Doppler Measurements & Calculations  MV E max jl: 55.3 cm/sec  MV A max jl: 100.4 cm/sec  MV E/A: 0.55  MV max P.3 mmHg  MV mean P.6 mmHg  MV V2 VTI: 24.1 cm  MVA(VTI): 2.8 cm2  MV dec time: 0.36 sec  Ao V2 max: 216.3 cm/sec  Ao max P.7 mmHg  Ao V2 mean: 138.5 cm/sec  Ao mean P.9 mmHg  Ao V2 VTI: 42.4 cm  EMILIANO(I,D): 1.6 cm2  EMILIANO(V,D): 1.8 cm2  LV V1 max P.1 mmHg  LV V1 max: 100.9 cm/sec  LV V1 VTI: 17.3 cm  SV(LVOT): 66.9 ml  SI(LVOT): 39.1 ml/m2  PA acc time: 0.10 sec  AV Jl Ratio (DI): 0.47  EMILIANO Index (cm2/m2): 0.92  E/E' avg: 10.6  Lateral E/e': 8.2  Medial E/e': 13.0              _____________________________________________________________________________  __        Report approved by: Nadine Cabezas 2020 10:54 AM            Discharge Medications   Discharge Medication List as of 2020 12:29 PM      START taking these medications    Details   acetaminophen (TYLENOL) 650 MG suppository Place 1 suppository (650 mg) rectally every 4 hours as needed for fever or mild pain (Do not exceed 4000 mg total acetaminophen per day.) Unwrap prior to insertion., Disp-12 suppository,R-1, Local Print      atropine 1 % ophthalmic solution Take 2-4 drops by mouth, place under tongue or place inside cheek every 2 hours as needed for other (terminal respiratory secretions), Disp-5 mL,R-1, E-Prescribe      bisacodyl  (DULCOLAX) 10 MG suppository Unwrap and insert 1 suppository rectally twice daily as needed for constipation., Disp-12 suppository,R-1, E-Prescribe      calcium carbonate (TUMS) 500 MG chewable tablet Take 2 tablets (1,000 mg) by mouth 3 times daily as needed for heartburn, Disp-30 tablet,R-1, E-Prescribe      haloperidol (HALDOL) 2 MG/ML (HIGH CONC) solution Take 0.25-0.5 mLs (0.5-1 mg) by mouth, place under tongue or insert rectally every 6 hours as needed for agitation (nausea), Disp-30 mL,R-1, E-Prescribe      LORazepam (ATIVAN) 2 MG/ML (HIGH CONC) solution Take 0.125-0.25 mLs (0.25-0.5 mg) by mouth, place under tongue or insert rectally every 4 hours as needed for anxiety (restlessness), Disp-30 mL,R-1, E-Prescribe      MEDICATION INSTRUCTION If care facility cannot accept or use ranges, facility is instructed to use lower end of dosing range, No Print Out      morphine sulfate, high concentrate, (ROXANOL-CONCENTRATED) 20 MG/ML concentrated solution Take 0.125 mLs (2.5 mg) by mouth every 2 hours as needed for shortness of breath / dyspnea or breakthrough pain, Disp-30 mL,R-0, E-Prescribe      pantoprazole (PROTONIX) 40 MG EC tablet Take 1 tablet (40 mg) by mouth every morning (before breakfast), Disp-30 tablet,R-1, E-Prescribe      sennosides (SENOKOT) 8.6 MG tablet Take 1-2 tablets by mouth 2 times daily, Disp-100 tablet,R-1, E-Prescribe         CONTINUE these medications which have NOT CHANGED    Details   acetaminophen (TYLENOL) 325 MG tablet Take 650 mg by mouth 2 times daily & 2 x daily as needed, Historical      allopurinol (ZYLOPRIM) 100 MG tablet Take 1 tablet (100 mg) by mouth daily, Disp-90 tablet, R-3, E-Prescribe      carbidopa-levodopa (SINEMET)  MG per tablet Take 2 tablets by mouth 3 times daily (9am - 12pm - 6pm), HistoricalPrescribed by Neurologist      diclofenac (VOLTAREN) 1 % topical gel Place 2 g onto the skin 2 times daily, Historical      melatonin 3 MG CAPS Take 3 mg by mouth At  Bedtime , Historical      midodrine (PROAMATINE) 5 MG tablet Take 10 mg by mouth 2 times daily 8AM & NOON, Historical      nystatin (MYCOSTATIN) 284431 UNIT/GM external powder Apply topically 2 times daily Apply to groin area as needed for redness/rashHistorical      omeprazole (PRILOSEC) 20 MG DR capsule TAKE 1 CAPSULE BY MOUTH 30 TO 60 MINUTES BEFORE FIRST MEAL OF THE DAY, Disp-90 capsule,R-0, E-Prescribe      polyethylene glycol (MIRALAX) 17 g packet Take 1 packet by mouth 2 times daily, Historical           Allergies   Allergies   Allergen Reactions     Lisinopril      rash, exacerbation of eczema

## 2020-09-16 NOTE — PLAN OF CARE
Admitting Diagnosis: Syncope  Pertinent History: significant hypotensive. Dementia. Parkinsonism, Fall in the nursing home on the 9/6/ 20.  For vital sign and assessment please see flow sheet.   Living Situation: Nursing   Pain plan: prn Tylenol available .  Mobility: assistance, 2 people with gb/w  Baseline activity:with assistive equipment  Alarms/Safety: BA  LDA's: no IV access  Pertinent test results: K+3.6, cr: 0.86, , trop: 0.01. hgb, 19.8   Consults: palliative care/ swallow eval/ speech PT following   Abnormals/Pending: none  Other Cares/Comments: pt alert to self, VSS, LS clear, o2 97% RA. Groin rash and redness, Miconazole powder applied.   Discharge Disposition: possible discharge tomorrow.  Discharge Time: TBD

## 2020-09-16 NOTE — PLAN OF CARE
"Speech Language Therapy Discharge Summary    Reason for therapy discharge:    All goals and outcomes met, no further needs identified. Pt also plan to discharge to LTC with hospice services    Progress towards therapy goal(s). See goals on Care Plan in Epic electronic health record for goal details.  Goals met.    Pt seen with 2 oz puree solids, 2 oz thin liquids - adequate clinical tolerance of textures - per surgery \"continue full liquids, 6 small meals in small amounts. Recommend for 1-2 weeks.\" No further skilled SLP intervention identified and SLP to sign off.     Therapy recommendation(s):    No further therapy is recommended.    Could consider consulted SLP at LTC if/when pt able to upgrade solid intake and if notable difficulty/dysphagia observed (pending overall goals of care).       "

## 2020-09-16 NOTE — PLAN OF CARE
VSS stable this morning. A&O x 3, confused at times hx of dementia.  Up to chair with 2A walker/ & GB  Full liquid diet, encouragement and needs assistance.  Received meds with applesauce  Bowel sounds auscultated in all 4 quadrants, hypoactive.  Pt c/o right lower quad pain this AM, Bladder scanned for 225, incontinent urine.    No BM this shifts    Pt is adequate for discharge.  Will receive hospice care upon returning to LTC facility. Family aware of discharge.     Medications filled here, medications sent with pt.     Discharge instructions reviewed with patient, but unable to assess understanding,due to baseline confusion

## 2020-09-16 NOTE — PROGRESS NOTES
"[facility]  General Surgery Progress Note         Assessment and Plan:   Assessment:   SBO - clinically resolved  Bowel function has returned with +BMs  Afebrile      Plan:   -Diet: continue full liquids, 6 small meals in small amounts. Recommend for 1-2 weeks.  -Hospitalist managing medical issues.   -Could get a gastrografin SBFT at some point to evaluate bowel  -Disposition: No surgical plans, patient's family desires to avoid \"aggressive intervention.\" If fails PO, will need to consider surgery/comfort care/ perc gastrostomy.          Interval History:   Sitting up in bed, doing fine. Denies pain. States he may have a little bit of RLQ discomfort. Tolerating full liquids. He is having multiple loose BMs.         Physical Exam:   Blood pressure 116/76, pulse 65, temperature 97.9  F (36.6  C), temperature source Axillary, resp. rate 20, weight 63 kg (138 lb 14.2 oz), SpO2 96 %.    I/O last 3 completed shifts:  In: 323 [P.O.:320; I.V.:3]  Out: 825 [Urine:825]    General: alert and in no acute distress  Abdomen:   soft, non-distended, non-tender, voluntary guarding absent and no masses palpated             Data:     Recent Labs   Lab 09/16/20  0607 09/15/20  0933 09/13/20  0804   WBC 4.4 3.4* 6.7   HGB 10.1* 9.8* 11.6*   HCT 33.8* 32.1* 38.1*   MCV 96 94 96   * 121* 156     AXR 9/12/20 IMPRESSION: No obstructive bowel gas pattern. No evidence of  pneumoperitoneum.    Surjit Villalta, PATonyC           "

## 2020-09-16 NOTE — PROGRESS NOTES
Discharge Planner   Discharge Plans in progress: The pt will discharge back to [facility] today via HE wheelchair at 1300.  The pt has orders for  Hospice who will meet with the pt tomorrow at [facility].      Andria spoke with Ofelia, pt's dtr-in-law, to update her that the pt is able to be transported via wheelchair which was her preference yesterday.  Ofelia confirmed that she still wants the pt to discharge via wheelchair.  Andria updated her on the discharge time.  Ofelia has no additional concerns or questions at this time.    Andria updated [facility] on the pt's discharge time and orders were sent.      09/16/20 1100   Final Resources   Resources List Skilled Nursing Facility;Hospice   Hospice [facility] [phone], Fax: [phone]   Skilled Nursing Facility [facility] [phone], Fax: [phone]   PAS Number [id number]     Barriers to discharge plan: None identified at this time.  Follow up plan: Sw will continue to be available as needed until discharge.       Entered by: Faina Menard 09/16/2020 11:34 AM     VIRGEN Levy, SW  Inpatient Care Coordination  [facility]  [phone]

## 2020-09-16 NOTE — PLAN OF CARE
PT-planned discharge to facility with Hospice per MD plan for today. Recommend to PT for discharge based on MD plan.Goals were not met.

## 2020-09-16 NOTE — PROGRESS NOTES
"[facility]  Palliative Care Progress Note  Text Page    POLST was e-mailed to the patient's daughter-in-law, Marissa Echevarria. As her name was misspelled I have created a new POLST with the spelling correction and placed it with the patient's dismissal paperwork. Appreciate input of [facility]  Allie Street, RN in working with Marissa regarding hospice dismissal.      Met with Bill as he was sitting up in a chair. His primary concern: \"When can I leave here?\" His only complaint was indigestion which he indicates resolved after taking Tums. He was unable or unwilling to give insight into the reason for his hospitalization: \"I just need to get out of here.\"     Assessment & Plan   1.  Decisional Capacity -  Questionable and is not demonstrating medical capacity (or at least is unwilling to discuss). Patient does not have an advance directive. Per  informed consent policy next of kin should be involved in all consent and decision making. His daughter-in-law April Echevarria (wife of son who  18 years ago) and 18 year old granddaughter Michelle Echevarria are next-of-kin.       2.  Abdominal pain - Patient denies pain, but does indicate having indigestion resolved with Tums. SBO resolved.       3.  Dysphagia - Appreciate input of Speech Therapist Tammy Collins, SLP recommendation for upgrade to DD1 and thin liquids. Surgeon Michael Redding MD recommending full liquids with 6 small meals for 1-2 weeks.     4.  Severe malnutrition - Appreciate input of Registered Dietitian Vy Larios, RD, LD.     5.  Generalized weakness - Admission following falls at LTC facility. Appreciate input of Physical Therapist Tammy Clifford, PT & Physical Therapy Assistant Teresa Jennings PTA and Occupational Therapist Deja Colon, OT. Patient to return to [facility] today with support of [facility].      6.  Spiritual Care - Appreciate input of sudhir Helms MA.     7.  Care Planning - " Appreciate input of  KAMINI Levy and [facility]  Allie Street RN regarding dismissal to [facility] with support of [facility].      Goals of Care: No CPR - Do NOT Intubate - Restorative care. Return to [facility] with support of [facility] today. POLST complete indicating comfort focused care.       Stacia Staples MS, RN, CNS, APRN, ACHPN, FAACVPR  Pain and Palliative Care  Pager [phone]  Office [phone]     Time Spent on this Encounter   Total unit/floor time 9:15 AM until 9:50 AM, time consisted of the following, examination of the patient, reviewing the record and completing documentation. >50% of time spent in counseling and coordination of care.  Time spend counseling with patient and family consisted of the following topics, goals of care and symptom management.  Time spent in coordination of care with [facility]  Allie Street RN;  Bedside Nurse Jovanni Mendenhall RN, Hospitalist Christopher Sheets MD and  KAMINI Levy.     Interval History   Chart reviewed - patient has no new complaints    Palliative Symptom Review (0=no symptom/no concern, 1=mild, 2=moderate, 3=severe):      Pain: 1-mild      Fatigue: 2-moderate      Nausea: 0-none      Constipation: 0-none      Diarrhea: 0-none      Depressive Symptoms: 0-none      Anxiety: 1-mild      Drowsiness: 0-none      Poor Appetite: 1-mild      Shortness of Breath: 0-none      Insomnia: 0-none          Physical Exam   Temp:  [97.2  F (36.2  C)-99  F (37.2  C)] 97.9  F (36.6  C)  Pulse:  [54-65] 65  Resp:  [18-20] 20  BP: (102-116)/(48-76) 116/76  SpO2:  [95 %-97 %] 96 %  138 lbs 14.24 oz  GEN: Elderly cachetic  male. Alert, to self ans situation, appears comfortable, no apparent distress.  HEENT:  Normocephalic/atraumatic, no scleral icterus, no nasal discharge, mouth moist (and drooling).  RESP:  Symmetric chest rise on inhalation noted.  Normal respiratory  effort.  ABD:  Rounded, soft, non-tender/non-distended.  +BS  PAIN BEHAVIOR: Cooperative  Psych:  Normal affect.  Calm, cooperative, conversant appropriately.    Medications       carbidopa-levodopa  2 tablet Oral TID     heparin ANTICOAGULANT  5,000 Units Subcutaneous Q12H     multivitamin w/minerals  1 tablet Oral Daily     pantoprazole  40 mg Oral QAM AC     sodium chloride (PF)  3 mL Intracatheter Q8H       Data   Results for orders placed or performed during the hospital encounter of 09/07/20 (from the past 24 hour(s))   XR Chest 2 Views    Narrative    XR CHEST 2 VW 9/15/2020 11:01 AM    HISTORY: hx of aspiration, new fever and cough.  ?infiltrate or edema.    COMPARISON: 9/8/2020    FINDINGS: Heart is mildly enlarged. Small left effusion with  atelectasis. Lungs are otherwise clear.    SID RACHEL MD   UA with Microscopic reflex to Culture    Specimen: Urine clean catch; Midstream Urine   Result Value Ref Range    Color Urine Light Yellow     Appearance Urine Clear     Glucose Urine Negative NEG^Negative mg/dL    Bilirubin Urine Negative NEG^Negative    Ketones Urine Negative NEG^Negative mg/dL    Specific Gravity Urine 1.005 1.003 - 1.035    Blood Urine Negative NEG^Negative    pH Urine 5.0 5.0 - 7.0 pH    Protein Albumin Urine Negative NEG^Negative mg/dL    Urobilinogen mg/dL Normal 0.0 - 2.0 mg/dL    Nitrite Urine Negative NEG^Negative    Leukocyte Esterase Urine Negative NEG^Negative    Source Midstream Urine     WBC Urine 1 0 - 5 /HPF    RBC Urine <1 0 - 2 /HPF    Bacteria Urine Few (A) NEG^Negative /HPF    Mucous Urine Present (A) NEG^Negative /LPF   CBC with platelets differential   Result Value Ref Range    WBC 4.4 4.0 - 11.0 10e9/L    RBC Count 3.52 (L) 4.4 - 5.9 10e12/L    Hemoglobin 10.1 (L) 13.3 - 17.7 g/dL    Hematocrit 33.8 (L) 40.0 - 53.0 %    MCV 96 78 - 100 fl    MCH 28.7 26.5 - 33.0 pg    MCHC 29.9 (L) 31.5 - 36.5 g/dL    RDW 15.0 10.0 - 15.0 %    Platelet Count 127 (L) 150 - 450 10e9/L     Diff Method Automated Method     % Neutrophils 68.3 %    % Lymphocytes 23.1 %    % Monocytes 6.8 %    % Eosinophils 0.9 %    % Basophils 0.2 %    % Immature Granulocytes 0.7 %    Nucleated RBCs 0 0 /100    Absolute Neutrophil 3.0 1.6 - 8.3 10e9/L    Absolute Lymphocytes 1.0 0.8 - 5.3 10e9/L    Absolute Monocytes 0.3 0.0 - 1.3 10e9/L    Absolute Eosinophils 0.0 0.0 - 0.7 10e9/L    Absolute Basophils 0.0 0.0 - 0.2 10e9/L    Abs Immature Granulocytes 0.0 0 - 0.4 10e9/L    Absolute Nucleated RBC 0.0

## 2020-09-17 NOTE — PROGRESS NOTES
Clinic Care Coordination Contact  Care Coordination Communication    Referral Source: IP Report    Clinical Data: Patient was hospitalized at [facility] from 9/7/20 to 9/16/20 with diagnosis of syncope.  Patient was discharged back to [facility] with hospice.    Hospice Contact:              Hospice Agency: [facility]              Contact name () and phone number: Radha Mckee RN ; ph: [phone]              Care Coordination contacted hospice: No; per review of patient's home hospice chart, RN and SW visits are scheduled for today 9/17/20              Anticipated start of care date: 9/15/20    Care Team Communication:               CC RN called and spoke with AIDEN Pascal at [facility].  Naida confirmed patient is in long term care and will be starting hospice today.  Naida updated that patient is followed by their facility providers; patient's Care Team updated accordingly.  Naida denied questions or concerns at this time.    Plan: Due to patient's status as resident in Long Term Care and active status with Hospice, no further outreaches or interventions will be made at this time.    Kris Sigala RN  Clinic Care Coordinator  [facility]  Ph: [phone]

## 2020-09-18 NOTE — PROGRESS NOTES
[facility]    PRIMARY CARE PROVIDER AND CLINIC:  Tyler Hunt MD, [facility] [address] [place]  / [place] [facility] [address]*  Chief Complaint   Patient presents with     Hospital F/U     [facility] Record Number:  [id number]  Place of Service where encounter took place:  [facility] [[id number]]    Chilango Echevarria  is a 85 year old  (10/24/1934), admitted to the above facility from  [facility]. Hospital stay 9/7 through 9/16/20..  Admitted to this facility for  medical management, nursing care and hospice.    HPI:    HPI information obtained from: facility chart records, facility staff, patient report and [facility] chart review.     Brief Summary of Hospital Course:     Patient admitted to [facility] 9/7-9/16 due to syncopal episodes w/unresponsiveness and recurrent falls. Cardiology consulted, Echo 9/8 w/normal EF 60-65%. Carotid US negative. Noted to have abd distention, general surgery consulted and had NG tube placed due to concern for small bowel obstruction. Also developed fever and was treated for UTI w/ceftriaxone. Palliative care consulted, given recurrent hospitalizations and physical decline it was decided to discharge back to [facility] w/goals of care as comfort focused with hospice services.    Patient transferred to [facility] LTC on 9/16.      Updates on Status Since Skilled nursing Admission:     During exam, patient seen resting in bed. HPI limited due to dementia and [place]. Reports eating throughout the day, likes boost. Denies constipation or diarrhea. Sleeping well at night. Denies pain, abdominal pain, nausea. Denies dysuria, urinary frequency. Denies chest pain, SOB, headache, syncope.  hospice team following.        CODE STATUS/ADVANCE DIRECTIVES DISCUSSION:   DNR / DNI  Patient's living condition: lives in a skilled nursing facility  ALLERGIES: Lisinopril  PAST MEDICAL HISTORY:  has a past medical history of Aortic  stenosis, Ying esophagus, Contact dermatitis and other eczema, due to unspecified cause, Esophageal reflux, Gout, unspecified, Incarcerated hiatal hernia (5/21/2015), Inguinal hernia without mention of obstruction or gangrene, unilateral or unspecified, (not specified as recurrent), Polymyalgia rheumatica (H), Umbilical hernia without mention of obstruction or gangrene, Unspecified essential hypertension, and Vasomotor rhinitis. He also has no past medical history of Basal cell carcinoma, Malignant melanoma (H), or Squamous cell carcinoma of skin, unspecified.  PAST SURGICAL HISTORY:   has a past surgical history that includes NONSPECIFIC PROCEDURE; NONSPECIFIC PROCEDURE; picc insertion (Right, 5/11/2015); Laparoscopic herniorrhaphy hiatal (N/A, 5/14/2015); Laparoscopic assisted insertion tube jejunostomy (N/A, 5/14/2015); Laparoscopic herniorrhaphy inguinal (Right, 5/14/2015); and Esophagoscopy, gastroscopy, duodenoscopy (EGD), combined (N/A, 5/11/2015).  FAMILY HISTORY: family history includes Breast Cancer in his mother; Cancer (age of onset: 37) in his son; Diabetes in his paternal grandfather.  SOCIAL HISTORY:   reports that he has never smoked. He has never used smokeless tobacco. He reports current alcohol use. He reports that he does not use drugs.    Post Discharge Medication Reconciliation Status: discharge medications reconciled and changed, per note/orders    Current Outpatient Medications   Medication Sig Dispense Refill     acetaminophen (TYLENOL) 325 MG tablet Take 650 mg by mouth 2 times daily & 2 x daily as needed       acetaminophen (TYLENOL) 650 MG suppository Place 1 suppository (650 mg) rectally every 4 hours as needed for fever or mild pain (Do not exceed 4000 mg total acetaminophen per day.) Unwrap prior to insertion. 12 suppository 1     allopurinol (ZYLOPRIM) 100 MG tablet Take 1 tablet (100 mg) by mouth daily 90 tablet 3     atropine 1 % ophthalmic solution Take 2-4 drops by mouth, place  "under tongue or place inside cheek every 2 hours as needed for other (terminal respiratory secretions) 5 mL 1     bisacodyl (DULCOLAX) 10 MG suppository Unwrap and insert 1 suppository rectally twice daily as needed for constipation. 12 suppository 1     calcium carbonate (TUMS) 500 MG chewable tablet Take 2 tablets (1,000 mg) by mouth 3 times daily as needed for heartburn 30 tablet 1     carbidopa-levodopa (SINEMET)  MG per tablet Take 2 tablets by mouth 3 times daily (9am - 12pm - 6pm)       diclofenac (VOLTAREN) 1 % topical gel Place 2 g onto the skin 2 times daily       LORazepam (ATIVAN) 2 MG/ML (HIGH CONC) solution Take 0.25 mg by mouth every 4 hours as needed for anxiety       melatonin 3 MG CAPS Take 3 mg by mouth At Bedtime        midodrine (PROAMATINE) 5 MG tablet Take 10 mg by mouth 2 times daily 8AM & NOON       morphine sulfate, high concentrate, (ROXANOL-CONCENTRATED) 20 MG/ML concentrated solution Take 0.125 mLs (2.5 mg) by mouth every 2 hours as needed for shortness of breath / dyspnea or breakthrough pain 30 mL 0     nystatin (MYCOSTATIN) 097184 UNIT/GM external powder Apply topically 2 times daily Apply to groin area as needed for redness/rash       pantoprazole (PROTONIX) 40 MG EC tablet Take 1 tablet (40 mg) by mouth every morning (before breakfast) 30 tablet 1     polyethylene glycol (MIRALAX) 17 g packet Take 1 packet by mouth 2 times daily       senna (SENOKOT) 8.6 MG tablet Take 1 tablet by mouth 2 times daily as needed for constipation           ROS:  Limited secondary to cognitive impairment but today pt reports no concerns      Vitals:  /58   Pulse 68   Temp 97.5  F (36.4  C)   Resp 18   Ht 1.651 m (5' 5\")   Wt 59.7 kg (131 lb 9.6 oz)   SpO2 92%   BMI 21.90 kg/m    Exam:  Limited observational exam due to COVID19 precautions  GENERAL APPEARANCE:  Alert, in no distress, thin, appears chronically ill, cooperative  ENT:  Mouth and posterior oropharynx normal, moist mucous " membranes, [place]  EYES:  EOM, conjunctivae, lids, pupils and irises normal, PERRL  NECK:  No adenopathy,masses or thyromegaly  RESP:  no respiratory distress, no coughing  CV:  no edema  M/S:   Gait and station abnormal, resting in bed. Digits and nails normal  SKIN:  Inspection of skin and subcutaneous tissue baseline  NEURO:   Cranial nerves 2-12 are normal tested and grossly at patient's baseline  PSYCH:  Oriented to self, memory impaired , affect and mood normal      Lab/Diagnostic data:  Recent labs in [facility] reviewed by me today.  and   Most Recent 3 CBC's:  Recent Labs   Lab Test 09/16/20  0607 09/15/20  0933 09/13/20  0804   WBC 4.4 3.4* 6.7   HGB 10.1* 9.8* 11.6*   MCV 96 94 96   * 121* 156     Most Recent 3 BMP's:  Recent Labs   Lab Test 09/15/20  0933 09/13/20  0804 09/11/20  0718    138 143   POTASSIUM 3.6 4.2 3.8   CHLORIDE 108 108 114*   CO2 23 25 25   BUN 15 23 27   CR 0.86 0.73 0.82   ANIONGAP 5 5 4   MESERET 7.5* 7.8* 7.9*   GLC 78 97 97         ASSESSMENT/PLAN:    (Z51.5) Hospice care patient  (primary encounter diagnosis)  (G20) Parkinson's disease (H)  (F03.90) Dementia without behavioral disturbance, unspecified dementia type (H)  Comment: Recently signed on to  hospice services on 9/16 d/t physical and cognitive decline secondary to Parkinson's disease and dementia. No changes in mood or behavior reported since back to LTC.  Plan:   - Continue sinemet  -Continue tylenol BID and BID prn; morphine prn, ativan prn  - Continue miralax BID, senna prn  - Monitor pain associated behaviors, BM  -  hospice team following, goals of care are comfort focused  - Patient has limited understanding due to dementia; left voicemail with patient's daughter-in-law (Marissa)    (E43) Severe malnutrition (H)  Comment: Severe malnutrition r/t limited intake. Noted to have 15lb weight loss since 06/2020. Body mass index is 21.9 kg/m .  Plan:   - Monitor intake, goals of care are comfort focused    (R55)  Syncope, unspecified syncope type  (I95.1) Orthostatic hypotension  Comment: Chronic orthostatic hypotension. No reports of recurrent syncopal episodes since back from hospital. Asymptomatic.   Plan:   - Continue midodrine  - Goals of care are comfort focused.            Total time spent with patient visit at the skilled nursing facility was 35 min including patient visit and review of past records. Greater than 50% of total time (22 minutes) spent with counseling patient regarding treatment plan, limited understanding d/t dementia; left voicemail w/daughter-in-law to review treatment plan and confirm code status. Coordinating care with  hospice team.     Electronically signed by:  ROSALINA Castaneda CNP

## 2020-09-23 NOTE — PROGRESS NOTES
[facility]  PRIMARY CARE PROVIDER AND CLINIC:  Tyler Hunt MD, [facility] [address] [place]  / L[place] 5*  Chief Complaint   Patient presents with     Establish Care     [facility] Medical Record Number:  [id number]  Place of Service where encounter took place:  [facility] ([facility] - [facility] ([facility]) [[id number]]    Chilango Echevarria  is a 85 year old  (10/24/1934) with Parkinsonism, dementia  admitted to the above facility from [facility]..  Admitted to this facility for  nursing care and hospice.    Recent hospital stay was at [facility] from 9/7/20 through 9/16/20.    HPI:    HPI information obtained from: facility chart records, facility staff, patient report, [facility] chart review and hospice nurse (Radha).     Patient with PD and dementia in decline since Jan 2020 when he moved to [place] and then to [facility] due to increased care needs. Had more confusion, paranoia, functional decline, and withdrawal from usual activity.     Brief Summary of Hospital Course:   Hospitalized 9/7 to 9/16 with severe malnutrition, acute cystitis, SBO vs ileus, recurrent syncope/falls, and TERRELL. Sent to ER from [facility] due to recurrent syncope, hypotension, weakness, and abd pain.  Also concern for STEMI. Initial plan for anigogram, but put on hold due to concern for small bowel obstruction and then fever. Echo with normal cardiac function, carotid US negative, no dysrhythmias on continuous cardiac monitor. Treated with 5 days of ceftriaxone for possible UTI. Head CT negative for acute process.  Palliative care consulted during hospitalization, met with daughter in-law, met criteria for hospice given overall decline in health status and elected to enroll in hospice and return to LTC facility.     Prior to most recent hospitalization was hospitalized at [facility] in March 2020 for a fall and again from 6/22 to 6/26 with fall and head laceration, as well as change in mental status.  June stay complicated by hypotension due to acute on chronic anemia, received blood transfusion. Mental status changes may have been related to Flexeril on which he was recently started due to rotator cuff injury.     Chronic health concerns include PD followed by Dr. Hurley at [facility]. Cognitive impairment and history of hallucinations, treated with PRN Zyprexa in the past. Dysphagia, now on DD3 diet and regular liquids. Hypertension, metoprolol stopped due to orthostatic hypotension and now midodrine BID. 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 History of left rotator cuff injury managed with Tylenol and voltaren. Hx of alcohol dependence while living in community. Hx of gout managed with allopurinol.     Updates on Status Since Skilled nursing Admission:   Seen today in bed, biggest concern is he wishes he could walk more. Also having some heartburn. On 14 day quarantine due to pandemic restrictions.  Wants to go to the exercise room. Does not recall details of hospital and is aware his short term memory is very poor. No SOB or chest pain. No cough. No abd pain. Intermittent constipation. No dysuria. No pain in joints reported. Does not seem to be aware he is on hospice despite long conversation with hospice nurse yesterday outlining care goals. Weight down 25# over last year, 154# >> 130#.    Recent blood pressures reviewed in facility EMR:      Wt Readings from Last 5 Encounters:   09/23/20 59.4 kg (130 lb 14.4 oz)   09/18/20 59.7 kg (131 lb 9.6 oz)   09/16/20 63 kg (138 lb 14.2 oz)   09/02/20 60.7 kg (133 lb 12.8 oz)   08/19/20 62.5 kg (137 lb 12.8 oz)     CODE STATUS/ADVANCE DIRECTIVES DISCUSSION:   DNR / DNI  Patient's living condition: lives in a nursing home  ALLERGIES: Lisinopril    PAST MEDICAL HISTORY:  has a past medical history of Aortic stenosis, Ying esophagus, Contact dermatitis and other eczema, due to unspecified cause, Esophageal reflux, Gout, unspecified, Incarcerated hiatal hernia  (2015), Inguinal hernia without mention of obstruction or gangrene, unilateral or unspecified, (not specified as recurrent), Polymyalgia rheumatica (H), Umbilical hernia without mention of obstruction or gangrene, Unspecified essential hypertension, and Vasomotor rhinitis. He also has no past medical history of Basal cell carcinoma, Malignant melanoma (H), or Squamous cell carcinoma of skin, unspecified.    PAST SURGICAL HISTORY:   has a past surgical history that includes NONSPECIFIC PROCEDURE; NONSPECIFIC PROCEDURE; picc insertion (Right, 2015); Laparoscopic herniorrhaphy hiatal (N/A, 2015); Laparoscopic assisted insertion tube jejunostomy (N/A, 2015); Laparoscopic herniorrhaphy inguinal (Right, 2015); and Esophagoscopy, gastroscopy, duodenoscopy (EGD), combined (N/A, 2015).    FAMILY HISTORY: family history includes Breast Cancer in his mother; Cancer (age of onset: 37) in his son; Diabetes in his paternal grandfather.    SOCIAL HISTORY:   reports that he has never smoked. He has never used smokeless tobacco. He reports current alcohol use. He reports that he does not use drugs.   He is a  and his only son  18 years ago and his daughter-in-law resides with the patient's only grandchild in [place].  Was living in [facility] independently in [facility] prior to hospitalizations.Then [facility].    Post Discharge Medication Reconciliation Status: discharge medications reconciled and changed, per note/orders    Current Outpatient Medications   Medication Sig Dispense Refill     acetaminophen (TYLENOL) 325 MG tablet Take 650 mg by mouth 2 times daily & 2 x daily as needed       acetaminophen (TYLENOL) 650 MG suppository Place 1 suppository (650 mg) rectally every 4 hours as needed for fever or mild pain (Do not exceed 4000 mg total acetaminophen per day.) Unwrap prior to insertion. 12 suppository 1     allopurinol (ZYLOPRIM) 100 MG tablet Take 1 tablet (100 mg) by mouth daily 90  tablet 3     atropine 1 % ophthalmic solution Take 2-4 drops by mouth, place under tongue or place inside cheek every 2 hours as needed for other (terminal respiratory secretions) 5 mL 1     bisacodyl (DULCOLAX) 10 MG suppository Unwrap and insert 1 suppository rectally twice daily as needed for constipation. 12 suppository 1     calcium carbonate (TUMS) 500 MG chewable tablet Take 2 tablets (1,000 mg) by mouth 3 times daily as needed for heartburn 30 tablet 1     carbidopa-levodopa (SINEMET)  MG per tablet Take 2 tablets by mouth 3 times daily (9am - 12pm - 6pm)       diclofenac (VOLTAREN) 1 % topical gel Place 2 g onto the skin 2 times daily       LORazepam (ATIVAN) 2 MG/ML (HIGH CONC) solution Take 0.125-0.25 mLs (0.25-0.5 mg) by mouth, place under tongue or insert rectally every 4 hours as needed for anxiety (restlessness) (Patient taking differently: Take 0.25 mg by mouth, place under tongue or insert rectally every 4 hours as needed for anxiety (restlessness) ) 30 mL 1     melatonin 3 MG CAPS Take 3 mg by mouth At Bedtime        midodrine (PROAMATINE) 5 MG tablet Take 10 mg by mouth 2 times daily 8AM & NOON       morphine sulfate, high concentrate, (ROXANOL-CONCENTRATED) 20 MG/ML concentrated solution Take 0.125 mLs (2.5 mg) by mouth every 2 hours as needed for shortness of breath / dyspnea or breakthrough pain 30 mL 0     nystatin (MYCOSTATIN) 367100 UNIT/GM external powder Apply topically 2 times daily Apply to groin area as needed for redness/rash       pantoprazole (PROTONIX) 40 MG EC tablet Take 1 tablet (40 mg) by mouth every morning (before breakfast) 30 tablet 1     polyethylene glycol (MIRALAX) 17 g packet Take 1 packet by mouth 2 times daily       sennosides (SENOKOT) 8.6 MG tablet Take 1-2 tablets by mouth 2 times daily 100 tablet 1     ROS:  Limited secondary to cognitive impairment but today pt reports history as per HPI.     Vitals:  /68   Pulse 78   Temp 96.7  F (35.9  C)   Resp  "18   Ht 1.651 m (5' 5\")   Wt 59.4 kg (130 lb 14.4 oz)   SpO2 93%   BMI 21.78 kg/m    Exam:  Exam limited due to COVID-19 pandemic to minimize unnecessary patient contact in high risk population.    GENERAL APPEARANCE:  Alert, in no distress, well groomed, thin, laying in bed  EYES:  sclera clear and conjunctiva normal, no discharge, wearing glasses  ENT: hearing acuity mildly decreased  RESP:  Non-labored breathing, palpation of chest normal, no chest wall tenderness, no respiratory distress, Lung sounds clear, patient is on room air.  CV:  Palpation - no murmur/non-displaced PMI, Auscultation - rate and rhythm regular, no murmur, no rub or gallop.  VASCULAR: No edema bilateral lower extremities. Pedal pulses diminished, feet warm and even temp BL.   ABDOMEN:  normal bowel sounds, soft, nontender, no grimacing or guarding with palpation. No hepatosplenomegaly. No appreciable masses.  M/S:   Gait and station wheelchair bound, needs assist to use waler. Increased stiffness of left LE and UE.   SKIN:  Inspection - no visible rashes/lesions/uclerations. Palpation- no increased warmth, skin is dry and non-tender.  NEURO: cranial nerves II-XII grossly intact, masked facies, follows simple commands, increase stiffness, no tremor  PSYCH: awake and alert, speech clear,  insight and judgement impaired, oriented to person/place/partially time, memory impaired - particularly short term, flat affect.    Lab/Diagnostic data:  Recent labs in [facility] reviewed by me today.   CBC RESULTS:   Recent Labs   Lab Test 09/16/20  0607 09/15/20  0933   WBC 4.4 3.4*   RBC 3.52* 3.42*   HGB 10.1* 9.8*   HCT 33.8* 32.1*   MCV 96 94   MCH 28.7 28.7   MCHC 29.9* 30.5*   RDW 15.0 14.7   * 121*       Last Basic Metabolic Panel:  Recent Labs   Lab Test 09/15/20  0933 09/13/20  0804    138   POTASSIUM 3.6 4.2   CHLORIDE 108 108   MESERET 7.5* 7.8*   CO2 23 25   BUN 15 23   CR 0.86 0.73   GLC 78 97     GFR Estimate   Date Value Ref Range " Status   09/15/2020 78 >60 mL/min/[1.73_m2] Final     Liver Function Studies -   Recent Labs   Lab Test 09/07/20  1818 06/22/20  0759   PROTTOTAL 8.3 5.9*   ALBUMIN 4.4 3.0*   BILITOTAL 0.5 0.5   ALKPHOS 78 51   AST 23 22   ALT 10 10       TSH   Date Value Ref Range Status   06/22/2020 0.99 0.40 - 4.00 mU/L Final   10/26/2018 1.14 0.40 - 4.00 mU/L Final   ]    No results found for: A1C          EXAM: CT AORTIC SURVEY W CONTRAST  LOCATION: [facility]  DATE/TIME: 9/7/2020 6:31 PM     INDICATION: Syncope, elevated troponin.  COMPARISON: CT abdomen and pelvis 05/10/2015  TECHNIQUE: CT angiogram chest abdomen pelvis during arterial phase of injection of IV contrast. 2D and 3D MIP reconstructions were performed by the CT technologist. Dose reduction techniques were used.   CONTRAST: 80mL Isovue-370     FINDINGS:   CT ANGIOGRAM CHEST, ABDOMEN, AND PELVIS: The thoracic and abdominal aorta are negative for dissection. Mild atherosclerotic disease. 3.9 x 3.3 cm infrarenal abdominal aortic aneurysm has increased in size and now with thrombus within the anterior aspect   of the aneurysm on image 121 of series 6 the inferior mesenteric artery arises immediately above the aneurysm. Mild plaque at the origins of the celiac trunk and superior mesenteric artery without significant narrowing. Plaque at the origin of the left   renal artery. Both renal arteries are small in caliber.     LUNGS AND PLEURA: Subsegmental atelectasis or scarring both lower lobes. No acute infiltrates or pleural effusions.     MEDIASTINUM/AXILLAE: No mediastinal or hilar adenopathy. The proximal esophagus is distended with air. The distal esophagus is nondistended with wall thickening.     HEPATOBILIARY: Fatty infiltration of the liver. Tiny cyst near the liver dome. Mildly dilated common bile duct is unchanged.     PANCREAS: Normal.     SPLEEN: Normal.     ADRENAL GLANDS: Normal.     KIDNEYS/BLADDER: Normal.     BOWEL: Marked gastric  distention with fluid as seen on previous study. There are also numerous dilated loops of small bowel throughout the abdomen and pelvis. The distal small bowel and colon are normal caliber. Normal appendix.     LYMPH NODES: Normal.     PELVIC ORGANS: Normal.     MUSCULOSKELETAL: Normal scoliosis. Severe degenerative changes throughout the thoracolumbar spine..         Impression     IMPRESSION:  1.  No evidence for aortic dissection.  2.  3.9 x 3.3 cm partially thrombosed infrarenal abdominal aortic aneurysm.  3.  The stomach is markedly distended with fluid, along with numerous dilated, fluid-filled small bowel loops concerning for high-grade obstruction with the transition point estimated in the region of the mid ileum. Consider NG tube placement and gastric   decompression.  4.  The proximal esophagus is distended with air. The distal esophagus at the GE junction is nondistended which may account for diffuse wall thickening. This should be reviewed on follow-up imaging to exclude neoplasm.     Echo 9/8/20  Interpretation Summary     Probably normal left ventricular systolic function though subtle wall motion abnormalities could be missed on this study. The apical two chamber view was significantly foreshortened.  The visual ejection fraction is estimated at 60-65%.  Mild valvular aortic stenosis.  Technically difficult, suboptimal study.    ASSESSMENT/PLAN:  (G20) Parkinson's disease (H)  (primary encounter diagnosis)  (Z51.5) Hospice care patient  Comment: First reported symptoms in Feb/2015 - resting tremor. Followed by [facility]. Seems to be having complications of disease process: orthostasis/syncope, constipation, cognitive impairment, hallucinations.   No neuropsych symptoms reported of late, sleeping well.   Hospitalized x3 over last six months with 25# weight loss over last year, and now enrolled in hospice.  Plan:   - Continue Sinemet   - Continue melatonin to help with sleep  - Continue  Miralax BID and Senna BID for constipation    - Discussed with hospice how to get patient moving more, may be difficult while in quarantine. OOB to  for meals.  - Appreciate hospice supports, comfort medications in placed, will discontinue Ativan if not used within 14 days.     (I95.1) Orthostatic hypotension  (Z87.898) History of syncope  Comment: Recurrent, seems to be postural in setting of PD and autonomic dysfunction. Cardiology considered further work-up of arrhythmia and possible MI on EKG, but enrolled hospice so no symptom mgmt.  Plan:   - Continue Midodrine 10 mg BID w/ hold parameter for high blood pressure  - Slow position change  - BL LE compression   - Encourage PO fluids  - Appreciate hospice supports     (R41.89) Cognitive impairment  Comment: BIMS 15/15, seems to have more issue with short term memory. Noted having questionable decision making capacity by inpatient palliative care team. Next of kin is Daughter In-law April Echevarria and granddaughter Michelle Echevarria. Also has friend, Abena.   Plan:  - Will benefit from supportive care in LTC setting  - Appreciate hospice supports   - Attempt to participate in decision making as able     (R26.9) Gait abnormality  Comment: Recurrent falls, can use walker with gait belt and supervision, as well as wheelchair. Would like to be OOB more. High fall risk due to low BP with position change and cognitive impairment.  Plan:   - Staff to assist patient to change position frequently and walk with assistance even in room if patient can tolerate, blood pressure and quarantine may limit     (I10) Essential hypertension  (I35.0) Mild aortic stenosis  Comment: Concern for MI during last hospitalization, but angiogram deferred due to frailty and SBO. Noted with mild aortic stenosis and partially thrombosed infarenal AAA. Ultimately enrolled in hospice. Off anti-hypertensive agents due to orthostatic hypotension and recurrent syncope. No further work-up or aggressive  treatment as on hospice.  Plan:  - Monitor routine blood pressure  - Comfort medications in place in the case of rapid decline      (Z87.19) Hx of SBO  Comment: Noted on last admission, general surgery consulted and treated with NG tube. Gradually advanced diet. On bowel regimen as above. No abd pain today. Having regular BMs per bowel record.  Plan:  - Monitor clinically, now on hospice     (K21.0) Gastroesophageal reflux disease with esophagitis  Comment: GERD with hx of BE. On two different PPIs. No use of PRN TUMS  Plan:   - Discontinue omeprazole, duplicate therapy. Continue Protonix, could increased to BID.  - Encourage use of PRN Tums and behavior interventions, elevated HOB after eating, avoid acid foods/carbonation, etc.    (S46.001D) Injury of right rotator cuff, subsequent encounter  Comment: No pain today  Plan:  - Continue Tylenol and Voltaren   - MSIR PRN for severe breakthrough pain.    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     Electronically signed by:  ROSALINA Farrar CNP

## 2020-09-27 PROBLEM — Z51.5 HOSPICE CARE PATIENT: Status: ACTIVE | Noted: 2020-01-01

## 2020-09-27 PROBLEM — I95.1 ORTHOSTATIC HYPOTENSION: Status: ACTIVE | Noted: 2020-01-01

## 2020-09-27 PROBLEM — R41.89 COGNITIVE IMPAIRMENT: Status: ACTIVE | Noted: 2020-01-01

## 2020-09-27 PROBLEM — K21.00 GASTROESOPHAGEAL REFLUX DISEASE WITH ESOPHAGITIS: Status: ACTIVE | Noted: 2020-01-01

## 2020-09-27 PROBLEM — Z87.898 HISTORY OF SYNCOPE: Status: ACTIVE | Noted: 2020-01-01

## 2020-09-27 NOTE — PATIENT INSTRUCTIONS
Chilango Echevarria  10/24/1934  ORDERS:  - Discontinue omeprazole   - OOB to  for meals   Electronically signed by:   ROSALINA Farrar CNP  09/27/20 3:25 PM

## 2020-09-30 NOTE — PROGRESS NOTES
[facility]  [facility] Record Number:  [id number]  Place of Service where encounter took place:  [facility] ([facility]) [[id number]]  Chief Complaint   Patient presents with     RECHECK       HPI:    Chilango Echevarria  is a 85 year old (10/24/1934) with Parkinson's Disease, dementia, dysphagia, GERD/BE, left rotator cuff injury, gout recurrent syncope who is being seen today for an episodic care visit.      HPI information obtained from: facility chart records, facility staff, patient report and [facility] chart review.     Today's concern is:  Follow-up due to positive COVID-19 test x2 on 9/22 and 9/25. Does not recall he his COVID today. Denies symptoms, no cough, no dyspnea, no fever, no GI symptoms, no change in taste/smell. However, nurses notes report sore throat and cough started 9/24.  Last BM 9/28. No hypoxia. No fever. No tachypnea.   Oral intake variable. 138# >> 123#: weight is down 15# over the last month    Recent blood pressures reviewed:      Past Medical and Surgical History reviewed in Epic today.    MEDICATIONS:  Current Outpatient Medications   Medication Sig Dispense Refill     acetaminophen (TYLENOL) 325 MG tablet Take 650 mg by mouth 2 times daily & 2 x daily as needed       acetaminophen (TYLENOL) 650 MG suppository Place 1 suppository (650 mg) rectally every 4 hours as needed for fever or mild pain (Do not exceed 4000 mg total acetaminophen per day.) Unwrap prior to insertion. 12 suppository 1     allopurinol (ZYLOPRIM) 100 MG tablet Take 1 tablet (100 mg) by mouth daily 90 tablet 3     atropine 1 % ophthalmic solution Take 2-4 drops by mouth, place under tongue or place inside cheek every 2 hours as needed for other (terminal respiratory secretions) 5 mL 1     bisacodyl (DULCOLAX) 10 MG suppository Unwrap and insert 1 suppository rectally twice daily as needed for constipation. 12 suppository 1     calcium carbonate (TUMS) 500 MG chewable tablet  "Take 2 tablets (1,000 mg) by mouth 3 times daily as needed for heartburn 30 tablet 1     carbidopa-levodopa (SINEMET)  MG per tablet Take 2 tablets by mouth 3 times daily (9am - 12pm - 6pm)       diclofenac (VOLTAREN) 1 % topical gel Place 2 g onto the skin 2 times daily       LORazepam (ATIVAN) 2 MG/ML (HIGH CONC) solution Take 0.25 mg by mouth every 4 hours as needed for anxiety       melatonin 3 MG CAPS Take 3 mg by mouth At Bedtime        midodrine (PROAMATINE) 5 MG tablet Take 10 mg by mouth 2 times daily 8AM & NOON       morphine sulfate, high concentrate, (ROXANOL-CONCENTRATED) 20 MG/ML concentrated solution Take 0.125 mLs (2.5 mg) by mouth every 2 hours as needed for shortness of breath / dyspnea or breakthrough pain 30 mL 0     nystatin (MYCOSTATIN) 420658 UNIT/GM external powder Apply topically 2 times daily Apply to groin area as needed for redness/rash       pantoprazole (PROTONIX) 40 MG EC tablet Take 1 tablet (40 mg) by mouth every morning (before breakfast) 30 tablet 1     polyethylene glycol (MIRALAX) 17 g packet Take 1 packet by mouth 2 times daily       senna (SENOKOT) 8.6 MG tablet Take 1 tablet by mouth 2 times daily as needed for constipation       REVIEW OF SYSTEMS:  Limited secondary to cognitive impairment but today pt reports history as per HPI.     Objective:  BP (!) 88/60   Pulse 62   Temp 97.3  F (36.3  C)   Resp 18   Ht 1.651 m (5' 5\")   Wt 56.1 kg (123 lb 9.6 oz)   SpO2 96%   BMI 20.57 kg/m    Exam:  Exam limited due to COVID-19 pandemic to minimize unnecessary patient contact in high risk population.    GENERAL APPEARANCE:  Alert, in no distress, well groomed, thin, laying in bed  RESP:  Non-labored breathing, palpation of chest normal, no chest wall tenderness, no respiratory distress, Lung sounds clear, patient is on room air.  CV:  Palpation - no murmur/non-displaced PMI, Auscultation - rate and rhythm regular, no murmur, no rub or gallop.  VASCULAR: No edema bilateral " lower extremities.  ABDOMEN:  normal bowel sounds, soft, nontender, no grimacing or guarding with palpation.  PSYCH: awake and alert, speech clear,  insight and judgement impaired, memory impaired, flat affect.    Labs:   Recent labs in EPIC reviewed by me today.     ASSESSMENT/PLAN:  (U07.1) COVID-19 virus infection  (primary encounter diagnosis)  (Z51.5) Hospice care patient  Comment: Resident COVID-19 positive on 9/22 and 9/25, on hospice for PD and FTT, No other new cases at facility, likely contracted in hospital. Denies symptoms today, but nursing notes show cough and sore throat.   Plan:   - Continue supportive care in LTC setting, nursing to chart COVID symptoms per protocol   - Appreciate hospice supports and weekly follow-up, comfort medications in place in the case of rapid decline  - Maintain contract precautions per 21 days were CDC guideline and retest in 90 days.    Electronically signed by:  ROSALINA Farrar CNP

## 2020-10-09 NOTE — LETTER
"    10/9/2020        RE: Chilango Echevarria  [address]  [address]  [facility] [address]        Chilango Echevarria is a 85 year old male seen October 9, 2020 at [facility] where he has resided for 4 months (admit to TCU 6/2020) seen for initial visit after transfer to LT.  He is seen on the unit up to  wearing an elastic corset.   States he is tired, does not elaborate about back pain because he is suspicious about a visit, \"What's going on?\"  Difficult to reassure.  By chart review, patient had sore throat and cough 9/24, tested +COVID19 on 9/22 and 25.   He was quarantined in his room for 2 weeks.   Now symptoms have resolved and he is out to DR.    Pt has had Parkinson's disease since 2015, now with dementia. HTN, PMR, gout GERD with Barretts esophagus, declining [place] because of confusion, paranoia and falls.     He was hospitalized in March after a fall; workup unremarkable and he discharged to TCU, then to the [facility].   He was readmitted in June after being found down in his [place] apartment.  He was found to be severely orthostatic and he was given IVF, metoprolol stopped.   He was transfused one unit pRBCs, ?blood loss from head laceration.     He discharged to [facility] TCU but made little progress with therapies and transferred to LT for ongoing care.   He was hospitalized again 9/7-16 for unresponsive episodes. He had cardiac evaluation and briefly treated for possible SBO.   He was seen by Palliative care, and pt discharged back to [facility] on Hospice.       Past Medical History:   Diagnosis Date     Aortic stenosis     very mild     Ying esophagus      Contact dermatitis and other eczema, due to unspecified cause      Esophageal reflux      Gout, unspecified      Incarcerated hiatal hernia 5/21/2015     Inguinal hernia without mention of obstruction or gangrene, unilateral or unspecified, (not specified as recurrent)      Polymyalgia " "rheumatica (H)      Umbilical hernia without mention of obstruction or gangrene      Unspecified essential hypertension      Vasomotor rhinitis        Past Surgical History:   Procedure Laterality Date     ESOPHAGOSCOPY, GASTROSCOPY, DUODENOSCOPY (EGD), COMBINED N/A 5/11/2015    Procedure: COMBINED ESOPHAGOSCOPY, GASTROSCOPY, DUODENOSCOPY (EGD);  Surgeon: Thad Ferro MD;  Location: [facility] OR     LAPAROSCOPIC ASSISTED INSERTION TUBE JEJUNOSTOMY N/A 5/14/2015    Procedure: LAPAROSCOPIC ASSISTED INSERTION TUBE JEJUNOSTOMY;  Surgeon: Thad Ferro MD;  Location: [facility] OR     LAPAROSCOPIC HERNIORRHAPHY HIATAL N/A 5/14/2015    Procedure: LAPAROSCOPIC HERNIORRHAPHY HIATAL;  Surgeon: Thad Ferro MD;  Location: [facility] OR     LAPAROSCOPIC HERNIORRHAPHY INGUINAL Right 5/14/2015    Procedure: LAPAROSCOPIC HERNIORRHAPHY INGUINAL;  Surgeon: Thad Ferro MD;  Location: [facility] OR     PICC INSERTION Right 5/11/2015    5fr DL Power PICC, 39cm (1cm external) in the R medial brachial vein w/ tip in the low SVC.     ZZC NONSPECIFIC PROCEDURE      T&A     ZZC NONSPECIFIC PROCEDURE      umbilical herniorraphy     SH:  Previously lived alone in a [facility], then at the [facility].  He has a sister Marissa.   A daughter-in-law lives in [place].   Non smoker    ROS:   Wt Readings from Last 5 Encounters:   10/09/20 55.8 kg (123 lb)   09/30/20 56.1 kg (123 lb 9.6 oz)   09/23/20 59.4 kg (130 lb 14.4 oz)   09/18/20 59.7 kg (131 lb 9.6 oz)   09/16/20 63 kg (138 lb 14.2 oz)      EXAM: NAD, thin and frail  /76   Pulse 78   Temp 97.1  F (36.2  C)   Resp 18   Ht 1.651 m (5' 5\")   Wt 55.8 kg (123 lb)   SpO2 94%   BMI 20.47 kg/m     Neck supple without adenopathy  Lungs with decreased BS, no rales or wheeze   Heart RRR s1s2  Abd soft, NT, no distention, +BS  Ext without edema  Neuro: confused, limited verbal skills, generalized weakness  Psych: affect anxious     Last Comprehensive Metabolic " Panel:  Sodium   Date Value Ref Range Status   09/15/2020 136 133 - 144 mmol/L Final     Potassium   Date Value Ref Range Status   09/15/2020 3.6 3.4 - 5.3 mmol/L Final     Chloride   Date Value Ref Range Status   09/15/2020 108 94 - 109 mmol/L Final     Carbon Dioxide   Date Value Ref Range Status   09/15/2020 23 20 - 32 mmol/L Final     Anion Gap   Date Value Ref Range Status   09/15/2020 5 3 - 14 mmol/L Final     Glucose   Date Value Ref Range Status   09/15/2020 78 70 - 99 mg/dL Final     Urea Nitrogen   Date Value Ref Range Status   09/15/2020 15 7 - 30 mg/dL Final     Creatinine   Date Value Ref Range Status   09/15/2020 0.86 0.66 - 1.25 mg/dL Final     GFR Estimate   Date Value Ref Range Status   09/15/2020 78 >60 mL/min/[1.73_m2] Final     Comment:     Non  GFR Calc  Starting 12/18/2018, serum creatinine based estimated GFR (eGFR) will be   calculated using the Chronic Kidney Disease Epidemiology Collaboration   (CKD-EPI) equation.       Calcium   Date Value Ref Range Status   09/15/2020 7.5 (L) 8.5 - 10.1 mg/dL Final     Lab Results   Component Value Date    AST 23 09/07/2020     Lab Results   Component Value Date    ALBUMIN 4.4 09/07/2020     Lab Results   Component Value Date    PROTTOTAL 8.3 09/07/2020      Lab Results   Component Value Date    ALKPHOS 78 09/07/2020      Lab Results   Component Value Date    WBC 4.4 09/16/2020      HGB 10.1 09/16/2020      MCV 96 09/16/2020       09/16/2020     ECHO 9/8/2020  Interpretation Summary  Probably normal left ventricular systolic function though subtle wall motion  abnormalities could be missed on this study. The apical two chamber view was significantly foreshortened.  The visual ejection fraction is estimated at 60-65%.      Mild valvular aortic stenosis.      IMP/PLAN:   (U07.1) COVID-19 virus infection   Comment: now out of quarantine   Plan: supportive care.       (G20) Parkinson's disease (H)  Comment: with autonomic dysfunction,  decreased mobility     Plan: Sinemet at usual dose     (I95.1) Orthostatic hypotension  (Z87.898) History of syncope  Comment: recent hospitalization   Plan: midodrine 10 mg bid with hold parameters  LE compression, hydration        (F03.91) Dementia with behavioral disturbance, unspecified dementia type (H)  (F22) Paranoia (H)  Comment: decrease in cognition and may be some SEs of Parkinson's med  Plan: LTC support for meds, meals, activity    (E43) Severe malnutrition (H)  Comment: significant weight loss, eating is variable     Plan: Hospice care    (K21.00) Gastroesophageal reflux disease with esophagitis without hemorrhage  Comment: by hx  Plan: pantoprazole 40 mg/day    (Z51.5) Hospice care patient  Comment: multiple co-morbidities, cognitive and physical decline   Plan: prns available for comfort.        Shruthi Bergman MD         Sincerely,        Shruthi Bergman MD

## 2020-10-12 NOTE — PROGRESS NOTES
"Chilango Echevarria is a 85 year old male seen October 9, 2020 at [facility] where he has resided for 4 months (admit to TCU 6/2020) seen for initial visit after transfer to LT.  He is seen on the unit up to  wearing an elastic corset.   States he is tired, does not elaborate about back pain because he is suspicious about a visit, \"What's going on?\"  Difficult to reassure.  By chart review, patient had sore throat and cough 9/24, tested +COVID19 on 9/22 and 25.   He was quarantined in his room for 2 weeks.   Now symptoms have resolved and he is out to DR.    Pt has had Parkinson's disease since 2015, now with dementia. HTN, PMR, gout GERD with Barretts esophagus, declining since [place] with [place] to [facility] because of confusion, paranoia and falls.     He was hospitalized in March after a fall; workup unremarkable and he discharged to TCU, then to the [facility].   He was readmitted in June after being found down in his [place] apartment.  He was found to be severely orthostatic and he was given IVF, metoprolol stopped.   He was transfused one unit pRBCs, ?blood loss from head laceration.     He discharged to [facility] TCU but made little progress with therapies and transferred to LT for ongoing care.   He was hospitalized again 9/7-16 for unresponsive episodes. He had cardiac evaluation and briefly treated for possible SBO.   He was seen by Palliative care, and pt discharged back to [facility] on Hospice.       Past Medical History:   Diagnosis Date     Aortic stenosis     very mild     Ying esophagus      Contact dermatitis and other eczema, due to unspecified cause      Esophageal reflux      Gout, unspecified      Incarcerated hiatal hernia 5/21/2015     Inguinal hernia without mention of obstruction or gangrene, unilateral or unspecified, (not specified as recurrent)      Polymyalgia rheumatica (H)      Umbilical hernia without mention of obstruction or gangrene      Unspecified essential " "hypertension      Vasomotor rhinitis        Past Surgical History:   Procedure Laterality Date     ESOPHAGOSCOPY, GASTROSCOPY, DUODENOSCOPY (EGD), COMBINED N/A 5/11/2015    Procedure: COMBINED ESOPHAGOSCOPY, GASTROSCOPY, DUODENOSCOPY (EGD);  Surgeon: Thad Ferro MD;  [facility]     LAPAROSCOPIC ASSISTED INSERTION TUBE JEJUNOSTOMY N/A 5/14/2015    Procedure: LAPAROSCOPIC ASSISTED INSERTION TUBE JEJUNOSTOMY;  Surgeon: Thad Ferro MD;  Location[facility]     LAPAROSCOPIC HERNIORRHAPHY HIATAL N/A 5/14/2015    Procedure: LAPAROSCOPIC HERNIORRHAPHY HIATAL;  Surgeon: Thad Ferro MD;  Location[facility]     LAPAROSCOPIC HERNIORRHAPHY INGUINAL Right 5/14/2015    Procedure: LAPAROSCOPIC HERNIORRHAPHY INGUINAL;  Surgeon: Thad Ferro MD;  Location: [facility] OR     PICC INSERTION Right 5/11/2015    5fr DL Power PICC, 39cm (1cm external) in the R medial brachial vein w/ tip in the low SVC.     ZZC NONSPECIFIC PROCEDURE      T&A     ZZC NONSPECIFIC PROCEDURE      umbilical herniorraphy     SH:  Previously lived alone in a [facility], then at the [facility].  He has a sister Marissa.   A daughter-in-law lives in [place].   Non smoker    ROS:   Wt Readings from Last 5 Encounters:   10/09/20 55.8 kg (123 lb)   09/30/20 56.1 kg (123 lb 9.6 oz)   09/23/20 59.4 kg (130 lb 14.4 oz)   09/18/20 59.7 kg (131 lb 9.6 oz)   09/16/20 63 kg (138 lb 14.2 oz)      EXAM: NAD, thin and frail  /76   Pulse 78   Temp 97.1  F (36.2  C)   Resp 18   Ht 1.651 m (5' 5\")   Wt 55.8 kg (123 lb)   SpO2 94%   BMI 20.47 kg/m     Neck supple without adenopathy  Lungs with decreased BS, no rales or wheeze   Heart RRR s1s2  Abd soft, NT, no distention, +BS  Ext without edema  Neuro: confused, limited verbal skills, generalized weakness  Psych: affect anxious     Last Comprehensive Metabolic Panel:  Sodium   Date Value Ref Range Status   09/15/2020 136 133 - 144 mmol/L Final     Potassium   Date " Value Ref Range Status   09/15/2020 3.6 3.4 - 5.3 mmol/L Final     Chloride   Date Value Ref Range Status   09/15/2020 108 94 - 109 mmol/L Final     Carbon Dioxide   Date Value Ref Range Status   09/15/2020 23 20 - 32 mmol/L Final     Anion Gap   Date Value Ref Range Status   09/15/2020 5 3 - 14 mmol/L Final     Glucose   Date Value Ref Range Status   09/15/2020 78 70 - 99 mg/dL Final     Urea Nitrogen   Date Value Ref Range Status   09/15/2020 15 7 - 30 mg/dL Final     Creatinine   Date Value Ref Range Status   09/15/2020 0.86 0.66 - 1.25 mg/dL Final     GFR Estimate   Date Value Ref Range Status   09/15/2020 78 >60 mL/min/[1.73_m2] Final     Comment:     Non  GFR Calc  Starting 12/18/2018, serum creatinine based estimated GFR (eGFR) will be   calculated using the Chronic Kidney Disease Epidemiology Collaboration   (CKD-EPI) equation.       Calcium   Date Value Ref Range Status   09/15/2020 7.5 (L) 8.5 - 10.1 mg/dL Final     Lab Results   Component Value Date    AST 23 09/07/2020     Lab Results   Component Value Date    ALBUMIN 4.4 09/07/2020     Lab Results   Component Value Date    PROTTOTAL 8.3 09/07/2020      Lab Results   Component Value Date    ALKPHOS 78 09/07/2020      Lab Results   Component Value Date    WBC 4.4 09/16/2020      HGB 10.1 09/16/2020      MCV 96 09/16/2020       09/16/2020     ECHO 9/8/2020  Interpretation Summary  Probably normal left ventricular systolic function though subtle wall motion  abnormalities could be missed on this study. The apical two chamber view was significantly foreshortened.  The visual ejection fraction is estimated at 60-65%.      Mild valvular aortic stenosis.      IMP/PLAN:   (U07.1) COVID-19 virus infection   Comment: now out of quarantine   Plan: supportive care.       (G20) Parkinson's disease (H)  Comment: with autonomic dysfunction, decreased mobility     Plan: Sinemet at usual dose     (I95.1) Orthostatic hypotension  (Z87.898) History  of syncope  Comment: recent hospitalization   Plan: midodrine 10 mg bid with hold parameters  LE compression, hydration        (F03.91) Dementia with behavioral disturbance, unspecified dementia type (H)  (F22) Paranoia (H)  Comment: decrease in cognition and may be some SEs of Parkinson's med  Plan: LTC support for meds, meals, activity    (E43) Severe malnutrition (H)  Comment: significant weight loss, eating is variable     Plan: Hospice care    (K21.00) Gastroesophageal reflux disease with esophagitis without hemorrhage  Comment: by hx  Plan: pantoprazole 40 mg/day    (Z51.5) Hospice care patient  Comment: multiple co-morbidities, cognitive and physical decline   Plan: prns available for comfort.        Shruthi Bergman MD

## 2020-10-28 PROBLEM — L20.9 ATOPIC DERMATITIS: Status: ACTIVE | Noted: 2020-01-01

## 2020-10-28 NOTE — PROGRESS NOTES
[facility]  [place] Medical Record Number:  [id number]  Place of Service where encounter took place:  [facility] [[id number]]  Chief Complaint   Patient presents with     Nursing Home Acute     rash       HPI:    Chilango Echevarria  is a 86 year old (10/24/1934) Parkinson's Disease, dementia, dysphagia, GERD/BE, left rotator cuff injury, gout recurrent syncope, who is being seen today for an episodic care visit.      HPI information obtained from: facility chart records, facility staff, patient report and [facility] chart review.     Today's concern is:  Follow-up today due to rash to thighs reported by nursing yesterday.   Resident provides limited history due to dementia and PD. Seated in wheelchair, fully dressed watching TV. Endorses itching rash to BL legs that is bothersome at times. Per hospice nurse has decline weekly bath aid for the last two weeks, aid has tried different times of day without success. Eat and drinking about 50% of meals per nursing notes. No apparent respiratory or cardiac distress. No change in bowel or bladder habits reported by nursing.     On chart review saw derm 2/18/20 due to eczema. At that time treated with Fluocinonide, daily moisturizer, loratadine, and cetrizine.     Past Medical and Surgical History reviewed in Epic today.    MEDICATIONS:  Current Outpatient Medications   Medication Sig Dispense Refill     acetaminophen (TYLENOL) 325 MG tablet Take 650 mg by mouth 2 times daily & 2 x daily as needed       acetaminophen (TYLENOL) 650 MG suppository Place 1 suppository (650 mg) rectally every 4 hours as needed for fever or mild pain (Do not exceed 4000 mg total acetaminophen per day.) Unwrap prior to insertion. 12 suppository 1     allopurinol (ZYLOPRIM) 100 MG tablet Take 1 tablet (100 mg) by mouth daily 90 tablet 3     atropine 1 % ophthalmic solution Take 2-4 drops by mouth, place under tongue or place inside cheek every 2 hours  "as needed for other (terminal respiratory secretions) 5 mL 1     bisacodyl (DULCOLAX) 10 MG suppository Unwrap and insert 1 suppository rectally twice daily as needed for constipation. 12 suppository 1     calcium carbonate (TUMS) 500 MG chewable tablet Take 2 tablets (1,000 mg) by mouth 3 times daily as needed for heartburn 30 tablet 1     carbidopa-levodopa (SINEMET)  MG per tablet Take 2 tablets by mouth 3 times daily (9am - 12pm - 6pm)       diclofenac (VOLTAREN) 1 % topical gel Place 2 g onto the skin 2 times daily       LORazepam (ATIVAN) 2 MG/ML (HIGH CONC) solution Take 0.25 mg by mouth every 4 hours as needed for anxiety       melatonin 3 MG CAPS Take 3 mg by mouth At Bedtime        midodrine (PROAMATINE) 5 MG tablet Take 10 mg by mouth 2 times daily 8AM & NOON       morphine sulfate, high concentrate, (ROXANOL-CONCENTRATED) 20 MG/ML concentrated solution Take 0.125 mLs (2.5 mg) by mouth every 2 hours as needed for shortness of breath / dyspnea or breakthrough pain 30 mL 0     nystatin (MYCOSTATIN) 037567 UNIT/GM external powder Apply topically 2 times daily Apply to groin area as needed for redness/rash       pantoprazole (PROTONIX) 40 MG EC tablet Take 1 tablet (40 mg) by mouth every morning (before breakfast) 30 tablet 1     polyethylene glycol (MIRALAX) 17 g packet Take 1 packet by mouth 2 times daily       senna (SENOKOT) 8.6 MG tablet Take 1 tablet by mouth 2 times daily as needed for constipation       REVIEW OF SYSTEMS:  Limited secondary to cognitive impairment but today pt reports history as per HPI.     Objective:  /63   Pulse 70   Temp 96.8  F (36  C)   Resp 18   Ht 1.651 m (5' 5\")   Wt 56.8 kg (125 lb 4.8 oz)   SpO2 99%   BMI 20.85 kg/m    Exam:  Exam limited due to COVID-19 pandemic to minimize unnecessary patient contact in high risk population.    GENERAL APPEARANCE:  Alert, in no distress, pleasant, cooperative, sitting up in wheelchair with cap and glasses in place  ENT: " + sialorrhea, assisted resident clean area around mouth and chin with warm ater   RESP:  Non-labored breathing, no respiratory distress,  patient is on room air  VASCULAR: 1+ edema bilateral lower extremities. Pedal pulses.   SKIN:  Inspection - Skin to legs is thin and visibly dry. Erythematous patches to lateral knees and lower legs, as well as left heel with cracking skin. Palpation- no increased warmth, skin is dry and non-tender.  PSYCH: awake and alert, speech soft and limited, masked facies, calm and cooperative    ASSESSMENT/PLAN:  (L20.9) Atopic dermatitis, unspecified type  Comment: Exacerbation of chronic issue in setting of drier air with season change and refusing bathing assistance. Followed by dermatology in the past.   Plan:   - Triamcinolone 0.1% ointment BID x 14 days  - Aquaphor TID thighs to toes   - If no improvement with above, would switch to Fluocinonide and start loratadine or cetrizine    Orders written by provider at facility    Hospice nurse updated.    Electronically signed by:  ROSALINA Farrar CNP

## 2020-12-03 NOTE — PROGRESS NOTES
[facility]  Chief Complaint   Patient presents with     correction Regulatory     Home Care/Hospice     [facility] Record Number:  [id number]  Place of Service where encounter took place:  [facility] [[id number]]    HPI:    Chilango Echevarria  is 86 year old (10/24/1934), who is being seen today for a federally mandated E/M visit.  HPI information obtained from: facility chart records, facility staff, patient report and [facility] chart review. Today's concerns are:  Has been in LTC since June 2020. PMH includes parkinson's disease, dementia, GERD with Tone's esophagus, hypertension, and PMR. He has been on hospice care.   Today he offers no response to questions. He is resting in bed. He is breathing comfortably.         ALLERGIES:Lisinopril  PAST MEDICAL HISTORY:   has a past medical history of Aortic stenosis, Ying esophagus, Contact dermatitis and other eczema, due to unspecified cause, Esophageal reflux, Gout, unspecified, Incarcerated hiatal hernia (5/21/2015), Inguinal hernia without mention of obstruction or gangrene, unilateral or unspecified, (not specified as recurrent), Polymyalgia rheumatica (H), Umbilical hernia without mention of obstruction or gangrene, Unspecified essential hypertension, and Vasomotor rhinitis. He also has no past medical history of Basal cell carcinoma, Malignant melanoma (H), or Squamous cell carcinoma of skin, unspecified.  PAST SURGICAL HISTORY:   has a past surgical history that includes NONSPECIFIC PROCEDURE; NONSPECIFIC PROCEDURE; picc insertion (Right, 5/11/2015); Laparoscopic herniorrhaphy hiatal (N/A, 5/14/2015); Laparoscopic assisted insertion tube jejunostomy (N/A, 5/14/2015); Laparoscopic herniorrhaphy inguinal (Right, 5/14/2015); and Esophagoscopy, gastroscopy, duodenoscopy (EGD), combined (N/A, 5/11/2015).  FAMILY HISTORY: family history includes Breast Cancer in his mother; Cancer (age of onset: 37) in his  son; Diabetes in his paternal grandfather.  SOCIAL HISTORY:  reports that he has never smoked. He has never used smokeless tobacco. He reports current alcohol use. He reports that he does not use drugs.    MEDICATIONS:  Current Outpatient Medications   Medication Sig Dispense Refill     acetaminophen (TYLENOL) 325 MG tablet Take 650 mg by mouth 2 times daily & 2 x daily as needed       acetaminophen (TYLENOL) 650 MG suppository Place 1 suppository (650 mg) rectally every 4 hours as needed for fever or mild pain (Do not exceed 4000 mg total acetaminophen per day.) Unwrap prior to insertion. 12 suppository 1     allopurinol (ZYLOPRIM) 100 MG tablet Take 1 tablet (100 mg) by mouth daily 90 tablet 3     atropine 1 % ophthalmic solution Take 2-4 drops by mouth, place under tongue or place inside cheek every 2 hours as needed for other (terminal respiratory secretions) 5 mL 1     bisacodyl (DULCOLAX) 10 MG suppository Unwrap and insert 1 suppository rectally twice daily as needed for constipation. 12 suppository 1     calcium carbonate (TUMS) 500 MG chewable tablet Take 2 tablets (1,000 mg) by mouth 3 times daily as needed for heartburn 30 tablet 1     carbidopa-levodopa (SINEMET)  MG per tablet Take 2 tablets by mouth 3 times daily (9am - 12pm - 6pm)       diclofenac (VOLTAREN) 1 % topical gel Place 2 g onto the skin 2 times daily       melatonin 3 MG CAPS Take 6 mg by mouth At Bedtime        midodrine (PROAMATINE) 5 MG tablet Take 10 mg by mouth 2 times daily 8AM & NOON       mineral oil-hydrophilic petrolatum (AQUAPHOR) external ointment Apply topically 3 times daily       morphine sulfate, high concentrate, (ROXANOL-CONCENTRATED) 20 MG/ML concentrated solution Take 0.125 mLs (2.5 mg) by mouth every 2 hours as needed for shortness of breath / dyspnea or breakthrough pain 30 mL 0     nystatin (MYCOSTATIN) 190202 UNIT/GM external powder Apply topically 2 times daily And as needed. Apply to groin for redness/rash    "    pantoprazole (PROTONIX) 40 MG EC tablet Take 1 tablet (40 mg) by mouth every morning (before breakfast) 30 tablet 1     polyethylene glycol (MIRALAX) 17 g packet Take 1 packet by mouth 2 times daily       senna (SENOKOT) 8.6 MG tablet Take 2 tablets by mouth 2 times daily            Case Management:  I have reviewed the care plan and MDS and do agree with the plan. Patient's desire to return to the community is not present. Information reviewed:  Medications, vital signs, orders, and nursing notes.    ROS:  Unobtainable secondary to cognitive impairment.     Vitals:  /63   Pulse 60   Temp 97.2  F (36.2  C)   Resp 18   Ht 1.651 m (5' 5\")   Wt 60.9 kg (134 lb 3.2 oz)   SpO2 98%   BMI 22.33 kg/m    Body mass index is 22.33 kg/m .  Exam:  GENERAL APPEARANCE:  Alert, in no distress  ENT:  Mouth and posterior oropharynx normal, moist mucous membranes, hearing acuity adequate   EYES:  EOM, conjunctivae, lids, pupils and irises normal  RESP:  respiratory effort normal, no respiratory distress  CV:  Edema none  M/S:   Gait and station not observed, Digits and nails normal   SKIN:  Inspection/Palpation of skin and subcutaneous tissue scattered scabs on arms.   NEURO: 2-12 in normal limits and at patient's baseline  PSYCH:  insight and judgement, memory impaired , affect and mood flat    Lab/Diagnostic data:   CBC RESULTS:   Recent Labs   Lab Test 09/16/20  0607 09/15/20  0933   WBC 4.4 3.4*   RBC 3.52* 3.42*   HGB 10.1* 9.8*   HCT 33.8* 32.1*   MCV 96 94   MCH 28.7 28.7   MCHC 29.9* 30.5*   RDW 15.0 14.7   * 121*       Last Basic Metabolic Panel:  Recent Labs   Lab Test 09/15/20  0933 09/13/20  0804    138   POTASSIUM 3.6 4.2   CHLORIDE 108 108   MESERET 7.5* 7.8*   CO2 23 25   BUN 15 23   CR 0.86 0.73   GLC 78 97       Liver Function Studies -   Recent Labs   Lab Test 09/07/20  1818 06/22/20  0759   PROTTOTAL 8.3 5.9*   ALBUMIN 4.4 3.0*   BILITOTAL 0.5 0.5   ALKPHOS 78 51   AST 23 22   ALT 10 10 "       TSH   Date Value Ref Range Status   06/22/2020 0.99 0.40 - 4.00 mU/L Final   10/26/2018 1.14 0.40 - 4.00 mU/L Final   ]    No results found for: A1C        ASSESSMENT/PLAN  Parkinson's disease (H)  Orthostatic hypotension  Hospice care patient  End stage parkinson's disease. He continues on sinemet. He continues on midorine for OH. BPs running 144/75, 103/ 63, 123/70  wts are stable. He appears comfortable. He is eating 50% of meals  He has not used any hospice medications.   -supportive care  -monitor for weight loss, breathing issues.     Gastroesophageal reflux disease with esophagitis without hemorrhage  H/o licona's esophagus  No report of reflux  -continue PPI    Atopic dermatitis, unspecified type  Started on TMC 0.1% cream due to sores on his thighs. Today he has scabs on his arms and fine rash on LEs-looks like dry skin.  -TMC and aquaphor        Electronically signed by:  ROSALINA Huber CNP

## 2021-01-01 ENCOUNTER — HOSPITAL LABORATORY (OUTPATIENT)
Dept: OTHER | Facility: CLINIC | Age: 86
End: 2021-01-01

## 2021-01-01 ENCOUNTER — NURSING HOME VISIT (OUTPATIENT)
Dept: GERIATRICS | Facility: CLINIC | Age: 86
End: 2021-01-01
Payer: MEDICARE

## 2021-01-01 ENCOUNTER — DOCUMENTATION ONLY (OUTPATIENT)
Dept: CARE COORDINATION | Facility: CLINIC | Age: 86
End: 2021-01-01

## 2021-01-01 ENCOUNTER — TELEPHONE (OUTPATIENT)
Dept: GERIATRICS | Facility: CLINIC | Age: 86
End: 2021-01-01

## 2021-01-01 VITALS
SYSTOLIC BLOOD PRESSURE: 145 MMHG | BODY MASS INDEX: 21.02 KG/M2 | HEIGHT: 65 IN | TEMPERATURE: 97.6 F | DIASTOLIC BLOOD PRESSURE: 73 MMHG | WEIGHT: 126.2 LBS | OXYGEN SATURATION: 98 % | HEART RATE: 69 BPM | RESPIRATION RATE: 18 BRPM

## 2021-01-01 VITALS
RESPIRATION RATE: 18 BRPM | WEIGHT: 124.8 LBS | TEMPERATURE: 97.6 F | DIASTOLIC BLOOD PRESSURE: 77 MMHG | BODY MASS INDEX: 20.79 KG/M2 | HEART RATE: 78 BPM | SYSTOLIC BLOOD PRESSURE: 156 MMHG | HEIGHT: 65 IN | OXYGEN SATURATION: 94 %

## 2021-01-01 VITALS
RESPIRATION RATE: 18 BRPM | HEART RATE: 60 BPM | TEMPERATURE: 97.3 F | WEIGHT: 124.8 LBS | HEIGHT: 65 IN | OXYGEN SATURATION: 95 % | DIASTOLIC BLOOD PRESSURE: 56 MMHG | BODY MASS INDEX: 20.79 KG/M2 | SYSTOLIC BLOOD PRESSURE: 106 MMHG

## 2021-01-01 VITALS
BODY MASS INDEX: 19.63 KG/M2 | DIASTOLIC BLOOD PRESSURE: 67 MMHG | HEART RATE: 76 BPM | HEIGHT: 65 IN | SYSTOLIC BLOOD PRESSURE: 112 MMHG | TEMPERATURE: 98.9 F | OXYGEN SATURATION: 90 % | RESPIRATION RATE: 28 BRPM | WEIGHT: 117.8 LBS

## 2021-01-01 VITALS
RESPIRATION RATE: 18 BRPM | DIASTOLIC BLOOD PRESSURE: 90 MMHG | SYSTOLIC BLOOD PRESSURE: 131 MMHG | TEMPERATURE: 97.5 F | HEART RATE: 68 BPM | BODY MASS INDEX: 20.96 KG/M2 | OXYGEN SATURATION: 94 % | WEIGHT: 125.8 LBS | HEIGHT: 65 IN

## 2021-01-01 VITALS
BODY MASS INDEX: 20.66 KG/M2 | DIASTOLIC BLOOD PRESSURE: 67 MMHG | OXYGEN SATURATION: 99 % | SYSTOLIC BLOOD PRESSURE: 142 MMHG | HEART RATE: 87 BPM | TEMPERATURE: 97.7 F | WEIGHT: 124 LBS | HEIGHT: 65 IN | RESPIRATION RATE: 18 BRPM

## 2021-01-01 VITALS
DIASTOLIC BLOOD PRESSURE: 67 MMHG | HEIGHT: 65 IN | SYSTOLIC BLOOD PRESSURE: 120 MMHG | TEMPERATURE: 97.6 F | HEART RATE: 68 BPM | BODY MASS INDEX: 20.66 KG/M2 | RESPIRATION RATE: 18 BRPM | OXYGEN SATURATION: 94 % | WEIGHT: 124 LBS

## 2021-01-01 VITALS
HEART RATE: 78 BPM | DIASTOLIC BLOOD PRESSURE: 63 MMHG | SYSTOLIC BLOOD PRESSURE: 115 MMHG | BODY MASS INDEX: 21.43 KG/M2 | HEIGHT: 65 IN | OXYGEN SATURATION: 94 % | WEIGHT: 128.6 LBS | TEMPERATURE: 98.2 F | RESPIRATION RATE: 18 BRPM

## 2021-01-01 VITALS
WEIGHT: 126 LBS | RESPIRATION RATE: 18 BRPM | OXYGEN SATURATION: 98 % | DIASTOLIC BLOOD PRESSURE: 66 MMHG | BODY MASS INDEX: 20.99 KG/M2 | HEIGHT: 65 IN | HEART RATE: 68 BPM | TEMPERATURE: 96.4 F | SYSTOLIC BLOOD PRESSURE: 127 MMHG

## 2021-01-01 VITALS
DIASTOLIC BLOOD PRESSURE: 72 MMHG | HEIGHT: 65 IN | RESPIRATION RATE: 18 BRPM | TEMPERATURE: 97.3 F | SYSTOLIC BLOOD PRESSURE: 123 MMHG | BODY MASS INDEX: 21.41 KG/M2 | HEART RATE: 67 BPM | WEIGHT: 128.5 LBS | OXYGEN SATURATION: 97 %

## 2021-01-01 DIAGNOSIS — L03.115 CELLULITIS OF RIGHT FOOT: Primary | ICD-10-CM

## 2021-01-01 DIAGNOSIS — L97.511 SKIN ULCER OF TOE OF RIGHT FOOT, LIMITED TO BREAKDOWN OF SKIN (H): ICD-10-CM

## 2021-01-01 DIAGNOSIS — I71.43 ANEURYSM OF INFRARENAL ABDOMINAL AORTA (H): ICD-10-CM

## 2021-01-01 DIAGNOSIS — G20.A1 PARKINSON DISEASE (H): ICD-10-CM

## 2021-01-01 DIAGNOSIS — K11.7 SIALORRHEA: ICD-10-CM

## 2021-01-01 DIAGNOSIS — I95.1 ORTHOSTATIC HYPOTENSION: ICD-10-CM

## 2021-01-01 DIAGNOSIS — G47.00 INSOMNIA, UNSPECIFIED TYPE: ICD-10-CM

## 2021-01-01 DIAGNOSIS — Z51.5 END OF LIFE CARE: Primary | ICD-10-CM

## 2021-01-01 DIAGNOSIS — R40.0 SOMNOLENCE: ICD-10-CM

## 2021-01-01 DIAGNOSIS — E43 SEVERE PROTEIN-CALORIE MALNUTRITION (H): ICD-10-CM

## 2021-01-01 DIAGNOSIS — H61.23 BILATERAL IMPACTED CERUMEN: Primary | ICD-10-CM

## 2021-01-01 DIAGNOSIS — L24.4 IRRITANT CONTACT DERMATITIS DUE TO DRUG IN CONTACT WITH SKIN: ICD-10-CM

## 2021-01-01 DIAGNOSIS — R41.89 COGNITIVE IMPAIRMENT: ICD-10-CM

## 2021-01-01 DIAGNOSIS — I10 ESSENTIAL HYPERTENSION, BENIGN: ICD-10-CM

## 2021-01-01 DIAGNOSIS — F03.91 DEMENTIA WITH BEHAVIORAL DISTURBANCE, UNSPECIFIED DEMENTIA TYPE: ICD-10-CM

## 2021-01-01 DIAGNOSIS — G20.A1 PARKINSON'S DISEASE (H): Primary | ICD-10-CM

## 2021-01-01 DIAGNOSIS — R52 PAIN: Primary | ICD-10-CM

## 2021-01-01 DIAGNOSIS — G20.A1 PARKINSON'S DISEASE (H): ICD-10-CM

## 2021-01-01 DIAGNOSIS — B99.9 INFECTION: ICD-10-CM

## 2021-01-01 DIAGNOSIS — K21.00 GASTROESOPHAGEAL REFLUX DISEASE WITH ESOPHAGITIS WITHOUT HEMORRHAGE: ICD-10-CM

## 2021-01-01 DIAGNOSIS — K59.01 SLOW TRANSIT CONSTIPATION: ICD-10-CM

## 2021-01-01 DIAGNOSIS — R26.9 GAIT ABNORMALITY: ICD-10-CM

## 2021-01-01 DIAGNOSIS — Z51.5 HOSPICE CARE PATIENT: ICD-10-CM

## 2021-01-01 DIAGNOSIS — L03.115 CELLULITIS OF RIGHT FOOT: ICD-10-CM

## 2021-01-01 DIAGNOSIS — K21.00 GASTROESOPHAGEAL REFLUX DISEASE WITH ESOPHAGITIS, UNSPECIFIED WHETHER HEMORRHAGE: ICD-10-CM

## 2021-01-01 DIAGNOSIS — R50.9 FEVER, UNSPECIFIED FEVER CAUSE: Primary | ICD-10-CM

## 2021-01-01 DIAGNOSIS — E43 SEVERE MALNUTRITION (H): ICD-10-CM

## 2021-01-01 DIAGNOSIS — Z87.898 HISTORY OF SYNCOPE: ICD-10-CM

## 2021-01-01 DIAGNOSIS — M1A.9XX0 CHRONIC GOUT WITHOUT TOPHUS, UNSPECIFIED CAUSE, UNSPECIFIED SITE: ICD-10-CM

## 2021-01-01 DIAGNOSIS — R60.0 EDEMA OF EXTREMITIES: Primary | ICD-10-CM

## 2021-01-01 DIAGNOSIS — S46.001D ROTATOR CUFF INJURY, RIGHT, SUBSEQUENT ENCOUNTER: ICD-10-CM

## 2021-01-01 DIAGNOSIS — I35.0 MILD AORTIC STENOSIS: ICD-10-CM

## 2021-01-01 DIAGNOSIS — R55 SYNCOPE, UNSPECIFIED SYNCOPE TYPE: ICD-10-CM

## 2021-01-01 LAB
LABORATORY COMMENT REPORT: NORMAL
SARS-COV-2 RNA RESP QL NAA+PROBE: NEGATIVE
SARS-COV-2 RNA RESP QL NAA+PROBE: NORMAL
SARS-COV-2 RNA RESP QL NAA+PROBE: NOT DETECTED
SPECIMEN SOURCE: NORMAL

## 2021-01-01 PROCEDURE — 99309 SBSQ NF CARE MODERATE MDM 30: CPT | Mod: GW | Performed by: NURSE PRACTITIONER

## 2021-01-01 PROCEDURE — 99318 PR ANNUAL NURSING FAC ASSESSMNT, STABLE: CPT | Mod: GV | Performed by: NURSE PRACTITIONER

## 2021-01-01 PROCEDURE — 99310 SBSQ NF CARE HIGH MDM 45: CPT | Mod: GW | Performed by: NURSE PRACTITIONER

## 2021-01-01 PROCEDURE — 99309 SBSQ NF CARE MODERATE MDM 30: CPT | Performed by: NURSE PRACTITIONER

## 2021-01-01 PROCEDURE — 99309 SBSQ NF CARE MODERATE MDM 30: CPT | Mod: GW | Performed by: INTERNAL MEDICINE

## 2021-01-01 PROCEDURE — 99309 SBSQ NF CARE MODERATE MDM 30: CPT | Performed by: INTERNAL MEDICINE

## 2021-01-01 RX ORDER — CARBIDOPA AND LEVODOPA 25; 100 MG/1; MG/1
2 TABLET ORAL 3 TIMES DAILY
COMMUNITY
Start: 2021-01-01

## 2021-01-01 RX ORDER — MORPHINE SULFATE 20 MG/ML
2.5 SOLUTION ORAL
COMMUNITY
End: 2021-01-01

## 2021-01-01 RX ORDER — LORAZEPAM 2 MG/ML
0.25 CONCENTRATE ORAL EVERY 4 HOURS PRN
COMMUNITY
End: 2021-01-01

## 2021-01-01 RX ORDER — MORPHINE SULFATE 20 MG/ML
SOLUTION ORAL
Qty: 30 ML | Refills: 0 | Status: SHIPPED | OUTPATIENT
Start: 2021-01-01

## 2021-01-01 RX ORDER — POLYETHYLENE GLYCOL 3350 17 G/17G
17 POWDER, FOR SOLUTION ORAL 2 TIMES DAILY
Qty: 510 G | COMMUNITY
Start: 2021-01-01

## 2021-01-01 RX ORDER — HALOPERIDOL 0.5 MG/1
0.5 TABLET ORAL EVERY 6 HOURS PRN
COMMUNITY
End: 2021-01-01

## 2021-01-01 RX ORDER — QUETIAPINE FUMARATE 25 MG/1
25 TABLET, FILM COATED ORAL EVERY 6 HOURS PRN
COMMUNITY
Start: 2021-01-01

## 2021-01-01 RX ORDER — TRAZODONE HYDROCHLORIDE 50 MG/1
TABLET, FILM COATED ORAL
COMMUNITY
Start: 2021-01-01 | End: 2021-01-01

## 2021-01-01 RX ORDER — SPIRONOLACTONE 50 MG/1
50 TABLET, FILM COATED ORAL DAILY
COMMUNITY
End: 2021-01-01

## 2021-01-01 RX ORDER — LORAZEPAM 2 MG/ML
0.5 CONCENTRATE ORAL EVERY 4 HOURS PRN
COMMUNITY
Start: 2021-01-01

## 2021-01-01 RX ORDER — CEFTRIAXONE 1 G/1
1000 INJECTION, POWDER, FOR SOLUTION INTRAMUSCULAR; INTRAVENOUS ONCE
Qty: 10 ML | Refills: 0 | Status: SHIPPED | OUTPATIENT
Start: 2021-01-01 | End: 2021-01-01

## 2021-01-01 RX ORDER — CLOTRIMAZOLE 1 %
CREAM (GRAM) TOPICAL 2 TIMES DAILY
COMMUNITY
Start: 2021-01-01 | End: 2021-01-01

## 2021-01-01 ASSESSMENT — MIFFLIN-ST. JEOR
SCORE: 1172.97
SCORE: 1179.32
SCORE: 1189.75
SCORE: 1141.22
SCORE: 1178.41
SCORE: 1177.51
SCORE: 1169.34
SCORE: 1190.21
SCORE: 1169.34
SCORE: 1172.97

## 2021-02-10 NOTE — LETTER
2/10/2021        RE: Chilango Echevarria  [id number]  [address]  [place] [address]        Chilango Echevarria is a 86 year old male seen February 10, 2021  at [facility] where he has resided for 8 months (admit to TCU 6/2020).  He is seen on the unit up to .  He has been working with PHYSICAL THERAPY this morning, feels tired and had difficulty attending to questions.   Says no to pain of any kind.    Pt had been on Hospice due to global decline, but elected to disenroll so that he could get PHYSICAL THERAPY    By therapy report he has not made significant gains and will be finishing up next week.        Pt has had Parkinson's disease since 2015, now with dementia. HTN, PMR, gout GERD with Barretts esophagus, declining [place] to [facility] because of confusion, paranoia and falls.     He was hospitalized in March 2020 after a fall; workup unremarkable and he discharged to TCU, then to the [facility].  He was readmitted in June after being found down in his [place] apartment.  He was severely orthostatic and given IVF, metoprolol stopped.   He was transfused one unit pRBCs, ?blood loss from head laceration.     He discharged to [facility] TCU but made little progress with therapies and transferred to LT for ongoing care.   He was hospitalized again in September 2020 for unresponsive episodes. He had cardiac evaluation and briefly treated for possible SBO.   He was seen by Palliative care, and pt discharged back to [facility] on Hospice.   Later in September he was COVID19+ but recovered without significant symptoms        Past Medical History:   Diagnosis Date     Aortic stenosis     very mild     Ying esophagus      Contact dermatitis and other eczema, due to unspecified cause      Esophageal reflux      Gout, unspecified      Incarcerated hiatal hernia 5/21/2015     Inguinal hernia without mention of obstruction or gangrene, unilateral or unspecified, (not specified as  "recurrent)      Polymyalgia rheumatica (H)      Umbilical hernia without mention of obstruction or gangrene      Unspecified essential hypertension      Vasomotor rhinitis        Past Surgical History:   Procedure Laterality Date     ESOPHAGOSCOPY, GASTROSCOPY, DUODENOSCOPY (EGD), COMBINED N/A 5/11/2015    Procedure: COMBINED ESOPHAGOSCOPY, GASTROSCOPY, DUODENOSCOPY (EGD);  Surgeon: Thad Ferro MD;  Location: [facility] OR     LAPAROSCOPIC ASSISTED INSERTION TUBE JEJUNOSTOMY N/A 5/14/2015    Procedure: LAPAROSCOPIC ASSISTED INSERTION TUBE JEJUNOSTOMY;  Surgeon: Thad Ferro MD;  Location: [facility] OR     LAPAROSCOPIC HERNIORRHAPHY HIATAL N/A 5/14/2015    Procedure: LAPAROSCOPIC HERNIORRHAPHY HIATAL;  Surgeon: Thad Ferro MD;  Location: [facility] OR     LAPAROSCOPIC HERNIORRHAPHY INGUINAL Right 5/14/2015    Procedure: LAPAROSCOPIC HERNIORRHAPHY INGUINAL;  Surgeon: Thad Ferro MD;  Location: [facility] OR     PICC INSERTION Right 5/11/2015    5fr DL Power PICC, 39cm (1cm external) in the R medial brachial vein w/ tip in the low SVC.     ZZC NONSPECIFIC PROCEDURE      T&A     ZZC NONSPECIFIC PROCEDURE      umbilical herniorraphy     SH:  Previously lived alone in a [facility], then at the [facility].  He has a sister Marissa.   A daughter-in-law lives in [place].   Non smoker    ROS:   Wt Readings from Last 5 Encounters:   02/10/21 58.3 kg (128 lb 9.6 oz)   12/03/20 60.9 kg (134 lb 3.2 oz)   10/28/20 56.8 kg (125 lb 4.8 oz)   10/09/20 55.8 kg (123 lb)   09/30/20 56.1 kg (123 lb 9.6 oz)      EXAM: NAD, thin and frail  /63   Pulse 78   Temp 98.2  F (36.8  C)   Resp 18   Ht 1.651 m (5' 5\")   Wt 58.3 kg (128 lb 9.6 oz)   SpO2 94%   BMI 21.40 kg/m     Bruising around right eye  Unable to manage oral secretions  Neck supple without adenopathy  Lungs with decreased BS, no rales or wheeze   Heart RRR s1s2  Abd soft, NT, no distention, +BS  Ext without edema, +sarcopenia  Neuro: " confused, limited verbal skills, generalized weakness  Psych: affect anxious     Labs reviewed    ECHO 9/8/2020  Interpretation Summary  Probably normal left ventricular systolic function though subtle wall motion  abnormalities could be missed on this study. The apical two chamber view was significantly foreshortened.  The visual ejection fraction is estimated at 60-65%.      Mild valvular aortic stenosis.      IMP/PLAN:   (G20) Parkinson's disease (H)  Comment: with autonomic dysfunction, decreased mobility and dementia    Plan: Sinemet 25/100 two tabs tid    (I95.1) Orthostatic hypotension  (Z87.898) History of syncope  Comment: autonomic dysfunction   Plan: midodrine 10 mg bid with hold parameters  LE compression, hydration        (F03.91) Dementia with behavioral disturbance, unspecified dementia type (H)  Comment: decrease in cognition, low functional status and resistance to cares  Plan: LTC support for meds, meals, activity  He has been on therapy service to improve mobility and strength.   Remains a fall risk.     (E43) Severe malnutrition (H)  Comment: significant weight loss, eating is variable     Plan: pt continues to be a candidate for re-enrollment in Hospice if he is agreeable    (K21.00) Gastroesophageal reflux disease with esophagitis without hemorrhage  Comment: Ying's esophagus by hx  Plan: pantoprazole 40 mg/day    Advanced directive: DNR/DNI, comfort focus.     Shruthi Bergman MD         Sincerely,        Shruthi Bergman MD

## 2021-02-14 NOTE — PROGRESS NOTES
Chilango Echevarria is a 86 year old male seen February 10, 2021  at [facility] where he has resided for 8 months (admit to TCU 6/2020).  He is seen on the unit up to .  He has been working with PHYSICAL THERAPY this morning, feels tired and had difficulty attending to questions.   Says no to pain of any kind.    Pt had been on Hospice due to global decline, but elected to disenroll so that he could get PHYSICAL THERAPY    By therapy report he has not made significant gains and will be finishing up next week.        Pt has had Parkinson's disease since 2015, now with dementia. HTN, PMR, gout GERD with Barretts esophagus, declining [place] because of confusion, paranoia and falls.     He was hospitalized in March 2020 after a fall; workup unremarkable and he discharged to TCU, then to the [facility].  He was readmitted in June after being found down in his [place] apartment.  He was severely orthostatic and given IVF, metoprolol stopped.   He was transfused one unit pRBCs, ?blood loss from head laceration.     He discharged to [facility] TCU but made little progress with therapies and transferred to LT for ongoing care.   He was hospitalized again in September 2020 for unresponsive episodes. He had cardiac evaluation and briefly treated for possible SBO.   He was seen by Palliative care, and pt discharged back to [facility] on Hospice.   Later in September he was COVID19+ but recovered without significant symptoms        Past Medical History:   Diagnosis Date     Aortic stenosis     very mild     Ying esophagus      Contact dermatitis and other eczema, due to unspecified cause      Esophageal reflux      Gout, unspecified      Incarcerated hiatal hernia 5/21/2015     Inguinal hernia without mention of obstruction or gangrene, unilateral or unspecified, (not specified as recurrent)      Polymyalgia rheumatica (H)      Umbilical hernia without mention of obstruction or  "gangrene      Unspecified essential hypertension      Vasomotor rhinitis        Past Surgical History:   Procedure Laterality Date     ESOPHAGOSCOPY, GASTROSCOPY, DUODENOSCOPY (EGD), COMBINED N/A 5/11/2015    Procedure: COMBINED ESOPHAGOSCOPY, GASTROSCOPY, DUODENOSCOPY (EGD);  Surgeon: Thad Ferro MD;  Location[facility]     LAPAROSCOPIC ASSISTED INSERTION TUBE JEJUNOSTOMY N/A 5/14/2015    Procedure: LAPAROSCOPIC ASSISTED INSERTION TUBE JEJUNOSTOMY;  Surgeon: Thad Ferro MD;  Location: [facility] OR     LAPAROSCOPIC HERNIORRHAPHY HIATAL N/A 5/14/2015    Procedure: LAPAROSCOPIC HERNIORRHAPHY HIATAL;  Surgeon: Thad Ferro MD;  Location: [facility]     LAPAROSCOPIC HERNIORRHAPHY INGUINAL Right 5/14/2015    Procedure: LAPAROSCOPIC HERNIORRHAPHY INGUINAL;  Surgeon: Thad Ferro MD;  Location: [facility] OR     PICC INSERTION Right 5/11/2015    5fr DL Power PICC, 39cm (1cm external) in the R medial brachial vein w/ tip in the low SVC.     ZZC NONSPECIFIC PROCEDURE      T&A     ZZC NONSPECIFIC PROCEDURE      umbilical herniorraphy     SH:  Previously lived alone in a [facility], then at the [facility].  He has a sister Marissa.   A daughter-in-law lives in [place].   Non smoker    ROS:   Wt Readings from Last 5 Encounters:   02/10/21 58.3 kg (128 lb 9.6 oz)   12/03/20 60.9 kg (134 lb 3.2 oz)   10/28/20 56.8 kg (125 lb 4.8 oz)   10/09/20 55.8 kg (123 lb)   09/30/20 56.1 kg (123 lb 9.6 oz)      EXAM: NAD, thin and frail  /63   Pulse 78   Temp 98.2  F (36.8  C)   Resp 18   Ht 1.651 m (5' 5\")   Wt 58.3 kg (128 lb 9.6 oz)   SpO2 94%   BMI 21.40 kg/m     Bruising around right eye  Unable to manage oral secretions  Neck supple without adenopathy  Lungs with decreased BS, no rales or wheeze   Heart RRR s1s2  Abd soft, NT, no distention, +BS  Ext without edema, +sarcopenia  Neuro: confused, limited verbal skills, generalized weakness  Psych: affect anxious     Labs reviewed    ECHO " 9/8/2020  Interpretation Summary  Probably normal left ventricular systolic function though subtle wall motion  abnormalities could be missed on this study. The apical two chamber view was significantly foreshortened.  The visual ejection fraction is estimated at 60-65%.      Mild valvular aortic stenosis.      IMP/PLAN:   (G20) Parkinson's disease (H)  Comment: with autonomic dysfunction, decreased mobility and dementia    Plan: Sinemet 25/100 two tabs tid    (I95.1) Orthostatic hypotension  (Z87.898) History of syncope  Comment: autonomic dysfunction   Plan: midodrine 10 mg bid with hold parameters  LE compression, hydration        (F03.91) Dementia with behavioral disturbance, unspecified dementia type (H)  Comment: decrease in cognition, low functional status and resistance to cares  Plan: LTC support for meds, meals, activity  He has been on therapy service to improve mobility and strength.   Remains a fall risk.     (E43) Severe malnutrition (H)  Comment: significant weight loss, eating is variable     Plan: pt continues to be a candidate for re-enrollment in Hospice if he is agreeable    (K21.00) Gastroesophageal reflux disease with esophagitis without hemorrhage  Comment: Ying's esophagus by hx  Plan: pantoprazole 40 mg/day    Advanced directive: DNR/DNI, comfort focus.     Shruthi Bergman MD

## 2021-02-22 NOTE — PROGRESS NOTES
[facility]  [place] Medical Record Number:  [id number]  Place of Service where encounter took place:  [facility] ([place]) [[id number]]  Chief Complaint   Patient presents with     RECHECK     weakness, cerumen impaction        HPI:    Chilango Echevarria  is a 86 year old (10/24/1934) Parkinson's Disease, dementia, dysphagia, GERD/BE, left rotator cuff injury, gout recurrent syncope, who is being seen today for an episodic care visit.      HPI information obtained from: facility chart records, facility staff, patient report, [facility] chart review and family/first contact friend (Abena) report.    Hospitalized 9/7 to 9/16 2020 with severe malnutrition, acute cystitis, SBO vs ileus, recurrent syncope/falls, and TERRELL. [facility] [place] [facility] due to recurrent syncope, hypotension, weakness, and abd pain.  Also concern for STEMI. Initial plan for anigogram, but put on hold due to concern for small bowel obstruction and then fever. Echo with normal cardiac function, carotid US negative, no dysrhythmias on continuous cardiac monitor. Treated with 5 days of ceftriaxone for possible UTI. Head CT negative for acute process.  Palliative care consulted during hospitalization, met with daughter in-law, met criteria for hospice given overall decline in health status and elected to enroll in hospice and return to LTC facility.     Prior to most recent hospitalization was hospitalized at [facility] in March 2020 for a fall and again from 6/22 to 6/26 2020 with fall and head laceration, as well as change in mental status. June stay complicated by hypotension due to acute on chronic anemia, received blood transfusion. Mental status changes may have been related to Flexeril on which he was recently started due to rotator cuff injury.     Chronic health concerns include PD [facility]. Cognitive impairment and history of hallucinations, treated with PRN  "Zyprexa in the past. Dysphagia, now on DD3 diet and regular liquids. Hypertension, metoprolol stopped due to orthostatic hypotension and now midodrine BID. 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 History of left rotator cuff injury managed with Tylenol and voltaren. Hx of alcohol dependence while living in community. Hx of gout managed with allopurinol.     Admitted to hospice and the discharged due to desire to participate in therapy, now completed course of PT.      Today's concern is:  Follow-up today at request of nursing due to decreased hearing, resident also recently completed course of PT and staff wondering if hospice services should be offered again.    After therapy completed resident suffered fall with head trauma on  when he was found on the floor near his wheelchair at 2:15 pm. Told nursing staff he fell because he \"wanted to get the pillow for the storm.\"    Resident is unable to give much meaningful history due to dementia. Has been having more difficulty hearing family on the phone and iPad to the point that he no longer wants to participate in these visits.    Told NP student about Right lower quadrant pain. Constipation alternating with diarrhea, but does not report this at second visit.    Recent weights reviewed:  2021 136# >> 21 128#   Down 8# in one month period.     2019 last saw neurology, note reviewed. Not taking medications to maximize absorption.     Resident is able to tell his family his goal is to walk more. On walking program if blood pressure allows.    Last covered day of therapy       Past Medical and Surgical History reviewed in Epic today.    SOCIAL HISTORY:   reports that he has never smoked. He has never used smokeless tobacco. He reports current alcohol use. He reports that he does not use drugs.   He is a  and his only son  18 years ago and his daughter-in-law resides with the patient's only grandchild in [place].  Was living [place] independently in " 2020 prior to hospitalizations.Then [facility].    MEDICATIONS:  Current Outpatient Medications   Medication Sig Dispense Refill     acetaminophen (TYLENOL) 325 MG tablet Take 650 mg by mouth 2 times daily & 2 x daily as needed       acetaminophen (TYLENOL) 650 MG suppository Place 1 suppository (650 mg) rectally every 4 hours as needed for fever or mild pain (Do not exceed 4000 mg total acetaminophen per day.) Unwrap prior to insertion. 12 suppository 1     allopurinol (ZYLOPRIM) 100 MG tablet Take 1 tablet (100 mg) by mouth daily 90 tablet 3     atropine 1 % ophthalmic solution Take 2-4 drops by mouth, place under tongue or place inside cheek every 2 hours as needed for other (terminal respiratory secretions) 5 mL 1     bisacodyl (DULCOLAX) 10 MG suppository Unwrap and insert 1 suppository rectally twice daily as needed for constipation. 12 suppository 1     calcium carbonate (TUMS) 500 MG chewable tablet Take 2 tablets (1,000 mg) by mouth 3 times daily as needed for heartburn 30 tablet 1     carbidopa-levodopa (SINEMET)  MG per tablet Take 2 tablets by mouth 3 times daily (9am - 12pm - 6pm)       melatonin 3 MG CAPS Take 6 mg by mouth At Bedtime        midodrine (PROAMATINE) 5 MG tablet Take 10 mg by mouth 2 times daily 8AM & NOON       morphine sulfate, high concentrate, (ROXANOL-CONCENTRATED) 20 MG/ML concentrated solution Take 0.125 mLs (2.5 mg) by mouth every 2 hours as needed for shortness of breath / dyspnea or breakthrough pain 30 mL 0     nystatin (MYCOSTATIN) 851749 UNIT/GM external powder Apply topically 2 times daily And as needed. Apply to groin for redness/rash       pantoprazole (PROTONIX) 40 MG EC tablet Take 1 tablet (40 mg) by mouth every morning (before breakfast) 30 tablet 1     polyethylene glycol (MIRALAX) 17 g packet Take 1 packet by mouth 2 times daily       senna (SENOKOT) 8.6 MG tablet Take 2 tablets by mouth 2 times daily        diclofenac (VOLTAREN) 1 % topical gel Place 2 g  "onto the skin 2 times daily       mineral oil-hydrophilic petrolatum (AQUAPHOR) external ointment Apply topically 3 times daily       REVIEW OF SYSTEMS:  Limited secondary to cognitive impairment but today pt reports history as per HPI.   BIMS 9/15     Objective:  /72   Pulse 67   Temp 97.3  F (36.3  C)   Resp 18   Ht 1.651 m (5' 5\")   Wt 58.3 kg (128 lb 8 oz)   SpO2 97%   BMI 21.38 kg/m    Exam:  GENERAL APPEARANCE:  Alert, in no distress, well groomed, thin, laying in bed  HEAD: resolving ecchymosis about right eye  EYES:  sclera clear and conjunctiva normal, no discharge, wearing glasses  ENT: External ears without lesions, BL impacted cerumen attempted to remove with lighted curette, but only able to remove small amount, small abrasion to right external ear canal with minor bleed so procedure stopped.  RESP:  Non-labored breathing, palpation of chest normal, no chest wall tenderness, no respiratory distress, Lung sounds clear, patient is on room air.  CV:  Palpation - no murmur/non-displaced PMI, Auscultation - rate and rhythm regular, no murmur, no rub or gallop.  VASCULAR: No edema bilateral lower extremities.   ABDOMEN:  normal bowel sounds, soft, nontender, no grimacing or guarding with palpation.   M/S:   Gait and station wheelchair bound, needs assist to use waler. Increased stiffness of left LE and UE.   SKIN:  Inspection - ecchymosis to right forehead. Palpation- no increased warmth, skin is dry and non-tender.  NEURO:  masked facies, follows simple commands, increase stiffness, no tremor  PSYCH: awake and alert, speech clear,  insight and judgement impaired,no acute confusion, flat affect.    Labs:   Recent labs in [facility] reviewed by me today.    CBC RESULTS:   Recent Labs   Lab Test 09/16/20  0607 09/15/20  0933   WBC 4.4 3.4*   RBC 3.52* 3.42*   HGB 10.1* 9.8*   HCT 33.8* 32.1*   MCV 96 94   MCH 28.7 28.7   MCHC 29.9* 30.5*   RDW 15.0 14.7   * 121*       Last Basic Metabolic " Panel:  Recent Labs   Lab Test 09/15/20  0933 09/13/20  0804    138   POTASSIUM 3.6 4.2   CHLORIDE 108 108   MESERET 7.5* 7.8*   CO2 23 25   BUN 15 23   CR 0.86 0.73   GLC 78 97       Liver Function Studies -   Recent Labs   Lab Test 09/07/20  1818 06/22/20  0759   PROTTOTAL 8.3 5.9*   ALBUMIN 4.4 3.0*   BILITOTAL 0.5 0.5   ALKPHOS 78 51   AST 23 22   ALT 10 10       TSH   Date Value Ref Range Status   06/22/2020 0.99 0.40 - 4.00 mU/L Final   10/26/2018 1.14 0.40 - 4.00 mU/L Final     ASSESSMENT/PLAN:  (H61.23) Bilateral impacted cerumen  (primary encounter diagnosis)  Comment: Unable to remove wax after course of debrox, has q-tips in room may by pushing wax into ears. Discussed with Abena, okay with ENT referral due to negative impact on QoL.  Plan:   - ENT referral, HUC to schedule and family to accompany    (R26.9) Gait abnormality  Comment: Recurrent falls, can use walker with gait belt and supervision, as well as wheelchair. Goal to walk more. High fall risk due to low BP with position change and cognitive impairment. Last fall 2/9. Completed course of therapy but unable to make any meaningful gains.  Plan:   - Staff to assist patient to change position frequently and walk with assistance even in room if patient can tolerate, blood pressure and quarantine may limit   - Continue falls precautions  - Continue walking program as BP allows    (G20) Parkinson's disease (H)   Comment: First reported symptoms in Feb/2015 - resting tremor. Followed by [facility]. Complications of disease process: orthostasis/syncope, constipation, cognitive impairment, hallucinations.   No neuropsych symptoms reported of late, sleeping well.   Hospitalized x3 over last six months with 25# weight loss over last year, discharged from home hospice to do therapy but continues to loose weight.  Plan:   - Patient unable to discuss care goals much today, but discussed with Abena, would like continue to qualify for hospice.  Abena verifies pt/familiy wish for no hospitalization, limited intervention, discussed the benefit of hospice in Bill's case. Family to consider and make decision at next care concerned with Marissa (DIL/HCA) is present.  - Continue Sinemet   - Continue melatonin to help with sleep  - Continue Miralax BID and Senna BID for constipation      (I95.1) Orthostatic hypotension  (Z87.898) History of syncope  Comment: Recurrent, seems to be postural in setting of PD and autonomic dysfunction - like contributed to recent fall. Cardiology considered further work-up of arrhythmia and possible MI on EKG, but enrolled hospice so no symptom mgmt.  Plan:   - Continue Midodrine 10 mg BID w/ hold parameter for high blood pressure  - Slow position change  - BL LE compression   - Encourage PO fluids  - If elects no hospice, consider cardiology follow-up    (R41.89) Cognitive impairment  Comment: BIMS 9/15, seems to have more issues with short term memory. Noted having questionable decision making capacity by inpatient palliative care team. Next of kin is Daughter In-law April Echevarria and granddaughter Michelle Echevarria. Also has local friend, Abena.   Plan:  - Consider neuropsych testing for capacity, suspect limited insight and judgement  - Continues to benefit from supportive care in LTC setting    Orders written by provider at facility.  - Discontinue MSIR now that off hospice.     Electronically signed by:  ROSALINA Farrar CNP

## 2021-02-22 NOTE — NURSING NOTE
"[facility]  [place] Medical Record Number:  [id number]  Place of Service where encounter took place:  [facility] ([place]) [[id number]]  Chief Complaint   Patient presents with     RECHECK     weakness, cerumen impaction        HPI:    Chilango Echevarria  is a 86 year old (10/24/1934), who is being seen today for an episodic care visit.  HPI information obtained from: facility chart records, facility staff, patient report and [facility] chart review. Today's concern is:  Cerumen impaction     Chilango has a PMH of Parkinson's Disease, dementia, dysphagia, GERD/BE, left rotator cuff injury, gout recurrent syncope. He suffered a recent fall on 2/9; he was sitting in his wheelchair and reached for a pillow and fell out of the chair. He hit his head and suffered a bruise to his eye. He was monitored at the facility, VSS, and neuro exam was intact. He has a known history of orthostatic hypotension and syncope. He uses midrodrine 10mg BID. He reports drinking lots of fluids and has a good appetite.     He had been on Hospice due to global decline, but elected to un-enroll in order to pursue physical therapy and improve his physical function. He has not made significant gains in his mobility and has completed his therapy. Today he is also seen for concerns of cerumen impaction. He can be hard of hearing at times and has a history of cerumen impaction.     Last saw neurology for his parkinson's in December 2019. He is seen at [facility] by Dr. Kd Hurley. He has been maintained on the same dose of sinemet 25/100 TID since 2018. Today he is seen with copious amounts of drool and says \"it's like one of my glands doesn't stop watering\". Hx of one fall recently as noted above.    Past Medical and Surgical History reviewed in Epic today.    MEDICATIONS:    Current Outpatient Medications   Medication Sig Dispense Refill     acetaminophen (TYLENOL) 325 MG tablet Take " 650 mg by mouth 2 times daily & 2 x daily as needed       acetaminophen (TYLENOL) 650 MG suppository Place 1 suppository (650 mg) rectally every 4 hours as needed for fever or mild pain (Do not exceed 4000 mg total acetaminophen per day.) Unwrap prior to insertion. 12 suppository 1     allopurinol (ZYLOPRIM) 100 MG tablet Take 1 tablet (100 mg) by mouth daily 90 tablet 3     atropine 1 % ophthalmic solution Take 2-4 drops by mouth, place under tongue or place inside cheek every 2 hours as needed for other (terminal respiratory secretions) 5 mL 1     bisacodyl (DULCOLAX) 10 MG suppository Unwrap and insert 1 suppository rectally twice daily as needed for constipation. 12 suppository 1     calcium carbonate (TUMS) 500 MG chewable tablet Take 2 tablets (1,000 mg) by mouth 3 times daily as needed for heartburn 30 tablet 1     carbidopa-levodopa (SINEMET)  MG per tablet Take 2 tablets by mouth 3 times daily (9am - 12pm - 6pm)       melatonin 3 MG CAPS Take 6 mg by mouth At Bedtime        midodrine (PROAMATINE) 5 MG tablet Take 10 mg by mouth 2 times daily 8AM & NOON       morphine sulfate, high concentrate, (ROXANOL-CONCENTRATED) 20 MG/ML concentrated solution Take 0.125 mLs (2.5 mg) by mouth every 2 hours as needed for shortness of breath / dyspnea or breakthrough pain 30 mL 0     nystatin (MYCOSTATIN) 106579 UNIT/GM external powder Apply topically 2 times daily And as needed. Apply to groin for redness/rash       pantoprazole (PROTONIX) 40 MG EC tablet Take 1 tablet (40 mg) by mouth every morning (before breakfast) 30 tablet 1     polyethylene glycol (MIRALAX) 17 g packet Take 1 packet by mouth 2 times daily       senna (SENOKOT) 8.6 MG tablet Take 2 tablets by mouth 2 times daily        diclofenac (VOLTAREN) 1 % topical gel Place 2 g onto the skin 2 times daily       mineral oil-hydrophilic petrolatum (AQUAPHOR) external ointment Apply topically 3 times daily       REVIEW OF SYSTEMS:  Limited secondary to  "cognitive impairment but today pt reports no compalints    Objective:  /72   Pulse 67   Temp 97.3  F (36.3  C)   Resp 18   Ht 1.651 m (5' 5\")   Wt 58.3 kg (128 lb 8 oz)   SpO2 97%   BMI 21.38 kg/m    Exam:  GENERAL APPEARANCE:  Alert, in no distress, cooperative  ENT:  Mouth and posterior oropharynx normal, moist mucous membranes, right TM not visualized secondary to cerumen, left TM normal, not visualized secondary to cerumen  EYES:  EOM, conjunctivae, lids, pupils and irises normal  RESP:  respiratory effort and palpation of chest normal, lungs clear to auscultation , no respiratory distress  CV:  Palpation and auscultation of heart done , regular rate and rhythm, no murmur, rub, or gallop, no edema, +2 pedal pulses  ABDOMEN:  normal bowel sounds, soft, nontender, no hepatosplenomegaly or other masses    Weights from [facility]:  2/21/2021 07:38 128.5 Lbs  2/1/2021 09:34 128.0 Lbs  1/1/2021 12:15 133.5 Lbs    Bps from [facility]:  2/22/2021 08:41 123 / 72 mmHg  2/21/2021 12:07 122 / 65 mmHg  2/21/2021 07:36 127 / 62 mmHg  2/20/2021 11:38 128 / 67 mmHg  2/20/2021 07:16 136 / 67 mmHg  2/19/2021 11:31 137 / 77 mmHg  2/19/2021 07:02 133 / 70 mmHg  2/18/2021 11:44 149 / 70 mmHg    Labs:   Recent labs in [facility] reviewed by me today.      CBC RESULTS:   Recent Labs   Lab Test 09/16/20  0607 09/15/20  0933   WBC 4.4 3.4*   RBC 3.52* 3.42*   HGB 10.1* 9.8*   HCT 33.8* 32.1*   MCV 96 94   MCH 28.7 28.7   MCHC 29.9* 30.5*   RDW 15.0 14.7   * 121*     Last Basic Metabolic Panel:  Recent Labs   Lab Test 09/15/20  0933 09/13/20  0804    138   POTASSIUM 3.6 4.2   CHLORIDE 108 108   MESERET 7.5* 7.8*   CO2 23 25   BUN 15 23   CR 0.86 0.73   GLC 78 97     Liver Function Studies -   Recent Labs   Lab Test 09/07/20  1818 06/22/20  0759   PROTTOTAL 8.3 5.9*   ALBUMIN 4.4 3.0*   BILITOTAL 0.5 0.5   ALKPHOS 78 51   AST 23 22   ALT 10 10       TSH   Date Value Ref Range Status   06/22/2020 0.99 0.40 - 4.00 mU/L Final   10/26/2018 " 1.14 0.40 - 4.00 mU/L Final     No results found for: A1C    ASSESSMENT/PLAN:  (F03.91) Dementia with behavioral disturbance, unspecified dementia type (H)  (G20) Parkinson's disease (H)  Comment: BIMS score 9 on MDS in Decemeber, has cognitive impairment; maintains on sinemet dose TID with no indication for increasing at this time. No bradykinesia, rigidity, or tremor.   Plan:  - High fall risk  - Continue supportive care from facility   - Sinemet 25/100 two tabs tid  - Consider going back to hospice; otherwise should follow up with Dr. Kd Hurley at [facility].     Weight Loss  Comment: has had ongoing weight loss. Down an additional 5 pounds; 133>>128#.    Plan:  - Refer back to hospice   - Start Weekly weights   - Discontinue daily weights    W05.0 FALL FROM NON-MOVING WHEELCHAIR  Comment: Has some bruising noted to his eye. Appears to be healing. No change in vision. No pain to eye. Compete work with physical therapy.   Plan:  - Continue to monitor    H61.23 IMPACTED CERUMEN, BILATERAL  Comment: Impacted cerumen noted by staff. He was treated with Debrox for 3 days. Moderate amount of cerumen removed with lighted curette. Some wax remained impacted and unable to clear.   Plan:  - Referral to ENT if it becomes bothersome, difficulty hearing, or painful    Orders written by provider at facility    Electronically signed by:  Radha Anderson DNP-Student    The above patient was seen and examined by myself and a student provider. Deja Panchal, ROSALINA CNP . SEE NP NOTE.

## 2021-03-01 NOTE — PATIENT INSTRUCTIONS
Chilango Echevarria  10/24/1934  ORDERS:  - Discontinue MSIR now that off hospice  Electronically signed by:   ROSALINA Farrar CNP  03/01/21 6:21 AM

## 2021-04-05 NOTE — PROGRESS NOTES
[facility]  Chief Complaint   Patient presents with     Annual Comprehensive Nursing Home     [place] Medical Record Number:  [id number]  Place of Service where encounter took place:  [facility] [[id number]]    HPI:    Chilango Echevarria  is a 86 year old  (10/24/1934) Parkinson's Disease, dementia, dysphagia, GERD/BE, left rotator cuff injury, gout recurrent syncope, who is being seen today for an annual comprehensive visit.     HPI information obtained from: facility chart records, facility staff, patient report and [facility] chart review.     Hospitalized 9/7 to 9/16 2020 with severe malnutrition, acute cystitis, SBO vs ileus, recurrent syncope/falls, and TERRELL. Sent to ER from [facility] due to recurrent syncope, hypotension, weakness, and abd pain.  Also concern for STEMI. Initial plan for anigogram, but put on hold due to concern for small bowel obstruction and then fever. Echo with normal cardiac function, carotid US negative, no dysrhythmias on continuous cardiac monitor. Treated with 5 days of ceftriaxone for possible UTI. Head CT negative for acute process.  Palliative care consulted during hospitalization, met with daughter in-law, met criteria for hospice given overall decline in health status and elected to enroll in hospice and return to LTC facility.     Prior to most recent hospitalization was hospitalized at [facility] in March 2020 for a fall and again from 6/22 to 6/26 2020 with fall and head laceration, as well as change in mental status. June stay complicated by hypotension due to acute on chronic anemia, received blood transfusion. Mental status changes may have been related to Flexeril on which he was recently started due to rotator cuff injury.     Chronic health concerns include PD followed by Dr. Hurley [facility]. Cognitive impairment and history of hallucinations, treated with PRN Zyprexa in the past. Dysphagia, now on DD3 diet  "and regular liquids. Hypertension, metoprolol stopped due to orthostatic hypotension and now midodrine BID. 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 History of left rotator cuff injury managed with Tylenol and voltaren. Hx of alcohol dependence while living in community. Hx of gout managed with allopurinol.     Admitted to hospice and the discharged due to desire to participate in therapy, now completed course of PT and elected to readmit to hospice.     Today's concerns are:  Follow-up today for federally mandated regulatory visit and assessment of multiple chronic health issues as outlined above.     Reports he is doing \"Pretty good!\"  Not aware of plan for hospice readmission.   Hearing very well today - pleased with ENT result.  \"Short of breath once and a while.\"  Some cough.  No chest pain.   No dizziness.  Regarding falls, does not recall any recently.   \"Off and on\" bowels, tends towards constipation.   No dysuria. No joint pain.   Aware appetite is \"less.\"  Started on clotrimazole cream for tinea pedis last week.    Recent weights and vitals reviewed in Epic:  Wt Readings from Last 5 Encounters:   04/05/21 57.2 kg (126 lb 3.2 oz)   02/22/21 58.3 kg (128 lb 8 oz)   02/10/21 58.3 kg (128 lb 9.6 oz)   12/03/20 60.9 kg (134 lb 3.2 oz)   10/28/20 56.8 kg (125 lb 4.8 oz)     BP Readings from Last 3 Encounters:   04/05/21 (!) 145/73   02/22/21 123/72   02/10/21 115/63     Pulse Readings from Last 4 Encounters:   04/05/21 69   02/22/21 67   02/10/21 78   12/03/20 60     ALLERGIES: Lisinopril     PAST MEDICAL HISTORY:  has a past medical history of Aortic stenosis, Ying esophagus, Contact dermatitis and other eczema, due to unspecified cause, Esophageal reflux, Gout, unspecified, Incarcerated hiatal hernia (5/21/2015), Inguinal hernia without mention of obstruction or gangrene, unilateral or unspecified, (not specified as recurrent), Polymyalgia rheumatica (H), Umbilical hernia without mention of obstruction or gangrene, " Unspecified essential hypertension, and Vasomotor rhinitis. He also has no past medical history of Basal cell carcinoma, Malignant melanoma (H), or Squamous cell carcinoma of skin, unspecified.     PAST SURGICAL HISTORY:  has a past surgical history that includes NONSPECIFIC PROCEDURE; NONSPECIFIC PROCEDURE; picc insertion (Right, 5/11/2015); Laparoscopic herniorrhaphy hiatal (N/A, 5/14/2015); Laparoscopic assisted insertion tube jejunostomy (N/A, 5/14/2015); Laparoscopic herniorrhaphy inguinal (Right, 5/14/2015); and Esophagoscopy, gastroscopy, duodenoscopy (EGD), combined (N/A, 5/11/2015).     IMMUNIZATIONS:  Immunization History   Administered Date(s) Administered     COVID-19,PF,Moderna 12/28/2020, 01/25/2021     Flu 65+ Years 11/02/2017, 10/10/2019     HEPA 04/07/1997, 05/15/1998     Influenza (High Dose) 3 valent vaccine 10/01/2016, 10/16/2018, 11/10/2020     Influenza (IIV3) PF 11/01/2002, 10/01/2014     Influenza (intradermal) 11/02/2017     Pneumo Conj 13-V (2010&after) 09/02/2015     Pneumococcal 23 valent 04/07/1997, 11/01/2002, 04/15/2009     TD (ADULT, 7+) 04/02/2003     TDAP Vaccine (Adacel) 07/09/2013, 06/22/2020     Td (Adult), Adsorbed 04/02/2003     Tetanus 11/18/1992     Zoster vaccine, live 07/25/2012, 04/01/2013     Above immunizations pulled from [facility]. [facility] and facility records also reconciled. Outstanding information sent to  to update [facility].  Future immunizations are deferred as: not clinically appropriate given goals of care.    Current Outpatient Medications   Medication Sig Dispense Refill     acetaminophen (TYLENOL) 325 MG tablet Take 650 mg by mouth 2 times daily & 2 x daily as needed       acetaminophen (TYLENOL) 650 MG suppository Place 1 suppository (650 mg) rectally every 4 hours as needed for fever or mild pain (Do not exceed 4000 mg total acetaminophen per day.) Unwrap prior to insertion. 12 suppository 1     allopurinol (ZYLOPRIM) 100 MG tablet  Take 1 tablet (100 mg) by mouth daily 90 tablet 3     atropine 1 % ophthalmic solution Take 2-4 drops by mouth, place under tongue or place inside cheek every 2 hours as needed for other (terminal respiratory secretions) 5 mL 1     bisacodyl (DULCOLAX) 10 MG suppository Unwrap and insert 1 suppository rectally twice daily as needed for constipation. 12 suppository 1     calcium carbonate (TUMS) 500 MG chewable tablet Take 2 tablets (1,000 mg) by mouth 3 times daily as needed for heartburn 30 tablet 1     carbidopa-levodopa (SINEMET)  MG per tablet Take 2 tablets by mouth 3 times daily (9am - 12pm - 6pm)       clotrimazole (LOTRIMIN) 1 % external cream Apply topically 2 times daily Apply to both feet       haloperidol (HALDOL) 0.5 MG tablet Take 0.5 mg by mouth every 6 hours as needed for agitation       LORazepam (ATIVAN) 2 MG/ML (HIGH CONC) solution Take 0.25 mg by mouth every 4 hours as needed for anxiety       melatonin 3 MG CAPS Take 6 mg by mouth At Bedtime        midodrine (PROAMATINE) 5 MG tablet Take 10 mg by mouth 2 times daily 8AM & NOON       mineral oil-hydrophilic petrolatum (AQUAPHOR) external ointment Apply topically 3 times daily       morphine sulfate HIGH CONCENTRATE (ROXANOL) 20 mg/mL (HIGH CONC) solution Take 5 mg by mouth every 2 hours as needed for shortness of breath / dyspnea or breakthrough pain       nystatin (MYCOSTATIN) 989829 UNIT/GM external powder Apply topically 2 times daily And as needed. Apply to groin for redness/rash       pantoprazole (PROTONIX) 40 MG EC tablet Take 1 tablet (40 mg) by mouth every morning (before breakfast) 30 tablet 1     polyethylene glycol (MIRALAX) 17 g packet Take 1 packet by mouth 2 times daily       senna (SENOKOT) 8.6 MG tablet Take 2 tablets by mouth 2 times daily        traZODone (DESYREL) 50 MG tablet Take 0.5 tablets (25 mg) by mouth At Bedtime. May also take 0.5 tablets (25 mg) daily as needed for sleep.       diclofenac (VOLTAREN) 1 % topical  "gel Place 2 g onto the skin 2 times daily       Case Management:  I have reviewed the facility/SNF care plan/MDS, including the falls risk, nutrition and pain screening. I also reviewed the current immunizations, and preventive care. .Future cancer screening is not clinically indicated secondary to age/goals of care Patient's desire to return to the community is present, but is not able due to care needs . Current Level of Care is appropriate.  - BIMS 13/15  - PHQ-9 5/27    Advance Directive Discussion:    I reviewed the current advanced directives as reflected in EPIC, the POLST and the facility chart, and verified the congruency of orders. I contacted the first party [facility] and discussed the plan of Care.  I did not due to cognitive impairment review the advance directives with the resident, but did attempt.     Team Discussion:  I communicated with the appropriate disciplines involved with the Plan of Care:   Nursing   and hospice team  Patient's goal is pain control and comfort.  Information reviewed:  Medications, vital signs, orders, and nursing notes.    ROS:  Limited secondary to cognitive impairment but today pt reports history as per HPI.     Vitals:  BP (!) 145/73   Pulse 69   Temp 97.6  F (36.4  C)   Resp 18   Ht 1.651 m (5' 5\")   Wt 57.2 kg (126 lb 3.2 oz)   SpO2 98%   BMI 21.00 kg/m   Body mass index is 21 kg/m .  Exam:  GENERAL APPEARANCE:  Alert, in no distress, well groomed, thin, sitting up in wheelchair   HEAD: Wearing baseball cap  EYES:  sclera clear and conjunctiva normal, no discharge, wearing glasses  RESP:  Non-labored breathing, palpation of chest normal, no chest wall tenderness, no respiratory distress, Lung sounds clear, patient is on room air.  CV:  Palpation - no murmur/non-displaced PMI, Auscultation - rate and rhythm regular, no murmur, no rub or gallop.  VASCULAR: No edema bilateral lower extremities.   ABDOMEN:  normal bowel sounds, soft, nontender, no grimacing or " guarding with palpation.   M/S:   Gait and station wheelchair bound. Increased stiffness of left LE and UE.   SKIN:  Inspection - No visible rashes or lesion. Palpation- no increased warmth, skin is dry and non-tender.  NEURO:  masked facies, sialorrhea, hypophonia, follows simple commands, increase stiffness, no tremor  PSYCH: awake and alert, speech clear,  insight and judgement impaired,no acute confusion, flat affect.    Lab/Diagnostic data:   Recent labs in [facility] reviewed by me today.    CBC RESULTS:   Recent Labs   Lab Test 09/16/20  0607 09/15/20  0933   WBC 4.4 3.4*   RBC 3.52* 3.42*   HGB 10.1* 9.8*   HCT 33.8* 32.1*   MCV 96 94   MCH 28.7 28.7   MCHC 29.9* 30.5*   RDW 15.0 14.7   * 121*       Last Basic Metabolic Panel:  Recent Labs   Lab Test 09/15/20  0933 09/13/20  0804    138   POTASSIUM 3.6 4.2   CHLORIDE 108 108   MESERET 7.5* 7.8*   CO2 23 25   BUN 15 23   CR 0.86 0.73   GLC 78 97       Liver Function Studies -   Recent Labs   Lab Test 09/07/20  1818 06/22/20  0759   PROTTOTAL 8.3 5.9*   ALBUMIN 4.4 3.0*   BILITOTAL 0.5 0.5   ALKPHOS 78 51   AST 23 22   ALT 10 10       TSH   Date Value Ref Range Status   06/22/2020 0.99 0.40 - 4.00 mU/L Final   10/26/2018 1.14 0.40 - 4.00 mU/L Final     EXAM: CT AORTIC SURVEY W CONTRAST  LOCATION: [facility]  DATE/TIME: 9/7/2020 6:31 PM    IMPRESSION:  1.  No evidence for aortic dissection.  2.  3.9 x 3.3 cm partially thrombosed infrarenal abdominal aortic aneurysm.  3.  The stomach is markedly distended with fluid, along with numerous dilated, fluid-filled small bowel loops concerning for high-grade obstruction with the transition point estimated in the region of the mid ileum. Consider NG tube placement and gastric   decompression.  4.  The proximal esophagus is distended with air. The distal esophagus at the GE junction is nondistended which may account for diffuse wall thickening. This should be reviewed on follow-up imaging to exclude  neoplasm.     ASSESSMENT/PLAN  (G20) Parkinson's disease (H)  (primary encounter diagnosis)  (G47.00) Insomnia  (Z51.5) Hospice care patient  Comment: First reported PD symptoms in Feb/2015 - resting tremor. Followed by [facility]. Seems to be having complications of disease process, autonomic dysfunction: orthostasis/syncope, constipation, cognitive impairment, hallucinations.   No neuropsych symptoms reported of late, but intermittent difficult with sleep.  Hospitalized x3 over last six months with 25# weight loss over last year. Revoked hospice briefly to do therapy, but unable to progress and re-enrolled.   Plan:   - Continue Sinemet  mg, two tablets TID   - Continue melatonin 6 mg at HS to help with sleep  - Hospice started Trazodone 25 mg at HS and 25 mg PRN to further address insomnia.  - Continue Miralax BID and Senna BID for constipation, PRN bisacodyl suppository in place    - Appreciate hospice supports, comfort medications in place in the case of rapid decline  - Discontinue haldol and ativan PRN if not used in 14 days     (I95.1) Orthostatic hypotension  (Z87.898) History of syncope  Comment: Recurrent, seems to be postural in setting of PD and autonomic dysfunction. Cardiology considered further work-up of arrhythmia and possible MI on EKG, but enrolled hospice so no symptom mgmt.  Plan:   - Continue Midodrine 10 mg BID w/ hold parameter for high blood pressure  - Slow position change  - BL LE compression and has abd compression binder   - Encourage PO fluids  - Appreciate hospice supports     (R41.89) Cognitive impairment  Comment: BIMS 13/15, short term memory impairment, needs assist for decision making. Next of kin is Daughter In-law Marissa Echevarria and granddaughter Michelle Echevarria. Also has friend, Abena locally.   Plan:  - Continues to benefit from supportive care in LTC setting  - Appreciate hospice supports   - Attempt to include resident in decision making as able      (R26.9) Gait abnormality  Comment: Recurrent falls, can use walker with gait belt and supervision, as well as wheelchair. Would like to be OOB more. High fall risk due to low BP with position change and cognitive impairment.  Plan:   - Staff to assist patient to change position frequently and walk with assistance even in room if patient can tolerate    (I10) Essential hypertension  (I35.0) Mild aortic stenosis  Comment: Concern for MI during last hospitalization, but angiogram deferred due to frailty and SBO. Noted with mild aortic stenosis and partially thrombosed infarenal AAA. Ultimately enrolled in hospice. Off anti-hypertensive agents due to orthostatic hypotension and recurrent syncope. No further work-up or aggressive treatment as on hospice.  BP Readings from Last 3 Encounters:   04/05/21 (!) 145/73   02/22/21 123/72   02/10/21 115/63   Plan:  - Monitor routine blood pressure  - Comfort medications in place in the case of rapid decline    - BP goals are <150/90 mm Hg.This is higher than ACC and AHA recommendations due to goals of care, risk for hypotension and risk of dizziness and falls. Patient is stable and continue without pharmacological invention with routine assessment.    (I71.4) Aneurysm of infrarenal abd aorta (H)  Comment: In Sept 2020 note with 3.9 x 3.3 cm partially thrombosed infrarenal abdominal aortic aneurysm.  Plan:   - Monitor clinically, no further serial imaging given care goals    (K21.00) Gastroesophageal reflux disease  Comment: GERD with hx of BE. On two different PPIs. No use of PRN TUMS  Plan:   - Continue Protonix 40 mg daily     (S46.001D) Injury of right rotator cuff, subsequent encounter  Comment: Chronic, injury to right rotator cuff, not good surgical candidate. No pain today.  Plan:  - Continue Tylenol    - MSIR PRN for severe breakthrough pain.    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

## 2021-04-05 NOTE — LETTER
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     HPI information obtained from: facility chart records, facility staff, patient report and [facility] chart review.     Hospitalized 9/7 to 9/16 2020 with severe malnutrition, acute cystitis, SBO vs ileus, recurrent syncope/falls, and TERRELL. Sent to ER from [facility] due to recurrent syncope, hypotension, weakness, and abd pain.  Also concern for STEMI. Initial plan for anigogram, but put on hold due to concern for small bowel obstruction and then fever. Echo with normal cardiac function, carotid US negative, no dysrhythmias on continuous cardiac monitor. Treated with 5 days of ceftriaxone for possible UTI. Head CT negative for acute process.  Palliative care consulted during hospitalization, met with daughter in-law, met criteria for hospice given overall decline in health status and elected to enroll in hospice and return to LTC facility.     Prior to most recent hospitalization was hospitalized at [facility] in March 2020 for a fall and again from 6/22 to 6/26 2020 with fall and head laceration, as well as change in mental status. June stay complicated by hypotension due to acute on chronic anemia, received blood transfusion. Mental status changes may have been related to Flexeril on which he was recently started due to rotator cuff injury.     Chronic health concerns include PD followed by Dr. Hurley at [facility]. Cognitive  "impairment and history of hallucinations, treated with PRN Zyprexa in the past. Dysphagia, now on DD3 diet and regular liquids. Hypertension, metoprolol stopped due to orthostatic hypotension and now midodrine BID. 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 History of left rotator cuff injury managed with Tylenol and voltaren. Hx of alcohol dependence while living in community. Hx of gout managed with allopurinol.     Admitted to hospice and the discharged due to desire to participate in therapy, now completed course of PT and elected to readmit to hospice.     Today's concerns are:  Follow-up today for federally mandated regulatory visit and assessment of multiple chronic health issues as outlined above.     Reports he is doing \"Pretty good!\"  Not aware of plan for hospice readmission.   Hearing very well today - pleased with ENT result.  \"Short of breath once and a while.\"  Some cough.  No chest pain.   No dizziness.  Regarding falls, does not recall any recently.   \"Off and on\" bowels, tends towards constipation.   No dysuria. No joint pain.   Aware appetite is \"less.\"  Started on clotrimazole cream for tinea pedis last week.    Recent weights and vitals reviewed in Epic:  Wt Readings from Last 5 Encounters:   04/05/21 57.2 kg (126 lb 3.2 oz)   02/22/21 58.3 kg (128 lb 8 oz)   02/10/21 58.3 kg (128 lb 9.6 oz)   12/03/20 60.9 kg (134 lb 3.2 oz)   10/28/20 56.8 kg (125 lb 4.8 oz)     BP Readings from Last 3 Encounters:   04/05/21 (!) 145/73   02/22/21 123/72   02/10/21 115/63     Pulse Readings from Last 4 Encounters:   04/05/21 69   02/22/21 67   02/10/21 78   12/03/20 60     ALLERGIES: Lisinopril     PAST MEDICAL HISTORY:  has a past medical history of Aortic stenosis, Ying esophagus, Contact dermatitis and other eczema, due to unspecified cause, Esophageal reflux, Gout, unspecified, Incarcerated hiatal hernia (5/21/2015), Inguinal hernia without mention of obstruction or gangrene, unilateral or unspecified, (not specified as " recurrent), Polymyalgia rheumatica (H), Umbilical hernia without mention of obstruction or gangrene, Unspecified essential hypertension, and Vasomotor rhinitis. He also has no past medical history of Basal cell carcinoma, Malignant melanoma (H), or Squamous cell carcinoma of skin, unspecified.     PAST SURGICAL HISTORY:  has a past surgical history that includes NONSPECIFIC PROCEDURE; NONSPECIFIC PROCEDURE; picc insertion (Right, 5/11/2015); Laparoscopic herniorrhaphy hiatal (N/A, 5/14/2015); Laparoscopic assisted insertion tube jejunostomy (N/A, 5/14/2015); Laparoscopic herniorrhaphy inguinal (Right, 5/14/2015); and Esophagoscopy, gastroscopy, duodenoscopy (EGD), combined (N/A, 5/11/2015).     IMMUNIZATIONS:  Immunization History   Administered Date(s) Administered     COVID-19,PF,Moderna 12/28/2020, 01/25/2021     Flu 65+ Years 11/02/2017, 10/10/2019     HEPA 04/07/1997, 05/15/1998     Influenza (High Dose) 3 valent vaccine 10/01/2016, 10/16/2018, 11/10/2020     Influenza (IIV3) PF 11/01/2002, 10/01/2014     Influenza (intradermal) 11/02/2017     Pneumo Conj 13-V (2010&after) 09/02/2015     Pneumococcal 23 valent 04/07/1997, 11/01/2002, 04/15/2009     TD (ADULT, 7+) 04/02/2003     TDAP Vaccine (Adacel) 07/09/2013, 06/22/2020     Td (Adult), Adsorbed 04/02/2003     Tetanus 11/18/1992     Zoster vaccine, live 07/25/2012, 04/01/2013     Above immunizations pulled from [facility]. [facility] and facility records also reconciled. Outstanding information sent to  to update [facility].  Future immunizations are deferred as: not clinically appropriate given goals of care.    Current Outpatient Medications   Medication Sig Dispense Refill     acetaminophen (TYLENOL) 325 MG tablet Take 650 mg by mouth 2 times daily & 2 x daily as needed       acetaminophen (TYLENOL) 650 MG suppository Place 1 suppository (650 mg) rectally every 4 hours as needed for fever or mild pain (Do not exceed 4000 mg total  acetaminophen per day.) Unwrap prior to insertion. 12 suppository 1     allopurinol (ZYLOPRIM) 100 MG tablet Take 1 tablet (100 mg) by mouth daily 90 tablet 3     atropine 1 % ophthalmic solution Take 2-4 drops by mouth, place under tongue or place inside cheek every 2 hours as needed for other (terminal respiratory secretions) 5 mL 1     bisacodyl (DULCOLAX) 10 MG suppository Unwrap and insert 1 suppository rectally twice daily as needed for constipation. 12 suppository 1     calcium carbonate (TUMS) 500 MG chewable tablet Take 2 tablets (1,000 mg) by mouth 3 times daily as needed for heartburn 30 tablet 1     carbidopa-levodopa (SINEMET)  MG per tablet Take 2 tablets by mouth 3 times daily (9am - 12pm - 6pm)       clotrimazole (LOTRIMIN) 1 % external cream Apply topically 2 times daily Apply to both feet       haloperidol (HALDOL) 0.5 MG tablet Take 0.5 mg by mouth every 6 hours as needed for agitation       LORazepam (ATIVAN) 2 MG/ML (HIGH CONC) solution Take 0.25 mg by mouth every 4 hours as needed for anxiety       melatonin 3 MG CAPS Take 6 mg by mouth At Bedtime        midodrine (PROAMATINE) 5 MG tablet Take 10 mg by mouth 2 times daily 8AM & NOON       mineral oil-hydrophilic petrolatum (AQUAPHOR) external ointment Apply topically 3 times daily       morphine sulfate HIGH CONCENTRATE (ROXANOL) 20 mg/mL (HIGH CONC) solution Take 5 mg by mouth every 2 hours as needed for shortness of breath / dyspnea or breakthrough pain       nystatin (MYCOSTATIN) 923390 UNIT/GM external powder Apply topically 2 times daily And as needed. Apply to groin for redness/rash       pantoprazole (PROTONIX) 40 MG EC tablet Take 1 tablet (40 mg) by mouth every morning (before breakfast) 30 tablet 1     polyethylene glycol (MIRALAX) 17 g packet Take 1 packet by mouth 2 times daily       senna (SENOKOT) 8.6 MG tablet Take 2 tablets by mouth 2 times daily        traZODone (DESYREL) 50 MG tablet Take 0.5 tablets (25 mg) by mouth At  "Bedtime. May also take 0.5 tablets (25 mg) daily as needed for sleep.       diclofenac (VOLTAREN) 1 % topical gel Place 2 g onto the skin 2 times daily       Case Management:  I have reviewed the facility/SNF care plan/MDS, including the falls risk, nutrition and pain screening. I also reviewed the current immunizations, and preventive care. .Future cancer screening is not clinically indicated secondary to age/goals of care Patient's desire to return to the community is present, but is not able due to care needs . Current Level of Care is appropriate.  - BIMS 13/15  - PHQ-9 5/27    Advance Directive Discussion:    I reviewed the current advanced directives as reflected in EPIC, the POLST and the facility chart, and verified the congruency of orders. I contacted the first party HARINI Ann and discussed the plan of Care.  I did not due to cognitive impairment review the advance directives with the resident, but did attempt.     Team Discussion:  I communicated with the appropriate disciplines involved with the Plan of Care:   Nursing   and hospice team  Patient's goal is pain control and comfort.  Information reviewed:  Medications, vital signs, orders, and nursing notes.    ROS:  Limited secondary to cognitive impairment but today pt reports history as per HPI.     Vitals:  BP (!) 145/73   Pulse 69   Temp 97.6  F (36.4  C)   Resp 18   Ht 1.651 m (5' 5\")   Wt 57.2 kg (126 lb 3.2 oz)   SpO2 98%   BMI 21.00 kg/m   Body mass index is 21 kg/m .  Exam:  GENERAL APPEARANCE:  Alert, in no distress, well groomed, thin, sitting up in wheelchair   HEAD: Wearing baseball cap  EYES:  sclera clear and conjunctiva normal, no discharge, wearing glasses  RESP:  Non-labored breathing, palpation of chest normal, no chest wall tenderness, no respiratory distress, Lung sounds clear, patient is on room air.  CV:  Palpation - no murmur/non-displaced PMI, Auscultation - rate and rhythm regular, no murmur, no rub or " gallop.  VASCULAR: No edema bilateral lower extremities.   ABDOMEN:  normal bowel sounds, soft, nontender, no grimacing or guarding with palpation.   M/S:   Gait and station wheelchair bound. Increased stiffness of left LE and UE.   SKIN:  Inspection - No visible rashes or lesion. Palpation- no increased warmth, skin is dry and non-tender.  NEURO:  masked facies, sialorrhea, hypophonia, follows simple commands, increase stiffness, no tremor  PSYCH: awake and alert, speech clear,  insight and judgement impaired,no acute confusion, flat affect.    Lab/Diagnostic data:   Recent labs in [facility] reviewed by me today.    CBC RESULTS:   Recent Labs   Lab Test 09/16/20  0607 09/15/20  0933   WBC 4.4 3.4*   RBC 3.52* 3.42*   HGB 10.1* 9.8*   HCT 33.8* 32.1*   MCV 96 94   MCH 28.7 28.7   MCHC 29.9* 30.5*   RDW 15.0 14.7   * 121*       Last Basic Metabolic Panel:  Recent Labs   Lab Test 09/15/20  0933 09/13/20  0804    138   POTASSIUM 3.6 4.2   CHLORIDE 108 108   MESERET 7.5* 7.8*   CO2 23 25   BUN 15 23   CR 0.86 0.73   GLC 78 97       Liver Function Studies -   Recent Labs   Lab Test 09/07/20  1818 06/22/20  0759   PROTTOTAL 8.3 5.9*   ALBUMIN 4.4 3.0*   BILITOTAL 0.5 0.5   ALKPHOS 78 51   AST 23 22   ALT 10 10       TSH   Date Value Ref Range Status   06/22/2020 0.99 0.40 - 4.00 mU/L Final   10/26/2018 1.14 0.40 - 4.00 mU/L Final     EXAM: CT AORTIC SURVEY W CONTRAST  LOCATION: [facility]  DATE/TIME: 9/7/2020 6:31 PM    IMPRESSION:  1.  No evidence for aortic dissection.  2.  3.9 x 3.3 cm partially thrombosed infrarenal abdominal aortic aneurysm.  3.  The stomach is markedly distended with fluid, along with numerous dilated, fluid-filled small bowel loops concerning for high-grade obstruction with the transition point estimated in the region of the mid ileum. Consider NG tube placement and gastric   decompression.  4.  The proximal esophagus is distended with air. The distal esophagus at the GE junction  is nondistended which may account for diffuse wall thickening. This should be reviewed on follow-up imaging to exclude neoplasm.     ASSESSMENT/PLAN  (G20) Parkinson's disease (H)  (primary encounter diagnosis)  (G47.00) Insomnia  (Z51.5) Hospice care patient  Comment: First reported PD symptoms in Feb/2015 - resting tremor. Followed by [facility]. Seems to be having complications of disease process, autonomic dysfunction: orthostasis/syncope, constipation, cognitive impairment, hallucinations.   No neuropsych symptoms reported of late, but intermittent difficult with sleep.  Hospitalized x3 over last six months with 25# weight loss over last year. Revoked hospice briefly to do therapy, but unable to progress and re-enrolled.   Plan:   - Continue Sinemet  mg, two tablets TID   - Continue melatonin 6 mg at HS to help with sleep  - Hospice started Trazodone 25 mg at HS and 25 mg PRN to further address insomnia.  - Continue Miralax BID and Senna BID for constipation, PRN bisacodyl suppository in place    - Appreciate hospice supports, comfort medications in place in the case of rapid decline  - Discontinue haldol and ativan PRN if not used in 14 days     (I95.1) Orthostatic hypotension  (Z87.898) History of syncope  Comment: Recurrent, seems to be postural in setting of PD and autonomic dysfunction. Cardiology considered further work-up of arrhythmia and possible MI on EKG, but enrolled hospice so no symptom mgmt.  Plan:   - Continue Midodrine 10 mg BID w/ hold parameter for high blood pressure  - Slow position change  - BL LE compression and has abd compression binder   - Encourage PO fluids  - Appreciate hospice supports     (R41.89) Cognitive impairment  Comment: BIMS 13/15, short term memory impairment, needs assist for decision making. Next of kin is Daughter In-law Marissa Echevarria and granddaughter Michelle Echevarria. Also has friend, Abena locally.   Plan:  - Continues to benefit from supportive  care in LTC setting  - Appreciate hospice supports   - Attempt to include resident in decision making as able     (R26.9) Gait abnormality  Comment: Recurrent falls, can use walker with gait belt and supervision, as well as wheelchair. Would like to be OOB more. High fall risk due to low BP with position change and cognitive impairment.  Plan:   - Staff to assist patient to change position frequently and walk with assistance even in room if patient can tolerate    (I10) Essential hypertension  (I35.0) Mild aortic stenosis  Comment: Concern for MI during last hospitalization, but angiogram deferred due to frailty and SBO. Noted with mild aortic stenosis and partially thrombosed infarenal AAA. Ultimately enrolled in hospice. Off anti-hypertensive agents due to orthostatic hypotension and recurrent syncope. No further work-up or aggressive treatment as on hospice.  BP Readings from Last 3 Encounters:   04/05/21 (!) 145/73   02/22/21 123/72   02/10/21 115/63   Plan:  - Monitor routine blood pressure  - Comfort medications in place in the case of rapid decline    - BP goals are <150/90 mm Hg.This is higher than ACC and AHA recommendations due to goals of care, risk for hypotension and risk of dizziness and falls. Patient is stable and continue without pharmacological invention with routine assessment.    (I71.4) Aneurysm of infrarenal abd aorta (H)  Comment: In Sept 2020 note with 3.9 x 3.3 cm partially thrombosed infrarenal abdominal aortic aneurysm.  Plan:   - Monitor clinically, no further serial imaging given care goals    (K21.00) Gastroesophageal reflux disease  Comment: GERD with hx of BE. On two different PPIs. No use of PRN TUMS  Plan:   - Continue Protonix 40 mg daily     (S46.001D) Injury of right rotator cuff, subsequent encounter  Comment: Chronic, injury to right rotator cuff, not good surgical candidate. No pain today.  Plan:  - Continue Tylenol    - MSIR PRN for severe breakthrough pain.    (M1A.9XX0)  Chronic gout without tophus  Comment: No symptoms   Plan:   - Continue allopurinol 100 mg daily for comfort     Electronically signed by:  ROSALINA Farrar CNP             Sincerely,        ROSALINA Farrar CNP

## 2021-04-12 NOTE — PROGRESS NOTES
"[place] [facility]  [place] Medical Record Number:  [id number]  Place of Service where encounter took place:  [facility] ([place] [[id number]]  Chief Complaint   Patient presents with     Nursing Home Acute     cellulitis of foot       HPI:    Chilango Echevarria  is a 86 year old (10/24/1934) Parkinson's Disease, dementia, dysphagia, GERD/BE, left rotator cuff injury, gout recurrent syncope, who is being seen today for an episodic care visit.      HPI information obtained from: facility chart records, facility staff, patient report and [facility] chart review. As well, as [facility] [facility] Nurse (Radha).    Today's concern is:  Follow-up today requested by hospice due to erythema of right foot noted about three days ago.    Started on Clotrimazole cream on 4/1 after nurses called to report tinea pedis symptoms, had similar symptoms in past with good response to topical antifungal; medication discontinued 4/8 when staff updated hospice about increased redness and weeping to right foot. Hospice provider started Keflex 250 mg PO QID, first dose given 4/8 and last dose schedule for 4/15. Redness, swelling, and drainage from right foot improved after starting oral antibiotics. Hospice nurse has been dressing foot in non-stick gauze and protective wrap.    Per resident, \"Not much pain.\"  Notices bandages, but doesn't hurt.   Sometimes feeling colder, but no chills or fever.   Eating \"too much.\" No change in bowel or bladder habits. Sleeping better per hospice nurse after starting on Trazodone.  States, \"I can't complain.\"    Past Medical and Surgical History reviewed in Epic today.    MEDICATIONS:  Current Outpatient Medications   Medication Sig Dispense Refill     acetaminophen (TYLENOL) 325 MG tablet Take 650 mg by mouth 2 times daily And 650 mg two times daily as needed       acetaminophen (TYLENOL) 650 MG suppository Place 1 suppository (650 mg) rectally every 4 hours as needed for fever or " mild pain (Do not exceed 4000 mg total acetaminophen per day.) Unwrap prior to insertion. 12 suppository 1     allopurinol (ZYLOPRIM) 100 MG tablet Take 1 tablet (100 mg) by mouth daily 90 tablet 3     atropine 1 % ophthalmic solution Take 2-4 drops by mouth, place under tongue or place inside cheek every 2 hours as needed for other (terminal respiratory secretions) 5 mL 1     bisacodyl (DULCOLAX) 10 MG suppository Unwrap and insert 1 suppository rectally twice daily as needed for constipation. 12 suppository 1     calcium carbonate (TUMS) 500 MG chewable tablet Take 2 tablets (1,000 mg) by mouth 3 times daily as needed for heartburn 30 tablet 1     carbidopa-levodopa (SINEMET)  MG per tablet Take 2 tablets by mouth 3 times daily (9am - 12pm - 6pm)       cephALEXin (KEFLEX) 250 MG capsule Take 250 mg by mouth 4 times daily       LORazepam (ATIVAN) 2 MG/ML (HIGH CONC) solution Take 0.25 mg by mouth every 4 hours as needed for anxiety       melatonin 3 MG CAPS Take 6 mg by mouth At Bedtime        midodrine (PROAMATINE) 5 MG tablet Take 10 mg by mouth 2 times daily 8AM & NOON       mineral oil-hydrophilic petrolatum (AQUAPHOR) external ointment Apply topically 3 times daily       morphine sulfate HIGH CONCENTRATE (ROXANOL) 20 mg/mL (HIGH CONC) solution Take 5 mg by mouth every 2 hours as needed for shortness of breath / dyspnea or breakthrough pain       nystatin (MYCOSTATIN) 997431 UNIT/GM external powder Apply topically 2 times daily And as needed. Apply to groin for redness/rash       pantoprazole (PROTONIX) 40 MG EC tablet Take 1 tablet (40 mg) by mouth every morning (before breakfast) 30 tablet 1     polyethylene glycol (MIRALAX) 17 g packet Take 1 packet by mouth 2 times daily       senna (SENOKOT) 8.6 MG tablet Take 2 tablets by mouth 2 times daily        traZODone (DESYREL) 50 MG tablet Take 0.5 tablets (25 mg) by mouth At Bedtime. May also take 0.5 tablets (25 mg) daily as needed for sleep.       REVIEW  "OF SYSTEMS:  Limited secondary to cognitive impairment but today pt reports history as per HPI.     Objective:  BP (!) 131/90   Pulse 68   Temp 97.5  F (36.4  C)   Resp 18   Ht 1.651 m (5' 5\")   Wt 57.1 kg (125 lb 12.8 oz)   SpO2 94%   BMI 20.93 kg/m    Exam:  GENERAL APPEARANCE:  Alert, in no distress, well groomed, thin, sitting up in wheelchair watching TV.    HEAD: Wearing baseball cap  EYES:  Sclera clear and conjunctiva normal, no discharge, wearing glasses  RESP:  Non-labored breathing, palpation of chest normal, no chest wall tenderness, no respiratory distress, Lung sounds clear, patient is on room air. SpO2 94%.  CV:  Palpation - no murmur/non-displaced PMI, Auscultation - rate and rhythm regular, no murmur, no rub or gallop. HR 60.  VASCULAR: Trace edema right LE. No leg swelling L LE.   ABDOMEN:  normal bowel sounds, soft, nontender, no grimacing or guarding with palpation.   M/S:   Gait and station wheelchair bound.  SKIN:  Inspection - see photo below of right foot with erythema extending from toes to mid-foot with superficial shearing to dorsal aspect weeping scant serous fluid, erythema receeding from marked area, skin on toes is dry and flaking. Palpation- no increased warmth, skin is dry and non-tender.  NEURO:  masked facies, follows simple commands, increased stiffness, no tremor  PSYCH: awake and alert, ypophonia,  insight and judgement impaired, no acute confusion, flat affect.    Right Foot:    Left foot:        Labs:   Recent labs in [facility] reviewed by me today.   No recent labs as on hospice.       ASSESSMENT/PLAN:  (L03.115) Cellulitis of right foot  (primary encounter diagnosis)  (L24.4) Irritant contact dermatitis due to drug in contact with skin  Comment: Presumed tinea pedis reported 4/1, started on topical clotrimazole, which has helped in past, however developed suspected cellulitis vs contact dermatitis to right forefoot. Non-toxic appearing and seems to be improving on oral " abx.   Plan:   - Complete course of Keflex on 4/15 (7 day course)  - Continue daily topical wound care  - Potential irritant has been with drawn, has tolerated clotrimazole in past with good effect, question if initial dx was early cellulitis rather than fungal rash  - Follow-up on 4/16 for interval clinical assessment    (G20) Parkinson's disease (H)  (G47.00) Insomnia, unspecified type  Comment: First reported PD symptoms in Feb/2015 - resting tremor. Followed by [facility]. Seems to be having complications of disease process, autonomic dysfunction: orthostasis/syncope, constipation, cognitive impairment, hallucinations, disordered sleep. No neuropsych symptoms reported of late, but intermittent difficult with sleep.  Hospitalized x3 over last six months with 25# weight loss over last year. Revoked hospice briefly to do therapy, but unable to progress and re-enrolled in hospice. Trazodone has helped with sleep  Plan:   - Continue Trazodone 25 mg at HS and 25 mg PRN   - Continue Sinemet  mg, two tablets TID   - Continue Melatonin 6 mg at HS to help with sleep  - Continue Miralax BID and Senna BID for constipation, PRN bisacodyl suppository in place    - Appreciate hospice supports, comfort medications in place in the case of rapid decline    Electronically signed by:  ROSALINA Farrar CNP

## 2021-04-16 NOTE — PROGRESS NOTES
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      HPI information obtained from: facility chart records, facility staff, patient report, [facility] chart review, family/first contact friend Abena report and hospice nurse (Radha).     Today's concern is:  Follow-up today to assess cellulitis of right foot and discuss capacity issues with interdisciplinary care team.     Started on Clotrimazole cream on 4/1 after nurses called to report tinea pedis symptoms, had similar symptoms in past with good response to topical antifungal; medication discontinued 4/8 when staff updated hospice about increased redness and weeping to right foot. Hospice provider started Keflex 250 mg PO QID, first dose given 4/8 and last dose given 4/15. Redness, swelling, and drainage from right foot improved after starting oral antibiotics. Hospice nurse has been dressing foot in non-stick gauze and protective wrap.    No pain to foot. No fever/chills. Some swelling to right foot remains, does not elevate feet. Wonders when he can start wearing shoes again. No SOB or CP. Reports several episode of loose stools while on antibiotic. Continues to sleep well with Trazodone, no use of PRN. Comfort medications in place, ativan is still ordered PRN, but has not been used all month.    [facility] [place], local friends are trying help sell condo, but there is an issue with financial POA paperwork. Family feels resident's cognition has improved since having wax removed from ears, but there is neuro-psych  testing from 3/25/21 that shows SLUMS of 13/30 and recommends assistance for decision making.     Past Medical and Surgical History reviewed in Epic today.    MEDICATIONS:  Current Outpatient Medications   Medication Sig Dispense Refill     acetaminophen (TYLENOL) 325 MG tablet Take 650 mg by mouth 2 times daily And 650 mg two times daily as needed       acetaminophen (TYLENOL) 650 MG suppository Place 1 suppository (650 mg) rectally every 4 hours as needed for fever or mild pain (Do not exceed 4000 mg total acetaminophen per day.) Unwrap prior to insertion. 12 suppository 1     allopurinol (ZYLOPRIM) 100 MG tablet Take 1 tablet (100 mg) by mouth daily 90 tablet 3     atropine 1 % ophthalmic solution Take 2-4 drops by mouth, place under tongue or place inside cheek every 2 hours as needed for other (terminal respiratory secretions) 5 mL 1     bisacodyl (DULCOLAX) 10 MG suppository Unwrap and insert 1 suppository rectally twice daily as needed for constipation. 12 suppository 1     calcium carbonate (TUMS) 500 MG chewable tablet Take 2 tablets (1,000 mg) by mouth 3 times daily as needed for heartburn 30 tablet 1     carbidopa-levodopa (SINEMET)  MG per tablet Take 2 tablets by mouth 3 times daily (9am - 12pm - 6pm)       LORazepam (ATIVAN) 2 MG/ML (HIGH CONC) solution Take 0.25 mg by mouth every 4 hours as needed for anxiety       melatonin 3 MG CAPS Take 6 mg by mouth At Bedtime        midodrine (PROAMATINE) 5 MG tablet Take 10 mg by mouth 2 times daily 8AM & NOON       mineral oil-hydrophilic petrolatum (AQUAPHOR) external ointment Apply topically 3 times daily       morphine sulfate HIGH CONCENTRATE (ROXANOL) 20 mg/mL (HIGH CONC) solution Take 5 mg by mouth every 2 hours as needed for shortness of breath / dyspnea or breakthrough pain       nystatin (MYCOSTATIN) 049867 UNIT/GM external powder Apply topically 2 times daily And as needed. Apply to groin for redness/rash       pantoprazole (PROTONIX) 40 MG EC  "tablet Take 1 tablet (40 mg) by mouth every morning (before breakfast) 30 tablet 1     polyethylene glycol (MIRALAX) 17 g packet Take 1 packet by mouth 2 times daily       senna (SENOKOT) 8.6 MG tablet Take 2 tablets by mouth 2 times daily        traZODone (DESYREL) 50 MG tablet Take 0.5 tablets (25 mg) by mouth At Bedtime. May also take 0.5 tablets (25 mg) daily as needed for sleep.       REVIEW OF SYSTEMS:  Limited secondary to cognitive impairment but today pt reports history as per HPI.     Objective:  BP (!) 156/77   Pulse 78   Temp 97.6  F (36.4  C)   Resp 18   Ht 1.651 m (5' 5\")   Wt 56.6 kg (124 lb 12.8 oz)   SpO2 94%   BMI 20.77 kg/m    Exam:  GENERAL APPEARANCE:  Alert, in no distress, well groomed, thin, sitting up in wheelchair watching TV, friend Abena in room  HEAD: Wearing baseball cap  EYES:  Sclera clear and conjunctiva normal, no discharge, wearing glasses  RESP:  Non-labored breathing, palpation of chest normal, no chest wall tenderness, no respiratory distress, Lung sounds clear, patient is on room air.   CV:  Palpation - no murmur/non-displaced PMI, Auscultation - rate and rhythm regular, no murmur, no rub or gallop..  VASCULAR: Trace edema right LE. No leg swelling L LE.   ABDOMEN:  normal bowel sounds, soft, nontender, no grimacing or guarding with palpation.   M/S:   Gait and station wheelchair bound.  SKIN:  Inspection - see photo below of right foot with decreased erythema from toes to mid-foot with superficial shearing now only to great toe, skin on toes is dry and flaking. Palpation- no increased warmth, skin is dry and non-tender.  NEURO:  masked facies, follows simple commands, increased stiffness, no tremor  PSYCH: awake and alert, hypophonia,  insight and judgement impaired, no acute confusion, flat affect.    Right Foot:      Labs:   Patient is on hospice/palliative care, no recent labs.     ASSESSMENT/PLAN:  (L03.115) Cellulitis of right foot  (primary encounter " diagnosis)  (L24.4) Irritant contact dermatitis due to drug in contact with skin  Comment: Presumed tinea pedis reported 4/1, started on topical clotrimazole, which has helped in past, however developed suspected cellulitis vs contact dermatitis to right forefoot. Non-toxic appearing and seems to have improved on oral abx, completee course of Keflex on 4/15 (7 day course).   Plan:   - Continue daily topical wound care to great toe  - Follow-up on 4/23 for interval clinical assessment  - Once skin closed consider topical steroid or emollient     (G20) Parkinson's disease (H)  (G47.00) Insomnia, unspecified type  Comment: First reported PD symptoms in Feb/2015 - resting tremor. Followed by [facility]. Seems to be having complications of disease process, autonomic dysfunction: orthostasis/syncope, constipation, cognitive impairment, hallucinations, disordered sleep. No neuropsych symptoms reported of late, but intermittent difficult with sleep.  Hospitalized x3 over last six months with 25# weight loss over last year. Revoked hospice briefly to do therapy, but unable to progress and re-enrolled in hospice. Trazodone has helped with sleep. SLUMS 13/30, lacks capacity on neuro-psych testing from 3/25/21  Plan:   - Discussed capacity issue with hospice and facility social work, to change POA documents family will need to consult elder care   - Given recent cerumen disimpaction hospice working to covering one time OT or SLP eval to complete interval SLUMS score to see if hearing impacted testing in anyway   - Continue Trazodone 25 mg at HS and 25 mg PRN   - Continue Sinemet  mg, two tablets TID   - Continue Melatonin 6 mg at HS to help with sleep  - Continue Miralax BID and Senna BID for constipation, PRN bisacodyl suppository in place    - Appreciate hospice supports, comfort medications in place in the case of rapid decline  - Will discontinue ativan as not using     Electronically signed  by:  ROSALINA Farrar CNP

## 2021-04-23 NOTE — PROGRESS NOTES
[facility] [facility]  [place] Medical Record Number:  [id number]  Place of Service where encounter took place:  No question data found.  No chief complaint on file.      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      HPI information obtained from: facility chart records, facility staff, patient report and [facility] chart review.     Today's concern is:  Follow-up today to assess cellulitis of right foot.     Started on Clotrimazole cream on 4/1 after nurses called to report tinea pedis symptoms, had similar symptoms in past with good response to topical antifungal; medication discontinued 4/8 when staff updated hospice about increased redness and weeping to right foot. Hospice provider started Keflex 250 mg PO QID, first dose given 4/8 and last dose given 4/15. Redness, swelling, and drainage from right foot improved after starting oral antibiotics. Hospice nurse has been dressing foot in non-stick gauze and protective wrap.    Reports no pain to right foot today. No fever/chills. Swelling in right foot improved. Poor historian due to cognitive impairment. No SOB or CP. No change in bowel or bladder habits.     Recent blood pressures reviewed:       Past Medical and Surgical History reviewed in Epic today.    MEDICATIONS:  Current Outpatient Medications   Medication Sig Dispense Refill     acetaminophen (TYLENOL) 325 MG tablet Take 650 mg by mouth 2 times daily And 650 mg two times daily as needed       acetaminophen (TYLENOL) 650 MG suppository Place 1 suppository (650 mg) rectally every 4 hours as needed for fever or mild pain (Do not exceed 4000 mg total acetaminophen per day.) Unwrap prior to insertion. 12 suppository 1     allopurinol (ZYLOPRIM) 100 MG tablet Take 1 tablet (100 mg) by mouth daily 90  "tablet 3     atropine 1 % ophthalmic solution Take 2-4 drops by mouth, place under tongue or place inside cheek every 2 hours as needed for other (terminal respiratory secretions) 5 mL 1     bisacodyl (DULCOLAX) 10 MG suppository Unwrap and insert 1 suppository rectally twice daily as needed for constipation. 12 suppository 1     calcium carbonate (TUMS) 500 MG chewable tablet Take 2 tablets (1,000 mg) by mouth 3 times daily as needed for heartburn 30 tablet 1     carbidopa-levodopa (SINEMET)  MG per tablet Take 2 tablets by mouth 3 times daily (9am - 12pm - 6pm)       melatonin 3 MG CAPS Take 6 mg by mouth At Bedtime        midodrine (PROAMATINE) 5 MG tablet Take 10 mg by mouth 2 times daily 8AM & NOON       mineral oil-hydrophilic petrolatum (AQUAPHOR) external ointment Apply topically 3 times daily       morphine sulfate HIGH CONCENTRATE (ROXANOL) 20 mg/mL (HIGH CONC) solution Take 5 mg by mouth every 2 hours as needed for shortness of breath / dyspnea or breakthrough pain       nystatin (MYCOSTATIN) 540458 UNIT/GM external powder Apply topically 2 times daily And as needed. Apply to groin for redness/rash       pantoprazole (PROTONIX) 40 MG EC tablet Take 1 tablet (40 mg) by mouth every morning (before breakfast) 30 tablet 1     polyethylene glycol (MIRALAX) 17 g packet Take 1 packet by mouth 2 times daily       senna (SENOKOT) 8.6 MG tablet Take 2 tablets by mouth 2 times daily        traZODone (DESYREL) 50 MG tablet Take 0.5 tablets (25 mg) by mouth At Bedtime. May also take 0.5 tablets (25 mg) daily as needed for sleep.       REVIEW OF SYSTEMS:  Limited secondary to cognitive impairment but today pt reports history as per HPI.     Objective:  /56   Pulse 60   Temp 97.3  F (36.3  C)   Resp 18   Ht 1.651 m (5' 5\")   Wt 56.6 kg (124 lb 12.8 oz)   SpO2 95%   BMI 20.77 kg/m    Exam:  GENERAL APPEARANCE:  Alert, in no distress, well groomed, thin, sitting up in wheelchair in hallway  HEAD: " Wearing baseball cap  EYES:  Sclera clear and conjunctiva normal, no discharge, wearing glasses  RESP:  Non-labored breathing, palpation of chest normal, no chest wall tenderness, no respiratory distress, Lung sounds clear, patient is on room air.   CV:  Palpation - no murmur/non-displaced PMI, Auscultation - rate and rhythm regular, no murmur, no rub or gallop..  VASCULAR: Trace edema right LE. No leg swelling L LE.   ABDOMEN:  normal bowel sounds, soft, nontender, no grimacing or guarding with palpation.   M/S:   Gait and station wheelchair bound.  SKIN:  Inspection - see photo below of right foot with decreased erythema from toes to mid-foot with superficial shearing now closed, skin on toes is dry and flaking, removed flaking skin and lotion applied. Palpation- no increased warmth, skin is dry and non-tender.  NEURO:  Masked facies.  PSYCH: Awake and alert, soft speech, insight and judgement impaired, no acute confusion, flat affect.    Labs:   Recent labs in [facility] reviewed by me today.    CBC RESULTS:   Recent Labs   Lab Test 09/16/20  0607 09/15/20  0933   WBC 4.4 3.4*   RBC 3.52* 3.42*   HGB 10.1* 9.8*   HCT 33.8* 32.1*   MCV 96 94   MCH 28.7 28.7   MCHC 29.9* 30.5*   RDW 15.0 14.7   * 121*       Last Basic Metabolic Panel:  Recent Labs   Lab Test 09/15/20  0933 09/13/20  0804    138   POTASSIUM 3.6 4.2   CHLORIDE 108 108   MESERET 7.5* 7.8*   CO2 23 25   BUN 15 23   CR 0.86 0.73   GLC 78 97       Liver Function Studies -   Recent Labs   Lab Test 09/07/20  1818 06/22/20  0759   PROTTOTAL 8.3 5.9*   ALBUMIN 4.4 3.0*   BILITOTAL 0.5 0.5   ALKPHOS 78 51   AST 23 22   ALT 10 10       TSH   Date Value Ref Range Status   06/22/2020 0.99 0.40 - 4.00 mU/L Final   10/26/2018 1.14 0.40 - 4.00 mU/L Final           ASSESSMENT/PLAN:  (L03.115) Cellulitis of right foot  (primary encounter diagnosis)  (L24.4) Irritant contact dermatitis due to drug in contact with skin  Comment: Improved. Presumed tinea pedis  reported 4/1, started on topical clotrimazole, which has helped in past, however developed suspected cellulitis vs contact dermatitis to right forefoot. Non-toxic appearing and improved on oral abx, completed course of Keflex on 4/15 (7 day course).   Plan:   - Discontinue dressing, no open areas or weeping  - Aquaphor BID after cleaning and drying forefoot and interdigital spaces well   - Update provided to hospice team. Hospice to follow at bi-weekly visit.      Electronically signed by:  ROSALINA Farrar CNP

## 2021-04-30 NOTE — PROGRESS NOTES
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      HPI information obtained from: facility chart records, facility staff, patient report, [facility] chart review and [facility]/ Hospice Nurse.     Today's concern is:  Follow-up today to assess cellulitis of right foot.   Started on Clotrimazole cream on 4/1 after nurses called to report tinea pedis symptoms, had similar symptoms in past with good response to topical antifungal; medication discontinued 4/8 when staff updated hospice about increased redness and weeping to right foot. Hospice provider started Keflex 250 mg PO QID, first dose given 4/8 and last dose given 4/15. Redness, swelling, and drainage from right foot improved after starting oral antibiotics and has been slowly resolving each week.     Hospice nurse concerned for continued swelling and wondering about the role of diruetics. Resident sits with legs in dependent position t/o the day. No compression socks in place, previously holding off on compression due to open draining areas and active infection.     Limited hx from resident today. No pain in foot. No concerns reported by nursing. No SOB or CP. No constipation, last BM at 5:00 am today. No dysuria.    Is bothered by by oral secretions, drip from mouth t/o visit. Despite copious secretion, mouth feels dry.     Past Medical and Surgical History reviewed in Epic today.    MEDICATIONS:  Current Outpatient Medications   Medication Sig Dispense Refill     acetaminophen (TYLENOL) 325 MG tablet Take 650 mg by mouth  2 times daily And 650 mg two times daily as needed       acetaminophen (TYLENOL) 650 MG suppository Place 1 suppository (650 mg) rectally every 4 hours as needed for fever or mild pain (Do not exceed 4000 mg total acetaminophen per day.) Unwrap prior to insertion. 12 suppository 1     allopurinol (ZYLOPRIM) 100 MG tablet Take 1 tablet (100 mg) by mouth daily 90 tablet 3     atropine 1 % ophthalmic solution Take 2-4 drops by mouth, place under tongue or place inside cheek every 2 hours as needed for other (terminal respiratory secretions) 5 mL 1     bisacodyl (DULCOLAX) 10 MG suppository Unwrap and insert 1 suppository rectally twice daily as needed for constipation. 12 suppository 1     calcium carbonate (TUMS) 500 MG chewable tablet Take 2 tablets (1,000 mg) by mouth 3 times daily as needed for heartburn 30 tablet 1     carbidopa-levodopa (SINEMET)  MG per tablet Take 2 tablets by mouth 3 times daily (9am - 12pm - 6pm)       melatonin 3 MG CAPS Take 6 mg by mouth At Bedtime        midodrine (PROAMATINE) 5 MG tablet Take 10 mg by mouth 2 times daily 8AM & NOON       mineral oil-hydrophilic petrolatum (AQUAPHOR) external ointment Apply topically 3 times daily       morphine sulfate HIGH CONCENTRATE (ROXANOL) 20 mg/mL (HIGH CONC) solution Take 5 mg by mouth every 2 hours as needed for shortness of breath / dyspnea or breakthrough pain       nystatin (MYCOSTATIN) 595245 UNIT/GM external powder Apply topically 2 times daily And as needed. Apply to groin for redness/rash       pantoprazole (PROTONIX) 40 MG EC tablet Take 1 tablet (40 mg) by mouth every morning (before breakfast) 30 tablet 1     polyethylene glycol (MIRALAX) 17 g packet Take 1 packet by mouth 2 times daily       senna (SENOKOT) 8.6 MG tablet Take 2 tablets by mouth 2 times daily        traZODone (DESYREL) 50 MG tablet Take 0.5 tablets (25 mg) by mouth At Bedtime. May also take 0.5 tablets (25 mg) daily as needed for sleep.       REVIEW OF  "SYSTEMS:  Limited secondary to cognitive impairment but today pt reports history as per HPI.     Objective:  /66   Pulse 68   Temp 96.4  F (35.8  C)   Resp 18   Ht 1.651 m (5' 5\")   Wt 57.2 kg (126 lb)   SpO2 98%   BMI 20.97 kg/m    Exam:  GENERAL APPEARANCE:  Alert, in no distress, well groomed, thin, sitting up in wheelchair, wearing cap  EYES:  Sclera clear and conjunctiva normal, no discharge, wearing glasses  RESP:  Non-labored breathing, palpation of chest normal, no chest wall tenderness, no respiratory distress, Lung sounds clear, patient is on room air.   CV:  rate and rhythm regular, no murmur, no rub or gallop. Distant heart tones  VASCULAR: Trace edema right LE, sock leave indent, no weeping or open areas. No leg swelling L LE. Feet are warm and even temp.  Negative Charity's sign. Calves are supple and non-tender.  ABDOMEN:  normal bowel sounds, soft, nontender, no grimacing or guarding with palpation.   M/S:   Gait and station wheelchair bound.  SKIN:  Inspection - see photo below of right foot with resolving erythema from toes to mid-foot, skin is well hydrated and no longer flaking. Palpation- no increased warmth, skin is dry and non-tender.  NEURO:  Masked facies, sialorrhea.  PSYCH: Awake and alert, soft speech, insight and judgement impaired, no acute confusion, flat affect.    Right foot:         Labs:   Patient is on hospice/palliative care - no recent labs.    ASSESSMENT/PLAN:  (R60.0) Edema of extremities  (primary encounter diagnosis)  (L03.115) Cellulitis of right foot  Comment: Present in first week of April with redness to right foot, treated with Keflex with good effect. Open areas resolved, as has as xerosis.  Plan:   - Continue Aquaphor  - Add tubigrips BID  - Assist resident to elevate legs between meals   - Can probably start wearing normal socks and shoes next week  - Would not recommend diuretic given mild nature of swelling hx of autonomic dysfunction with falls "     (K11.7) Sialorrhea  Comment: Copious oral secretions in setting of PD  Plan:  - Consider glycopyrrolate with hospice team next week, would not want to start going into weekend in case of intolerance   - PRN atropine in place    Electronically signed by:  ROSALINA Farrar CNP

## 2021-05-21 NOTE — PROGRESS NOTES
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     Hospitalized 9/7 to 9/16 2020 with severe malnutrition, acute cystitis, SBO vs ileus, recurrent syncope/falls, and TERRELL. Sent to ER from [facility] due to recurrent syncope, hypotension, weakness, and abd pain.  Also concern for STEMI. Initial plan for anigogram, but put on hold due to concern for small bowel obstruction and then fever. Echo with normal cardiac function, carotid US negative, no dysrhythmias on continuous cardiac monitor. Treated with 5 days of ceftriaxone for possible UTI. Head CT negative for acute process.  Palliative care consulted during hospitalization, met with daughter in-law, met criteria for hospice given overall decline in health status and elected to enroll in hospice and return to LTC facility.     Prior to most recent hospitalization was hospitalized at [facility] in March 2020 for a fall and again from 6/22 to 6/26 2020 with fall and head laceration, as well as change in mental status. June stay complicated by hypotension due to acute on chronic anemia, received blood transfusion. Mental status changes may have been related to Flexeril on which he was recently started due to rotator cuff injury.     Chronic health concerns include PD followed by Dr. Hurley at [facility]. Cognitive impairment and history of hallucinations, treated with PRN Zyprexa in the past. Dysphagia, now on DD3 diet and regular liquids. Hypertension, metoprolol stopped due to orthostatic hypotension and now midodrine BID. 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 History of left rotator cuff injury managed with Tylenol and voltaren. Hx of alcohol dependence while living in community. Hx of gout managed with allopurinol.     Admitted to hospice and the discharged due to desire to participate in therapy, now completed course of PT and elected to readmit to hospice.     Today's concerns are:  Nursing request urgent visit due to change in clinical status.     Resident minimally responsive with low grade fever. Unable to safely take oral medications.   Friend Abena present t/o visit. Spoke to Bill last night on phone, normal conversation.  Last BM 5/20/21.   Incontinent of urine.   Started on Atropine drops scheduled - only recent medication change.  No respiratory distress or cough noted. No hypoxia.  Staff are documenting incontinence of urine q shift.     Resident unable to offer history due to altered mental status.     Nursing notes reviewed:  5/21/2021 10:25 Nurses Note   \"Note Text: Cart nurse reported that resident is lethargic, unable to respond verbally, and was having difficulty swallowing AM medications. Writer assessed resident. Resident appears restless in bed, reaching/pick something in the air. Opens eyes with verbal stimuli. Low grade temp of 99.8. Abena and hospice SW present at bedside. Writer notified Hospice nurse and NP. Writer will follow up PRN.\"  5/21/2021 13:27 Nurses Note  \"Note Text: Resident was noted incoherent this shift.He was verbally unresponsive at the beginning of the shift.Later on he started responding with a mumbly speech.He was unable to verbalize needs and he was unable to eat/swallow /drink/take his oral tablet medications this shift.His breathing is even and non labored.O2 sats at 97% in RA.RR20,T 99.7,p 81,B/P 126/69.PRN tylenol supp was given for fever.Temp was down to 98.8 by noon.He was turned and repositioned Oral care was completed and the mouth was kept moisturized.Nurse manager and hospice nurse are aware of the " "resident's status.Per hospice nurse the family has been updated of the resident's status.New orders received for liquid comfort medications.A friend/guardian is visiting with the resident.\"    ALLERGIES:Lisinopril  PAST MEDICAL HISTORY:   Past Medical History:   Diagnosis Date     Aortic stenosis     very mild     Ying esophagus      Contact dermatitis and other eczema, due to unspecified cause      Esophageal reflux      Gout, unspecified      Incarcerated hiatal hernia 5/21/2015     Inguinal hernia without mention of obstruction or gangrene, unilateral or unspecified, (not specified as recurrent)      Polymyalgia rheumatica (H)      Umbilical hernia without mention of obstruction or gangrene      Unspecified essential hypertension      Vasomotor rhinitis      PAST SURGICAL HISTORY:   has a past surgical history that includes NONSPECIFIC PROCEDURE; NONSPECIFIC PROCEDURE; picc insertion (Right, 5/11/2015); Laparoscopic herniorrhaphy hiatal (N/A, 5/14/2015); Laparoscopic assisted insertion tube jejunostomy (N/A, 5/14/2015); Laparoscopic herniorrhaphy inguinal (Right, 5/14/2015); and Esophagoscopy, gastroscopy, duodenoscopy (EGD), combined (N/A, 5/11/2015).  FAMILY HISTORY: family history includes Breast Cancer in his mother; Cancer (age of onset: 37) in his son; Diabetes in his paternal grandfather.  SOCIAL HISTORY:  reports that he has never smoked. He has never used smokeless tobacco. He reports current alcohol use. He reports that he does not use drugs.    MEDICATIONS:     Review of your medicines          Accurate as of May 21, 2021 11:24 AM. If you have any questions, ask your nurse or doctor.            CONTINUE these medicines which have NOT CHANGED      Dose / Directions   * acetaminophen 325 MG tablet  Commonly known as: TYLENOL      Dose: 650 mg  Take 650 mg by mouth 2 times daily And 650 mg two times daily as needed  Refills: 0     * acetaminophen 650 MG suppository  Commonly known as: TYLENOL  Used " for: Parkinson's disease (H)      Dose: 650 mg  Place 1 suppository (650 mg) rectally every 4 hours as needed for fever or mild pain (Do not exceed 4000 mg total acetaminophen per day.) Unwrap prior to insertion.  Quantity: 12 suppository  Refills: 1     allopurinol 100 MG tablet  Commonly known as: ZYLOPRIM  Used for: Chronic gout without tophus, unspecified cause, unspecified site      Dose: 100 mg  Take 1 tablet (100 mg) by mouth daily  Quantity: 90 tablet  Refills: 3     atropine 1 % ophthalmic solution  Used for: Parkinson's disease (H)      Dose: 2-4 drop  Take 2-4 drops by mouth, place under tongue or place inside cheek every 2 hours as needed for other (terminal respiratory secretions)  Quantity: 5 mL  Refills: 1     atropine 1 % Soln      Dose: 2 drop  Place 2 drops under the tongue 4 times daily And 2 drops every 2 hours as needed  Refills: 0     bisacodyl 10 MG suppository  Commonly known as: DULCOLAX  Used for: Parkinson's disease (H)      Unwrap and insert 1 suppository rectally twice daily as needed for constipation.  Quantity: 12 suppository  Refills: 1     calcium carbonate 500 MG chewable tablet  Commonly known as: TUMS  Used for: Parkinson's disease (H)      Dose: 2 chew tab  Take 2 tablets (1,000 mg) by mouth 3 times daily as needed for heartburn  Quantity: 30 tablet  Refills: 1     carbidopa-levodopa  MG tablet  Commonly known as: SINEMET  Used for: Parkinson's disease (H)      Dose: 2 tablet  Take 2 tablets by mouth 3 times daily (9am - 12pm - 6pm)  Refills: 0     melatonin 3 MG Caps      Dose: 6 mg  Take 6 mg by mouth At Bedtime  Refills: 0     midodrine 5 MG tablet  Commonly known as: PROAMATINE      Dose: 10 mg  Take 10 mg by mouth 2 times daily 8AM & NOON  Refills: 0     mineral oil-hydrophilic petrolatum external ointment      Apply topically 3 times daily  Refills: 0     morphine sulfate HIGH CONCENTRATE 20 mg/mL (HIGH CONC) solution  Commonly known as: ROXANOL      Dose: 5 mg  Take  "5 mg by mouth every 2 hours as needed for shortness of breath / dyspnea or breakthrough pain  Refills: 0     nystatin 964575 UNIT/GM external powder  Commonly known as: MYCOSTATIN      Apply topically 2 times daily And as needed. Apply to groin for redness/rash  Refills: 0     pantoprazole 40 MG EC tablet  Commonly known as: PROTONIX  Used for: Parkinson's disease (H)      Dose: 40 mg  Take 1 tablet (40 mg) by mouth every morning (before breakfast)  Quantity: 30 tablet  Refills: 1     polyethylene glycol 17 g packet  Commonly known as: MIRALAX      Dose: 1 packet  Take 1 packet by mouth 2 times daily  Refills: 0     senna 8.6 MG tablet  Commonly known as: SENOKOT      Dose: 2 tablet  Take 2 tablets by mouth 2 times daily  Refills: 0     traZODone 50 MG tablet  Commonly known as: DESYREL      Take 0.5 tablets (25 mg) by mouth At Bedtime. May also take 0.5 tablets (25 mg) daily as needed for sleep.  Refills: 0         * This list has 2 medication(s) that are the same as other medications prescribed for you. Read the directions carefully, and ask your doctor or other care provider to review them with you.              ROS:  Unobtainable secondary to cognitive impairment.     Vitals:  BP (!) 142/67   Pulse 87   Temp 97.7  F (36.5  C)   Resp 18   Ht 1.651 m (5' 5\")   Wt 56.2 kg (124 lb)   SpO2 99%   BMI 20.63 kg/m    Body mass index is 20.63 kg/m .  Exam:  GENERAL APPEARANCE:  Somnolent, laying in bed on left side  EYES:  Keeps eyes closed  RESP:  Non-labored breathing, palpation of chest normal, no chest wall tenderness, no respiratory distress, Lung sounds clear, patient is on room air. SpO2 94%.   CV:  rate and rhythm regular, no murmur, no rub or gallop. Distant heart tones  VASCULAR: No edema. Feet are warm and even temp. Calves are supple and non-tender.  ABDOMEN:  Normal bowel sounds, soft, nontender, no grimacing or guarding with palpation.   : no palpable bladder or apparent suprapubic tenderness  M/S: " Needs draw sheet assist to turn in bed for exam  SKIN:  Inspection - do not appreciate any new skin lesions, erythema on limited exam as fully dressed. Palpation- no increased warmth, skin is dry and non-tender.  NEURO:  Resists with BL UE and LE with good strength BL, squeezes eyes shot with attempt at exam, do not appreciate any focal deficit   PSYCH: Somnolent, minimally responsive, does open eyes, but then closely them again, no speech    Lab/Diagnostic data:   Recent labs in [facility] reviewed by me today.    CBC RESULTS:   Recent Labs   Lab Test 09/16/20  0607 09/15/20  0933   WBC 4.4 3.4*   RBC 3.52* 3.42*   HGB 10.1* 9.8*   HCT 33.8* 32.1*   MCV 96 94   MCH 28.7 28.7   MCHC 29.9* 30.5*   RDW 15.0 14.7   * 121*       Last Basic Metabolic Panel:  Recent Labs   Lab Test 09/15/20  0933 09/13/20  0804    138   POTASSIUM 3.6 4.2   CHLORIDE 108 108   MESERET 7.5* 7.8*   CO2 23 25   BUN 15 23   CR 0.86 0.73   GLC 78 97     GFR Estimate   Date Value Ref Range Status   09/15/2020 78 >60 mL/min/[1.73_m2] Final     Liver Function Studies -   Recent Labs   Lab Test 09/07/20  1818 06/22/20  0759   PROTTOTAL 8.3 5.9*   ALBUMIN 4.4 3.0*   BILITOTAL 0.5 0.5   ALKPHOS 78 51   AST 23 22   ALT 10 10       TSH   Date Value Ref Range Status   06/22/2020 0.99 0.40 - 4.00 mU/L Final   10/26/2018 1.14 0.40 - 4.00 mU/L Final     9/7/20      ASSESSMENT/PLAN  (R50.9) Fever, unspecified fever cause  (primary encounter diagnosis)  (B99.9) Possible Infection Unknown Etiology  (R40.0) Somnolence   (Z51.5) Hospice Care Patient   Comment: Low grade fever and change in mental status on waking this AM. Unable to take PO medications. Mostly likely cause of change in status seems to be infection, no respiratory symptoms, NO GI symptoms, would favor UTI given hx. New medication scheduled atropine can cause confusion, could have aspirated, also some concern for restlessness/halluincations.  Plan:   - Discontinue atropine, anticholinergic  effect could cause confusion, monitor mental status off medication   - Long discussion with family and hospice team regarding risk/benefit of empiric treatment of potential infection. Family would like a trial of IM abx (given inability to swallow) and continued hospice supports, after consideration pf hospital supports and full comfort care no abx risk/benefit in setting of progressive PD and dementia. Ordered cefTRIAXone (ROCEPHIN) 1 GM one time as single dose.  - Discontinue routine oral medications unable to take: Allopurinol, Sinemet  mg two tablets TID, melatonin, Ensure, Midodrine 10 mg BID, Protonix, Mirlax BID, Trazodone 25 mg q HS, Senna two tablets BID.  - Start liquid comfort medications MSIR and ativan PRN  - Start Seroquel 25 mg solution q 6 hours PRN x14 days  Noted PRN orders for antipsychotic medications are limited to 14 days. Face to Face encounter done today. Evaluation of Seroquel medication indicates it is appropriate and necessary to continue this medication due to possible terminal delirium for 14 days. Nsg to update [facility] provider before day 14 if greater than 3 uses in the last 5 days.  - Hospice to follow-up daily.    (G20) Parkinson's disease (H)  Comment: First reported PD symptoms in Feb/2015 - resting tremor. Followed by \[facility]. Seems to be having complications of disease process, autonomic dysfunction: orthostasis/syncope, constipation, cognitive impairment, hallucinations, disordered sleep. No neuropsych symptoms reported of late, but intermittent difficult with sleep.  Hospitalized x3 over last six months with 25# weight loss over last year. Revoked hospice briefly to do therapy, but unable to progress and re-enrolled in hospice. [facility] 13/30, lacks capacity on neuro-psych testing from 3/25/21  Plan:   - Stop oral medications for symptom mgmt as above, if clinical status improves after abx will plan to resume  - Appreciate hospice supports     Total time spent  "with patient visit at the skilled nursing facility was 41 min including patient visit, review of past records, phone call to patient contact and discussion with hospice team. Greater than 50% of total time spent with counseling and coordinating care due to acute change in clinical status requiring creation of new plan of care. 27/41 minutes in counseling and coordination of care.    10:40 AM - 10:50 AM (10 minutes) Chart reviewed with hospice RN on unit assess clinical status and medications    10:55 AM - 11:08 AM (13 minutes) Patient visit with friend Abena present, 5 min in exam, 10 min in discussion of care goals.     11:11 AM - 11:23 AM (12 minutes) Spoke to daughter in-law  Marissa Echevarria with hospice nurse, \"I think the most important thing is to make him comfortable\" but then asks about sending Bill to the hospital for treatment, discussed risk/benefit at length. Reviewed hospice philosophy in setting of Bill's chronic illness. Marissa asked to call back after she discusses with there family. Understands if Bill is sent to hospital will need to revoke hospice.    11:35 - 11:36 (1 minutes) Marissa called back, no hospital, but would like to try IM Rocephin and take things one day at time. Okay with comfort medications and hold oral medications.    11:40-11:45 (5 minutes) Spoke to hospice NP with hospice nurse via telephone, IM Rocephin may not be covered by secondary insurance and hospice will not cover. Hospice nurse to discuss cost with family, will let facility know if they are not okay with potential cost.      Electronically signed by:  ROSALINA Farrar CNP      "

## 2021-05-24 NOTE — PROGRESS NOTES
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      HPI information obtained from: facility chart records, facility staff, patient report, [facility] chart review and visit today with hospice nurse Radha.    Today's concern is:  Follow-up today after acute change in clinical status three days ago with fever and somnolence. Treated with 1 Gram of IM Rocephin for possible early infection of unclear etiology around 20:00 on 5/21. Scheduled atropine drops were also stopped due to possible anticholingeric side effect of confusion.    5/22 - used two doses PRN MSIR    Discussed with nursing, resident \"came back\" on 5/23, up in wheelchair rather than laying in bed and started eating again. Nursing feels resident could start taking PO medications again.    Prior to visit resident found self-propeling in wheelchair in hallway.   Limited history due to cognition and hypophonia.  Reports, \"I'm terrible!\"  \"The breathing is fine.\"  No chest pain.  No abd pain.   No nausea.   \"No idea\" about bladder or bowel habits.  Last documented BM 5/19.   No PO intake 5/22, but did eat part of two meals yesterday.     Recent blood pressures:      Recent temps:        Past Medical and Surgical History reviewed in Epic today.    MEDICATIONS:  Current Outpatient Medications   Medication Sig Dispense Refill     acetaminophen (TYLENOL) 325 MG tablet Take 650 mg by mouth 2 times daily And 650 mg two times daily as needed       acetaminophen (TYLENOL) 650 MG suppository Place 1 suppository (650 mg) rectally every 4 hours as " "needed for fever or mild pain (Do not exceed 4000 mg total acetaminophen per day.) Unwrap prior to insertion. 12 suppository 1     bisacodyl (DULCOLAX) 10 MG suppository Unwrap and insert 1 suppository rectally twice daily as needed for constipation. 12 suppository 1     carbidopa-levodopa (SINEMET)  MG tablet Take 2 tablets by mouth 3 times daily       LORazepam (ATIVAN) 2 MG/ML (HIGH CONC) solution Take 0.25 mLs (0.5 mg) by mouth every 4 hours as needed for anxiety       mineral oil-hydrophilic petrolatum (AQUAPHOR) external ointment Apply topically 3 times daily       morphine sulfate HIGH CONCENTRATE (ROXANOL) 20 mg/mL (HIGH CONC) solution Take 2.5 mg by mouth every 2 hours as needed for shortness of breath / dyspnea or breakthrough pain        nystatin (MYCOSTATIN) 144463 UNIT/GM external powder Apply topically 2 times daily And as needed. Apply to groin for redness/rash       polyethylene glycol (MIRALAX) 17 GM/Dose powder Take 17 g by mouth 2 times daily 510 g      QUEtiapine (SEROQUEL) 25 MG tablet Take 1 tablet (25 mg) by mouth every 6 hours as needed (terminal delirim) NOTE THIS IS COMPOUNDED SOLUTION, NOT TABLETS, concentration is 50 mg/mL       senna (SENOKOT) 8.6 MG tablet Take 2 tablets by mouth 2 times daily        REVIEW OF SYSTEMS:  Limited secondary to cognitive impairment but today pt reports history as per HPI.     Objective:  /67   Pulse 68   Temp 97.6  F (36.4  C)   Resp 18   Ht 1.651 m (5' 5\")   Wt 56.2 kg (124 lb)   SpO2 94%   BMI 20.63 kg/m    Exam:  GENERAL APPEARANCE:  Alert, in no distress, well groomed, thin, sitting up in wheelchair, wearing cap  EYES:  Sclera clear and conjunctiva normal, no discharge, wearing glasses  RESP:  Non-labored breathing, palpation of chest normal, no chest wall tenderness, no respiratory distress, Lung sounds clear, patient is on room air. SpO2 95%  CV:  rate and rhythm regular, no murmur, no rub or gallop. Distant heart tones. HR " 65  VASCULAR: Trace edema right LE, sock leave indent, no weeping or open areas. No leg swelling L LE. Feet are warm and even temp.  Negative Charity's sign. Calves are supple and non-tender.  ABDOMEN:  normal bowel sounds, soft, nontender, no grimacing or guarding with palpation.   M/S:   Gait and station wheelchair bound.  SKIN:  Inspection - ulcer to right second - SEE PHOTO. Palpation- no increased warmth, skin is dry and non-tender.  NEURO:  Masked facies, sialorrhea.  PSYCH: Awake and alert, soft speech, insight and judgement impaired, no acute confusion, flat affect.    Right 2nd toe:      Labs:   Patient is on hospice/palliative care and labs are not recommended    ASSESSMENT/PLAN:  (G20) Parkinson's disease (H)  (primary encounter diagnosis)  (K59.01) Slow transit constipation  (I95.1) Orthostatic hypotension  (Z51.5) Hospice care patient  Comment: Presume infection of uncertain etiology noted three days ago, concern resident would transition to active dying, but seemed to perk-up after dose of IM Rocephin, which was ordered after risk/benefit discussion with family.  PD symptoms since at least 2015, was followed by [facility]. Complications of disease process include orthostasis/syncope, constipation, cognitive impairment, hallucinations, insomnia. Enrolled in hospice after recurrent hospitalization and weight loss, attempt to revoke and try therapy again, but unable to progress.  Oral medications were stopped on 5/21 due to somnolence and inability to safely swallow.  Plan:   - AM/PM blood pressures and HR x 3 days -- to see if Midodrine needs to be restarted, hospice nurse to follow-up 5/27  - Resume Sinemet  mg two tablets TID at 9,12,18  - Restart Ensure  - Discontinue TUMS PRN, not uses  - Restart Miralax 17 gram BID  - Restart Senna 8.6 two tablets BID    (L97.511) Skin ulcer of toe of right foot, limited to breakdown of skin (H)  Comment: Ulcer incidentally noted on exam, possible  pressure and friction injury? No apparent infection, no significant pain.  Plan:   - Foam dressing daily, hospice nursing to follow closely    Electronically signed by:  ROSALINA Farrar CNP
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
## 2021-05-25 PROBLEM — L97.511 SKIN ULCER OF TOE OF RIGHT FOOT, LIMITED TO BREAKDOWN OF SKIN (H): Status: ACTIVE | Noted: 2021-01-01

## 2021-06-04 NOTE — LETTER
6/4/2021        RE: Chilango Echevarria  [address] [address]  [address]  [place] [facility] [place] [address]        Chilango Echevarria is a 86 year old male seen June 4, 2021 at [facility] where he has resided for one year (admit to TCU 6/2020).    He is seen in his room resting abed, unresponsive to voice or touch.   His close friend Abena is here; she notes he had some response yesterday, but none today.   He has not taken in food or drink past few days.     Pt has had Parkinson's disease since 2015, now with dementia, with other comorbidities including HTN, PMR, gout, GERD with Ying's esophagus, [place] because of confusion, paranoia and falls.     He was hospitalized in March 2020 after a fall; workup unremarkable and he discharged to TCU, then to the [facility].  He was readmitted in June after being found down in his [place] apartment.  He was severely orthostatic and given IVF, metoprolol stopped.   He was transfused one unit pRBCs, ?blood loss from head laceration.    He discharged to [facility] TCU but made little progress with therapies and transferred to LT for ongoing care.   He was hospitalized again in September 2020 for unresponsive episodes. He had cardiac evaluation and briefly treated for possible SBO.   He was seen by Palliative care, and pt discharged back to [facility] on Hospice.   Later in September 2020 he was COVID19+ but recovered without significant symptoms    This past winter patient elected to disenroll from Hospice and have another course of PHYSICAL THERAPY  He was not able to make any functional gains, and then readmitted to Hospice in April.     Past Medical History:   Diagnosis Date     Aortic stenosis     very mild     Ying esophagus      Contact dermatitis and other eczema, due to unspecified cause      Esophageal reflux      Gout, unspecified      Incarcerated hiatal hernia 5/21/2015     Inguinal hernia without mention of  "obstruction or gangrene, unilateral or unspecified, (not specified as recurrent)      Polymyalgia rheumatica (H)      Umbilical hernia without mention of obstruction or gangrene      Unspecified essential hypertension      Vasomotor rhinitis        Past Surgical History:   Procedure Laterality Date     ESOPHAGOSCOPY, GASTROSCOPY, DUODENOSCOPY (EGD), COMBINED N/A 5/11/2015    Procedure: COMBINED ESOPHAGOSCOPY, GASTROSCOPY, DUODENOSCOPY (EGD);  Surgeon: Thad Ferro MD;  Location: [facility] OR     LAPAROSCOPIC ASSISTED INSERTION TUBE JEJUNOSTOMY N/A 5/14/2015    Procedure: LAPAROSCOPIC ASSISTED INSERTION TUBE JEJUNOSTOMY;  Surgeon: Thad Ferro MD;  Location: [facility] OR     LAPAROSCOPIC HERNIORRHAPHY HIATAL N/A 5/14/2015    Procedure: LAPAROSCOPIC HERNIORRHAPHY HIATAL;  Surgeon: Thad Ferro MD;  Location: [facility] OR     LAPAROSCOPIC HERNIORRHAPHY INGUINAL Right 5/14/2015    Procedure: LAPAROSCOPIC HERNIORRHAPHY INGUINAL;  Surgeon: Thad Ferro MD;  Location: [facility] OR     PICC INSERTION Right 5/11/2015    5fr DL Power PICC, 39cm (1cm external) in the R medial brachial vein w/ tip in the low SVC.     ZZC NONSPECIFIC PROCEDURE      T&A     ZZC NONSPECIFIC PROCEDURE      umbilical herniorraphy     SH:  Previously lived alone in a [facility], then at the [facility].  He has a sister Marissa.   A daughter-in-law lives in [place].   Non smoker    ROS:   Wt Readings from Last 5 Encounters:   06/04/21 53.4 kg (117 lb 12.8 oz)   05/24/21 56.2 kg (124 lb)   05/21/21 56.2 kg (124 lb)   04/30/21 57.2 kg (126 lb)   04/23/21 56.6 kg (124 lb 12.8 oz)      EXAM: very thin and frail  /67   Pulse 76   Temp 98.9  F (37.2  C)   Resp 28   Ht 1.651 m (5' 5\")   Wt 53.4 kg (117 lb 12.8 oz)   SpO2 90%   BMI 19.60 kg/m     Unresponsive   Cheyne-Gunn respirations  Neck supple without adenopathy  Lungs with decreased BS, no rales or wheeze   Heart RRR s1s2  Abd soft, NT, no distention, " +BS  Ext without edema, +sarcopenia    Labs reviewed    IMP/PLAN:   (Z51.5) End of life care  (primary encounter diagnosis)  Comment: no oral intake, unresponsive   Plan: Hospice care.  Titrate MS to treat discomfort and tachypnea  Reviewed findings and plan of care with pt's friend Abena at bedside.        (G20) Parkinson's disease (H)  Comment: with autonomic dysfunction, decreased mobility and dementia    Plan: Sinemet stopped when no longer able to take po medications      (I95.1) Orthostatic hypotension  (Z87.898) History of syncope  Comment: autonomic dysfunction        (F03.91) Dementia with behavioral disturbance, unspecified dementia type (H)  Comment: decrease in cognition, low functional status   Plan: LTC support for meds, meals, activity    (E43) Severe malnutrition (H)  Comment: significant weight loss, eating is variable     Plan: Hospice care    Shruthi Bergman MD         Sincerely,        Shruthi Bergman MD

## 2021-06-04 NOTE — PROGRESS NOTES
Chilango Echevarria is a 86 year old male seen June 4, 2021 at [facility] where he has resided for one year (admit to TCU 6/2020).    He is seen in his room resting abed, unresponsive to voice or touch.   His close friend Abena is here; she notes he had some response yesterday, but none today.   He has not taken in food or drink past few days.     Pt has had Parkinson's disease since 2015, now with dementia, with other comorbidities including HTN, PMR, gout, GERD with Ying's esophagus, declining since [place] because of confusion, paranoia and falls.     He was hospitalized in March 2020 after a fall; workup unremarkable and he discharged to TCU, then to the [facility].  He was readmitted in June after being found down in his [place] apartment.  He was severely orthostatic and given IVF, metoprolol stopped.   He was transfused one unit pRBCs, ?blood loss from head laceration.    He discharged to [facility] TCU but made little progress with therapies and transferred to LT for ongoing care.   He was hospitalized again in September 2020 for unresponsive episodes. He had cardiac evaluation and briefly treated for possible SBO.   He was seen by Palliative care, and pt discharged back to [facility] on Hospice.   Later in September 2020 he was COVID19+ but recovered without significant symptoms    This past winter patient elected to disenroll from Hospice and have another course of PHYSICAL THERAPY  He was not able to make any functional gains, and then readmitted to Hospice in April.     Past Medical History:   Diagnosis Date     Aortic stenosis     very mild     Ying esophagus      Contact dermatitis and other eczema, due to unspecified cause      Esophageal reflux      Gout, unspecified      Incarcerated hiatal hernia 5/21/2015     Inguinal hernia without mention of obstruction or gangrene, unilateral or unspecified, (not specified as recurrent)      Polymyalgia rheumatica  "(H)      Umbilical hernia without mention of obstruction or gangrene      Unspecified essential hypertension      Vasomotor rhinitis        Past Surgical History:   Procedure Laterality Date     ESOPHAGOSCOPY, GASTROSCOPY, DUODENOSCOPY (EGD), COMBINED N/A 5/11/2015    Procedure: COMBINED ESOPHAGOSCOPY, GASTROSCOPY, DUODENOSCOPY (EGD);  Surgeon: Thad Ferro MD;  Location: [facility] OR     LAPAROSCOPIC ASSISTED INSERTION TUBE JEJUNOSTOMY N/A 5/14/2015    Procedure: LAPAROSCOPIC ASSISTED INSERTION TUBE JEJUNOSTOMY;  Surgeon: Thad Ferro MD;  Location: [facility] OR     LAPAROSCOPIC HERNIORRHAPHY HIATAL N/A 5/14/2015    Procedure: LAPAROSCOPIC HERNIORRHAPHY HIATAL;  Surgeon: Thad Ferro MD;  Location: [facility] OR     LAPAROSCOPIC HERNIORRHAPHY INGUINAL Right 5/14/2015    Procedure: LAPAROSCOPIC HERNIORRHAPHY INGUINAL;  Surgeon: Thad Ferro MD;  Location: [facility] OR     PICC INSERTION Right 5/11/2015    5fr DL Power PICC, 39cm (1cm external) in the R medial brachial vein w/ tip in the low SVC.     ZZC NONSPECIFIC PROCEDURE      T&A     ZZC NONSPECIFIC PROCEDURE      umbilical herniorraphy     SH:  Previously lived alone in a [facility], then at the [facility].  He has a sister Marissa.   A daughter-in-law lives in [place].   Non smoker    ROS:   Wt Readings from Last 5 Encounters:   06/04/21 53.4 kg (117 lb 12.8 oz)   05/24/21 56.2 kg (124 lb)   05/21/21 56.2 kg (124 lb)   04/30/21 57.2 kg (126 lb)   04/23/21 56.6 kg (124 lb 12.8 oz)      EXAM: very thin and frail  /67   Pulse 76   Temp 98.9  F (37.2  C)   Resp 28   Ht 1.651 m (5' 5\")   Wt 53.4 kg (117 lb 12.8 oz)   SpO2 90%   BMI 19.60 kg/m     Unresponsive   Cheyne-Gunn respirations  Neck supple without adenopathy  Lungs with decreased BS, no rales or wheeze   Heart RRR s1s2  Abd soft, NT, no distention, +BS  Ext without edema, +sarcopenia    Labs reviewed    IMP/PLAN:   (Z51.5) End of life care  (primary encounter " diagnosis)  Comment: no oral intake, unresponsive   Plan: Hospice care.  Titrate MS to treat discomfort and tachypnea  Reviewed findings and plan of care with pt's friend Abena at bedside.        (G20) Parkinson's disease (H)  Comment: with autonomic dysfunction, decreased mobility and dementia    Plan: Sinemet stopped when no longer able to take po medications      (I95.1) Orthostatic hypotension  (Z87.898) History of syncope  Comment: autonomic dysfunction        (F03.91) Dementia with behavioral disturbance, unspecified dementia type (H)  Comment: decrease in cognition, low functional status   Plan: LTC support for meds, meals, activity    (E43) Severe malnutrition (H)  Comment: significant weight loss, eating is variable     Plan: Hospice care    Shruthi Bergman MD

## 2021-06-05 NOTE — PROGRESS NOTES
Dear Deja Panchal CNP  We are notifying you of a Missed Visit or Hospice Death.  Chilango Echevarria; MRN [id number]   peacefully On date 2021  Time 02:30  Sincerely [facility]  Sha Barbosa  [phone]

## 2021-06-07 PROBLEM — E46 PROTEIN-CALORIE MALNUTRITION (H): Status: ACTIVE | Noted: 2021-06-07

## 2022-10-26 NOTE — PLAN OF CARE
PRIMARY DIAGNOSIS: GENERALIZED WEAKNESS    OUTPATIENT/OBSERVATION GOALS TO BE MET BEFORE DISCHARGE  1. Orthostatic performed: No    2. Tolerating PO medications: Yes    3. Return to near baseline physical activity: Yes    4. Cleared for discharge by consultants (if involved): No    Discharge Planner Nurse   Safe discharge environment identified: No  Barriers to discharge: Yes       Entered by: Ching Barr 03/17/2020 11:33 AM     Please review provider order for any additional goals.   Nurse to notify provider when observation goals have been met and patient is ready for discharge.   No

## 2024-02-25 NOTE — TELEPHONE ENCOUNTER
Resident had a fall early AM at 230 and found on floor    This morning his B/P was 64/48 and given his midodrine   B/P came up to 90/60 with fluids pushed.    Did get a skin tear on left elbow    No new orders    Electronically signed by Marie Larios RN, CNP    
radiology results pending

## 2024-07-22 NOTE — TELEPHONE ENCOUNTER
I approve of requested home care orders.    Enrique Ring MD     [place]      Last Written Prescription Date:  4/16/24  Last Fill Quantity: 90,   # refills: 0  Last Office Visit: 7/9/24  Future Office visit:    Next 5 appointments (look out 90 days)      Jul 23, 2024 2:40 PM  (Arrive by 2:25 PM)  Return Visit with NORMAN Mauro CNP  [facility] ([facility] ) [address]  [place] [address]  [phone]     Aug 07, 2024 11:00 AM  (Arrive by 10:45 AM)  Return Visit with Lizeth Crisostomo CNP  [facility] [address] [facility] [address]  [phone]     Aug 21, 2024 11:30 AM  (Arrive by 11:15 AM)  Provider Visit with Sheyla Sanches NP  [facility] ([facility] ) [address]  [place] [address]  [phone]     Oct 09, 2024 11:00 AM  (Arrive by 10:45 AM)  Return Visit with Lelia Lemus NP  [facility] [address]  [place] [address]  [phone]             Routing refill request to provider for review/approval because: